# Patient Record
Sex: FEMALE | Race: WHITE | NOT HISPANIC OR LATINO | Employment: OTHER | URBAN - METROPOLITAN AREA
[De-identification: names, ages, dates, MRNs, and addresses within clinical notes are randomized per-mention and may not be internally consistent; named-entity substitution may affect disease eponyms.]

---

## 2017-01-06 ENCOUNTER — OUTPATIENT CASE MANAGEMENT (OUTPATIENT)
Dept: ADMINISTRATIVE | Facility: OTHER | Age: 82
End: 2017-01-06

## 2017-01-06 NOTE — PROGRESS NOTES
Follow up with pt. Pt reports she is doing much better. She states she had a good Flavio with her family. She denies any further needs at this time. Pt disenrolled from [facility].

## 2017-01-18 ENCOUNTER — OFFICE VISIT (OUTPATIENT)
Dept: FAMILY MEDICINE | Facility: CLINIC | Age: 82
End: 2017-01-18
Payer: MEDICARE

## 2017-01-18 VITALS
OXYGEN SATURATION: 93 % | WEIGHT: 142.63 LBS | TEMPERATURE: 98 F | RESPIRATION RATE: 18 BRPM | HEIGHT: 63 IN | SYSTOLIC BLOOD PRESSURE: 118 MMHG | DIASTOLIC BLOOD PRESSURE: 66 MMHG | HEART RATE: 77 BPM | BODY MASS INDEX: 25.27 KG/M2

## 2017-01-18 DIAGNOSIS — J06.9 UPPER RESPIRATORY TRACT INFECTION, UNSPECIFIED TYPE: Primary | ICD-10-CM

## 2017-01-18 PROCEDURE — 1157F ADVNC CARE PLAN IN RCRD: CPT | Mod: S$GLB,,, | Performed by: FAMILY MEDICINE

## 2017-01-18 PROCEDURE — 1160F RVW MEDS BY RX/DR IN RCRD: CPT | Mod: S$GLB,,, | Performed by: FAMILY MEDICINE

## 2017-01-18 PROCEDURE — 1125F AMNT PAIN NOTED PAIN PRSNT: CPT | Mod: S$GLB,,, | Performed by: FAMILY MEDICINE

## 2017-01-18 PROCEDURE — 99213 OFFICE O/P EST LOW 20 MIN: CPT | Mod: S$GLB,,, | Performed by: FAMILY MEDICINE

## 2017-01-18 PROCEDURE — 1159F MED LIST DOCD IN RCRD: CPT | Mod: S$GLB,,, | Performed by: FAMILY MEDICINE

## 2017-01-18 PROCEDURE — 99999 PR PBB SHADOW E&M-EST. PATIENT-LVL III: CPT | Mod: PBBFAC,,, | Performed by: FAMILY MEDICINE

## 2017-01-18 RX ORDER — BENZONATATE 100 MG/1
100 CAPSULE ORAL 3 TIMES DAILY PRN
Qty: 30 CAPSULE | Refills: 0 | Status: SHIPPED | OUTPATIENT
Start: 2017-01-18 | End: 2017-01-28

## 2017-01-18 RX ORDER — PROMETHAZINE HYDROCHLORIDE AND DEXTROMETHORPHAN HYDROBROMIDE 6.25; 15 MG/5ML; MG/5ML
5 SYRUP ORAL EVERY 8 HOURS PRN
Qty: 118 ML | Refills: 0 | Status: SHIPPED | OUTPATIENT
Start: 2017-01-18 | End: 2017-01-26 | Stop reason: ALTCHOICE

## 2017-01-18 NOTE — PROGRESS NOTES
"Subjective:       Patient ID: Sandra Moody is a 88 y.o. female.    Chief Complaint: URI (all since Sat) and Sore Throat    URI    This is a new problem. The current episode started in the past 7 days. There has been no fever. Associated symptoms include coughing, diarrhea, rhinorrhea and sneezing. Pertinent negatives include no ear pain, headaches, nausea or vomiting. She has tried nothing for the symptoms. The treatment provided no relief.     Review of Systems   HENT: Positive for rhinorrhea and sneezing. Negative for ear pain.    Respiratory: Positive for cough.    Gastrointestinal: Positive for diarrhea. Negative for nausea and vomiting.   Neurological: Negative for headaches.       Objective:       Vitals:    01/18/17 1446   BP: 118/66   Pulse: 77   Resp: 18   Temp: 98.3 °F (36.8 °C)   TempSrc: Oral   SpO2: (!) 93%   Weight: 64.7 kg (142 lb 10.2 oz)   Height: 5' 3" (1.6 m)     .ASS  Physical Exam   Constitutional: She is oriented to person, place, and time. She appears well-developed and well-nourished. No distress.   HENT:   Head: Normocephalic and atraumatic.   Right Ear: External ear normal.   Left Ear: External ear normal.   Mouth/Throat: Oropharynx is clear and moist. No oropharyngeal exudate.   Neck: Normal range of motion. Neck supple.   Cardiovascular: Normal rate, regular rhythm and normal heart sounds.  Exam reveals no gallop and no friction rub.    No murmur heard.  Pulmonary/Chest: Effort normal and breath sounds normal. No respiratory distress. She has no wheezes. She has no rales. She exhibits no tenderness.   Lymphadenopathy:     She has cervical adenopathy.   Neurological: She is alert and oriented to person, place, and time.   Skin: She is not diaphoretic.       Assessment:       1. Upper respiratory tract infection, unspecified type        Plan:       Sandra was seen today for uri and sore throat.    Diagnoses and all orders for this visit:    Upper respiratory tract infection, " unspecified type  -     benzonatate (TESSALON) 100 MG capsule; Take 1 capsule (100 mg total) by mouth 3 (three) times daily as needed for Cough.  -     promethazine-dextromethorphan (PROMETHAZINE-DM) 6.25-15 mg/5 mL Syrp; Take 5 mLs by mouth every 8 (eight) hours as needed.

## 2017-01-18 NOTE — MR AVS SNAPSHOT
[facility]  [address]  [address]  [place] [address]  Phone: [phone]  Fax: [phone]                  Sandra Moody   2017 2:45 PM   Office Visit    Description:  Female : 10/27/1928   Provider:  Chhaya Christianson MD   Department:  [facility]           Reason for Visit     URI     Sore Throat           Diagnoses this Visit        Comments    Upper respiratory tract infection, unspecified type    -  Primary            To Do List           Goals (5 Years of Data)              12/7/16    11/22/16    11/3/16    COMPLETED: Patient/caregiver will complete application process to obtain services prior to discharge from [facility].           Notes - Note edited  2016 12:54 PM by Alyce Varma LCSW    Overall Time to Completion  2 months from 2016    Short Term Goals  Patient/caregiver will contact Agency: [facility] and Insurance Customer Service for guidance within 1 month.  Interventions   · Assist with completing insurance claim form.  · Refer to Customer Service Department.   Status  · Deferred      Patient/caregiver will review and complete Advance Directive Form within 1 month.  Interventions   · Empower patient/caregiver to discuss End of life issues with family, Physician and/or Power of .  · Provide education on advance care planning.   Status  · Deferred            COMPLETED: Patient/caregiver will have adequate mental health support/resources prior to discharge from [facility].           Notes - Note edited  2016 11:47 AM by Alyce Varma LCSW    Overall Time to Completion  2 weeks from 2016    Short Term Goals  Patient/caregiver will visit Mental Health  or support group within 2 weeks.  Interventions   · Encourage compliance with medical/mental health appointments.  · Encourage family/social  and involvement in care.  · Encourage medication compliance.  · Facilitate family  meeting.  · Provide crisis intervention hotline.  · Provide information on safety devices.  · Provide support group information.  · Provide supportive counseling.  · Recognize and provide educational material (KRAMES).   Status  · Met            COMPLETED: Patient/caregiver will have an action plan in place to manage and prevent complications of COPD prior to discharge from [facility].   On track  On track  On track    Notes - Note created  10/24/2016  2:00 PM by Shawna Lawrence RN    Overall Time to Completion  4 weeks from 10/24/2016    Short Term Goals  Patient/caregiver will identify 3 deficits resulting from COPD and list 3 ways to overcome those deficits within 2 weeks.  Interventions   · Arrange home visit to evaluate home environment.  · Recognize and provide educational material (KRAMES).   Status  · Partially met    Patient/caregiver will verbalize 4 signs and symptoms of COPD within 2 weeks.   Interventions   · Recognize and provide educational material (KRAMES).  · Complete medication reconciliation.  · Encourage Medication Compliance.   Status  · Partially met    Patient/caregiver will verbalize 3 ways of preventing complications due to disease process within 1 month.  Interventions   · Recognize and provide educational material (KRAMES).  · Encourage Medication Compliance.   Status  · Partially met    Patient/caregiver will verbalize importance of early intervention for symptoms of Hypotension COPD within 1 month.  Interventions   · Recognize and provide educational material (KRAMES).  · Encourage Medication Compliance.   Status  · Partially met          COMPLETED: Patient/caregiver will have knowledge of resources available in order to obtain the services that are needed prior to discharge from [facility].           Notes - Note created  10/24/2016  2:01 PM by Shawna Lawrence RN    Overall Time to Completion  4 weeks from 10/24/2016    Short Term Goals  Patient/caregiver will notify RN CCM if patient does not hear  from [facility]  within 1 week.  Interventions   · Refer to Outpatient Case Management Social Worker.   Status  · Met             These Medications        Disp Refills Start End    benzonatate (TESSALON) 100 MG capsule 30 capsule 0 1/18/2017 1/28/2017    Take 1 capsule (100 mg total) by mouth 3 (three) times daily as needed for Cough. - Oral    Pharmacy: [facility] [address] [place] [address] [facility] #: [phone]       promethazine-dextromethorphan (PROMETHAZINE-DM) 6.25-15 mg/5 mL Syrp 118 mL 0 1/18/2017 1/28/2017    Take 5 mLs by mouth every 8 (eight) hours as needed. - Oral    Pharmacy: [facility] [address] [place] [address] [facility] #: [phone]         [facility]     [facility] On [facility] Nurse Care Line - 24/7 Assistance  Registered nurses in the [facility] provide clinical advisement, health education, appointment booking, and other advisory services.  Call for this free service at [phone].             Medications           Message regarding Medications     Verify the changes and/or additions to your medication regime listed below are the same as discussed with your clinician today.  If any of these changes or additions are incorrect, please notify your healthcare provider.        START taking these NEW medications        Refills    benzonatate (TESSALON) 100 MG capsule 0    Sig: Take 1 capsule (100 mg total) by mouth 3 (three) times daily as needed for Cough.    Class: Normal    Route: Oral    promethazine-dextromethorphan (PROMETHAZINE-DM) 6.25-15 mg/5 mL Syrp 0    Sig: Take 5 mLs by mouth every 8 (eight) hours as needed.    Class: Normal    Route: Oral           Verify that the below list of medications is an accurate representation of the medications you are currently taking.  If none reported, the list may be blank. If incorrect, please contact your healthcare provider. Carry this list  with you in case of emergency.           Current Medications     albuterol (PROVENTIL HFA) 90 mcg/actuation inhaler Inhale 2 puffs into the lungs every 6 (six) hours as needed (ANY equivalent covered is OK).    alendronate (FOSAMAX) 70 MG tablet TAKE 1 TABLET EVERY 7 DAYS    amlodipine (NORVASC) 5 MG tablet TAKE 2 TABLETS EVERY DAY    aspirin 81 mg Tab Take 1 tablet by mouth Daily.     ciclopirox (LOPROX) 0.77 % Crea     hydrocortisone 2.5 % cream Apply topically 2 (two) times daily.    levothyroxine (SYNTHROID) 25 MCG tablet TAKE 1 TABLET EVERY DAY    lisinopril (PRINIVIL,ZESTRIL) 40 MG tablet TAKE 1 TABLET EVERY DAY    metoprolol succinate (TOPROL-XL) 50 MG 24 hr tablet TAKE 1 TABLET ONE TIME DAILY    sertraline (ZOLOFT) 25 MG tablet Take 1 tablet (25 mg total) by mouth once daily.    temazepam (RESTORIL) 30 mg capsule TAKE ONE CAPSULE BY MOUTH AT NIGHT AS NEEDED    triamcinolone acetonide 0.1% (KENALOG) 0.1 % cream     benzonatate (TESSALON) 100 MG capsule Take 1 capsule (100 mg total) by mouth 3 (three) times daily as needed for Cough.    ergocalciferol (VITAMIN D2) 50,000 unit Cap Take 5,000 Units by mouth every 7 days.    estradiol (ESTRACE) 0.01 % (0.1 mg/g) vaginal cream Pea-size dab of cream to urethra/introitus at bed time.    fexofenadine (ALLEGRA) 180 MG tablet Take 1 tablet (180 mg total) by mouth once daily. 1 Tablet Oral Every day.  Take as directed for allergy or thin secretions.    omeprazole (PRILOSEC) 20 MG capsule TAKE 1 OR 2 CAPSULES DAILY AS DIRECTED    oxybutynin (DITROPAN-XL) 10 MG 24 hr tablet TAKE 1 TABLET ONE TIME DAILY    promethazine-dextromethorphan (PROMETHAZINE-DM) 6.25-15 mg/5 mL Syrp Take 5 mLs by mouth every 8 (eight) hours as needed.    tramadol (ULTRAM) 50 mg tablet Take 1 tablet (50 mg total) by mouth every 6 (six) hours as needed for Pain. 1-2 q 6hr prn           Clinical Reference Information           Vital Signs - Last Recorded  Most recent update: 1/18/2017  2:47 PM by  "Jessica Geiger MA    BP Pulse Temp Resp Ht Wt    118/66 (BP Location: Left arm, Patient Position: Sitting, BP Method: Manual) 77 98.3 °F (36.8 °C) (Oral) 18 5' 3" (1.6 m) 64.7 kg (142 lb 10.2 oz)    SpO2 BMI             (!) 93% 25.27 kg/m2         Blood Pressure          Most Recent Value    BP  118/66      Allergies as of 1/18/2017     No Known Allergies      Immunizations Administered on Date of Encounter - 1/18/2017     None      [facility] Sign-Up     Activating your [facility] account is as easy as 1-2-3!     1) Visit [email or web address], select Sign Up Now, enter this activation code and your date of birth, then select Next.  [email or web address]  Expires: 1/19/2017  9:49 AM      2) Create a username and password to use when you visit [facility] in the future and select a security question in case you lose your password and select Next.    3) Enter your e-mail address and click Sign Up!    Additional Information  If you have questions, please e-mail [email or web address] or call [phone] to talk to our [facility] staff. Remember, [facility] is NOT to be used for urgent needs. For medical emergencies, dial 911.         "

## 2017-01-26 ENCOUNTER — LAB VISIT (OUTPATIENT)
Dept: LAB | Facility: HOSPITAL | Age: 82
End: 2017-01-26
Attending: INTERNAL MEDICINE
Payer: MEDICARE

## 2017-01-26 ENCOUNTER — OFFICE VISIT (OUTPATIENT)
Dept: FAMILY MEDICINE | Facility: CLINIC | Age: 82
End: 2017-01-26
Payer: MEDICARE

## 2017-01-26 VITALS
SYSTOLIC BLOOD PRESSURE: 116 MMHG | RESPIRATION RATE: 18 BRPM | DIASTOLIC BLOOD PRESSURE: 64 MMHG | OXYGEN SATURATION: 93 % | HEART RATE: 88 BPM | HEIGHT: 63 IN | WEIGHT: 144.63 LBS | BODY MASS INDEX: 25.62 KG/M2 | TEMPERATURE: 99 F

## 2017-01-26 DIAGNOSIS — R35.0 INCREASED URINARY FREQUENCY: ICD-10-CM

## 2017-01-26 DIAGNOSIS — I10 ESSENTIAL HYPERTENSION: ICD-10-CM

## 2017-01-26 DIAGNOSIS — J01.00 ACUTE MAXILLARY SINUSITIS, RECURRENCE NOT SPECIFIED: Primary | ICD-10-CM

## 2017-01-26 DIAGNOSIS — R10.9 ACUTE LEFT FLANK PAIN: ICD-10-CM

## 2017-01-26 LAB
BACTERIA #/AREA URNS HPF: ABNORMAL /HPF
BILIRUB UR QL STRIP: NEGATIVE
CLARITY UR: CLEAR
COLOR UR: YELLOW
GLUCOSE UR QL STRIP: NEGATIVE
HGB UR QL STRIP: NEGATIVE
KETONES UR QL STRIP: NEGATIVE
LEUKOCYTE ESTERASE UR QL STRIP: ABNORMAL
MICROSCOPIC COMMENT: ABNORMAL
NITRITE UR QL STRIP: NEGATIVE
NON-SQ EPI CELLS #/AREA URNS HPF: <1 /HPF
PH UR STRIP: 5 [PH] (ref 5–8)
PROT UR QL STRIP: NEGATIVE
RBC #/AREA URNS HPF: 0 /HPF (ref 0–4)
SP GR UR STRIP: 1.01 (ref 1–1.03)
SQUAMOUS #/AREA URNS HPF: 10 /HPF
URN SPEC COLLECT METH UR: ABNORMAL
UROBILINOGEN UR STRIP-ACNC: NEGATIVE EU/DL
WBC #/AREA URNS HPF: 3 /HPF (ref 0–5)

## 2017-01-26 PROCEDURE — 87086 URINE CULTURE/COLONY COUNT: CPT

## 2017-01-26 PROCEDURE — 99499 UNLISTED E&M SERVICE: CPT | Mod: S$GLB,,, | Performed by: INTERNAL MEDICINE

## 2017-01-26 PROCEDURE — 1159F MED LIST DOCD IN RCRD: CPT | Mod: S$GLB,,, | Performed by: INTERNAL MEDICINE

## 2017-01-26 PROCEDURE — 99999 PR PBB SHADOW E&M-EST. PATIENT-LVL III: CPT | Mod: PBBFAC,,, | Performed by: INTERNAL MEDICINE

## 2017-01-26 PROCEDURE — 1125F AMNT PAIN NOTED PAIN PRSNT: CPT | Mod: S$GLB,,, | Performed by: INTERNAL MEDICINE

## 2017-01-26 PROCEDURE — 99214 OFFICE O/P EST MOD 30 MIN: CPT | Mod: S$GLB,,, | Performed by: INTERNAL MEDICINE

## 2017-01-26 PROCEDURE — 81000 URINALYSIS NONAUTO W/SCOPE: CPT

## 2017-01-26 PROCEDURE — 1160F RVW MEDS BY RX/DR IN RCRD: CPT | Mod: S$GLB,,, | Performed by: INTERNAL MEDICINE

## 2017-01-26 PROCEDURE — 1157F ADVNC CARE PLAN IN RCRD: CPT | Mod: S$GLB,,, | Performed by: INTERNAL MEDICINE

## 2017-01-26 RX ORDER — AMOXICILLIN AND CLAVULANATE POTASSIUM 875; 125 MG/1; MG/1
1 TABLET, FILM COATED ORAL 2 TIMES DAILY
Qty: 20 TABLET | Refills: 0 | Status: SHIPPED | OUTPATIENT
Start: 2017-01-26 | End: 2017-02-05

## 2017-01-26 NOTE — PROGRESS NOTES
SUBJECTIVE     Chief Complaint   Patient presents with    Flank Pain     x 2 days. no injury not pulled muscle. pain just started all of a sudden    Cough     Still having cough and coughing up phlem since last visit       HPI  Sandra Moody is a 88 y.o. female with multiple medical diagnoses as listed in the medical history and problem list that presents for evaluation of L flank pain x 1 day and cough since 1/18/17.     L Flank Pain- Pt says her L flank pain started yesterday, but denies any heavy lifting or falls/trauma. Pain is only when she touches it. She can not describe the pain, but is at a 5-6/10 without any radiation. She has not taken any meds for the pain. Denies any dysuria. +increased urinary frequency and urgency. Pt denies any fever, chills, or night sweats. +hx of kidney stones x 2 that she was not able to blast. Pt is not having any hematuria.     URI- Pt reports she is still coughing. She finds herself coughing more with talking and neither of the cough meds are supressing the cough. Cough is productive of yellow sputum. She has been using a peppermint with some improvement of cough. +sick contacts(2 great grandsons). Pt reports her cough started while on a cruise ship in [facility].      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 lj- fosamax    PSVT (paroxysmal supraventricular tachycardia)      dr. 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    Thyroid disease      hypothyroidism    Trochanteric bursitis 4/18/2013    Urine incontinence 2/24/2015    Vitamin D deficiency disease 9/4/2014       PAST SURGICAL HISTORY:  Past Surgical History   Procedure Laterality Date    Adenoidectomy      Tonsillectomy      Hysterectomy      Cholecystectomy      Appendectomy      Cystoscopy      Oophorectomy      Colonoscopy      Upper gastrointestinal endoscopy      Lung lobectomy Right        SOCIAL HISTORY:  Social History     Social History    Marital status:      Spouse name: N/A    Number of children: N/A    Years of education: N/A     Occupational History    Not on file.     Social History Main Topics    Smoking status: Never Smoker    Smokeless tobacco: Never Used    Alcohol use No    Drug use: No    Sexual activity: No     Other Topics Concern    Not on file     Social History Narrative       FAMILY HISTORY:  Family History   Problem Relation Age of Onset    Cancer Mother      COLON    Cancer Brother     Asthma Neg Hx     Emphysema Neg Hx     Amblyopia Neg Hx     Blindness Neg Hx     Cataracts Neg Hx     Diabetes Neg Hx     Glaucoma Neg Hx     Hypertension Neg Hx     Macular degeneration Neg Hx     Retinal detachment Neg Hx     Strabismus Neg Hx     Stroke Neg Hx     Thyroid disease Neg Hx     Cirrhosis Neg Hx        ALLERGIES AND MEDICATIONS: updated and reviewed.  Review of patient's allergies indicates:  No Known Allergies  Current Outpatient Prescriptions   Medication Sig Dispense Refill    alendronate (FOSAMAX) 70 MG tablet TAKE 1 TABLET EVERY 7 DAYS 12 tablet 11    amlodipine (NORVASC) 5 MG tablet TAKE 2 TABLETS EVERY  tablet 3    aspirin 81 mg Tab Take 1 tablet by mouth Daily.       benzonatate (TESSALON) 100 MG capsule Take 1 capsule (100 mg total) by mouth 3 (three) times daily as needed for Cough. 30 capsule 0    ciclopirox (LOPROX) 0.77  % Crea       ergocalciferol (VITAMIN D2) 50,000 unit Cap Take 5,000 Units by mouth every 7 days.      estradiol (ESTRACE) 0.01 % (0.1 mg/g) vaginal cream Pea-size dab of cream to urethra/introitus at bed time. 42.5 g 1    fexofenadine (ALLEGRA) 180 MG tablet Take 1 tablet (180 mg total) by mouth once daily. 1 Tablet Oral Every day.  Take as directed for allergy or thin secretions. 90 tablet 12    levothyroxine (SYNTHROID) 25 MCG tablet TAKE 1 TABLET EVERY DAY 90 tablet 3    lisinopril (PRINIVIL,ZESTRIL) 40 MG tablet TAKE 1 TABLET EVERY DAY 90 tablet 3    metoprolol succinate (TOPROL-XL) 50 MG 24 hr tablet TAKE 1 TABLET ONE TIME DAILY 90 tablet 3    omeprazole (PRILOSEC) 20 MG capsule TAKE 1 OR 2 CAPSULES DAILY AS DIRECTED 180 capsule PRN    oxybutynin (DITROPAN-XL) 10 MG 24 hr tablet TAKE 1 TABLET ONE TIME DAILY 90 tablet 11    sertraline (ZOLOFT) 25 MG tablet Take 1 tablet (25 mg total) by mouth once daily. 30 tablet 11    temazepam (RESTORIL) 30 mg capsule TAKE ONE CAPSULE BY MOUTH AT NIGHT AS NEEDED 30 capsule 5    tramadol (ULTRAM) 50 mg tablet Take 1 tablet (50 mg total) by mouth every 6 (six) hours as needed for Pain. 1-2 q 6hr prn 60 tablet 0    triamcinolone acetonide 0.1% (KENALOG) 0.1 % cream       albuterol (PROVENTIL HFA) 90 mcg/actuation inhaler Inhale 2 puffs into the lungs every 6 (six) hours as needed (ANY equivalent covered is OK). 1 Inhaler 2    amoxicillin-clavulanate 875-125mg (AUGMENTIN) 875-125 mg per tablet Take 1 tablet by mouth 2 (two) times daily. 20 tablet 0    hydrocortisone 2.5 % cream Apply topically 2 (two) times daily. 20 g 0     No current facility-administered medications for this visit.        ROS  Review of Systems   Constitutional: Negative for chills and fever.   HENT: Negative for hearing loss and sore throat.    Eyes: Negative for visual disturbance.   Respiratory: Positive for cough. Negative for shortness of breath.    Cardiovascular: Negative for chest pain,  "palpitations and leg swelling.   Gastrointestinal: Negative for abdominal pain, constipation, diarrhea, nausea and vomiting.   Genitourinary: Positive for frequency and urgency. Negative for dysuria.   Musculoskeletal: Negative for arthralgias, joint swelling and myalgias.        L flank pain   Skin: Negative for rash and wound.   Neurological: Negative for headaches.   Psychiatric/Behavioral: Negative for agitation and confusion. The patient is not nervous/anxious.          OBJECTIVE     Physical Exam  Vitals:    01/26/17 1002   BP: 116/64   Pulse: 88   Resp: 18   Temp: 98.8 °F (37.1 °C)    Body mass index is 25.62 kg/(m^2).  Weight: 65.6 kg (144 lb 10 oz)   Height: 5' 3" (160 cm)     Physical Exam   Constitutional: She is oriented to person, place, and time. She appears well-developed and well-nourished. No distress.   HENT:   Head: Normocephalic and atraumatic.   Right Ear: External ear normal.   Left Ear: External ear normal.   Nose: Right sinus exhibits maxillary sinus tenderness. Right sinus exhibits no frontal sinus tenderness. Left sinus exhibits maxillary sinus tenderness. Left sinus exhibits no frontal sinus tenderness.   Mouth/Throat: No uvula swelling. Posterior oropharyngeal erythema present. No posterior oropharyngeal edema. No tonsillar exudate.   Eyes: Conjunctivae and EOM are normal. Right eye exhibits no discharge. Left eye exhibits no discharge. No scleral icterus.   Neck: Normal range of motion. Neck supple. No JVD present. No tracheal deviation present.   Cardiovascular: Normal rate, regular rhythm, normal heart sounds and intact distal pulses.  Exam reveals no gallop and no friction rub.    No murmur heard.  Pulmonary/Chest: Effort normal and breath sounds normal. No respiratory distress. She has no wheezes.   Abdominal: Soft. Bowel sounds are normal. She exhibits no distension and no mass. There is tenderness in the right upper quadrant, epigastric area, left upper quadrant and left lower " quadrant. There is no rebound, no guarding and no CVA tenderness.   Musculoskeletal: Normal range of motion. She exhibits no edema, tenderness or deformity.   Neurological: She is alert and oriented to person, place, and time. She exhibits normal muscle tone. Coordination normal.   Skin: Skin is warm and dry. No rash noted. No erythema.   Psychiatric: She has a normal mood and affect. Her behavior is normal. Judgment and thought content normal.         Health Maintenance       Date Due Completion Date    TETANUS VACCINE 3/1/2017 (Originally 10/27/1946) ---    DEXA SCAN 5/5/2017 5/5/2014    Override on 4/10/2014: (N/S)    Lipid Panel 4/10/2019 4/10/2014            ASSESSMENT     88 y.o. female with     1. Acute maxillary sinusitis, recurrence not specified    2. Essential hypertension    3. Increased urinary frequency    4. Acute left flank pain        PLAN:     1. Acute maxillary sinusitis, recurrence not specified  - Pt instructed to rest and remain well hydrated   - Take Abx to completion  - Continue Tessalon perles for cough; okay to use cough drops as needed or OTC Robitussin prn cough  - amoxicillin-clavulanate 875-125mg (AUGMENTIN) 875-125 mg per tablet; Take 1 tablet by mouth 2 (two) times daily.  Dispense: 20 tablet; Refill: 0    2. Essential hypertension  - BP well controlled; at goal of <140/90  - The current medical regimen is effective;  continue present plan and medications.    3. Increased urinary frequency  - Urine culture; Future  - Urinalysis; Future    4. Acute left flank pain  - Suspect pain is musculoskeletal from excessive coughing for several weeks, but can not r/o a possible UTI vs Nephrolithiasis  - Pt advised to take NSAIDs(Aleve, Ibuprofen, Advil, or Motrin) or Tylenol prn pain  - Apply heating pad to affected area with care not to burn skin  - Will f/u on urine studies above        RTC in 2 weeks for any acute worsening of current condition or failure to improve     Rox Montes,  MD  01/26/2017 10:40 AM        No Follow-up on file.

## 2017-01-27 ENCOUNTER — TELEPHONE (OUTPATIENT)
Dept: FAMILY MEDICINE | Facility: CLINIC | Age: 82
End: 2017-01-27

## 2017-01-28 LAB — BACTERIA UR CULT: NORMAL

## 2017-02-06 ENCOUNTER — TELEPHONE (OUTPATIENT)
Dept: FAMILY MEDICINE | Facility: CLINIC | Age: 82
End: 2017-02-06

## 2017-02-06 RX ORDER — FLUCONAZOLE 150 MG/1
150 TABLET ORAL DAILY
Qty: 1 TABLET | Refills: 0 | Status: SHIPPED | OUTPATIENT
Start: 2017-02-06 | End: 2017-02-07

## 2017-02-06 NOTE — TELEPHONE ENCOUNTER
----- Message from Nicole Galarza sent at 2/4/2017  8:20 AM CST -----  Contact: self  Pt calling to state that she has been trying to get in to see Dr MAGANA and can never see him. Please call [phone]

## 2017-02-06 NOTE — TELEPHONE ENCOUNTER
Call and get details of what is going on AND research if there are other messages/calls pending, thanks

## 2017-02-06 NOTE — TELEPHONE ENCOUNTER
Thanks, please call back and advised we will send it and Diflucan pill and also please make sure patient knows that this is the first message the doctor has received.    If she needs us in the future, please encourage her to call and leave a message with details and we will definitely get back with her and get her started on treatment or get her an appointment    Again, we have heard nothing about her problems until now

## 2017-02-06 NOTE — TELEPHONE ENCOUNTER
Stated, she can never see her PCP. He is always booked. She is getting to old to be bounced around like this. She was started on antibiotic for an URI. If PCP wants to help, he can send her in something to help her with this yeast infection she has now from the antibiotic. She can never see her PCP anymore. But thank you for calling. Please advise.

## 2017-02-08 RX ORDER — TEMAZEPAM 30 MG/1
CAPSULE ORAL
Qty: 30 CAPSULE | Refills: 5 | Status: SHIPPED | OUTPATIENT
Start: 2017-02-08 | End: 2017-07-30 | Stop reason: SDUPTHER

## 2017-02-10 ENCOUNTER — TELEPHONE (OUTPATIENT)
Dept: FAMILY MEDICINE | Facility: CLINIC | Age: 82
End: 2017-02-10

## 2017-02-10 DIAGNOSIS — K74.60 CIRRHOSIS OF LIVER WITHOUT ASCITES, UNSPECIFIED HEPATIC CIRRHOSIS TYPE: Primary | ICD-10-CM

## 2017-02-10 RX ORDER — TEMAZEPAM 30 MG/1
CAPSULE ORAL
Qty: 30 CAPSULE | Refills: 0 | OUTPATIENT
Start: 2017-02-10

## 2017-02-10 NOTE — TELEPHONE ENCOUNTER
----- Message from Nicole Galarza sent at 2/10/2017 10:19 AM CST -----  Contact: self  Pt calling to request a refill of temazepam (RESTORIL) 30 mg capsule. Please call [phone].

## 2017-02-15 ENCOUNTER — TELEPHONE (OUTPATIENT)
Dept: ENDOSCOPY | Facility: HOSPITAL | Age: 82
End: 2017-02-15

## 2017-03-02 ENCOUNTER — HOSPITAL ENCOUNTER (OUTPATIENT)
Dept: RADIOLOGY | Facility: HOSPITAL | Age: 82
Discharge: HOME | End: 2017-03-02
Attending: NURSE PRACTITIONER
Payer: MEDICARE

## 2017-03-02 DIAGNOSIS — K74.60 CIRRHOSIS OF LIVER WITHOUT ASCITES, UNSPECIFIED HEPATIC CIRRHOSIS TYPE: ICD-10-CM

## 2017-03-02 PROCEDURE — 76700 US EXAM ABDOM COMPLETE: CPT | Mod: 26,,, | Performed by: RADIOLOGY

## 2017-03-02 PROCEDURE — 76700 US EXAM ABDOM COMPLETE: CPT | Mod: TC

## 2017-03-06 ENCOUNTER — TELEPHONE (OUTPATIENT)
Dept: FAMILY MEDICINE | Facility: CLINIC | Age: 82
End: 2017-03-06

## 2017-03-06 NOTE — TELEPHONE ENCOUNTER
----- Message from Francesca Ervin sent at 3/3/2017 12:17 PM CST -----  Contact: self  Pt needs a sooner appt with Dr Harry. Please call [phone]. Thanks

## 2017-03-07 ENCOUNTER — OFFICE VISIT (OUTPATIENT)
Dept: FAMILY MEDICINE | Facility: CLINIC | Age: 82
End: 2017-03-07
Payer: MEDICARE

## 2017-03-07 ENCOUNTER — TELEPHONE (OUTPATIENT)
Dept: FAMILY MEDICINE | Facility: CLINIC | Age: 82
End: 2017-03-07

## 2017-03-07 VITALS
TEMPERATURE: 99 F | WEIGHT: 143.5 LBS | DIASTOLIC BLOOD PRESSURE: 78 MMHG | SYSTOLIC BLOOD PRESSURE: 152 MMHG | RESPIRATION RATE: 18 BRPM | OXYGEN SATURATION: 93 % | BODY MASS INDEX: 25.43 KG/M2 | HEART RATE: 65 BPM | HEIGHT: 63 IN

## 2017-03-07 DIAGNOSIS — J44.9 CHRONIC OBSTRUCTIVE PULMONARY DISEASE, UNSPECIFIED COPD TYPE: ICD-10-CM

## 2017-03-07 DIAGNOSIS — I49.8 FLUTTERING HEART: ICD-10-CM

## 2017-03-07 DIAGNOSIS — D69.6 THROMBOCYTOPENIA: ICD-10-CM

## 2017-03-07 DIAGNOSIS — I70.0 ATHEROSCLEROSIS OF AORTA: ICD-10-CM

## 2017-03-07 DIAGNOSIS — K76.6 PORTAL HYPERTENSION: ICD-10-CM

## 2017-03-07 DIAGNOSIS — M19.90 OSTEOARTHRITIS, UNSPECIFIED OSTEOARTHRITIS TYPE, UNSPECIFIED SITE: Primary | ICD-10-CM

## 2017-03-07 DIAGNOSIS — I10 HYPERTENSION, ESSENTIAL, BENIGN: ICD-10-CM

## 2017-03-07 DIAGNOSIS — R94.31 ABNORMAL EKG: ICD-10-CM

## 2017-03-07 PROCEDURE — 99999 PR PBB SHADOW E&M-EST. PATIENT-LVL III: CPT | Mod: PBBFAC,,, | Performed by: INTERNAL MEDICINE

## 2017-03-07 PROCEDURE — 1160F RVW MEDS BY RX/DR IN RCRD: CPT | Mod: S$GLB,,, | Performed by: INTERNAL MEDICINE

## 2017-03-07 PROCEDURE — 99214 OFFICE O/P EST MOD 30 MIN: CPT | Mod: S$GLB,,, | Performed by: INTERNAL MEDICINE

## 2017-03-07 PROCEDURE — 1157F ADVNC CARE PLAN IN RCRD: CPT | Mod: S$GLB,,, | Performed by: INTERNAL MEDICINE

## 2017-03-07 PROCEDURE — 93005 ELECTROCARDIOGRAM TRACING: CPT | Mod: S$GLB,,, | Performed by: INTERNAL MEDICINE

## 2017-03-07 PROCEDURE — 1159F MED LIST DOCD IN RCRD: CPT | Mod: S$GLB,,, | Performed by: INTERNAL MEDICINE

## 2017-03-07 PROCEDURE — 1125F AMNT PAIN NOTED PAIN PRSNT: CPT | Mod: S$GLB,,, | Performed by: INTERNAL MEDICINE

## 2017-03-07 PROCEDURE — 93010 ELECTROCARDIOGRAM REPORT: CPT | Mod: S$GLB,,, | Performed by: INTERNAL MEDICINE

## 2017-03-07 PROCEDURE — 99499 UNLISTED E&M SERVICE: CPT | Mod: S$GLB,,, | Performed by: INTERNAL MEDICINE

## 2017-03-07 NOTE — PROGRESS NOTES
SUBJECTIVE     Chief Complaint   Patient presents with    Tachycardia     over past week noticed rapid HR them Slows down. concerned    Arthritis     fingers, hip, and shoulder       HPI  Sandra Moody is a 88 y.o. female with multiple medical diagnoses as listed in the medical history and problem list that presents for evaluation of Arthritis in B/L 1st MCP joint, L hip and L shoulder since late 2016 . Pt has not been taking any meds, because she did not want to hurt her liver. Pain is burning and swelling at an 8/10 and intermittent in nature. Pt reports her fingers get really red and inflammed.     Tachycardia- Pt has experienced tachycardia/palpitations x 1 week. She feels a fluttering in her chest almost daily and then the HR slows down. Occurs with rest or activity. She also becomes lightheaded. The episodes last only a few minutes and resolve without any intervention. Pt says this happened to her several years ago, but had a negative work up. Pt denies any associated chest pain, SOB, HA, N/V, or diaphoresis.     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 lj- fosamax    PSVT (paroxysmal supraventricular tachycardia)     dr. marx    PVC (premature ventricular contraction) 11/10/2012    Rectocele     Status post partial lobectomy of lung 2/2010    Thyroid disease     hypothyroidism    Trochanteric bursitis 4/18/2013    Urine incontinence 2/24/2015    Vitamin D deficiency disease 9/4/2014        PAST SURGICAL HISTORY:  Past Surgical History:   Procedure Laterality Date    ADENOIDECTOMY      APPENDECTOMY      CHOLECYSTECTOMY      COLONOSCOPY      CYSTOSCOPY      HYSTERECTOMY      LUNG LOBECTOMY Right     OOPHORECTOMY      TONSILLECTOMY      UPPER GASTROINTESTINAL ENDOSCOPY         SOCIAL HISTORY:  Social History     Social History    Marital status:      Spouse name: N/A    Number of children: N/A    Years of education: N/A     Occupational History    Not on file.     Social History Main Topics    Smoking status: Never Smoker    Smokeless tobacco: Never Used    Alcohol use No    Drug use: No    Sexual activity: No     Other Topics Concern    Not on file     Social History Narrative       FAMILY HISTORY:  Family History   Problem Relation Age of Onset    Cancer Mother      COLON    Cancer Brother     Asthma Neg Hx     Emphysema Neg Hx     Amblyopia Neg Hx     Blindness Neg Hx     Cataracts Neg Hx     Diabetes Neg Hx     Glaucoma Neg Hx     Hypertension Neg Hx     Macular degeneration Neg Hx     Retinal detachment Neg Hx     Strabismus Neg Hx     Stroke Neg Hx     Thyroid disease Neg Hx     Cirrhosis Neg Hx        ALLERGIES AND MEDICATIONS: updated and reviewed.  Review of patient's allergies indicates:  No Known Allergies  Current Outpatient Prescriptions   Medication Sig Dispense Refill    alendronate (FOSAMAX) 70 MG tablet TAKE 1 TABLET EVERY 7 DAYS 12 tablet 11    amlodipine (NORVASC) 5 MG tablet TAKE 2 TABLETS EVERY  tablet 3    aspirin 81 mg Tab Take 1 tablet by mouth Daily.       ciclopirox (LOPROX) 0.77 % Crea       ergocalciferol (VITAMIN D2) 50,000 unit Cap Take 5,000 Units by mouth every 7 days.      estradiol (ESTRACE) 0.01 % (0.1 mg/g) vaginal cream Pea-size dab of cream to urethra/introitus at bed time. 42.5 g 1    fexofenadine (ALLEGRA) 180 MG tablet Take 1 tablet (180 mg total) by mouth once daily. 1 Tablet Oral Every day.  Take as  directed for allergy or thin secretions. 90 tablet 12    levothyroxine (SYNTHROID) 25 MCG tablet TAKE 1 TABLET EVERY DAY 90 tablet 3    lisinopril (PRINIVIL,ZESTRIL) 40 MG tablet TAKE 1 TABLET EVERY DAY 90 tablet 3    metoprolol succinate (TOPROL-XL) 50 MG 24 hr tablet TAKE 1 TABLET ONE TIME DAILY 90 tablet 3    omeprazole (PRILOSEC) 20 MG capsule TAKE 1 OR 2 CAPSULES DAILY AS DIRECTED 180 capsule PRN    oxybutynin (DITROPAN-XL) 10 MG 24 hr tablet TAKE 1 TABLET ONE TIME DAILY 90 tablet 11    sertraline (ZOLOFT) 25 MG tablet Take 1 tablet (25 mg total) by mouth once daily. 30 tablet 11    temazepam (RESTORIL) 30 mg capsule TAKE ONE CAPSULE BY MOUTH AT NIGHT AS NEEDED 30 capsule 5    tramadol (ULTRAM) 50 mg tablet Take 1 tablet (50 mg total) by mouth every 6 (six) hours as needed for Pain. 1-2 q 6hr prn 60 tablet 0    triamcinolone acetonide 0.1% (KENALOG) 0.1 % cream       albuterol (PROVENTIL HFA) 90 mcg/actuation inhaler Inhale 2 puffs into the lungs every 6 (six) hours as needed (ANY equivalent covered is OK). 1 Inhaler 2    hydrocortisone 2.5 % cream Apply topically 2 (two) times daily. 20 g 0     No current facility-administered medications for this visit.        ROS  Review of Systems   Constitutional: Negative for chills and fever.   HENT: Negative for hearing loss and sore throat.    Eyes: Negative for visual disturbance.   Respiratory: Negative for cough and shortness of breath.    Cardiovascular: Positive for palpitations. Negative for chest pain and leg swelling.   Gastrointestinal: Negative for abdominal pain, constipation, diarrhea, nausea and vomiting.   Genitourinary: Negative for dysuria, frequency and urgency.   Musculoskeletal: Positive for arthralgias (B/L 1st MCP joint, L shoulder, and L hip pain). Negative for joint swelling and myalgias.   Skin: Negative for rash and wound.   Neurological: Positive for light-headedness. Negative for headaches.   Psychiatric/Behavioral: Negative for  "agitation and confusion. The patient is not nervous/anxious.          OBJECTIVE     Physical Exam  Vitals:    03/07/17 0956   BP: (!) 152/78   Pulse: 65   Resp: 18   Temp: 98.9 °F (37.2 °C)    Body mass index is 25.42 kg/(m^2).  Weight: 65.1 kg (143 lb 8.3 oz)   Height: 5' 3" (160 cm)     Physical Exam   Constitutional: She is oriented to person, place, and time. She appears well-developed and well-nourished. No distress.   HENT:   Head: Normocephalic and atraumatic.   Right Ear: External ear normal.   Left Ear: External ear normal.   Nose: Nose normal.   Mouth/Throat: Oropharynx is clear and moist.   Eyes: Conjunctivae and EOM are normal. Right eye exhibits no discharge. Left eye exhibits no discharge. No scleral icterus.   Neck: Normal range of motion. Neck supple. No JVD present. No tracheal deviation present.   Cardiovascular: Normal rate, regular rhythm and intact distal pulses.  Exam reveals no gallop and no friction rub.    No murmur heard.  Pulmonary/Chest: Effort normal and breath sounds normal. No respiratory distress. She has no wheezes.   Abdominal: Soft. Bowel sounds are normal. She exhibits no distension and no mass. There is no tenderness. There is no rebound and no guarding.   Musculoskeletal: Normal range of motion. She exhibits no edema, tenderness or deformity.   Neurological: She is alert and oriented to person, place, and time. She exhibits normal muscle tone. Coordination normal.   Skin: Skin is warm and dry. No rash noted. No erythema.   Psychiatric: She has a normal mood and affect. Her behavior is normal. Judgment and thought content normal.         Health Maintenance       Date Due Completion Date    TETANUS VACCINE 10/27/1946 ---    DEXA SCAN 5/5/2017 5/5/2014    Override on 4/10/2014: (N/S)    Lipid Panel 4/10/2019 4/10/2014            ASSESSMENT     88 y.o. female with     1. Osteoarthritis, unspecified osteoarthritis type, unspecified site    2. Abnormal EKG    3. Fluttering heart    4. " Atherosclerosis of aorta    5. Portal hypertension    6. Chronic obstructive pulmonary disease, unspecified COPD type    7. Hypertension, essential, benign    8. Thrombocytopenia        PLAN:     1. Osteoarthritis, unspecified osteoarthritis type, unspecified site  - Pt with OA of B/L 1st MCP joints, L shoulder, and L hip  - Pt advised to continue Tylenol prn pain, but advised not exceed 2g of Tylenol per day  - Pt also advised to use a rolling walker when necessary to avoid falls    2. Abnormal EKG  - 2D echo with color flow doppler; Future  - Ambulatory referral to Cardiology    3. Fluttering heart  - EKG personally reviewed by myself today in clinic and awaiting review by Cardiology  - EKG 12-lead; NSR, rate ~60s, axis ~-10, normal intervals, no LVH or atrial enlargement, q waves in III and aVF and poor R wave progression anteriorly; no ST elevation/depression  - Holter monitor - 48 hour; Future    4. Atherosclerosis of aorta  - Stable; no acute issues  - Continue ASA and pt needs better HTN control    5. Portal hypertension  - Pt with recent Abdominal U/S with mild hepatomegaly and spleenomegaly    6. Chronic obstructive pulmonary disease, unspecified COPD type  - Stable; no acute issues  - The current medical regimen is effective;  continue present plan and medications.    7. Hypertension, essential, benign  - BP above goal of <140/90; likely multifactorial as pt has not taken meds today and has arthritic pain  - Continue current meds and monitor with f/u appt in 4 weeks    8. Thrombocytopenia  - Stable; no signs of bleed/bruising  - Likely 2/2 hx of liver disease  - Monitor        RTC in 4 weeks     Rox Montes MD  03/07/2017 10:26 AM        No Follow-up on file.

## 2017-03-07 NOTE — TELEPHONE ENCOUNTER
----- Message from Jeanette Young sent at 3/6/2017  1:34 PM CST -----  Contact: Self  Pt called to f/u on message that was put in on Friday. Pt is very upset and would like a call back today. Pt can be reached @ [phone].

## 2017-03-08 NOTE — TELEPHONE ENCOUNTER
Spoke with pt she states that she was seen in [place] by Dr. Montes, states that Dr. Montes informed her that she had a slight heart attack, states that Dr Montes has nirav her to see heart dr and have other testing done. Pt want to inform you of the situation.

## 2017-03-10 ENCOUNTER — TELEPHONE (OUTPATIENT)
Dept: FAMILY MEDICINE | Facility: CLINIC | Age: 82
End: 2017-03-10

## 2017-03-16 ENCOUNTER — HOSPITAL ENCOUNTER (OUTPATIENT)
Dept: CARDIOLOGY | Facility: HOSPITAL | Age: 82
Discharge: HOME | End: 2017-03-16
Attending: INTERNAL MEDICINE
Payer: MEDICARE

## 2017-03-16 ENCOUNTER — HOSPITAL ENCOUNTER (OUTPATIENT)
Dept: RADIOLOGY | Facility: HOSPITAL | Age: 82
Discharge: HOME | End: 2017-03-16
Attending: INTERNAL MEDICINE
Payer: MEDICARE

## 2017-03-16 ENCOUNTER — OFFICE VISIT (OUTPATIENT)
Dept: CARDIOLOGY | Facility: CLINIC | Age: 82
End: 2017-03-16
Payer: MEDICARE

## 2017-03-16 VITALS
BODY MASS INDEX: 25.39 KG/M2 | WEIGHT: 143.31 LBS | OXYGEN SATURATION: 94 % | DIASTOLIC BLOOD PRESSURE: 67 MMHG | SYSTOLIC BLOOD PRESSURE: 140 MMHG | HEART RATE: 88 BPM | HEIGHT: 63 IN

## 2017-03-16 DIAGNOSIS — R94.31 ABNORMAL EKG: ICD-10-CM

## 2017-03-16 DIAGNOSIS — R00.0 TACHYCARDIA: ICD-10-CM

## 2017-03-16 DIAGNOSIS — I10 ESSENTIAL HYPERTENSION: ICD-10-CM

## 2017-03-16 DIAGNOSIS — J43.8 OTHER EMPHYSEMA: ICD-10-CM

## 2017-03-16 DIAGNOSIS — R00.2 PALPITATIONS: Primary | ICD-10-CM

## 2017-03-16 DIAGNOSIS — E03.9 ACQUIRED HYPOTHYROIDISM: ICD-10-CM

## 2017-03-16 DIAGNOSIS — Z85.118 HISTORY OF LUNG CANCER: ICD-10-CM

## 2017-03-16 DIAGNOSIS — R00.2 PALPITATIONS: ICD-10-CM

## 2017-03-16 DIAGNOSIS — K74.69 CRYPTOGENIC CIRRHOSIS: ICD-10-CM

## 2017-03-16 DIAGNOSIS — I49.8 FLUTTERING HEART: ICD-10-CM

## 2017-03-16 DIAGNOSIS — N18.2 CHRONIC KIDNEY DISEASE, STAGE II (MILD): ICD-10-CM

## 2017-03-16 DIAGNOSIS — E78.5 DYSLIPIDEMIA: ICD-10-CM

## 2017-03-16 LAB
DIASTOLIC DYSFUNCTION: NO
DIASTOLIC DYSFUNCTION: NO
ESTIMATED PA SYSTOLIC PRESSURE: 35.26
MITRAL VALVE REGURGITATION: NORMAL
RETIRED EF AND QEF - SEE NOTES: 60 (ref 55–65)
TRICUSPID VALVE REGURGITATION: NORMAL

## 2017-03-16 PROCEDURE — 93306 TTE W/DOPPLER COMPLETE: CPT | Mod: 26,,, | Performed by: INTERNAL MEDICINE

## 2017-03-16 PROCEDURE — 99204 OFFICE O/P NEW MOD 45 MIN: CPT | Mod: S$GLB,,, | Performed by: INTERNAL MEDICINE

## 2017-03-16 PROCEDURE — 63600175 PHARM REV CODE 636 W HCPCS

## 2017-03-16 PROCEDURE — 93018 CV STRESS TEST I&R ONLY: CPT | Mod: ,,, | Performed by: INTERNAL MEDICINE

## 2017-03-16 PROCEDURE — 99999 PR PBB SHADOW E&M-EST. PATIENT-LVL III: CPT | Mod: PBBFAC,,, | Performed by: INTERNAL MEDICINE

## 2017-03-16 PROCEDURE — 1157F ADVNC CARE PLAN IN RCRD: CPT | Mod: S$GLB,,, | Performed by: INTERNAL MEDICINE

## 2017-03-16 PROCEDURE — 93016 CV STRESS TEST SUPVJ ONLY: CPT | Mod: ,,, | Performed by: INTERNAL MEDICINE

## 2017-03-16 PROCEDURE — 1160F RVW MEDS BY RX/DR IN RCRD: CPT | Mod: S$GLB,,, | Performed by: INTERNAL MEDICINE

## 2017-03-16 PROCEDURE — 1126F AMNT PAIN NOTED NONE PRSNT: CPT | Mod: S$GLB,,, | Performed by: INTERNAL MEDICINE

## 2017-03-16 PROCEDURE — 93227 XTRNL ECG REC<48 HR R&I: CPT | Mod: ,,, | Performed by: INTERNAL MEDICINE

## 2017-03-16 PROCEDURE — 93225 XTRNL ECG REC<48 HRS REC: CPT

## 2017-03-16 PROCEDURE — 93017 CV STRESS TEST TRACING ONLY: CPT

## 2017-03-16 PROCEDURE — 78452 HT MUSCLE IMAGE SPECT MULT: CPT | Mod: TC

## 2017-03-16 PROCEDURE — 99499 UNLISTED E&M SERVICE: CPT | Mod: S$GLB,,, | Performed by: INTERNAL MEDICINE

## 2017-03-16 PROCEDURE — 78452 HT MUSCLE IMAGE SPECT MULT: CPT | Mod: 26,,, | Performed by: INTERNAL MEDICINE

## 2017-03-16 PROCEDURE — 93306 TTE W/DOPPLER COMPLETE: CPT

## 2017-03-16 PROCEDURE — 1159F MED LIST DOCD IN RCRD: CPT | Mod: S$GLB,,, | Performed by: INTERNAL MEDICINE

## 2017-03-16 RX ORDER — REGADENOSON 0.08 MG/ML
INJECTION, SOLUTION INTRAVENOUS
Status: DISPENSED
Start: 2017-03-16 | End: 2017-03-17

## 2017-03-16 NOTE — LETTER
March 16, 2017      Rox Montes MD  [address] [address]  [address]  [place] [address]           [facility]  [address] [facility] [address] [place] [address]  Phone: [phone]          Patient: Sandra Moody   MR Number: [id number]   YOB: 1928   Date of Visit: 3/16/2017       Dear Dr. Rox Montes:    Thank you for referring Sandra Moody to me for evaluation. Attached you will find relevant portions of my assessment and plan of care.    If you have questions, please do not hesitate to call me. I look forward to following Sandra Moody along with you.    Sincerely,    Aime Esteban MD    Enclosure  CC:  No Recipients    If you would like to receive this communication electronically, please contact [email or web address] or [phone] to request more information on [employer] access.    For providers and/or their staff who would like to refer a patient to [facility], please contact us through our one-stop-shop provider referral line, [facility], at [phone].    If you feel you have received this communication in error or would no longer like to receive these types of communications, please e-mail [email or web address]

## 2017-03-16 NOTE — PROGRESS NOTES
Subjective:    Patient ID:  Sandra Moody is a 88 y.o. female who presents for evaluation of [facility]      HPI  Patient was referred for evaluation of abnormal EKG and palpitations.  She says she has a history of irregular heartbeats dating back many years.  She says flutters that sustain with tachycardia upwards of 10 minutes.  She's had them more frequently in the past couple weeks.  EKG done at the care physician's office was abnormal showing baseline ST-T changes in possible inferior infarct.  She says she remotely had a cardiac workup possibly near 10 years ago that was within normal limits at that time.  She did go on a treadmill at that time as well.  Despite her stated age she looks great.  She says she denies any other cardiopulmonary complaints.  She's not expressing chest pain or shortness of breath.  She's not very active at baseline however.  She is able to do all her daily activities without any issues.  She says she also has all of her wits about her.  She denies any PND, orthopnea but does have some occasional lower extremity edema relieved with elevation.  She denies any dizziness, presyncope or syncope.    Review of Systems   Constitution: Negative.   HENT: Negative.    Eyes: Negative.    Cardiovascular: Positive for irregular heartbeat and palpitations. Negative for chest pain, dyspnea on exertion, leg swelling, near-syncope, orthopnea, paroxysmal nocturnal dyspnea and syncope.   Respiratory: Negative for shortness of breath.    Skin: Negative.    Musculoskeletal: Negative.    Gastrointestinal: Negative for abdominal pain, constipation and diarrhea.   Genitourinary: Negative for dysuria.   Neurological: Negative.    Psychiatric/Behavioral: Negative.      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 lj- fosamax    PSVT (paroxysmal supraventricular tachycardia)     dr. marx    PVC (premature ventricular contraction) 11/10/2012    Rectocele     Status post partial lobectomy of lung 2/2010    Thyroid disease     hypothyroidism    Trochanteric bursitis 4/18/2013    Urine incontinence 2/24/2015    Vitamin D deficiency disease 9/4/2014     Past Surgical History:   Procedure Laterality Date    ADENOIDECTOMY      APPENDECTOMY      CHOLECYSTECTOMY      COLONOSCOPY      CYSTOSCOPY      HYSTERECTOMY      LUNG LOBECTOMY Right     OOPHORECTOMY      TONSILLECTOMY      UPPER GASTROINTESTINAL ENDOSCOPY       Social History   Substance Use Topics    Smoking status: Never Smoker    Smokeless tobacco: Never Used    Alcohol use No     Family History   Problem Relation Age of Onset    Cancer Mother      COLON    Cancer Brother     Asthma Neg Hx     Emphysema Neg Hx     Amblyopia Neg Hx     Blindness Neg Hx     Cataracts Neg Hx     Diabetes Neg Hx     Glaucoma Neg Hx     Hypertension Neg Hx     Macular degeneration Neg Hx     Retinal detachment Neg Hx     Strabismus Neg Hx     Stroke Neg Hx     Thyroid disease Neg Hx     Cirrhosis Neg Hx         Objective:    Physical Exam   Constitutional: She is oriented to person, place, and time. She appears well-developed and well-nourished.   HENT:   Head: Normocephalic and atraumatic.   Eyes: Conjunctivae and EOM are normal. Pupils are equal, round, and reactive to light.   Neck: Normal range of motion. Neck supple. No thyromegaly present.   Cardiovascular: Normal rate and regular rhythm.    No murmur heard.  Pulmonary/Chest: Effort normal and breath  sounds normal. No respiratory distress.   Abdominal: Soft. Bowel sounds are normal.   Musculoskeletal: She exhibits no edema.   Neurological: She is alert and oriented to person, place, and time.   Skin: Skin is warm and dry.   Psychiatric: She has a normal mood and affect. Her behavior is normal.       ekg EKG shows normal sinus rhythm with nonspecific ST-T changes, inferior infarct    Assessment:       1. Palpitations    2. Tachycardia    3. Abnormal EKG    4. Essential hypertension    5. Dyslipidemia    6. Other emphysema    7. Chronic kidney disease, stage II (mild)    8. Acquired hypothyroidism    9. Cryptogenic cirrhosis    10. History of lung cancer         Plan:       -Multiple risk factors with abnormal EKG and current symptoms  -Plan for baseline testing including nuclear stress, echo and Holter as ordered  -Currently stable on beta blocker therapy    Return to clinic in one month with testing ASAP

## 2017-03-16 NOTE — MR AVS SNAPSHOT
[facility] - Cardiology  [address] [place] [address]  Phone: [phone]                  Sandra Moody   3/16/2017 8:40 AM   Office Visit    Description:  Female : 10/27/1928   Provider:  Aime Esteban MD   Department:  [facility] - Cardiology           Reason for Visit     Establish Care                To Do List           Future Appointments        Provider Department Dept Phone    3/16/2017 10:00 AM [facility] [phone]    3/16/2017 10:30 AM [facility]OLTER, [facility] [phone]    3/23/2017 1:00 PM Lisbeth Lund NP [facility] - Hepatology [phone]    2017 10:00 AM MD Bella [facility] - [facility] [phone]      Goals (5 Years of Data)              12/7/16    11/22/16    11/3/16    COMPLETED: Patient/caregiver will complete application process to obtain services prior to discharge from [facility].           Notes - Note edited  2016 12:54 PM by Alyce Varma LCSW    Overall Time to Completion  2 months from 2016    Short Term Goals  Patient/caregiver will contact Agency: [facility] and Insurance Customer Service for guidance within 1 month.  Interventions   · Assist with completing insurance claim form.  · Refer to Customer Service Department.   Status  · Deferred      Patient/caregiver will review and complete Advance Directive Form within 1 month.  Interventions   · Empower patient/caregiver to discuss End of life issues with family, Physician and/or Power of .  · Provide education on advance care planning.   Status  · Deferred            COMPLETED: Patient/caregiver will have adequate mental health support/resources prior to discharge from [facility].           Notes - Note edited  2016 11:47 AM by Alyce Varma LCSW    Overall Time to Completion  2 weeks from 2016    Short Term Goals  Patient/caregiver will visit Mental Health  or support  group within 2 weeks.  Interventions   · Encourage compliance with medical/mental health appointments.  · Encourage family/social  and involvement in care.  · Encourage medication compliance.  · Facilitate family meeting.  · Provide crisis intervention hotline.  · Provide information on safety devices.  · Provide support group information.  · Provide supportive counseling.  · Recognize and provide educational material (SUZANNEMES).   Status  · Met            COMPLETED: Patient/caregiver will have an action plan in place to manage and prevent complications of COPD prior to discharge from [facility].   On track  On track  On track    Notes - Note created  10/24/2016  2:00 PM by Shawna Lawrence RN    Overall Time to Completion  4 weeks from 10/24/2016    Short Term Goals  Patient/caregiver will identify 3 deficits resulting from COPD and list 3 ways to overcome those deficits within 2 weeks.  Interventions   · Arrange home visit to evaluate home environment.  · Recognize and provide educational material (KRAMES).   Status  · Partially met    Patient/caregiver will verbalize 4 signs and symptoms of COPD within 2 weeks.   Interventions   · Recognize and provide educational material (KRAMES).  · Complete medication reconciliation.  · Encourage Medication Compliance.   Status  · Partially met    Patient/caregiver will verbalize 3 ways of preventing complications due to disease process within 1 month.  Interventions   · Recognize and provide educational material (KRAMES).  · Encourage Medication Compliance.   Status  · Partially met    Patient/caregiver will verbalize importance of early intervention for symptoms of Hypotension COPD within 1 month.  Interventions   · Recognize and provide educational material (KRAMES).  · Encourage Medication Compliance.   Status  · Partially met          COMPLETED: Patient/caregiver will have knowledge of resources available in order to obtain the services that are needed prior to  discharge from [facility].           Notes - Note created  10/24/2016  2:01 PM by Shawna Lawrence RN    Overall Time to Completion  4 weeks from 10/24/2016    Short Term Goals  Patient/caregiver will notify RN [facility] if patient does not hear from [facility]  within 1 week.  Interventions   · Refer to Outpatient Case Management Social Worker.   Status  · Met            [facility] On Call     [facility] On Call Nurse Care Line - 24/7 Assistance  Registered nurses in the [facility] On Call Center provide clinical advisement, health education, appointment booking, and other advisory services.  Call for this free service at [phone].             Medications           Message regarding Medications     Verify the changes and/or additions to your medication regime listed below are the same as discussed with your clinician today.  If any of these changes or additions are incorrect, please notify your healthcare provider.             Verify that the below list of medications is an accurate representation of the medications you are currently taking.  If none reported, the list may be blank. If incorrect, please contact your healthcare provider. Carry this list with you in case of emergency.           Current Medications     albuterol (PROVENTIL HFA) 90 mcg/actuation inhaler Inhale 2 puffs into the lungs every 6 (six) hours as needed (ANY equivalent covered is OK).    alendronate (FOSAMAX) 70 MG tablet TAKE 1 TABLET EVERY 7 DAYS    amlodipine (NORVASC) 5 MG tablet TAKE 2 TABLETS EVERY DAY    aspirin 81 mg Tab Take 1 tablet by mouth Daily.     ciclopirox (LOPROX) 0.77 % Crea     ergocalciferol (VITAMIN D2) 50,000 unit Cap Take 5,000 Units by mouth every 7 days.    estradiol (ESTRACE) 0.01 % (0.1 mg/g) vaginal cream Pea-size dab of cream to urethra/introitus at bed time.    fexofenadine (ALLEGRA) 180 MG tablet Take 1 tablet (180 mg total) by mouth once daily. 1 Tablet Oral Every day.  Take as directed for allergy or thin secretions.     "hydrocortisone 2.5 % cream Apply topically 2 (two) times daily.    levothyroxine (SYNTHROID) 25 MCG tablet TAKE 1 TABLET EVERY DAY    lisinopril (PRINIVIL,ZESTRIL) 40 MG tablet TAKE 1 TABLET EVERY DAY    metoprolol succinate (TOPROL-XL) 50 MG 24 hr tablet TAKE 1 TABLET ONE TIME DAILY    omeprazole (PRILOSEC) 20 MG capsule TAKE 1 OR 2 CAPSULES DAILY AS DIRECTED    oxybutynin (DITROPAN-XL) 10 MG 24 hr tablet TAKE 1 TABLET ONE TIME DAILY    sertraline (ZOLOFT) 25 MG tablet Take 1 tablet (25 mg total) by mouth once daily.    temazepam (RESTORIL) 30 mg capsule TAKE ONE CAPSULE BY MOUTH AT NIGHT AS NEEDED    tramadol (ULTRAM) 50 mg tablet Take 1 tablet (50 mg total) by mouth every 6 (six) hours as needed for Pain. 1-2 q 6hr prn    triamcinolone acetonide 0.1% (KENALOG) 0.1 % cream            Clinical Reference Information           Your Vitals Were     BP Pulse Height Weight SpO2 BMI    140/67 (BP Location: Right arm, Patient Position: Sitting, BP Method: Automatic) 88 5' 3" (1.6 m) 65 kg (143 lb 4.8 oz) 94% 25.38 kg/m2      Blood Pressure          Most Recent Value    BP  (!)  140/67      Allergies as of 3/16/2017     No Known Allergies      Immunizations Administered on Date of Encounter - 3/16/2017     None      [facility] Sign-Up     Activating your [facility] account is as easy as 1-2-3!     1) Visit [email or web address], select Sign Up Now, enter this activation code and your date of birth, then select Next.  [email or web address]  Expires: 4/30/2017  8:43 AM      2) Create a username and password to use when you visit [facility] in the future and select a security question in case you lose your password and select Next.    3) Enter your e-mail address and click Sign Up!    Additional Information  If you have questions, please e-mail [email or web address] or call [phone] to talk to our [facility] staff. Remember, [facility] is NOT to be used for urgent needs. For medical emergencies, dial 911.         Language Assistance " Services     ATTENTION: Language assistance services are available, free of charge. Please call [phone].      ATENCIÓN: Si habla hugoañol, tiene a garcia disposición servicios gratuitos de asistencia lingüística. Llame al [phone].     CHÚ Ý: N?u b?n nói Ti?ng Vi?t, có các d?ch v? h? tr? ngôn ng? mi?n phí dành cho b?n. G?i s? [phone].         [facility] complies with applicable Federal civil rights laws and does not discriminate on the basis of race, color, national origin, age, disability, or sex.

## 2017-03-23 ENCOUNTER — TELEPHONE (OUTPATIENT)
Dept: HEPATOLOGY | Facility: CLINIC | Age: 82
End: 2017-03-23

## 2017-03-23 ENCOUNTER — OFFICE VISIT (OUTPATIENT)
Dept: HEPATOLOGY | Facility: CLINIC | Age: 82
End: 2017-03-23
Payer: MEDICARE

## 2017-03-23 VITALS
SYSTOLIC BLOOD PRESSURE: 135 MMHG | HEIGHT: 63 IN | DIASTOLIC BLOOD PRESSURE: 74 MMHG | WEIGHT: 144.81 LBS | OXYGEN SATURATION: 93 % | BODY MASS INDEX: 25.66 KG/M2 | TEMPERATURE: 98 F | HEART RATE: 80 BPM | RESPIRATION RATE: 20 BRPM

## 2017-03-23 DIAGNOSIS — K74.69 CRYPTOGENIC CIRRHOSIS: Primary | ICD-10-CM

## 2017-03-23 DIAGNOSIS — K76.6 PORTAL HYPERTENSION: ICD-10-CM

## 2017-03-23 DIAGNOSIS — Z12.11 COLON CANCER SCREENING: ICD-10-CM

## 2017-03-23 DIAGNOSIS — K74.60 CIRRHOSIS OF LIVER WITHOUT ASCITES, UNSPECIFIED HEPATIC CIRRHOSIS TYPE: Primary | ICD-10-CM

## 2017-03-23 PROCEDURE — 1157F ADVNC CARE PLAN IN RCRD: CPT | Mod: S$GLB,,, | Performed by: NURSE PRACTITIONER

## 2017-03-23 PROCEDURE — 1160F RVW MEDS BY RX/DR IN RCRD: CPT | Mod: S$GLB,,, | Performed by: NURSE PRACTITIONER

## 2017-03-23 PROCEDURE — 99999 PR PBB SHADOW E&M-EST. PATIENT-LVL IV: CPT | Mod: PBBFAC,,, | Performed by: NURSE PRACTITIONER

## 2017-03-23 PROCEDURE — 99213 OFFICE O/P EST LOW 20 MIN: CPT | Mod: S$GLB,,, | Performed by: NURSE PRACTITIONER

## 2017-03-23 PROCEDURE — 1159F MED LIST DOCD IN RCRD: CPT | Mod: S$GLB,,, | Performed by: NURSE PRACTITIONER

## 2017-03-23 PROCEDURE — 99499 UNLISTED E&M SERVICE: CPT | Mod: S$GLB,,, | Performed by: NURSE PRACTITIONER

## 2017-03-23 PROCEDURE — 1126F AMNT PAIN NOTED NONE PRSNT: CPT | Mod: S$GLB,,, | Performed by: NURSE PRACTITIONER

## 2017-03-23 NOTE — PROGRESS NOTES
[facility] Established Patient Visit    Reason for Visit:  F/u cirrhosis    PCP: Akhil Harry    HPI:  This is a 88 y.o. female here for f/u of well-compensated cryptogenic cirrhosis. Her cirrhosis evaluation was prompted after a CT chest in 8/2014 showed a nodular contour of the liver. Her serological workup was negative for Anurag's, alpha-1 antitrypsin deficiency, hemochromatosis, autoimmune etiology, and viral hepatitis. 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 She has thrombocytopenia and splenomegaly on imaging. She had repeat labs and u/s recently. She has normal synthetic functioning and normal liver enzymes. She had no masses seen on u/s, AFP nl. She reports she feels well and denies any complaints today. Denies jaundice, dark urine, hematemesis, melena, slowed mentation, abdominal distention.  EGD 10/2014 showed no varices. She is a very high functioning 88 yo. She will have an EGD for varices surveillance. She is due for colonoscopy also but does not want to do colonoscopy because the prep makes her sick every time. Her mother had colon cancer. She reports she has only had one polyp in the past. She does not have anemia or any changes in bowel habits. She stated that with her age, she would be reluctant to do anything if they find something anyway. She is willing to do stool for occult blood for screening so she will discuss this with her PCP.       ROS:   GENERAL: Denies fever, chills, weight loss/gain, fatigue  HEENT: Denies headaches, dizziness, vision/hearing changes  CARDIOVASCULAR: Denies chest pain, palpitations, or edema  RESPIRATORY: Denies dyspnea, cough  GI: Denies abdominal pain, rectal bleeding, nausea, vomiting. No change in bowel pattern or color  : Denies dysuria, hematuria   SKIN: Denies rash, itching   NEURO: Denies confusion, memory loss, or mood changes  PSYCH: Denies depression or anxiety  HEME/LYMPH: (+) easy bruising and bleeding       PMHX:  has  a past medical history of Allergy; Anxiety; Cirrhosis; COPD (chronic obstructive pulmonary disease) (10/16/2012); Cryptogenic cirrhosis (11/6/2014); Cystocele; Diverticulitis; Dyspnea (11/8/2012); GERD (gastroesophageal reflux disease); Hypertension; Hypothyroidism (4/10/2014); Insomnia (4/18/2013); Kidney stones; Lung cancer; Lung cancer, upper lobe (3/4/2013); Menopause; Nuclear sclerosis (7/17/2014); Osteopenia; PSVT (paroxysmal supraventricular tachycardia); PVC (premature ventricular contraction) (11/10/2012); Rectocele; 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    PSHX:  has a past surgical history that includes Adenoidectomy; Tonsillectomy; Hysterectomy; Cholecystectomy; Appendectomy; Cystoscopy; Oophorectomy; Colonoscopy; Upper gastrointestinal endoscopy; and Lung lobectomy (Right).    The patient's social and family histories were reviewed by me and updated in the appropriate section of the electronic medical record.    Review of patient's allergies indicates:  No Known Allergies    Current Outpatient Rx   Name  Route  Sig  Dispense  Refill    alendronate (FOSAMAX) 70 MG tablet    Oral    Take 1 tablet (70 mg total) by mouth every 7 days.    12 tablet    11      amlodipine (NORVASC) 5 MG tablet        TAKE 2 TABLETS EVERY DAY     180 tablet    3      aspirin 81 mg Tab    Oral    Take 1 tablet by mouth Daily.               ciclopirox (LOPROX) 0.77 % Crea                      ergocalciferol (VITAMIN D2) 50,000 unit Cap    Oral    Take 5,000 Units by mouth every 7 days.              estradiol (ESTRACE) 0.01 % (0.1 mg/g) vaginal cream        Pea-size dab of cream to urethra/introitus at bed time.    42.5 g    1      fexofenadine (ALLEGRA) 180 MG tablet    Oral    Take 1 tablet (180 mg total) by mouth once daily. 1 Tablet Oral Every day.  Take as directed for allergy or  "thin secretions.    90 tablet    12      levothyroxine (SYNTHROID) 25 MCG tablet        TAKE 1 TABLET EVERY DAY    90 tablet    3      lisinopril (PRINIVIL,ZESTRIL) 40 MG tablet        TAKE 1 TABLET EVERY DAY    90 tablet    3      metoprolol succinate (TOPROL-XL) 50 MG 24 hr tablet    Oral    Take 1 tablet (50 mg total) by mouth once daily.    30 tablet    3      omeprazole (PRILOSEC) 20 MG capsule        TAKE 1 OR 2 CAPSULES DAILY AS DIRECTED    180 capsule    PRN      ondansetron (ZOFRAN) 4 MG tablet    Oral    Take 1 tablet (4 mg total) by mouth every 8 (eight) hours as needed for Nausea.    12 tablet    0      oxybutynin (DITROPAN-XL) 10 MG 24 hr tablet    Oral    Take 1 tablet (10 mg total) by mouth once daily.    30 tablet    11      temazepam (RESTORIL) 30 mg capsule    Oral    Take 1 capsule (30 mg total) by mouth nightly as needed.    30 capsule    5      tramadol (ULTRAM) 50 mg tablet        1-2 q 6hr prn    60 tablet    5      triamcinolone acetonide 0.1% (KENALOG) 0.1 % cream                           Objective Findings:    PHYSICAL EXAM:   Friendly White female, in no acute distress; alert and oriented to person, place and time  VITALS:   /74 (BP Location: Left arm, Patient Position: Sitting, BP Method: Automatic)  Pulse 80  Temp 97.6 °F (36.4 °C) (Oral)   Resp 20  Ht 5' 3" (1.6 m)  Wt 65.7 kg (144 lb 13.5 oz)  SpO2 (!) 93%  BMI 25.66 kg/m2    HENT: Normocephalic, without obvious abnormality. Oral mucosa pink and moist. Dentition good.  EYES: Sclerae anicteric. No conjunctival pallor.   NECK: Supple. No masses or cervical adenopathy.  CARDIOVASCULAR: Regular rate and rhythm. No murmurs.  RESPIRATORY: Normal respiratory effort. BBS CTA. No wheezes or crackles.  GI: Soft, non-tender, non-distended. No hepatosplenomegaly. No masses palpable. No ascites.  EXTREMITIES:  No clubbing, cyanosis or edema.  SKIN: Warm and dry. No jaundice. No rashes noted to exposed skin. Few telangectasias " noted to chest. No palmar erythema.  NEURO:  Normal gate. No asterixis.  PSYCH:  Memory intact. Thought and speech pattern appropriate. Behavior normal. No depression or anxiety noted.      Labs:  Lab Results   Component Value Date    WBC 5.62 03/02/2017    HGB 14.6 03/02/2017    HCT 44.1 03/02/2017     (L) 03/02/2017    CHOL 188 04/10/2014    TRIG 172 (H) 04/10/2014    HDL 44 04/10/2014     03/02/2017    K 3.8 03/02/2017    CREATININE 0.6 03/02/2017    ALT 16 03/02/2017    AST 16 03/02/2017    ALKPHOS 46 (L) 03/02/2017    BILITOT 0.8 03/02/2017    ALBUMIN 4.0 03/02/2017    INR 1.0 03/02/2017    AFP 2.4 03/02/2017       ASSESSMENT:  88 y.o. White female with:  1.  Cryptogenic cirrhosis, well-compensated  -- MELD= MELD-Na score: 6 at 3/2/2017  9:35 AM  MELD score: 6 at 3/2/2017  9:35 AM  Calculated from:  Serum Creatinine: 0.6 mg/dL (Rounded to 1) at 3/2/2017  9:35 AM  Serum Sodium: 142 mmol/L (Rounded to 137) at 3/2/2017  9:35 AM  Total Bilirubin: 0.8 mg/dL (Rounded to 1) at 3/2/2017  9:35 AM  INR(ratio): 1.0 at 3/2/2017  9:35 AM  Age: 88 years  -- HCC screening- AFP and abd. U/S- up to date, next due 9/2017  -- Immunity to Hep A and B- finished hep B vaccines 4/2015, already immune to hep A  -- EGD- 10/13/14- no varices  2. Portal hypertension  -- EGD- 10/2014 - no varices  -- Ascites- none  -- Splenomegaly and thrombocytopenia  3. Colon cancer screening, h/o colon polyp, family h/o colon cancer  -- last colon 2011 and repeat in 5 yrs advised. Pt declines repeat colonoscopy due to her age and intolerance of colon prep. She will discuss alternatives for screening with her PCP      EDUCATION:   Signs and symptoms of hepatic decompensation were reviewed, including jaundice, ascites, and slowed mentation due to hepatic encephalopathy. The patient should seek medical attention if any of these things occur. We discussed the potential for bleeding from esophageal varices with symptoms of hematemesis and  melena. The patient should report to the Emergency Department for these symptoms.    We discussed the increased risk of hepatocellular carcinoma due to cirrhosis. Continued screening every six months with ultrasound and AFP is recommended.     Reassured her that her liver functioning is normal and liver disease appears to be very stable.      PLAN:  1. HCC screening Q 6 months with AFP and abd. U/S, next due 9/2017  2. EGD ordered to be done on [facility]  3. F/u in 9/2017 with u/s and labs before appt on [facility]    Thank you for allowing me to participate in the care of Sandra Lund NP-ERIKA

## 2017-03-23 NOTE — TELEPHONE ENCOUNTER
----- Message from Tami Dubois sent at 3/23/2017  3:38 PM CDT -----  Lisbeth, will Mrs. Moody need an AFP with September's labs?

## 2017-03-23 NOTE — Clinical Note
Please enter recall for F/u in 9/2017 with u/s and labs before appt on [facility]

## 2017-04-04 ENCOUNTER — OFFICE VISIT (OUTPATIENT)
Dept: FAMILY MEDICINE | Facility: CLINIC | Age: 82
End: 2017-04-04
Payer: MEDICARE

## 2017-04-04 VITALS
DIASTOLIC BLOOD PRESSURE: 70 MMHG | HEIGHT: 63 IN | BODY MASS INDEX: 25.47 KG/M2 | RESPIRATION RATE: 18 BRPM | SYSTOLIC BLOOD PRESSURE: 110 MMHG | OXYGEN SATURATION: 96 % | TEMPERATURE: 99 F | HEART RATE: 78 BPM | WEIGHT: 143.75 LBS

## 2017-04-04 DIAGNOSIS — Z71.2 ENCOUNTER TO DISCUSS TEST RESULTS: ICD-10-CM

## 2017-04-04 DIAGNOSIS — M19.90 ARTHRITIS: ICD-10-CM

## 2017-04-04 DIAGNOSIS — I10 ESSENTIAL HYPERTENSION: Primary | ICD-10-CM

## 2017-04-04 DIAGNOSIS — K74.69 CRYPTOGENIC CIRRHOSIS: ICD-10-CM

## 2017-04-04 PROCEDURE — 99999 PR PBB SHADOW E&M-EST. PATIENT-LVL III: CPT | Mod: PBBFAC,,, | Performed by: INTERNAL MEDICINE

## 2017-04-04 PROCEDURE — 1159F MED LIST DOCD IN RCRD: CPT | Mod: S$GLB,,, | Performed by: INTERNAL MEDICINE

## 2017-04-04 PROCEDURE — 99499 UNLISTED E&M SERVICE: CPT | Mod: S$GLB,,, | Performed by: INTERNAL MEDICINE

## 2017-04-04 PROCEDURE — 99214 OFFICE O/P EST MOD 30 MIN: CPT | Mod: S$GLB,,, | Performed by: INTERNAL MEDICINE

## 2017-04-04 PROCEDURE — 1157F ADVNC CARE PLAN IN RCRD: CPT | Mod: S$GLB,,, | Performed by: INTERNAL MEDICINE

## 2017-04-04 PROCEDURE — 1126F AMNT PAIN NOTED NONE PRSNT: CPT | Mod: S$GLB,,, | Performed by: INTERNAL MEDICINE

## 2017-04-04 PROCEDURE — 1160F RVW MEDS BY RX/DR IN RCRD: CPT | Mod: S$GLB,,, | Performed by: INTERNAL MEDICINE

## 2017-04-04 NOTE — PROGRESS NOTES
SUBJECTIVE     Chief Complaint   Patient presents with    Results    Follow-up     no other complaints/concerns       HPI  Sandra Moody is a 88 y.o. female with multiple medical diagnoses as listed in the medical history and problem list that presents for follow-up of HTN and to discuss results of recent tests. Pt has been doing well since last visit and reports full compliance with meds without any adverse side effects. Pt is without any specific complaints today.    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 lj- fosamax    PSVT (paroxysmal supraventricular tachycardia)     dr. marx    PVC (premature ventricular contraction) 11/10/2012    Rectocele     Status post partial lobectomy of lung 2/2010    Thyroid disease     hypothyroidism    Trochanteric bursitis 4/18/2013    Urine incontinence 2/24/2015    Vitamin D deficiency disease 9/4/2014       PAST SURGICAL HISTORY:  Past Surgical History:   Procedure Laterality Date    ADENOIDECTOMY      APPENDECTOMY      CHOLECYSTECTOMY      COLONOSCOPY      CYSTOSCOPY      HYSTERECTOMY      LUNG LOBECTOMY Right     OOPHORECTOMY      TONSILLECTOMY      UPPER GASTROINTESTINAL ENDOSCOPY         SOCIAL HISTORY:  Social History     Social History    Marital status:      Spouse name: N/A    Number of children: N/A    Years of education: N/A     Occupational History    Not on file.     Social History Main Topics    Smoking status: Never  Smoker    Smokeless tobacco: Never Used    Alcohol use No    Drug use: No    Sexual activity: No     Other Topics Concern    Not on file     Social History Narrative       FAMILY HISTORY:  Family History   Problem Relation Age of Onset    Cancer Mother      COLON    Cancer Brother     Asthma Neg Hx     Emphysema Neg Hx     Amblyopia Neg Hx     Blindness Neg Hx     Cataracts Neg Hx     Diabetes Neg Hx     Glaucoma Neg Hx     Hypertension Neg Hx     Macular degeneration Neg Hx     Retinal detachment Neg Hx     Strabismus Neg Hx     Stroke Neg Hx     Thyroid disease Neg Hx     Cirrhosis Neg Hx        ALLERGIES AND MEDICATIONS: updated and reviewed.  Review of patient's allergies indicates:  No Known Allergies  Current Outpatient Prescriptions   Medication Sig Dispense Refill    albuterol (PROVENTIL HFA) 90 mcg/actuation inhaler Inhale 2 puffs into the lungs every 6 (six) hours as needed (ANY equivalent covered is OK). 1 Inhaler 2    alendronate (FOSAMAX) 70 MG tablet TAKE 1 TABLET EVERY 7 DAYS 12 tablet 11    amlodipine (NORVASC) 5 MG tablet TAKE 2 TABLETS EVERY  tablet 3    aspirin 81 mg Tab Take 1 tablet by mouth Daily.       ciclopirox (LOPROX) 0.77 % Crea       ergocalciferol (VITAMIN D2) 50,000 unit Cap Take 5,000 Units by mouth every 7 days.      estradiol (ESTRACE) 0.01 % (0.1 mg/g) vaginal cream Pea-size dab of cream to urethra/introitus at bed time. 42.5 g 1    fexofenadine (ALLEGRA) 180 MG tablet Take 1 tablet (180 mg total) by mouth once daily. 1 Tablet Oral Every day.  Take as directed for allergy or thin secretions. 90 tablet 12    hydrocortisone 2.5 % cream Apply topically 2 (two) times daily. 20 g 0    levothyroxine (SYNTHROID) 25 MCG tablet TAKE 1 TABLET EVERY DAY 90 tablet 3    lisinopril (PRINIVIL,ZESTRIL) 40 MG tablet TAKE 1 TABLET EVERY DAY 90 tablet 3    metoprolol succinate (TOPROL-XL) 50 MG 24 hr tablet TAKE 1 TABLET ONE TIME DAILY 90 tablet 3     "omeprazole (PRILOSEC) 20 MG capsule TAKE 1 OR 2 CAPSULES DAILY AS DIRECTED 180 capsule PRN    oxybutynin (DITROPAN-XL) 10 MG 24 hr tablet TAKE 1 TABLET ONE TIME DAILY 90 tablet 11    sertraline (ZOLOFT) 25 MG tablet Take 1 tablet (25 mg total) by mouth once daily. 30 tablet 11    temazepam (RESTORIL) 30 mg capsule TAKE ONE CAPSULE BY MOUTH AT NIGHT AS NEEDED 30 capsule 5    tramadol (ULTRAM) 50 mg tablet Take 1 tablet (50 mg total) by mouth every 6 (six) hours as needed for Pain. 1-2 q 6hr prn 60 tablet 0    triamcinolone acetonide 0.1% (KENALOG) 0.1 % cream        No current facility-administered medications for this visit.        ROS  Review of Systems   Constitutional: Negative for chills and fever.   HENT: Negative for hearing loss and sore throat.    Eyes: Negative for visual disturbance.   Respiratory: Negative for cough and shortness of breath.    Cardiovascular: Negative for chest pain, palpitations and leg swelling.   Gastrointestinal: Negative for abdominal pain, constipation, diarrhea, nausea and vomiting.   Genitourinary: Negative for dysuria, frequency and urgency.   Musculoskeletal: Positive for arthralgias. Negative for joint swelling and myalgias.   Skin: Negative for rash and wound.   Neurological: Negative for headaches.   Psychiatric/Behavioral: Negative for agitation and confusion. The patient is not nervous/anxious.          OBJECTIVE     Physical Exam  Vitals:    04/04/17 0959   BP: 110/70   Pulse: 78   Resp: 18   Temp: 98.7 °F (37.1 °C)    Body mass index is 25.46 kg/(m^2).  Weight: 65.2 kg (143 lb 11.8 oz)   Height: 5' 3" (160 cm)     Physical Exam   Constitutional: She is oriented to person, place, and time. She appears well-developed and well-nourished. No distress.   HENT:   Head: Normocephalic and atraumatic.   Right Ear: External ear normal.   Left Ear: External ear normal.   Nose: Nose normal.   Mouth/Throat: Oropharynx is clear and moist.   Eyes: Conjunctivae and EOM are normal. " Right eye exhibits no discharge. Left eye exhibits no discharge. No scleral icterus.   Neck: Normal range of motion. Neck supple. No JVD present. No tracheal deviation present.   Cardiovascular: Normal rate, regular rhythm, normal heart sounds and intact distal pulses.  Exam reveals no gallop and no friction rub.    No murmur heard.  Pulmonary/Chest: Effort normal and breath sounds normal. No respiratory distress. She has no wheezes.   Abdominal: Soft. Bowel sounds are normal. She exhibits no distension and no mass. There is no tenderness. There is no rebound and no guarding.   Musculoskeletal: Normal range of motion. She exhibits no edema, tenderness or deformity.   Neurological: She is alert and oriented to person, place, and time. She exhibits normal muscle tone. Coordination normal.   Skin: Skin is warm and dry. No rash noted. No erythema.   Psychiatric: She has a normal mood and affect. Her behavior is normal. Judgment and thought content normal.         Health Maintenance       Date Due Completion Date    TETANUS VACCINE 10/27/1946 ---    DEXA SCAN 5/5/2017 5/5/2014    Override on 4/10/2014: (N/S)    Lipid Panel 4/10/2019 4/10/2014            ASSESSMENT     88 y.o. female with     1. Essential hypertension    2. Encounter to discuss test results    3. Cryptogenic cirrhosis    4. Arthritis        PLAN:     1. Essential hypertension  - BP at goal of <140/90; well controlled  - The current medical regimen is effective;  continue present plan and medications.    2. Encounter to discuss test results  - Pt given results of recent Holter monitor, 2D Echo, and stress test  - All questions/concerns addressed  - Pt voiced understanding  - Pt advised to keep f/u appt with Cardiology as previously scheduled    3. Cryptogenic cirrhosis  - Stable; no acute issues  - Continue management per Hepatology    4. Arthritis  - Stable; no acute issues  -The current medical regimen is effective;  continue present plan and  medications.  - Discussed pt taking Tylenol or NSAIDs(Aleve, Ibuprofen, Advil, or Motrin) prn pain, but pt to take meds safely  - Pt instructed not to exceed >2g of Tylenol per day given liver disease  - Pt also instructed to take NSAIDs on a limited basis to avoid kidney disease and/or gastritis        RTC in 3 months for f/u with PCP-Dr. Harry; otherwise f/u here prn     Rox Montes MD  04/04/2017 10:14 AM        No Follow-up on file.

## 2017-04-05 ENCOUNTER — ANESTHESIA EVENT (OUTPATIENT)
Dept: ENDOSCOPY | Facility: HOSPITAL | Age: 82
End: 2017-04-05
Payer: MEDICARE

## 2017-04-06 ENCOUNTER — ANESTHESIA (OUTPATIENT)
Dept: ENDOSCOPY | Facility: HOSPITAL | Age: 82
End: 2017-04-06
Payer: MEDICARE

## 2017-04-06 ENCOUNTER — HOSPITAL ENCOUNTER (OUTPATIENT)
Facility: HOSPITAL | Age: 82
Discharge: HOME | End: 2017-04-06
Attending: INTERNAL MEDICINE | Admitting: INTERNAL MEDICINE
Payer: MEDICARE

## 2017-04-06 ENCOUNTER — SURGERY (OUTPATIENT)
Age: 82
End: 2017-04-06

## 2017-04-06 VITALS
SYSTOLIC BLOOD PRESSURE: 132 MMHG | TEMPERATURE: 98 F | HEIGHT: 63 IN | BODY MASS INDEX: 25.16 KG/M2 | RESPIRATION RATE: 18 BRPM | OXYGEN SATURATION: 98 % | DIASTOLIC BLOOD PRESSURE: 74 MMHG | HEART RATE: 60 BPM | WEIGHT: 142 LBS

## 2017-04-06 DIAGNOSIS — K21.9 GERD (GASTROESOPHAGEAL REFLUX DISEASE): ICD-10-CM

## 2017-04-06 PROCEDURE — 25000003 PHARM REV CODE 250: Performed by: NURSE ANESTHETIST, CERTIFIED REGISTERED

## 2017-04-06 PROCEDURE — 88305 TISSUE EXAM BY PATHOLOGIST: CPT | Performed by: PATHOLOGY

## 2017-04-06 PROCEDURE — 37000009 HC ANESTHESIA EA ADD 15 MINS: Performed by: INTERNAL MEDICINE

## 2017-04-06 PROCEDURE — D9220A PRA ANESTHESIA: Mod: ANES,,, | Performed by: ANESTHESIOLOGY

## 2017-04-06 PROCEDURE — 45380 COLONOSCOPY AND BIOPSY: CPT | Performed by: INTERNAL MEDICINE

## 2017-04-06 PROCEDURE — 37000008 HC ANESTHESIA 1ST 15 MINUTES: Performed by: INTERNAL MEDICINE

## 2017-04-06 PROCEDURE — 88342 IMHCHEM/IMCYTCHM 1ST ANTB: CPT | Mod: 26,,, | Performed by: PATHOLOGY

## 2017-04-06 PROCEDURE — 25000003 PHARM REV CODE 250: Performed by: ANESTHESIOLOGY

## 2017-04-06 PROCEDURE — 88305 TISSUE EXAM BY PATHOLOGIST: CPT | Mod: 26,,, | Performed by: PATHOLOGY

## 2017-04-06 PROCEDURE — D9220A PRA ANESTHESIA: Mod: CRNA,,, | Performed by: NURSE ANESTHETIST, CERTIFIED REGISTERED

## 2017-04-06 PROCEDURE — 63600175 PHARM REV CODE 636 W HCPCS: Performed by: NURSE ANESTHETIST, CERTIFIED REGISTERED

## 2017-04-06 PROCEDURE — 43239 EGD BIOPSY SINGLE/MULTIPLE: CPT | Performed by: INTERNAL MEDICINE

## 2017-04-06 PROCEDURE — 27201012 HC FORCEPS, HOT/COLD, DISP: Performed by: INTERNAL MEDICINE

## 2017-04-06 RX ORDER — LIDOCAINE HCL/PF 100 MG/5ML
SYRINGE (ML) INTRAVENOUS
Status: DISCONTINUED | OUTPATIENT
Start: 2017-04-06 | End: 2017-04-06

## 2017-04-06 RX ORDER — PROPOFOL 10 MG/ML
VIAL (ML) INTRAVENOUS
Status: DISCONTINUED
Start: 2017-04-06 | End: 2017-04-06 | Stop reason: HOSPADM

## 2017-04-06 RX ORDER — SODIUM CHLORIDE 9 MG/ML
INJECTION, SOLUTION INTRAVENOUS CONTINUOUS
Status: DISCONTINUED | OUTPATIENT
Start: 2017-04-06 | End: 2017-04-06 | Stop reason: HOSPADM

## 2017-04-06 RX ORDER — LIDOCAINE HYDROCHLORIDE 20 MG/ML
INJECTION, SOLUTION INFILTRATION; PERINEURAL
Status: DISCONTINUED
Start: 2017-04-06 | End: 2017-04-06 | Stop reason: HOSPADM

## 2017-04-06 RX ORDER — PROPOFOL 10 MG/ML
VIAL (ML) INTRAVENOUS
Status: DISCONTINUED | OUTPATIENT
Start: 2017-04-06 | End: 2017-04-06

## 2017-04-06 RX ORDER — LIDOCAINE HYDROCHLORIDE 10 MG/ML
1 INJECTION, SOLUTION EPIDURAL; INFILTRATION; INTRACAUDAL; PERINEURAL ONCE
Status: DISCONTINUED | OUTPATIENT
Start: 2017-04-06 | End: 2017-04-06 | Stop reason: HOSPADM

## 2017-04-06 RX ADMIN — LIDOCAINE HYDROCHLORIDE 100 MG: 20 INJECTION, SOLUTION INTRAVENOUS at 10:04

## 2017-04-06 RX ADMIN — SODIUM CHLORIDE: 0.9 INJECTION, SOLUTION INTRAVENOUS at 09:04

## 2017-04-06 RX ADMIN — PROPOFOL 80 MG: 10 INJECTION, EMULSION INTRAVENOUS at 10:04

## 2017-04-06 NOTE — TRANSFER OF CARE
"Anesthesia Transfer of Care Note    Patient: Sandra Moody    Procedure(s) Performed: Procedure(s) (LRB):  ESOPHAGOGASTRODUODENOSCOPY (EGD) (N/A)    Patient location: PACU    Anesthesia Type: general    Transport from OR: Transported from OR on room air with adequate spontaneous ventilation    Post pain: adequate analgesia    Post assessment: no apparent anesthetic complications    Post vital signs: stable    Level of consciousness: awake, alert and oriented    Nausea/Vomiting: no nausea/vomiting    Complications: none          Last vitals:   Visit Vitals    BP (!) 119/57 (BP Location: Left arm, Patient Position: Lying, BP Method: Automatic)    Pulse 65    Temp 36.9 °C (98.4 °F) (Oral)    Resp 16    Ht 5' 3" (1.6 m)    Wt 64.4 kg (142 lb)    SpO2 95%    Breastfeeding No    BMI 25.15 kg/m2     "

## 2017-04-06 NOTE — ANESTHESIA PREPROCEDURE EVALUATION
04/06/2017  Sandra Moody is a 88 y.o., female.    [facility] Anesthesia Evaluation    I have reviewed the Patient Summary Reports.     I have reviewed the Medications.     Review of Systems  Anesthesia Hx:  No problems with previous Anesthesia Denies Hx of Anesthetic complications  History of prior surgery of interest to airway management or planning: Denies Family Hx of Anesthesia complications.   Denies Personal Hx of Anesthesia complications.   Social:  Non-Smoker, No Alcohol Use    Hematology/Oncology:  Hematology Normal   Oncology Normal     EENT/Dental:EENT/Dental Normal   Cardiovascular:   Exercise tolerance: good Hypertension, well controlled    Pulmonary:   COPD Shortness of breath    Renal/:  Renal/ Normal     Hepatic/GI:   GERD, well controlled Liver Disease, Portal HTN and stable varices   Musculoskeletal:  Musculoskeletal Normal    Neurological:  Neurology Normal    Endocrine:   Hypothyroidism    Dermatological:  Skin Normal    Psych:  Psychiatric Normal           Physical Exam  General:  Well nourished    Airway/Jaw/Neck:  Airway Findings: Mouth Opening: Small, but > 3cm Tongue: Normal  General Airway Assessment: Adult  Mallampati: II  Improves to II with phonation.  TM Distance: 4 - 6 cm      Dental:  Dental Findings: In tact   Chest/Lungs:  Chest/Lungs Findings: Normal Respiratory Rate     Heart/Vascular:  Heart Findings: Rate: Normal        Mental Status:  Mental Status Findings:  Cooperative, Alert and Oriented         Anesthesia Plan  Type of Anesthesia, risks & benefits discussed:  Anesthesia Type:  general  Patient's Preference:   Intra-op Monitoring Plan:   Intra-op Monitoring Plan Comments:   Post Op Pain Control Plan:   Post Op Pain Control Plan Comments:   Induction:   IV  Beta Blocker:  Patient is on a Beta-Blocker and has received one dose within the past 24 hours (No  "further documentation required).       Informed Consent: Patient understands risks and agrees with Anesthesia plan.  Questions answered. Anesthesia consent signed with patient.  ASA Score: 3     Day of Surgery Review of History & Physical:    H&P update referred to the surgeon.         Ready For Surgery From Anesthesia Perspective.     /74 (BP Location: Left arm, Patient Position: Lying, BP Method: Automatic)  Pulse 60  Temp 36.9 °C (98.4 °F) (Oral)   Resp 18  Ht 5' 3" (1.6 m)  Wt 64.4 kg (142 lb)  SpO2 98%  Breastfeeding? No  BMI 25.15 kg/m2  Wt Readings from Last 3 Encounters:   04/06/17 64.4 kg (142 lb)   04/04/17 65.2 kg (143 lb 11.8 oz)   03/23/17 65.7 kg (144 lb 13.5 oz)     BP Readings from Last 3 Encounters:   04/06/17 132/74   04/04/17 110/70   03/23/17 135/74     Review of patient's allergies indicates:  No Known Allergies  Active Ambulatory Problems     Diagnosis Date Noted    Essential hypertension     Dyspnea 11/08/2012    PVC (premature ventricular contraction) 11/10/2012    Chronic kidney disease, stage II (mild) 01/09/2013    GERD (gastroesophageal reflux disease) 04/18/2013    Insomnia 04/18/2013    Trochanteric bursitis 04/18/2013    Hypothyroidism 04/10/2014    Osteopenia 04/10/2014    COPD (chronic obstructive pulmonary disease) 04/10/2014    Nuclear sclerosis 07/17/2014    Chorioretinal scar 07/17/2014    Idiopathic hypertension 07/17/2014    Refractive error 07/17/2014    Senile nuclear sclerosis 08/28/2014    Vitamin D deficiency disease 09/04/2014    Cryptogenic cirrhosis 11/06/2014    Urine incontinence 02/24/2015    Portal hypertension 04/06/2016    Thrombocytopenia 04/06/2016    History of lung cancer 12/05/2016    Atherosclerosis of aorta 12/05/2016     Resolved Ambulatory Problems     Diagnosis Date Noted    Hypoxemia 11/29/2012    Non-small cell lung cancer 11/29/2012    History of frequent urinary tract infections 01/09/2013    Kidney stones " 01/09/2013    UTI (urinary tract infection) 01/09/2013    Lung cancer, upper lobe 03/04/2013    Vaginitis 04/23/2013    Post-operative state 08/29/2014    Post-operative state 10/10/2014    Esophageal varices 10/13/2014     Past Medical History:   Diagnosis Date    Allergy     Anxiety     Cirrhosis     COPD (chronic obstructive pulmonary disease) 10/16/2012    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    Cystocele     Diverticulitis     Dyspnea 11/8/2012    GERD (gastroesophageal reflux disease)     Hypertension     Hypothyroidism 4/10/2014    Insomnia 4/18/2013    Kidney stones     Lung cancer     Lung cancer, upper lobe 3/4/2013    Menopause     Nuclear sclerosis 7/17/2014    Osteopenia     PSVT (paroxysmal supraventricular tachycardia)     PVC (premature ventricular contraction) 11/10/2012    Rectocele     Status post partial lobectomy of lung 2/2010    Thyroid disease     Trochanteric bursitis 4/18/2013    Urine incontinence 2/24/2015    Vitamin D deficiency disease 9/4/2014     Past Surgical History:   Procedure Laterality Date    ADENOIDECTOMY      APPENDECTOMY      CHOLECYSTECTOMY      COLONOSCOPY      CYSTOSCOPY      HYSTERECTOMY      LUNG LOBECTOMY Right     OOPHORECTOMY      TONSILLECTOMY      UPPER GASTROINTESTINAL ENDOSCOPY         Current Facility-Administered Medications:     0.9%  NaCl infusion, , Intravenous, Continuous, Pillo Newman MD, Last Rate: 10 mL/hr at 04/06/17 0949    lidocaine (PF) 10 mg/ml (1%) injection 10 mg, 1 mL, Intradermal, Once, Pillo Newman MD    lidocaine HCL 20 mg/ml (2%) 20 mg/mL (2 %) injection, , , ,     propofol (DIPRIVAN) 10 mg/mL IVP injection, , , ,    Lab Results   Component Value Date    WBC 5.62 03/02/2017    HGB 14.6 03/02/2017    HCT 44.1 03/02/2017    MCV 93 03/02/2017     (L) 03/02/2017     Lab Results   Component Value Date    INR 1.0 03/02/2017    INR 1.0 09/30/2016    INR 1.0 04/05/2016

## 2017-04-06 NOTE — IP AVS SNAPSHOT
[place] [address] [place] [address]  Phone: [phone]           Patient Discharge Instructions   Our goal is to set you up for success. This packet includes information on your condition, medications, and your home care.  It will help you care for yourself to prevent having to return to the hospital.     Please ask your nurse if you have any questions.      There are many details to remember when preparing to leave the hospital. Here is what you will need to do:    1. Take your medicine. If you are prescribed medications, review your Medication List on the following pages. You may have new medications to  at the pharmacy and others that you'll need to stop taking. Review the instructions for how and when to take your medications. Talk with your doctor or nurses if you are unsure of what to do.     2. Go to your follow-up appointments. Specific follow-up information is listed in the following pages. Your may be contacted by a nurse or clinical provider about future appointments. Be sure we have all of the phone numbers to reach you. Please contact your provider's office if you are unable to make an appointment.     3. Watch for warning signs. Your doctor or nurse will give you detailed warning signs to watch for and when to call for assistance. These instructions may also include educational information about your condition. If you experience any of warning signs to your health, call your doctor.               ** Verify the list of medication(s) below is accurate and up to date. Carry this with you in case of emergency. If your medications have changed, please notify your healthcare provider.             Medication List      CONTINUE taking these medications        Additional Info                      albuterol 90 mcg/actuation inhaler   Commonly known as:  PROVENTIL HFA   Quantity:  1 Inhaler   Refills:  2   Dose:  2 puff    Instructions:  Inhale 2 puffs into the lungs  every 6 (six) hours as needed (ANY equivalent covered is OK).     Begin Date    AM    Noon    PM    Bedtime       alendronate 70 MG tablet   Commonly known as:  FOSAMAX   Quantity:  12 tablet   Refills:  11    Instructions:  TAKE 1 TABLET EVERY 7 DAYS     Begin Date    AM    Noon    PM    Bedtime       amlodipine 5 MG tablet   Commonly known as:  NORVASC   Quantity:  180 tablet   Refills:  3    Instructions:  TAKE 2 TABLETS EVERY DAY     Begin Date    AM    Noon    PM    Bedtime       aspirin 81 mg Tab   Refills:  0   Dose:  1 tablet    Instructions:  Take 1 tablet by mouth Daily.     Begin Date    AM    Noon    PM    Bedtime       ciclopirox 0.77 % Crea   Commonly known as:  LOPROX   Refills:  0      Begin Date    AM    Noon    PM    Bedtime       estradiol 0.01 % (0.1 mg/gram) vaginal cream   Commonly known as:  ESTRACE   Quantity:  42.5 g   Refills:  1    Instructions:  Pea-size dab of cream to urethra/introitus at bed time.     Begin Date    AM    Noon    PM    Bedtime       fexofenadine 180 MG tablet   Commonly known as:  ALLEGRA   Quantity:  90 tablet   Refills:  12   Dose:  180 mg    Instructions:  Take 1 tablet (180 mg total) by mouth once daily. 1 Tablet Oral Every day.  Take as directed for allergy or thin secretions.     Begin Date    AM    Noon    PM    Bedtime       hydrocortisone 2.5 % cream   Quantity:  20 g   Refills:  0    Instructions:  Apply topically 2 (two) times daily.     Begin Date    AM    Noon    PM    Bedtime       levothyroxine 25 MCG tablet   Commonly known as:  SYNTHROID   Quantity:  90 tablet   Refills:  3    Instructions:  TAKE 1 TABLET EVERY DAY     Begin Date    AM    Noon    PM    Bedtime       lisinopril 40 MG tablet   Commonly known as:  PRINIVIL,ZESTRIL   Quantity:  90 tablet   Refills:  3    Instructions:  TAKE 1 TABLET EVERY DAY     Begin Date    AM    Noon    PM    Bedtime       metoprolol succinate 50 MG 24 hr tablet   Commonly known as:  TOPROL-XL   Quantity:  90 tablet    Refills:  3    Instructions:  TAKE 1 TABLET ONE TIME DAILY     Begin Date    AM    Noon    PM    Bedtime       omeprazole 20 MG capsule   Commonly known as:  PRILOSEC   Quantity:  180 capsule   Refills:  PRN    Instructions:  TAKE 1 OR 2 CAPSULES DAILY AS DIRECTED     Begin Date    AM    Noon    PM    Bedtime       oxybutynin 10 MG 24 hr tablet   Commonly known as:  DITROPAN-XL   Quantity:  90 tablet   Refills:  11    Instructions:  TAKE 1 TABLET ONE TIME DAILY     Begin Date    AM    Noon    PM    Bedtime       sertraline 25 MG tablet   Commonly known as:  ZOLOFT   Quantity:  30 tablet   Refills:  11   Dose:  25 mg    Instructions:  Take 1 tablet (25 mg total) by mouth once daily.     Begin Date    AM    Noon    PM    Bedtime       temazepam 30 mg capsule   Commonly known as:  RESTORIL   Quantity:  30 capsule   Refills:  5    Instructions:  TAKE ONE CAPSULE BY MOUTH AT NIGHT AS NEEDED     Begin Date    AM    Noon    PM    Bedtime       tramadol 50 mg tablet   Commonly known as:  ULTRAM   Quantity:  60 tablet   Refills:  0   Dose:  50 mg    Instructions:  Take 1 tablet (50 mg total) by mouth every 6 (six) hours as needed for Pain. 1-2 q 6hr prn     Begin Date    AM    Noon    PM    Bedtime       triamcinolone acetonide 0.1% 0.1 % cream   Commonly known as:  KENALOG   Refills:  0      Begin Date    AM    Noon    PM    Bedtime       VITAMIN D2 50,000 unit Cap   Refills:  0   Dose:  5000 Units   Generic drug:  ergocalciferol    Instructions:  Take 5,000 Units by mouth every 7 days.     Begin Date    AM    Noon    PM    Bedtime                  Please bring to all follow up appointments:    1. A copy of your discharge instructions.  2. All medicines you are currently taking in their original bottles.  3. Identification and insurance card.    Please arrive 15 minutes ahead of scheduled appointment time.    Please call 24 hours in advance if you must reschedule your appointment and/or time.        Your Scheduled  "Appointments     Apr 17, 2017  1:20 PM CDT   Established Patient Visit with Aime Esteban MD   [facility]    [address] [facility] [address] [place] [id number] [phone]            Sep 07, 2017  7:30 AM CDT   Non-Fasting Lab [facility] ([facility])    [address] [place] [address] [phone]            Sep 07, 2017  8:30 AM CDT   Us Abdomen with [facility] US1 - ROOM 3   [facility] ([facility])    [address] [place] [address] [phone]              Follow-up Information     Follow up with Akhil Harry MD.    Specialty:  Internal Medicine    Why:  As previously scheduled    Contact information:    [address] [facility]  [place] [address] [phone]          Follow up with Martin Chauhan MD.    Specialty:  Gastroenterology    Contact information:    [address] [place] [address]  [address]  [facility]  [place] [address] [phone]            Admission Information     Date & Time Provider Department CSN    4/6/2017  9:21 AM Martin Chauhan MD [facility] [id number]      Care Providers     Provider Role Specialty Primary office phone    Martin Chauhan MD Attending Provider Gastroenterology [phone]    Martin Chauhan MD Surgeon  Gastroenterology [phone]      Your Vitals Were     BP Pulse Temp Resp Height Weight    132/74 (BP Location: Left arm, Patient Position: Lying, BP Method: Automatic) 60 98.4 °F (36.9 °C) (Oral) 18 5' 3" (1.6 m) 64.4 kg (142 lb)    SpO2 BMI             98% 25.15 kg/m2         Recent Lab Values     No lab values to display.      Pending Labs     Order Current Status    Specimen to Pathology - Surgery Collected (04/06/17 1016)      Allergies as of 4/6/2017     No Known Allergies      [facility]     [facility] On Call Nurse Care Line - 24/7 Assistance  Unless otherwise " directed by your provider, please contact [facility], our nurse care line that is available for 24/7 assistance.     Registered nurses in the [facility] provide clinical advisement, health education, appointment booking, and other advisory services.  Call for this free service at [phone].        Advance Directives     An advance directive is a document which, in the event you are no longer able to make decisions for yourself, tells your healthcare team what kind of treatment you do or do not want to receive, or who you would like to make those decisions for you.  If you do not currently have an advance directive, [facility] encourages you to create one.  For more information call:  [phone]), [phone] ([phone]),  or log on to [email or web address].        Language Assistance Services     ATTENTION: Language assistance services are available, free of charge. Please call [phone].      ATENCIÓN: Si habla español, tiene a garcia disposición servicios gratuitos de asistencia lingüística. Llame al [phone].     CHÚ Ý: N?u b?n nói Ti?ng Vi?t, có các d?ch v? h? tr? ngôn ng? mi?n phí dành cho b?n. G?i s? [phone].        Chronic Kindey Disease Education             [facility] Sign-Up     Activating your [facility] account is as easy as 1-2-3!     1) Visit [email or web address], select Sign Up Now, enter this activation code and your date of birth, then select Next.  [email or web address]  Expires: 4/30/2017  8:43 AM      2) Create a username and password to use when you visit [facility] in the future and select a security question in case you lose your password and select Next.    3) Enter your e-mail address and click Sign Up!    Additional Information  If you have questions, please e-mail [email or web address] or call [phone] to talk to our [facility] staff. Remember, [facility] is NOT to be used for urgent needs. For medical emergencies, dial 911.          [facility] complies with applicable Federal civil rights laws and does not discriminate on the basis of race, color, national origin, age, disability, or sex.

## 2017-04-06 NOTE — ANESTHESIA POSTPROCEDURE EVALUATION
"Anesthesia Post Evaluation    Patient: Sandra Moody    Procedure(s) Performed: Procedure(s) (LRB):  ESOPHAGOGASTRODUODENOSCOPY (EGD) (N/A)    Final Anesthesia Type: general  Patient location during evaluation: GI PACU  Patient participation: Yes- Able to Participate  Level of consciousness: awake and alert  Post-procedure vital signs: reviewed and stable  Pain management: adequate  Airway patency: patent  PONV status at discharge: No PONV  Anesthetic complications: no      Cardiovascular status: blood pressure returned to baseline  Respiratory status: unassisted  Hydration status: euvolemic  Follow-up not needed.        Visit Vitals    /74 (BP Location: Left arm, Patient Position: Lying, BP Method: Automatic)    Pulse 60    Temp 36.9 °C (98.4 °F) (Oral)    Resp 18    Ht 5' 3" (1.6 m)    Wt 64.4 kg (142 lb)    SpO2 98%    Breastfeeding No    BMI 25.15 kg/m2       Pain/Melania Score: Pain Assessment Performed: Yes (4/6/2017 10:36 AM)  Presence of Pain: denies (4/6/2017 10:36 AM)  Melania Score: 10 (4/6/2017 10:36 AM)      "

## 2017-04-17 ENCOUNTER — OFFICE VISIT (OUTPATIENT)
Dept: CARDIOLOGY | Facility: CLINIC | Age: 82
End: 2017-04-17
Payer: MEDICARE

## 2017-04-17 VITALS
WEIGHT: 141 LBS | SYSTOLIC BLOOD PRESSURE: 151 MMHG | BODY MASS INDEX: 24.98 KG/M2 | HEIGHT: 63 IN | HEART RATE: 67 BPM | OXYGEN SATURATION: 92 % | DIASTOLIC BLOOD PRESSURE: 69 MMHG

## 2017-04-17 DIAGNOSIS — E03.9 ACQUIRED HYPOTHYROIDISM: ICD-10-CM

## 2017-04-17 DIAGNOSIS — R00.2 PALPITATIONS: Primary | ICD-10-CM

## 2017-04-17 DIAGNOSIS — I49.3 PVC (PREMATURE VENTRICULAR CONTRACTION): ICD-10-CM

## 2017-04-17 DIAGNOSIS — N18.2 CHRONIC KIDNEY DISEASE, STAGE II (MILD): ICD-10-CM

## 2017-04-17 DIAGNOSIS — E78.5 DYSLIPIDEMIA: ICD-10-CM

## 2017-04-17 DIAGNOSIS — I10 ESSENTIAL HYPERTENSION: ICD-10-CM

## 2017-04-17 DIAGNOSIS — Z85.118 HISTORY OF LUNG CANCER: ICD-10-CM

## 2017-04-17 DIAGNOSIS — R00.0 TACHYCARDIA: ICD-10-CM

## 2017-04-17 DIAGNOSIS — R94.31 ABNORMAL EKG: ICD-10-CM

## 2017-04-17 DIAGNOSIS — J43.8 OTHER EMPHYSEMA: ICD-10-CM

## 2017-04-17 PROCEDURE — 1159F MED LIST DOCD IN RCRD: CPT | Mod: S$GLB,,, | Performed by: INTERNAL MEDICINE

## 2017-04-17 PROCEDURE — 1160F RVW MEDS BY RX/DR IN RCRD: CPT | Mod: S$GLB,,, | Performed by: INTERNAL MEDICINE

## 2017-04-17 PROCEDURE — 99214 OFFICE O/P EST MOD 30 MIN: CPT | Mod: S$GLB,,, | Performed by: INTERNAL MEDICINE

## 2017-04-17 PROCEDURE — 99499 UNLISTED E&M SERVICE: CPT | Mod: S$GLB,,, | Performed by: INTERNAL MEDICINE

## 2017-04-17 PROCEDURE — 1126F AMNT PAIN NOTED NONE PRSNT: CPT | Mod: S$GLB,,, | Performed by: INTERNAL MEDICINE

## 2017-04-17 PROCEDURE — 99999 PR PBB SHADOW E&M-EST. PATIENT-LVL III: CPT | Mod: PBBFAC,,, | Performed by: INTERNAL MEDICINE

## 2017-04-17 NOTE — MR AVS SNAPSHOT
[facility] - Cardiology  [address] [place] [address]  Phone: [phone]                  Sandra Moody   2017 1:20 PM   Office Visit    Description:  Female : 10/27/1928   Provider:  Aime Esteban MD   Department:  [facility] - Cardiology           Reason for Visit     Follow-up           Diagnoses this Visit        Comments    Palpitations    -  Primary     Tachycardia         Abnormal EKG         Essential hypertension         Dyslipidemia         Other emphysema         Acquired hypothyroidism         Chronic kidney disease, stage II (mild)         History of lung cancer         PVC (premature ventricular contraction)                To Do List           Future Appointments        Provider Department Dept Phone    2017 7:30 AM [facility] [phone]    2017 8:30 AM [facility] - ROOM 3 [facility] [phone]      Goals (5 Years of Data)              12/7/16    11/22/16    11/3/16    COMPLETED: Patient/caregiver will complete application process to obtain services prior to discharge from [facility].           Notes - Note edited  2016 12:54 PM by Alyce Varma LCSW    Overall Time to Completion  2 months from 2016    Short Term Goals  Patient/caregiver will contact Agency: [facility] and Insurance Customer Service for guidance within 1 month.  Interventions   · Assist with completing insurance claim form.  · Refer to Customer Service Department.   Status  · Deferred      Patient/caregiver will review and complete Advance Directive Form within 1 month.  Interventions   · Empower patient/caregiver to discuss End of life issues with family, Physician and/or Power of .  · Provide education on advance care planning.   Status  · Deferred            COMPLETED: Patient/caregiver will have adequate mental health support/resources prior to discharge from [facility].           Notes - Note edited   11/23/2016 11:47 AM by Alyce Varma LCSW    Overall Time to Completion  2 weeks from 11/08/2016    Short Term Goals  Patient/caregiver will visit Mental Health  or support group within 2 weeks.  Interventions   · Encourage compliance with medical/mental health appointments.  · Encourage family/social  and involvement in care.  · Encourage medication compliance.  · Facilitate family meeting.  · Provide crisis intervention hotline.  · Provide information on safety devices.  · Provide support group information.  · Provide supportive counseling.  · Recognize and provide educational material (CARLOS ALBERTO).   Status  · Met            COMPLETED: Patient/caregiver will have an action plan in place to manage and prevent complications of COPD prior to discharge from [facility].   On track  On track  On track    Notes - Note created  10/24/2016  2:00 PM by Shawna Lawrence RN    Overall Time to Completion  4 weeks from 10/24/2016    Short Term Goals  Patient/caregiver will identify 3 deficits resulting from COPD and list 3 ways to overcome those deficits within 2 weeks.  Interventions   · Arrange home visit to evaluate home environment.  · Recognize and provide educational material (CARLOS ALBERTO).   Status  · Partially met    Patient/caregiver will verbalize 4 signs and symptoms of COPD within 2 weeks.   Interventions   · Recognize and provide educational material (SUZANNEMES).  · Complete medication reconciliation.  · Encourage Medication Compliance.   Status  · Partially met    Patient/caregiver will verbalize 3 ways of preventing complications due to disease process within 1 month.  Interventions   · Recognize and provide educational material (KRAMES).  · Encourage Medication Compliance.   Status  · Partially met    Patient/caregiver will verbalize importance of early intervention for symptoms of Hypotension COPD within 1 month.  Interventions   · Recognize and provide educational material (CARLOS ALBERTO).  · Encourage  Medication Compliance.   Status  · Partially met          COMPLETED: Patient/caregiver will have knowledge of resources available in order to obtain the services that are needed prior to discharge from [facility].           Notes - Note created  10/24/2016  2:01 PM by Shawna Lawrence RN    Overall Time to Completion  4 weeks from 10/24/2016    Short Term Goals  Patient/caregiver will notify RN [facility] if patient does not hear from [facility]  within 1 week.  Interventions   · Refer to Outpatient Case Management Social Worker.   Status  · Met            [facility] On Call     [facility] On Call Nurse Care Line - 24/7 Assistance  Unless otherwise directed by your provider, please contact [facility], our nurse care line that is available for 24/7 assistance.     Registered nurses in the [facility] On Call Center provide: appointment scheduling, clinical advisement, health education, and other advisory services.  Call: [phone] (toll free)               Medications           Message regarding Medications     Verify the changes and/or additions to your medication regime listed below are the same as discussed with your clinician today.  If any of these changes or additions are incorrect, please notify your healthcare provider.        STOP taking these medications     ciclopirox (LOPROX) 0.77 % Crea     ergocalciferol (VITAMIN D2) 50,000 unit Cap Take 5,000 Units by mouth every 7 days.    omeprazole (PRILOSEC) 20 MG capsule TAKE 1 OR 2 CAPSULES DAILY AS DIRECTED    tramadol (ULTRAM) 50 mg tablet Take 1 tablet (50 mg total) by mouth every 6 (six) hours as needed for Pain. 1-2 q 6hr prn    triamcinolone acetonide 0.1% (KENALOG) 0.1 % cream            Verify that the below list of medications is an accurate representation of the medications you are currently taking.  If none reported, the list may be blank. If incorrect, please contact your healthcare provider. Carry this list with you in case of emergency.           Current  "Medications     albuterol (PROVENTIL HFA) 90 mcg/actuation inhaler Inhale 2 puffs into the lungs every 6 (six) hours as needed (ANY equivalent covered is OK).    alendronate (FOSAMAX) 70 MG tablet TAKE 1 TABLET EVERY 7 DAYS    amlodipine (NORVASC) 5 MG tablet TAKE 2 TABLETS EVERY DAY    aspirin 81 mg Tab Take 1 tablet by mouth Daily.     estradiol (ESTRACE) 0.01 % (0.1 mg/g) vaginal cream Pea-size dab of cream to urethra/introitus at bed time.    fexofenadine (ALLEGRA) 180 MG tablet Take 1 tablet (180 mg total) by mouth once daily. 1 Tablet Oral Every day.  Take as directed for allergy or thin secretions.    levothyroxine (SYNTHROID) 25 MCG tablet TAKE 1 TABLET EVERY DAY    lisinopril (PRINIVIL,ZESTRIL) 40 MG tablet TAKE 1 TABLET EVERY DAY    metoprolol succinate (TOPROL-XL) 50 MG 24 hr tablet TAKE 1 TABLET ONE TIME DAILY    oxybutynin (DITROPAN-XL) 10 MG 24 hr tablet TAKE 1 TABLET ONE TIME DAILY    sertraline (ZOLOFT) 25 MG tablet Take 1 tablet (25 mg total) by mouth once daily.    temazepam (RESTORIL) 30 mg capsule TAKE ONE CAPSULE BY MOUTH AT NIGHT AS NEEDED    hydrocortisone 2.5 % cream Apply topically 2 (two) times daily.           Clinical Reference Information           Your Vitals Were     BP Pulse Height Weight SpO2 BMI    151/69 (BP Location: Left arm, Patient Position: Sitting, BP Method: Automatic) 67 5' 3" (1.6 m) 64 kg (141 lb) 92% 24.98 kg/m2      Blood Pressure          Most Recent Value    BP  (!)  151/69      Allergies as of 4/17/2017     No Known Allergies      Immunizations Administered on Date of Encounter - 4/17/2017     None      [facility] Sign-Up     Activating your [facility] account is as easy as 1-2-3!     1) Visit [email or web address], select Sign Up Now, enter this activation code and your date of birth, then select Next.  [email or web address]  Expires: 4/30/2017  8:43 AM      2) Create a username and password to use when you visit [facility] in the future and select a security question in case you " lose your password and select Next.    3) Enter your e-mail address and click Sign Up!    Additional Information  If you have questions, please e-mail [email or web address] or call [phone] to talk to our [facility] staff. Remember, [facility] is NOT to be used for urgent needs. For medical emergencies, dial 911.         Language Assistance Services     ATTENTION: Language assistance services are available, free of charge. Please call [phone].      ATENCIÓN: Si habla español, tiene a garcia disposición servicios gratuitos de asistencia lingüística. Llame al [phone].     CHÚ Ý: N?u b?n nói Ti?ng Vi?t, có các d?ch v? h? tr? ngôn ng? mi?n phí dành cho b?n. G?i s? [phone].         [facility] complies with applicable Federal civil rights laws and does not discriminate on the basis of race, color, national origin, age, disability, or sex.

## 2017-04-17 NOTE — PROGRESS NOTES
Subjective:    Patient ID:  Sandra Moody is a 88 y.o. female who presents for follow-up of No chief complaint on file.      HPI  Patient is here for follow-up of palpitations.  She has frequent PVCs which were symptomatic.  She says that she's been better on beta blocker but needs to confirm that she's still taking this.  She denies any current cardiopulmonary complaints.  She's expands no chest pain or shortness of breath.  She feels like she can do all her daily activities without any issues.  She's not expressing any PND, orthopnea or lower edema.  She denies any dizziness, presyncope or syncope.  She underwent diagnostic testing as below which was fairly unremarkable apart from the frequent PVCs.    Review of Systems   Constitution: Negative.   HENT: Negative.    Eyes: Negative.    Cardiovascular: Negative for chest pain, dyspnea on exertion, irregular heartbeat, leg swelling, near-syncope, orthopnea, palpitations, paroxysmal nocturnal dyspnea and syncope.   Respiratory: Negative for shortness of breath.    Skin: Negative.    Musculoskeletal: Negative.    Gastrointestinal: Negative for abdominal pain, constipation and diarrhea.   Genitourinary: Negative for dysuria.   Neurological: Negative.    Psychiatric/Behavioral: Negative.         Objective:    Physical Exam   Constitutional: She is oriented to person, place, and time. She appears well-developed and well-nourished.   HENT:   Head: Normocephalic and atraumatic.   Eyes: Conjunctivae and EOM are normal. Pupils are equal, round, and reactive to light.   Neck: Normal range of motion. Neck supple. No thyromegaly present.   Cardiovascular: Normal rate and regular rhythm.    No murmur heard.  Pulmonary/Chest: Effort normal and breath sounds normal. No respiratory distress.   Abdominal: Soft. Bowel sounds are normal.   Musculoskeletal: She exhibits no edema.   Neurological: She is alert and oriented to person, place, and time.   Skin: Skin is warm and  dry.   Psychiatric: She has a normal mood and affect. Her behavior is normal.         NST:  Impression: NORMAL MYOCARDIAL PERFUSION  1. The perfusion scan is free of evidence for myocardial ischemia or injury.   2. There is mild apical thinning which is a normal variant.   3. Resting wall motion is physiologic.   4. Resting LV function is normal.   5. The ventricular volumes are normal at rest and stress.   6. The extracardiac distribution of radioactivity is normal.     Echo:  CONCLUSIONS     1 - Normal left ventricular systolic function (EF 60-65%).     2 - Concentric hypertrophy.     3 - Mildly enlarged ascending aorta.     4 - The estimated PA systolic pressure is 35 mmHg.     5 - Trivial mitral regurgitation.     6 - Mild tricuspid regurgitation.     [place]   TEST DESCRIPTION   PREDOMINANT RHYTHM  1. Sinus rhythm with heart rates varying between 50 and 117 bpm with an average of 72 bpm.     VENTRICULAR ARRHYTHMIAS  1. There were very frequent PVCs totalling 01358 and averaging 261 per hour.     2. There were rare non-sustained runs of monomorphic ventricular tachycardia lasting from 2 to 4 beats.     SUPRA VENTRICULAR ARRHYTHMIAS  1. There were occasional PACs totalling 755 and averaging 15 per hour.     2. There were no episodes of sustained supraventricular tachycardia.    SINUS NODE FUNCTION  1. There was no evidence of high grade SA sintia block.     AV CONDUCTION  1. There was no evidence of high grade AV block.     DIARY  1. The diary was not returned    MISCELLANEOUS  1. There were frequent hookup related artifacts. Overall, the study was of adequate quality.     2. This was a tape of adequate length (48 hrs).       Assessment:       1. Palpitations    2. Tachycardia    3. Abnormal EKG    4. Essential hypertension    5. Dyslipidemia    6. Other emphysema    7. Acquired hypothyroidism    8. Chronic kidney disease, stage II (mild)    9. History of lung cancer    10. PVC (premature ventricular contraction)          Plan:       -frequent sx pvc on bb    RTC 6 mos

## 2017-05-17 RX ORDER — AMLODIPINE BESYLATE 5 MG/1
TABLET ORAL
Qty: 180 TABLET | Refills: 3 | Status: SHIPPED | OUTPATIENT
Start: 2017-05-17 | End: 2018-03-02 | Stop reason: SDUPTHER

## 2017-06-05 ENCOUNTER — OFFICE VISIT (OUTPATIENT)
Dept: FAMILY MEDICINE | Facility: CLINIC | Age: 82
End: 2017-06-05
Payer: MEDICARE

## 2017-06-05 VITALS
WEIGHT: 144.38 LBS | BODY MASS INDEX: 25.58 KG/M2 | DIASTOLIC BLOOD PRESSURE: 70 MMHG | HEART RATE: 65 BPM | TEMPERATURE: 98 F | OXYGEN SATURATION: 95 % | SYSTOLIC BLOOD PRESSURE: 120 MMHG

## 2017-06-05 DIAGNOSIS — R31.9 URINARY TRACT INFECTION WITH HEMATURIA, SITE UNSPECIFIED: Primary | ICD-10-CM

## 2017-06-05 DIAGNOSIS — I10 ESSENTIAL HYPERTENSION: ICD-10-CM

## 2017-06-05 DIAGNOSIS — N39.0 URINARY TRACT INFECTION WITH HEMATURIA, SITE UNSPECIFIED: Primary | ICD-10-CM

## 2017-06-05 DIAGNOSIS — G47.00 INSOMNIA, UNSPECIFIED TYPE: ICD-10-CM

## 2017-06-05 LAB
BILIRUB SERPL-MCNC: NORMAL MG/DL
BLOOD URINE, POC: 250
COLOR, POC UA: NORMAL
GLUCOSE UR QL STRIP: NORMAL
KETONES UR QL STRIP: NORMAL
LEUKOCYTE ESTERASE URINE, POC: NORMAL
NITRITE, POC UA: NORMAL
PH, POC UA: 5
PROTEIN, POC: NORMAL
SPECIFIC GRAVITY, POC UA: 1.01
UROBILINOGEN, POC UA: NORMAL

## 2017-06-05 PROCEDURE — 99499 UNLISTED E&M SERVICE: CPT | Mod: S$GLB,,, | Performed by: INTERNAL MEDICINE

## 2017-06-05 PROCEDURE — 99214 OFFICE O/P EST MOD 30 MIN: CPT | Mod: 25,S$GLB,, | Performed by: INTERNAL MEDICINE

## 2017-06-05 PROCEDURE — 1126F AMNT PAIN NOTED NONE PRSNT: CPT | Mod: S$GLB,,, | Performed by: INTERNAL MEDICINE

## 2017-06-05 PROCEDURE — 99999 PR PBB SHADOW E&M-EST. PATIENT-LVL III: CPT | Mod: PBBFAC,,, | Performed by: INTERNAL MEDICINE

## 2017-06-05 PROCEDURE — 81002 URINALYSIS NONAUTO W/O SCOPE: CPT | Mod: S$GLB,,, | Performed by: INTERNAL MEDICINE

## 2017-06-05 PROCEDURE — 1159F MED LIST DOCD IN RCRD: CPT | Mod: S$GLB,,, | Performed by: INTERNAL MEDICINE

## 2017-06-05 RX ORDER — CEPHALEXIN 500 MG/1
500 CAPSULE ORAL EVERY 12 HOURS
Qty: 14 CAPSULE | Refills: 0 | Status: SHIPPED | OUTPATIENT
Start: 2017-06-05 | End: 2017-06-12

## 2017-06-05 NOTE — PROGRESS NOTES
"SUBJECTIVE     Chief Complaint   Patient presents with    Urinary Tract Infection     sx since 6/2/17       HPI  Sandra Moody is a 88 y.o. female with multiple medical diagnoses as listed in the medical history and problem list that presents for evaluation of UTI symptoms since Friday. Pt says she has "spasms" when she micturates. Pt denies any dysuria. She reports increased urinary frequency with cloudy urine and some urgency. Denies any fever or chills, but has some night sweats. She has not been taking any meds for her symptoms.     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 lj- fosamax    PSVT (paroxysmal supraventricular tachycardia)     dr. marx    PVC (premature ventricular contraction) 11/10/2012    Rectocele     Status post partial lobectomy of lung 2/2010    Thyroid disease     hypothyroidism    Trochanteric bursitis 4/18/2013    Urine incontinence 2/24/2015    Vitamin D deficiency disease 9/4/2014       PAST SURGICAL HISTORY:  Past Surgical History:   Procedure Laterality Date    ADENOIDECTOMY      APPENDECTOMY      CHOLECYSTECTOMY      COLONOSCOPY      CYSTOSCOPY      HYSTERECTOMY      LUNG LOBECTOMY Right     OOPHORECTOMY      TONSILLECTOMY      UPPER GASTROINTESTINAL ENDOSCOPY         SOCIAL HISTORY:  Social History     Social History    Marital status:      Spouse name: N/A    Number of children: N/A    Years of education: N/A     Occupational History    " Not on file.     Social History Main Topics    Smoking status: Never Smoker    Smokeless tobacco: Never Used    Alcohol use No    Drug use: No    Sexual activity: No     Other Topics Concern    Not on file     Social History Narrative    No narrative on file       FAMILY HISTORY:  Family History   Problem Relation Age of Onset    Cancer Mother      COLON    Cancer Brother     Asthma Neg Hx     Emphysema Neg Hx     Amblyopia Neg Hx     Blindness Neg Hx     Cataracts Neg Hx     Diabetes Neg Hx     Glaucoma Neg Hx     Hypertension Neg Hx     Macular degeneration Neg Hx     Retinal detachment Neg Hx     Strabismus Neg Hx     Stroke Neg Hx     Thyroid disease Neg Hx     Cirrhosis Neg Hx        ALLERGIES AND MEDICATIONS: updated and reviewed.  Review of patient's allergies indicates:  No Known Allergies  Current Outpatient Prescriptions   Medication Sig Dispense Refill    alendronate (FOSAMAX) 70 MG tablet TAKE 1 TABLET EVERY 7 DAYS 12 tablet 11    amlodipine (NORVASC) 5 MG tablet TAKE 2 TABLETS EVERY  tablet 3    aspirin 81 mg Tab Take 1 tablet by mouth Daily.       estradiol (ESTRACE) 0.01 % (0.1 mg/g) vaginal cream Pea-size dab of cream to urethra/introitus at bed time. 42.5 g 1    fexofenadine (ALLEGRA) 180 MG tablet Take 1 tablet (180 mg total) by mouth once daily. 1 Tablet Oral Every day.  Take as directed for allergy or thin secretions. 90 tablet 12    levothyroxine (SYNTHROID) 25 MCG tablet TAKE 1 TABLET EVERY DAY 90 tablet 3    lisinopril (PRINIVIL,ZESTRIL) 40 MG tablet TAKE 1 TABLET EVERY DAY 90 tablet 3    metoprolol succinate (TOPROL-XL) 50 MG 24 hr tablet TAKE 1 TABLET ONE TIME DAILY 90 tablet 3    oxybutynin (DITROPAN-XL) 10 MG 24 hr tablet TAKE 1 TABLET ONE TIME DAILY 90 tablet 11    sertraline (ZOLOFT) 25 MG tablet Take 1 tablet (25 mg total) by mouth once daily. 30 tablet 11    temazepam (RESTORIL) 30 mg capsule TAKE ONE CAPSULE BY MOUTH AT NIGHT AS NEEDED 30  capsule 5    albuterol (PROVENTIL HFA) 90 mcg/actuation inhaler Inhale 2 puffs into the lungs every 6 (six) hours as needed (ANY equivalent covered is OK). 1 Inhaler 2    cephALEXin (KEFLEX) 500 MG capsule Take 1 capsule (500 mg total) by mouth every 12 (twelve) hours. 14 capsule 0    hydrocortisone 2.5 % cream Apply topically 2 (two) times daily. 20 g 0     No current facility-administered medications for this visit.        ROS  Review of Systems   Constitutional: Negative for chills and fever.   HENT: Negative for hearing loss and sore throat.    Eyes: Negative for visual disturbance.   Respiratory: Negative for cough and shortness of breath.    Cardiovascular: Negative for chest pain, palpitations and leg swelling.   Gastrointestinal: Negative for abdominal pain, constipation, diarrhea, nausea and vomiting.   Genitourinary: Positive for frequency and urgency. Negative for dysuria.   Musculoskeletal: Negative for arthralgias, joint swelling and myalgias.   Skin: Negative for rash and wound.   Neurological: Negative for headaches.   Psychiatric/Behavioral: Negative for agitation and confusion. The patient is not nervous/anxious.          OBJECTIVE     Physical Exam  Vitals:    06/05/17 1106   BP: 120/70   Pulse: 65   Temp: 98.4 °F (36.9 °C)    Body mass index is 25.58 kg/m².  Weight: 65.5 kg (144 lb 6.4 oz)         Physical Exam   Constitutional: She is oriented to person, place, and time. She appears well-developed and well-nourished. No distress.   HENT:   Head: Normocephalic and atraumatic.   Right Ear: External ear normal.   Left Ear: External ear normal.   Nose: Nose normal.   Mouth/Throat: Oropharynx is clear and moist.   Eyes: Conjunctivae and EOM are normal. Right eye exhibits no discharge. Left eye exhibits no discharge. No scleral icterus.   Neck: Normal range of motion. Neck supple. No JVD present. No tracheal deviation present.   Cardiovascular: Normal rate, regular rhythm and intact distal pulses.   Exam reveals no gallop and no friction rub.    No murmur heard.  Pulmonary/Chest: Effort normal and breath sounds normal. No respiratory distress. She has no wheezes.   Abdominal: Soft. Bowel sounds are normal. She exhibits no distension and no mass. There is no tenderness. There is CVA tenderness. There is no rebound and no guarding.   Musculoskeletal: Normal range of motion. She exhibits no edema, tenderness or deformity.   Neurological: She is alert and oriented to person, place, and time. She exhibits normal muscle tone. Coordination normal.   Skin: Skin is warm and dry. No rash noted. No erythema.   Psychiatric: She has a normal mood and affect. Her behavior is normal. Judgment and thought content normal.         Health Maintenance       Date Due Completion Date    TETANUS VACCINE 10/27/1946 ---    DEXA SCAN 05/05/2017 5/5/2014    Override on 4/10/2014: (N/S)    Influenza Vaccine 08/01/2017 10/20/2016    Lipid Panel 04/10/2019 4/10/2014            ASSESSMENT     88 y.o. female with     1. Urinary tract infection with hematuria, site unspecified    2. Insomnia, unspecified type    3. Essential hypertension        PLAN:     1. Urinary tract infection with hematuria, site unspecified  - Pt was advised to take Abx to completion  - POCT URINE DIPSTICK WITHOUT MICROSCOPE; ++ leuk, + nit, and 250 blood(not enough urine to send for culture)  - cephALEXin (KEFLEX) 500 MG capsule; Take 1 capsule (500 mg total) by mouth every 12 (twelve) hours.  Dispense: 14 capsule; Refill: 0  - Pt advised to call back if no improvement of symptoms in the next 3 days    2. Insomnia, unspecified type  - Stable; no acute issues  - The current medical regimen is effective;  continue present plan and medications.    3. Essential hypertension  - BP well controlled; at goal of <140/90  - The current medical regimen is effective;  continue present plan and medications.      RTC in 1 week for any acute worsening of current condition or failure  to improve     Rox Montes MD  06/05/2017 11:34 AM        No Follow-up on file.

## 2017-06-20 ENCOUNTER — TELEPHONE (OUTPATIENT)
Dept: FAMILY MEDICINE | Facility: CLINIC | Age: 82
End: 2017-06-20

## 2017-06-20 ENCOUNTER — HOSPITAL ENCOUNTER (OUTPATIENT)
Dept: RADIOLOGY | Facility: HOSPITAL | Age: 82
Discharge: HOME | End: 2017-06-20
Attending: NURSE PRACTITIONER
Payer: MEDICARE

## 2017-06-20 ENCOUNTER — OFFICE VISIT (OUTPATIENT)
Dept: FAMILY MEDICINE | Facility: CLINIC | Age: 82
End: 2017-06-20
Payer: MEDICARE

## 2017-06-20 VITALS
BODY MASS INDEX: 26.75 KG/M2 | TEMPERATURE: 98 F | HEART RATE: 70 BPM | OXYGEN SATURATION: 94 % | DIASTOLIC BLOOD PRESSURE: 70 MMHG | SYSTOLIC BLOOD PRESSURE: 100 MMHG | WEIGHT: 151 LBS | HEIGHT: 63 IN

## 2017-06-20 DIAGNOSIS — M79.672 FOOT PAIN, LEFT: Primary | ICD-10-CM

## 2017-06-20 DIAGNOSIS — T14.90XA TRAUMA: ICD-10-CM

## 2017-06-20 DIAGNOSIS — M79.672 FOOT PAIN, LEFT: ICD-10-CM

## 2017-06-20 PROCEDURE — 99999 PR PBB SHADOW E&M-EST. PATIENT-LVL IV: CPT | Mod: PBBFAC,,, | Performed by: NURSE PRACTITIONER

## 2017-06-20 PROCEDURE — 1125F AMNT PAIN NOTED PAIN PRSNT: CPT | Mod: S$GLB,,, | Performed by: NURSE PRACTITIONER

## 2017-06-20 PROCEDURE — 99214 OFFICE O/P EST MOD 30 MIN: CPT | Mod: S$GLB,,, | Performed by: NURSE PRACTITIONER

## 2017-06-20 PROCEDURE — 73630 X-RAY EXAM OF FOOT: CPT | Mod: TC,PO,LT

## 2017-06-20 PROCEDURE — 1159F MED LIST DOCD IN RCRD: CPT | Mod: S$GLB,,, | Performed by: NURSE PRACTITIONER

## 2017-06-20 PROCEDURE — 73630 X-RAY EXAM OF FOOT: CPT | Mod: 26,LT,, | Performed by: RADIOLOGY

## 2017-06-20 NOTE — TELEPHONE ENCOUNTER
Call patient.  X-ray of foot look good.  No fracture.  Wear a loose fitting shoe something like a sneaker would be best.

## 2017-06-20 NOTE — PROGRESS NOTES
This dictation has been generated using Dragon Dictation some phonetic errors may occur.     Sandra was seen today for foot pain.    Diagnoses and all orders for this visit:    Foot pain, left  -     X-Ray Foot Complete Left; Future    Trauma  -     X-Ray Foot Complete Left; Future      Foot pain and trauma.  I reviewed x-ray images no evidence of fracture.  Recommended comfortable footwear.  Something loose fitting like a sneaker would be best.    Return if symptoms worsen or fail to improve.      ________________________________________________________________  ________________________________________________________________        Chief Complaint   Patient presents with    Foot Pain     left     History of present illness  This 88 y.o. presents today for complaint of foot pain.  Patient indicates that she struck her foot on a door 1 and half weeks ago.  She was walking at night to go to the bathroom.  She was able to bear weight at the time of the accident but notes limping.  She complains of left foot pain.  Pain is improved somewhat since incident.  The bruising has improved in the last week.  She denies any numbness in the toes.  Review of systems  A she denies any redness or swelling.  No break in the skin.  Past medical and social history reviewed.  Patient due to me.  Follows one of my partners.    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 lj- fosamax    PSVT (paroxysmal supraventricular tachycardia)     dr. francisco j ASCENCIO  (premature ventricular contraction) 11/10/2012    Rectocele     Status post partial lobectomy of lung 2/2010    Thyroid disease     hypothyroidism    Trochanteric bursitis 4/18/2013    Urine incontinence 2/24/2015    Vitamin D deficiency disease 9/4/2014       Past Surgical History:   Procedure Laterality Date    ADENOIDECTOMY      APPENDECTOMY      CHOLECYSTECTOMY      COLONOSCOPY      CYSTOSCOPY      HYSTERECTOMY      LUNG LOBECTOMY Right     OOPHORECTOMY      TONSILLECTOMY      UPPER GASTROINTESTINAL ENDOSCOPY         Family History   Problem Relation Age of Onset    Cancer Mother      COLON    Cancer Brother     Asthma Neg Hx     Emphysema Neg Hx     Amblyopia Neg Hx     Blindness Neg Hx     Cataracts Neg Hx     Diabetes Neg Hx     Glaucoma Neg Hx     Hypertension Neg Hx     Macular degeneration Neg Hx     Retinal detachment Neg Hx     Strabismus Neg Hx     Stroke Neg Hx     Thyroid disease Neg Hx     Cirrhosis Neg Hx        Social History     Social History    Marital status:      Spouse name: N/A    Number of children: N/A    Years of education: N/A     Social History Main Topics    Smoking status: Never Smoker    Smokeless tobacco: Never Used    Alcohol use No    Drug use: No    Sexual activity: No     Other Topics Concern    None     Social History Narrative    None       Current Outpatient Prescriptions   Medication Sig Dispense Refill    alendronate (FOSAMAX) 70 MG tablet TAKE 1 TABLET EVERY 7 DAYS 12 tablet 11    amlodipine (NORVASC) 5 MG tablet TAKE 2 TABLETS EVERY  tablet 3    aspirin 81 mg Tab Take 1 tablet by mouth Daily.       estradiol (ESTRACE) 0.01 % (0.1 mg/g) vaginal cream Pea-size dab of cream to urethra/introitus at bed time. 42.5 g 1    fexofenadine (ALLEGRA) 180 MG tablet Take 1 tablet (180 mg total) by mouth once daily. 1 Tablet Oral Every day.  Take as directed for allergy or thin secretions. 90 tablet 12    levothyroxine  (SYNTHROID) 25 MCG tablet TAKE 1 TABLET EVERY DAY 90 tablet 3    lisinopril (PRINIVIL,ZESTRIL) 40 MG tablet TAKE 1 TABLET EVERY DAY 90 tablet 3    metoprolol succinate (TOPROL-XL) 50 MG 24 hr tablet TAKE 1 TABLET ONE TIME DAILY 90 tablet 3    oxybutynin (DITROPAN-XL) 10 MG 24 hr tablet TAKE 1 TABLET ONE TIME DAILY 90 tablet 11    sertraline (ZOLOFT) 25 MG tablet Take 1 tablet (25 mg total) by mouth once daily. 30 tablet 11    temazepam (RESTORIL) 30 mg capsule TAKE ONE CAPSULE BY MOUTH AT NIGHT AS NEEDED 30 capsule 5    albuterol (PROVENTIL HFA) 90 mcg/actuation inhaler Inhale 2 puffs into the lungs every 6 (six) hours as needed (ANY equivalent covered is OK). 1 Inhaler 2    hydrocortisone 2.5 % cream Apply topically 2 (two) times daily. 20 g 0     No current facility-administered medications for this visit.        Review of patient's allergies indicates:  No Known Allergies    Physical examination  Vitals Reviewed  Gen. Well-dressed well-nourished no apparent distress  Skin warm dry and intact.  No rashes noted.  Neck is supple without adenopathy  Chest.  Respirations are even unlabored.  Lungs are clear to auscultation.  Cardiac regular rate and rhythm.  No chest wall adenopathy noted.  Neuro. Awake alert oriented x4.  Normal judgment and cognition noted.  Extremities no clubbing cyanosis or edema noted.  Bruising noted to the dorsal aspect of the foot and the second toe.  Neurovascularly intact.  Localized warmth palpable.  I don't appreciate any cellulitis or abscess collection.    Call or return to clinic prn if these symptoms worsen or fail to improve as anticipated.

## 2017-07-31 RX ORDER — TEMAZEPAM 30 MG/1
CAPSULE ORAL
Qty: 30 CAPSULE | Refills: 0 | Status: SHIPPED | OUTPATIENT
Start: 2017-07-31 | End: 2017-08-31 | Stop reason: SDUPTHER

## 2017-08-01 RX ORDER — METOPROLOL SUCCINATE 50 MG/1
TABLET, EXTENDED RELEASE ORAL
Qty: 90 TABLET | Refills: 3 | Status: SHIPPED | OUTPATIENT
Start: 2017-08-01 | End: 2018-07-07 | Stop reason: SDUPTHER

## 2017-08-01 RX ORDER — LISINOPRIL 40 MG/1
TABLET ORAL
Qty: 90 TABLET | Refills: 3 | Status: SHIPPED | OUTPATIENT
Start: 2017-08-01 | End: 2018-07-07 | Stop reason: SDUPTHER

## 2017-08-01 RX ORDER — LEVOTHYROXINE SODIUM 25 UG/1
TABLET ORAL
Qty: 90 TABLET | Refills: 3 | Status: SHIPPED | OUTPATIENT
Start: 2017-08-01 | End: 2018-07-07 | Stop reason: SDUPTHER

## 2017-08-09 ENCOUNTER — TELEPHONE (OUTPATIENT)
Dept: HEPATOLOGY | Facility: CLINIC | Age: 82
End: 2017-08-09

## 2017-08-09 NOTE — TELEPHONE ENCOUNTER
----- Message from Janna Jansen sent at 8/9/2017  2:49 PM CDT -----  Contact: patient   Requesting return call at  regarding labs

## 2017-08-09 NOTE — TELEPHONE ENCOUNTER
MA called patient back, schedule her follow up visit to see NP. Mailed appt reminder to patient. tino

## 2017-09-01 RX ORDER — TEMAZEPAM 30 MG/1
CAPSULE ORAL
Qty: 30 CAPSULE | Refills: 0 | Status: SHIPPED | OUTPATIENT
Start: 2017-09-01 | End: 2017-10-02 | Stop reason: SDUPTHER

## 2017-09-05 ENCOUNTER — TELEPHONE (OUTPATIENT)
Dept: FAMILY MEDICINE | Facility: CLINIC | Age: 82
End: 2017-09-05

## 2017-09-05 ENCOUNTER — OFFICE VISIT (OUTPATIENT)
Dept: FAMILY MEDICINE | Facility: CLINIC | Age: 82
End: 2017-09-05
Payer: MEDICARE

## 2017-09-05 VITALS
HEART RATE: 61 BPM | TEMPERATURE: 98 F | DIASTOLIC BLOOD PRESSURE: 80 MMHG | BODY MASS INDEX: 25.55 KG/M2 | RESPIRATION RATE: 16 BRPM | OXYGEN SATURATION: 98 % | SYSTOLIC BLOOD PRESSURE: 120 MMHG | HEIGHT: 63 IN | WEIGHT: 144.19 LBS

## 2017-09-05 DIAGNOSIS — E03.9 ACQUIRED HYPOTHYROIDISM: ICD-10-CM

## 2017-09-05 DIAGNOSIS — G47.00 INSOMNIA, UNSPECIFIED TYPE: ICD-10-CM

## 2017-09-05 DIAGNOSIS — H31.003 CHORIORETINAL SCAR, BILATERAL: ICD-10-CM

## 2017-09-05 DIAGNOSIS — J43.8 OTHER EMPHYSEMA: ICD-10-CM

## 2017-09-05 DIAGNOSIS — Z00.00 ENCOUNTER FOR PREVENTIVE HEALTH EXAMINATION: ICD-10-CM

## 2017-09-05 DIAGNOSIS — M85.80 OSTEOPENIA, UNSPECIFIED LOCATION: ICD-10-CM

## 2017-09-05 DIAGNOSIS — I10 ESSENTIAL HYPERTENSION: ICD-10-CM

## 2017-09-05 DIAGNOSIS — H25.12 NUCLEAR SCLEROSIS, LEFT: ICD-10-CM

## 2017-09-05 DIAGNOSIS — K76.6 PORTAL HYPERTENSION: ICD-10-CM

## 2017-09-05 DIAGNOSIS — H52.7 REFRACTIVE ERROR: ICD-10-CM

## 2017-09-05 DIAGNOSIS — M89.9 DISORDER OF BONE: ICD-10-CM

## 2017-09-05 DIAGNOSIS — N18.2 CHRONIC KIDNEY DISEASE, STAGE II (MILD): ICD-10-CM

## 2017-09-05 DIAGNOSIS — Z12.39 ENCOUNTER FOR SCREENING FOR MALIGNANT NEOPLASM OF BREAST: ICD-10-CM

## 2017-09-05 DIAGNOSIS — D69.6 THROMBOCYTOPENIA: ICD-10-CM

## 2017-09-05 DIAGNOSIS — K74.69 CRYPTOGENIC CIRRHOSIS: Primary | ICD-10-CM

## 2017-09-05 DIAGNOSIS — Z85.118 HISTORY OF LUNG CANCER: ICD-10-CM

## 2017-09-05 DIAGNOSIS — F41.9 ANXIETY AND DEPRESSION: ICD-10-CM

## 2017-09-05 DIAGNOSIS — Z12.31 ENCOUNTER FOR SCREENING MAMMOGRAM FOR MALIGNANT NEOPLASM OF BREAST: ICD-10-CM

## 2017-09-05 DIAGNOSIS — R73.01 IMPAIRED FASTING GLUCOSE: ICD-10-CM

## 2017-09-05 DIAGNOSIS — F32.A ANXIETY AND DEPRESSION: ICD-10-CM

## 2017-09-05 DIAGNOSIS — Z12.11 SCREENING FOR MALIGNANT NEOPLASM OF COLON: ICD-10-CM

## 2017-09-05 DIAGNOSIS — I70.0 ATHEROSCLEROSIS OF AORTA: ICD-10-CM

## 2017-09-05 DIAGNOSIS — K21.9 GASTROESOPHAGEAL REFLUX DISEASE, ESOPHAGITIS PRESENCE NOT SPECIFIED: ICD-10-CM

## 2017-09-05 DIAGNOSIS — Z86.39 HISTORY OF VITAMIN D DEFICIENCY: ICD-10-CM

## 2017-09-05 PROCEDURE — 99999 PR PBB SHADOW E&M-EST. PATIENT-LVL V: CPT | Mod: PBBFAC,,, | Performed by: NURSE PRACTITIONER

## 2017-09-05 PROCEDURE — G0439 PPPS, SUBSEQ VISIT: HCPCS | Mod: S$GLB,,, | Performed by: NURSE PRACTITIONER

## 2017-09-05 PROCEDURE — 99499 UNLISTED E&M SERVICE: CPT | Mod: S$GLB,,, | Performed by: NURSE PRACTITIONER

## 2017-09-05 NOTE — PATIENT INSTRUCTIONS
Counseling and Referral of Other Preventative  (Italic type indicates deductible and co-insurance are waived)    Patient Name: Sandra Moody  Today's Date: 9/5/2017      SERVICE LIMITATIONS RECOMMENDATION    Vaccines    · Pneumococcal (once after 65)    · Influenza (annually)    · Hepatitis B (if medium/high risk)    · Prevnar 13      Hepatitis B medium/high risk factors:       - End-stage renal disease       - Hemophiliacs who received Factor VII or         IX concentrates       - Clients of institutions for the mentally             retarded       - Persons who live in the same house as          a HepB carrier       - Homosexual men       - Illicit injectable drug abusers     Pneumococcal: Done, no repeat necessary     Influenza: Recommended to patient for fall     Hepatitis B: N/A     Prevnar 13: Done, no repeat necessary    Mammogram (biennial age 50-74)  Annually (age 40 or over)  Last done 10/2016, recommend to repeat every 1  years    Pap (up to age 70 and after 70 if unknown history or abnormal study last 10 years)    N/A     The USPSTF recommends against screening for cervical cancer in women who have had a hysterectomy with removal of the cervix and who do not have a history of a high-grade precancerous lesion (cervical intraepithelial neoplasia [MARLON] grade 2 or 3) or cervical cancer.     Colorectal cancer screening (to age 75)    · Fecal occult blood test (annual)  · Flexible sigmoidoscopy (5y)  · Screening colonoscopy (10y)  · Barium enema   Recommended to patient, will do occult blood    Diabetes self-management training (no USPSTF recommendations)  Requires referral by treating physician for patient with diabetes or renal disease. 10 hours of initial DSMT sessions of no less than 30 minutes each in a continuous 12-month period. 2 hours of follow-up DSMT in subsequent years.  Done this year, repeat every year    Bone mass measurements (age 65 & older, biennial)  Requires diagnosis related to  osteoporosis or estrogen deficiency. Biennial benefit unless patient has history of long-term glucocorticoid  Last done 2014, recommend to repeat every 2  years    Glaucoma screening (no USPSTF recommendation)  Diabetes mellitus, family history   , age 50 or over    American, age 65 or over  Recommend follow up with eye care professional regularly    Medical nutrition therapy for diabetes or renal disease (no recommended schedule)  Requires referral by treating physician for patient with diabetes or renal disease or kidney transplant within the past 3 years.  Can be provided in same year as diabetes self-management training (DSMT), and CMS recommends medical nutrition therapy take place after DSMT. Up to 3 hours for initial year and 2 hours in subsequent years.  N/A    Cardiovascular screening blood tests (every 5 years)  · Fasting lipid panel  Order as a panel if possible  Done this year, repeat every year    Diabetes screening tests (at least every 3 years, Medicare covers annually or at 6-month intervals for prediabetic patients)  · Fasting blood sugar (FBS) or glucose tolerance test (GTT)  Patient must be diagnosed with one of the following:       - Hypertension       - Dyslipidemia       - Obesity (BMI 30kg/m2)       - Previous elevated impaired FBS or GTT       ... or any two of the following:       - Overweight (BMI 25 but <30)       - Family history of diabetes       - Age 65 or older       - History of gestational diabetes or birth of baby weighing more than 9 pounds  Done this year, repeat every year    HIV screening (annually for increased risk patients)  · HIV-1 and HIV-2 by EIA, or ROXANE, rapid antibody test or oral mucosa transudate  Patients must be at increased risk for HIV infection per USPSTF guidelines or pregnant. Tests covered annually for patient at increased risk or as requested by the patient. Pregnant patients may receive up to 3 tests during pregnancy.  Risks  discussed, screening is not recommended    Smoking cessation counseling (up to 8 sessions per year)  Patients must be asymptomatic of tobacco-related conditions to receive as a preventative service.  Non-smoker    Subsequent annual wellness visit  At least 12 months since last AWV  Return in one year     The following information is provided to all patients.  This information is to help you find resources for any of the problems found today that may be affecting your health:                Living healthy guide: www.[facility].[place].[facility]      Understanding Diabetes: www.diabetes.org      Eating healthy: www.cdc.gov/healthyweight      CDC home safety checklist: www.cdc.gov/steadi/patient.html      Agency on Aging: www.goea.[place].[facility]      Alcoholics anonymous (AA): www.aa.org      Physical Activity: www.natalie.nih.gov/vt6xhnd      Tobacco use: www.quitwithusla.org

## 2017-09-05 NOTE — TELEPHONE ENCOUNTER
----- Message from Bonilla Saucedo sent at 9/1/2017 10:40 AM CDT -----  Contact: Self/[phone]  Patient would like to speak to the staff regarding her prescription:  temazepam (RESTORIL) 30 mg capsule. She states that she does not have transportation to  the prescription.   Thank you.

## 2017-09-06 RX ORDER — TEMAZEPAM 30 MG/1
CAPSULE ORAL
Qty: 30 CAPSULE | Refills: 0 | OUTPATIENT
Start: 2017-09-06

## 2017-09-07 ENCOUNTER — HOSPITAL ENCOUNTER (OUTPATIENT)
Dept: RADIOLOGY | Facility: HOSPITAL | Age: 82
Discharge: HOME | End: 2017-09-07
Attending: NURSE PRACTITIONER
Payer: MEDICARE

## 2017-09-07 DIAGNOSIS — K74.60 CIRRHOSIS OF LIVER WITHOUT ASCITES, UNSPECIFIED HEPATIC CIRRHOSIS TYPE: ICD-10-CM

## 2017-09-07 PROCEDURE — 76700 US EXAM ABDOM COMPLETE: CPT | Mod: TC

## 2017-09-07 PROCEDURE — 76700 US EXAM ABDOM COMPLETE: CPT | Mod: 26,,, | Performed by: RADIOLOGY

## 2017-09-08 ENCOUNTER — TELEPHONE (OUTPATIENT)
Dept: FAMILY MEDICINE | Facility: CLINIC | Age: 82
End: 2017-09-08

## 2017-09-08 PROBLEM — F41.9 ANXIETY AND DEPRESSION: Status: ACTIVE | Noted: 2017-09-08

## 2017-09-08 PROBLEM — F32.A ANXIETY AND DEPRESSION: Status: ACTIVE | Noted: 2017-09-08

## 2017-09-08 NOTE — PROGRESS NOTES
"Sandra Moody presented for a  Medicare AWV and comprehensive Health Risk Assessment today. The following components were reviewed and updated:    · Medical history  · Family History  · Social history  · Allergies and Current Medications  · Health Risk Assessment  · Health Maintenance  · Care Team     ** See Completed Assessments for Annual Wellness Visit within the encounter summary.**       The following assessments were completed:  · Living Situation  · CAGE  · Depression Screening  · Timed Get Up and Go  · Whisper Test  · Cognitive Function Screening  · Nutrition Screening  · ADL Screening  · PAQ Screening        Vitals:    09/05/17 1408   BP: 120/80   Pulse: 61   Resp: 16   Temp: 98.1 °F (36.7 °C)   TempSrc: Oral   SpO2: 98%   Weight: 65.4 kg (144 lb 2.9 oz)   Height: 5' 3" (1.6 m)     Body mass index is 25.54 kg/m².  Physical Exam   Constitutional: She is oriented to person, place, and time. She appears well-developed. No distress.   Cardiovascular: Normal rate and regular rhythm.    Murmur heard.  Pulmonary/Chest: Effort normal and breath sounds normal.   Musculoskeletal: She exhibits no edema.   Neurological: She is alert and oriented to person, place, and time.   Skin: She is not diaphoretic.   Psychiatric: She has a normal mood and affect. Her behavior is normal. Judgment and thought content normal.         Diagnoses and health risks identified today and associated recommendations/orders:    1. Cryptogenic cirrhosis  Stable, Follow by hepatology    2. Portal hypertension  Stable, follow by cardiology    3. Atherosclerosis of aorta  She will follow up with PCP to consider statin therapy    4. Other emphysema  Albuterol prn    5. Thrombocytopenia  Secondary liver disease, follow by hepatology    6. Essential hypertension  The current regimen is effective, continue treatment, follow by pcp  - Lipid panel; Future    7. Chronic kidney disease, stage II (mild)  Stable, The current regimen is effective, " continue treatment, follow by pcp    8. Gastroesophageal reflux disease, esophagitis presence not specified  Diet controlled    9. Insomnia, unspecified type  The current regimen, restoril,  is effective, continue treatment, follow by pcp    10. Acquired hypothyroidism  The current regimen is effective, continue treatment, follow by pcp  - TSH; Future  - T4, free; Future    11. 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 Refractive error  Follow by opthalmology, due for annual    14. History of lung cancer  Greater 5 years remission    15. Screening for malignant neoplasm of colon  Recommend fit kit  - Fecal Immunochemical Test (iFOBT); Future    16. History of vitamin D deficiency  Repeat vitamin D    17. Impaired fasting glucose  Check hemoglobin a1c to assess blood sugar control  - Hemoglobin A1c; Future    18. Osteopenia, unspecified location/Disorder of bone   High frax score, on fosamax, due for bone scan  - DXA Bone Density Spine And Hip; Future  - Vitamin D; Future    19. Encounter for screening mammogram for malignant neoplasm of breast /Encounter for screening for malignant neoplasm of breast  Due for mammogram, ordered  - Mammo Digital Screening Bilateral With CAD; Future    20. Anxiety and depression  The current regimen,zoloft,  is effective, continue treatment, follow by pcp    21. Encounter for preventive health examination  Provided Sandra with a 5-10 year written screening schedule and personal prevention plan. Recommendations were developed using the USPSTF age appropriate recommendations. Education, counseling, and referrals were provided as needed. After Visit Summary printed and given to patient which includes a list of additional screenings\tests needed.    Return if symptoms worsen or fail to improve. scheduled for follow up with pcp on 10/02/2017    Miguelina He NP

## 2017-09-22 ENCOUNTER — LAB VISIT (OUTPATIENT)
Dept: LAB | Facility: HOSPITAL | Age: 82
End: 2017-09-22
Attending: NURSE PRACTITIONER
Payer: MEDICARE

## 2017-09-22 ENCOUNTER — TELEPHONE (OUTPATIENT)
Dept: FAMILY MEDICINE | Facility: CLINIC | Age: 82
End: 2017-09-22

## 2017-09-22 DIAGNOSIS — Z12.11 SCREENING FOR MALIGNANT NEOPLASM OF COLON: ICD-10-CM

## 2017-09-22 LAB — HEMOCCULT STL QL IA: NEGATIVE

## 2017-09-22 PROCEDURE — 82274 ASSAY TEST FOR BLOOD FECAL: CPT

## 2017-09-22 NOTE — TELEPHONE ENCOUNTER
Call and advise that stool for blood, fit Kit was all negative    Please do whatever necessary to document this

## 2017-10-02 ENCOUNTER — HOSPITAL ENCOUNTER (OUTPATIENT)
Dept: RADIOLOGY | Facility: CLINIC | Age: 82
Discharge: HOME | End: 2017-10-02
Attending: NURSE PRACTITIONER
Payer: MEDICARE

## 2017-10-02 ENCOUNTER — OFFICE VISIT (OUTPATIENT)
Dept: FAMILY MEDICINE | Facility: CLINIC | Age: 82
End: 2017-10-02
Payer: MEDICARE

## 2017-10-02 VITALS
DIASTOLIC BLOOD PRESSURE: 78 MMHG | HEIGHT: 63 IN | OXYGEN SATURATION: 92 % | HEART RATE: 76 BPM | TEMPERATURE: 98 F | WEIGHT: 142.63 LBS | BODY MASS INDEX: 25.27 KG/M2 | SYSTOLIC BLOOD PRESSURE: 137 MMHG

## 2017-10-02 DIAGNOSIS — E03.9 ACQUIRED HYPOTHYROIDISM: ICD-10-CM

## 2017-10-02 DIAGNOSIS — E55.9 VITAMIN D DEFICIENCY DISEASE: ICD-10-CM

## 2017-10-02 DIAGNOSIS — M89.9 DISORDER OF BONE AND CARTILAGE: ICD-10-CM

## 2017-10-02 DIAGNOSIS — Z86.010 HISTORY OF COLONIC POLYPS: ICD-10-CM

## 2017-10-02 DIAGNOSIS — K74.69 CRYPTOGENIC CIRRHOSIS: ICD-10-CM

## 2017-10-02 DIAGNOSIS — G47.00 INSOMNIA, UNSPECIFIED TYPE: ICD-10-CM

## 2017-10-02 DIAGNOSIS — M85.80 OSTEOPENIA, UNSPECIFIED LOCATION: ICD-10-CM

## 2017-10-02 DIAGNOSIS — Z80.0 FAMILY HISTORY OF COLON CANCER: ICD-10-CM

## 2017-10-02 DIAGNOSIS — Z00.00 ROUTINE MEDICAL EXAM: Primary | ICD-10-CM

## 2017-10-02 DIAGNOSIS — M89.9 DISORDER OF BONE: ICD-10-CM

## 2017-10-02 DIAGNOSIS — F39 MOOD DISORDER: ICD-10-CM

## 2017-10-02 DIAGNOSIS — M94.9 DISORDER OF BONE AND CARTILAGE: ICD-10-CM

## 2017-10-02 DIAGNOSIS — I10 ESSENTIAL HYPERTENSION: ICD-10-CM

## 2017-10-02 PROCEDURE — G0008 ADMIN INFLUENZA VIRUS VAC: HCPCS | Mod: 59,S$GLB,, | Performed by: INTERNAL MEDICINE

## 2017-10-02 PROCEDURE — 90662 IIV NO PRSV INCREASED AG IM: CPT | Mod: S$GLB,,, | Performed by: INTERNAL MEDICINE

## 2017-10-02 PROCEDURE — 99214 OFFICE O/P EST MOD 30 MIN: CPT | Mod: 25,S$GLB,, | Performed by: INTERNAL MEDICINE

## 2017-10-02 PROCEDURE — 77080 DXA BONE DENSITY AXIAL: CPT | Mod: TC,PO

## 2017-10-02 PROCEDURE — 77080 DXA BONE DENSITY AXIAL: CPT | Mod: 26,,, | Performed by: INTERNAL MEDICINE

## 2017-10-02 PROCEDURE — 99999 PR PBB SHADOW E&M-EST. PATIENT-LVL III: CPT | Mod: PBBFAC,,, | Performed by: INTERNAL MEDICINE

## 2017-10-02 PROCEDURE — 99499 UNLISTED E&M SERVICE: CPT | Mod: S$GLB,,, | Performed by: INTERNAL MEDICINE

## 2017-10-02 PROCEDURE — 99397 PER PM REEVAL EST PAT 65+ YR: CPT | Mod: 25,S$GLB,, | Performed by: INTERNAL MEDICINE

## 2017-10-02 RX ORDER — TEMAZEPAM 30 MG/1
CAPSULE ORAL
Qty: 30 CAPSULE | Refills: 5 | Status: SHIPPED | OUTPATIENT
Start: 2017-10-02 | End: 2018-03-29 | Stop reason: SDUPTHER

## 2017-10-02 NOTE — PROGRESS NOTES
Chief complaint --physical exam    87-year-old white female patient here for her physical.  The discoloration needs any cancer screening although we did note her family history of colon cancer in her mom.  She also has a personal history of colon polyps back in 2011 with a 5 year follow-up but she is not interested in pursuing another colonoscopy.  She apparently did some stool testing that was sent to her.    Recent labs are reviewed        ROS:   CONST: weight stable. EYES: no vision change. ENT: no sore throat. CV: no chest pain w/ exertion. RESP: no shortness of breath. GI: no nausea, vomiting, diarrhea. No dysphagia. : no urinary issues. MUSCULOSKELETAL: no new myalgias or arthralgias. SKIN: no new changes. NEURO: no focal deficits. PSYCH: no new issues. ENDOCRINE: no polyuria. HEME: no lymph nodes. ALLERGY: no general pruritis.      PMH:   1. HTN, chronically uncontrolled. Renal ultrasound w/o ANAI in 2001.   2. Hypothyroidism.   3. Osteopenia, appears Fosamax was started 10/04 by Dr. Haynes. Restarted 2014.  4. H/o kidney stones.   5. Menopause at 47.   6. H/o paroxysmal supraventricular tachycardia.   7. FMH of colon cancer, mother.   8. Allergic rhinitis.   9. Last colonoscopy with diverticulosis 2001.   10. H/o diverticulitis.   11. S/p hysterectomy, tonsillectomy, appendectomy, cholecystectomy.   12. H/o cystocele and rectocele.   13. Adenocarcinoma of the lung, right upper lobe resection, 2010.   14. COLON POLYPS and many tics 7/11 -5 yrs   15. Mammo 10/16   16.  Trochanteric bursitis  17.  Insomnia  18.  PVC's, neg stress 12/12 -[facility]  19.  Urinary incontinence, bladder lift ×3  20.  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    Diagnoses and all orders for this visit:    Routine medical exam, patient wishes to defer her breast cancer screening perhaps every other year.  She also does not wish to pursue any further colon screening although we did discuss that she is at more risk than others with her family history and personal history of polyps which she states her polyps are only 1 time.                                                      Additional evaluation and management issues: X    Additionally, patient has numerous other medical issues to address today.  She does have osteopenia continues on weekly Fosamax and she had a bone density today and we will await the report.  She has significant vitamin D deficiency at 17 and we discussed taking 5000 units of vitamin D.  Her hypertension appears to be indicated control.  She continues with insomnia was somewhat anxious that we were giving her no refills and I explained her she was due for an appointment.  I'll be happy to give her 6 months of refills and even give her 6 month  As she comes in for her yearly physical.  Her anxiety and mood disorder.  Endocrine control.  We have previously discussed her bereavement and she appears to be doing at her.  She has hypothyroidism and thyroid function normal.  She has cirrhosis which likely explains her thrombocytopenia which is actually getting a little bit better.  She has had no abdominal distention or pain and she followed regular by the liver clinic.  She will get her flu shot today.  All these various issues discussed and patient counseled and her evaluation and management of the separate issues we based upon time counseling.  Total time over 25 minutes with over 50%  counseling.              Assessment and plan:            Osteopenia, unspecified location, treat vitamin D and await bone density result, continue Fosamax possibly    Vitamin D deficiency disease, start 5000 units    Essential hypertension, chronic and stable    Insomnia, unspecified type, incomplete control but discussed she has had side effects of Ambien and that likely will not be covered and I would not recommend it.  She can add some melatonin to the temazepam if the temazepam is working intermittently    Mood disorder, fair control    Acquired hypothyroidism, euthyroid    Cryptogenic cirrhosis, followed by liver clinic    History of colonic polyps, although stool cards and not indicated for people with history of polyps, it is some form of colon cancer screening that she wishes to continue with    Family history of colon cancer    Disorder of bone and cartilage    Other orders  -     Cancel: DXA Bone Density Spine And Hip; Future  -     Influenza - High Dose (65+) (PF) (IM)  -     temazepam (RESTORIL) 30 mg capsule; TAKE 1 CAPSULE BY MOUTH AT BEDTIME AS NEEDED

## 2017-10-04 ENCOUNTER — OFFICE VISIT (OUTPATIENT)
Dept: HEPATOLOGY | Facility: CLINIC | Age: 82
End: 2017-10-04
Payer: MEDICARE

## 2017-10-04 VITALS
HEART RATE: 70 BPM | OXYGEN SATURATION: 94 % | WEIGHT: 141.56 LBS | BODY MASS INDEX: 25.08 KG/M2 | DIASTOLIC BLOOD PRESSURE: 70 MMHG | SYSTOLIC BLOOD PRESSURE: 160 MMHG | HEIGHT: 63 IN | TEMPERATURE: 97 F | RESPIRATION RATE: 18 BRPM

## 2017-10-04 DIAGNOSIS — K76.6 PORTAL HYPERTENSION: ICD-10-CM

## 2017-10-04 DIAGNOSIS — K74.69 CRYPTOGENIC CIRRHOSIS: Primary | ICD-10-CM

## 2017-10-04 PROCEDURE — 99999 PR PBB SHADOW E&M-EST. PATIENT-LVL IV: CPT | Mod: PBBFAC,,, | Performed by: NURSE PRACTITIONER

## 2017-10-04 PROCEDURE — 99213 OFFICE O/P EST LOW 20 MIN: CPT | Mod: S$GLB,,, | Performed by: NURSE PRACTITIONER

## 2017-10-04 PROCEDURE — 99499 UNLISTED E&M SERVICE: CPT | Mod: S$GLB,,, | Performed by: NURSE PRACTITIONER

## 2017-10-04 NOTE — PROGRESS NOTES
[facility] Established Patient Visit    Reason for Visit:  F/u cirrhosis    PCP: Akhil Harry    HPI:  This is a 88 y.o. female here for f/u of well-compensated cryptogenic cirrhosis. Her cirrhosis evaluation was prompted after a CT chest in 8/2014 showed a nodular contour of the liver. Her serological workup was negative for Anurag's, alpha-1 antitrypsin deficiency, hemochromatosis, autoimmune etiology, and viral hepatitis. 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 She has thrombocytopenia and splenomegaly on imaging. She had repeat labs and u/s recently. She has normal synthetic functioning and normal liver enzymes. She had no masses seen on u/s, AFP nl. She reports she feels well and denies any complaints today. Denies jaundice, dark urine, hematemesis, melena, slowed mentation, abdominal distention.  EGD 4/2017 showed no varices again. She is a very high functioning 88 yo. She has declined repeat colonoscopy.    ROS:   GENERAL: Denies fever, chills, weight loss/gain, fatigue  HEENT: Denies headaches, dizziness, vision/hearing changes  CARDIOVASCULAR: Denies chest pain, palpitations, or edema  RESPIRATORY: Denies dyspnea, cough  GI: Denies abdominal pain, rectal bleeding, nausea, vomiting. No change in bowel pattern or color  : Denies dysuria, hematuria   SKIN: Denies rash, itching   NEURO: Denies confusion, memory loss, or mood changes  PSYCH: Denies depression or anxiety  HEME/LYMPH: (+) easy bruising and bleeding       PMHX:  has a past medical history of Allergy; Anxiety; Cirrhosis; COPD (chronic obstructive pulmonary disease) (10/16/2012); Cryptogenic cirrhosis (11/6/2014); Cystocele; Diverticulitis; Dyspnea (11/8/2012); Family history of colon cancer (10/2/2017); GERD (gastroesophageal reflux disease); History of colonic polyps (10/2/2017); Hypertension; Hypothyroidism (4/10/2014); Insomnia (4/18/2013); Kidney stones; Lung cancer; Lung cancer, upper lobe (3/4/2013);  Menopause; Nuclear sclerosis (7/17/2014); Osteopenia; PSVT (paroxysmal supraventricular tachycardia); PVC (premature ventricular contraction) (11/10/2012); Rectocele; 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    PSHX:  has a past surgical history that includes Adenoidectomy; Tonsillectomy; Hysterectomy; Cholecystectomy; Appendectomy; Cystoscopy; Oophorectomy; Colonoscopy; Upper gastrointestinal endoscopy; and Lung lobectomy (Right).    The patient's social and family histories were reviewed by me and updated in the appropriate section of the electronic medical record.    Review of patient's allergies indicates:  No Known Allergies    Current Outpatient Rx   Name  Route  Sig  Dispense  Refill    alendronate (FOSAMAX) 70 MG tablet    Oral    Take 1 tablet (70 mg total) by mouth every 7 days.    12 tablet    11      amlodipine (NORVASC) 5 MG tablet        TAKE 2 TABLETS EVERY DAY     180 tablet    3      aspirin 81 mg Tab    Oral    Take 1 tablet by mouth Daily.               ciclopirox (LOPROX) 0.77 % Crea                      ergocalciferol (VITAMIN D2) 50,000 unit Cap    Oral    Take 5,000 Units by mouth every 7 days.              estradiol (ESTRACE) 0.01 % (0.1 mg/g) vaginal cream        Pea-size dab of cream to urethra/introitus at bed time.    42.5 g    1      fexofenadine (ALLEGRA) 180 MG tablet    Oral    Take 1 tablet (180 mg total) by mouth once daily. 1 Tablet Oral Every day.  Take as directed for allergy or thin secretions.    90 tablet    12      levothyroxine (SYNTHROID) 25 MCG tablet        TAKE 1 TABLET EVERY DAY    90 tablet    3      lisinopril (PRINIVIL,ZESTRIL) 40 MG tablet        TAKE 1 TABLET EVERY DAY    90 tablet    3      metoprolol succinate (TOPROL-XL) 50 MG 24 hr tablet    Oral    Take 1 tablet (50 mg total) by mouth once daily.    30 tablet    3    "   omeprazole (PRILOSEC) 20 MG capsule        TAKE 1 OR 2 CAPSULES DAILY AS DIRECTED    180 capsule    PRN      ondansetron (ZOFRAN) 4 MG tablet    Oral    Take 1 tablet (4 mg total) by mouth every 8 (eight) hours as needed for Nausea.    12 tablet    0      oxybutynin (DITROPAN-XL) 10 MG 24 hr tablet    Oral    Take 1 tablet (10 mg total) by mouth once daily.    30 tablet    11      temazepam (RESTORIL) 30 mg capsule    Oral    Take 1 capsule (30 mg total) by mouth nightly as needed.    30 capsule    5      tramadol (ULTRAM) 50 mg tablet        1-2 q 6hr prn    60 tablet    5      triamcinolone acetonide 0.1% (KENALOG) 0.1 % cream                           Objective Findings:    PHYSICAL EXAM:   Friendly White female, in no acute distress; alert and oriented to person, place and time  VITALS:   BP (!) 160/70 (BP Location: Left arm, Patient Position: Sitting, BP Method: Small (Automatic))   Pulse 70   Temp 96.6 °F (35.9 °C) (Oral)   Resp 18   Ht 5' 3" (1.6 m)   Wt 64.2 kg (141 lb 8.6 oz)   SpO2 (!) 94%   BMI 25.07 kg/m²     HENT: Normocephalic, without obvious abnormality. Oral mucosa pink and moist. Dentition good.  EYES: Sclerae anicteric.   NECK: Supple.   CARDIOVASCULAR: Regular rate and rhythm. No murmurs.  RESPIRATORY: Normal respiratory effort. BBS CTA. No wheezes or crackles.  GI: Soft, non-tender, non-distended. No hepatosplenomegaly. No masses palpable. No ascites.  EXTREMITIES:  No clubbing, cyanosis or edema.  SKIN: Warm and dry. No jaundice. No rashes noted to exposed skin. Few telangectasias noted to chest. No palmar erythema.  NEURO:  Normal gate. No asterixis.  PSYCH:  Memory intact. Thought and speech pattern appropriate. Behavior normal. No depression or anxiety noted.      Labs:  Lab Results   Component Value Date    WBC 5.10 09/07/2017    HGB 14.9 09/07/2017    HCT 44.2 09/07/2017     (L) 09/07/2017    CHOL 201 (H) 09/07/2017    TRIG 198 (H) 09/07/2017    HDL 48 09/07/2017    "  09/07/2017    K 4.1 09/07/2017    CREATININE 0.8 09/07/2017    ALT 13 09/07/2017    AST 13 09/07/2017    ALKPHOS 39 (L) 09/07/2017    BILITOT 0.6 09/07/2017    ALBUMIN 3.7 09/07/2017    INR 1.0 09/07/2017    AFP 2.1 09/07/2017       ASSESSMENT:  88 y.o. White female with:  1.  Cryptogenic cirrhosis, well-compensated  -- MELD= MELD-Na score: 6 at 9/7/2017  7:31 AM  MELD score: 6 at 9/7/2017  7:31 AM  Calculated from:  Serum Creatinine: 0.8 mg/dL (Rounded to 1) at 9/7/2017  7:31 AM  Serum Sodium: 140 mmol/L (Rounded to 137) at 9/7/2017  7:31 AM  Total Bilirubin: 0.6 mg/dL (Rounded to 1) at 9/7/2017  7:31 AM  INR(ratio): 1.0 at 9/7/2017  7:31 AM  Age: 88 years  -- HCC screening- AFP and abd. U/S- up to date, next due 3/2018  -- Immunity to Hep A and B- finished hep B vaccines 4/2015, already immune to hep A  -- EGD- 4/2017- no varices  2. Portal hypertension  -- EGD- 4/2017 - no varices  -- Ascites- none  -- Splenomegaly and thrombocytopenia  3. Colon cancer screening, h/o colon polyp, family h/o colon cancer  --  Pt declines repeat colonoscopy due to her age and intolerance of colon prep.       EDUCATION:   Signs and symptoms of hepatic decompensation were reviewed, including jaundice, ascites, and slowed mentation due to hepatic encephalopathy. The patient should seek medical attention if any of these things occur. We discussed the potential for bleeding from esophageal varices with symptoms of hematemesis and melena. The patient should report to the Emergency Department for these symptoms.    We discussed the increased risk of hepatocellular carcinoma due to cirrhosis. Continued screening every six months with ultrasound and AFP is recommended.     Reassured her that her liver functioning is normal and liver disease appears to be very stable.      PLAN:  1. HCC screening Q 6 months with AFP and abd. U/S, next due 3/2018  2. No indication for repeat EGD unless she has findings worrisome for bleeding  3. F/u in  3/2018 with u/s and labs before appt on [facility]    Thank you for allowing me to participate in the care of Sandra Lund, CARLOSC

## 2017-10-04 NOTE — Clinical Note
Please enter recall for F/u in 3/2018 with u/s and labs before appt on [facility]

## 2017-10-13 ENCOUNTER — OFFICE VISIT (OUTPATIENT)
Dept: FAMILY MEDICINE | Facility: CLINIC | Age: 82
End: 2017-10-13
Payer: MEDICARE

## 2017-10-13 VITALS
TEMPERATURE: 99 F | HEART RATE: 68 BPM | SYSTOLIC BLOOD PRESSURE: 110 MMHG | WEIGHT: 141.31 LBS | OXYGEN SATURATION: 95 % | DIASTOLIC BLOOD PRESSURE: 70 MMHG | BODY MASS INDEX: 25.03 KG/M2

## 2017-10-13 DIAGNOSIS — G89.29 CHRONIC RIGHT SHOULDER PAIN: ICD-10-CM

## 2017-10-13 DIAGNOSIS — N39.0 URINARY TRACT INFECTION WITHOUT HEMATURIA, SITE UNSPECIFIED: Primary | ICD-10-CM

## 2017-10-13 DIAGNOSIS — M25.511 CHRONIC RIGHT SHOULDER PAIN: ICD-10-CM

## 2017-10-13 LAB
BILIRUB SERPL-MCNC: NORMAL MG/DL
BLOOD URINE, POC: 50
COLOR, POC UA: YELLOW
GLUCOSE UR QL STRIP: NORMAL
KETONES UR QL STRIP: NORMAL
LEUKOCYTE ESTERASE URINE, POC: NORMAL
NITRITE, POC UA: NORMAL
PH, POC UA: 5
PROTEIN, POC: NORMAL
SPECIFIC GRAVITY, POC UA: 1.01
UROBILINOGEN, POC UA: NORMAL

## 2017-10-13 PROCEDURE — 87088 URINE BACTERIA CULTURE: CPT

## 2017-10-13 PROCEDURE — 99214 OFFICE O/P EST MOD 30 MIN: CPT | Mod: 25,S$GLB,, | Performed by: INTERNAL MEDICINE

## 2017-10-13 PROCEDURE — 99999 PR PBB SHADOW E&M-EST. PATIENT-LVL III: CPT | Mod: PBBFAC,,, | Performed by: INTERNAL MEDICINE

## 2017-10-13 PROCEDURE — 87077 CULTURE AEROBIC IDENTIFY: CPT

## 2017-10-13 PROCEDURE — 87186 SC STD MICRODIL/AGAR DIL: CPT

## 2017-10-13 PROCEDURE — 81002 URINALYSIS NONAUTO W/O SCOPE: CPT | Mod: S$GLB,,, | Performed by: INTERNAL MEDICINE

## 2017-10-13 PROCEDURE — 87086 URINE CULTURE/COLONY COUNT: CPT

## 2017-10-13 RX ORDER — DOXYCYCLINE 100 MG/1
100 CAPSULE ORAL 2 TIMES DAILY
Qty: 10 CAPSULE | Refills: 0 | Status: SHIPPED | OUTPATIENT
Start: 2017-10-13 | End: 2017-10-18

## 2017-10-13 NOTE — PROGRESS NOTES
"SUBJECTIVE     Chief Complaint   Patient presents with    Shoulder Pain    Arthritis    Urinary Tract Infection     over 1 week of sx       HPI  Sandra Moody is a 88 y.o. female with multiple medical diagnoses as listed in the medical history and problem list that presents for evaluation of UTI symptoms x >1 week. Pt reports a "vibrating" pain to the suprapubic area and cloudy, increased urinary frequency, and urgency. Denies any dysuria or hematuria. Denies any fever, chills, or night sweats.    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 lj- fosamax    PSVT (paroxysmal supraventricular tachycardia)     dr. marx    PVC (premature ventricular contraction) 11/10/2012    Rectocele     Status post partial lobectomy of lung 2/2010    Thyroid disease     hypothyroidism    Trochanteric bursitis 4/18/2013    Urine incontinence 2/24/2015    Vitamin D deficiency disease 9/4/2014       PAST SURGICAL HISTORY:  Past Surgical History:   Procedure Laterality Date    ADENOIDECTOMY      APPENDECTOMY      CHOLECYSTECTOMY      COLONOSCOPY      CYSTOSCOPY      HYSTERECTOMY      LUNG LOBECTOMY Right     OOPHORECTOMY      TONSILLECTOMY      UPPER GASTROINTESTINAL ENDOSCOPY         SOCIAL HISTORY:  Social History     Social History    Marital status:      Spouse name: N/A    Number of children: N/A    Years of " education: N/A     Occupational History    Not on file.     Social History Main Topics    Smoking status: Never Smoker    Smokeless tobacco: Never Used    Alcohol use No    Drug use: No    Sexual activity: No     Other Topics Concern    Not on file     Social History Narrative    No narrative on file       FAMILY HISTORY:  Family History   Problem Relation Age of Onset    Cancer Mother      COLON    Cancer Brother      throat    Asthma Neg Hx     Emphysema Neg Hx     Amblyopia Neg Hx     Blindness Neg Hx     Cataracts Neg Hx     Diabetes Neg Hx     Glaucoma Neg Hx     Hypertension Neg Hx     Macular degeneration Neg Hx     Retinal detachment Neg Hx     Strabismus Neg Hx     Stroke Neg Hx     Thyroid disease Neg Hx     Cirrhosis Neg Hx        ALLERGIES AND MEDICATIONS: updated and reviewed.  Review of patient's allergies indicates:  No Known Allergies  Current Outpatient Prescriptions   Medication Sig Dispense Refill    albuterol (PROVENTIL HFA) 90 mcg/actuation inhaler Inhale 2 puffs into the lungs every 6 (six) hours as needed (ANY equivalent covered is OK). 1 Inhaler 2    alendronate (FOSAMAX) 70 MG tablet TAKE 1 TABLET EVERY 7 DAYS 12 tablet 11    amlodipine (NORVASC) 5 MG tablet TAKE 2 TABLETS EVERY  tablet 3    aspirin 81 mg Tab Take 1 tablet by mouth Daily.       doxycycline (MONODOX) 100 MG capsule Take 1 capsule (100 mg total) by mouth 2 (two) times daily. 10 capsule 0    estradiol (ESTRACE) 0.01 % (0.1 mg/g) vaginal cream Pea-size dab of cream to urethra/introitus at bed time. 42.5 g 1    levothyroxine (SYNTHROID) 25 MCG tablet TAKE 1 TABLET EVERY DAY 90 tablet 3    lisinopril (PRINIVIL,ZESTRIL) 40 MG tablet TAKE 1 TABLET EVERY DAY 90 tablet 3    metoprolol succinate (TOPROL-XL) 50 MG 24 hr tablet TAKE 1 TABLET ONE TIME DAILY 90 tablet 3    oxybutynin (DITROPAN-XL) 10 MG 24 hr tablet TAKE 1 TABLET ONE TIME DAILY 90 tablet 11    sertraline (ZOLOFT) 25 MG tablet Take  1 tablet (25 mg total) by mouth once daily. 30 tablet 11    temazepam (RESTORIL) 30 mg capsule TAKE 1 CAPSULE BY MOUTH AT BEDTIME AS NEEDED 30 capsule 5     No current facility-administered medications for this visit.        ROS  Review of Systems   Constitutional: Negative for chills and fever.   HENT: Negative for hearing loss and sore throat.    Eyes: Negative for visual disturbance.   Respiratory: Negative for cough and shortness of breath.    Cardiovascular: Negative for chest pain, palpitations and leg swelling.   Gastrointestinal: Negative for abdominal pain, constipation, diarrhea, nausea and vomiting.   Genitourinary: Positive for frequency and urgency. Negative for dysuria.   Musculoskeletal: Positive for arthralgias (R shoulder, 1st MCP joint of B/L hands, rib pain). Negative for joint swelling and myalgias.   Skin: Negative for rash and wound.   Neurological: Negative for headaches.   Psychiatric/Behavioral: Negative for agitation and confusion. The patient is not nervous/anxious.          OBJECTIVE     Physical Exam  Vitals:    10/13/17 1357   BP: 110/70   Pulse: 68   Temp: 98.7 °F (37.1 °C)    Body mass index is 25.03 kg/m².  Weight: 64.1 kg (141 lb 5 oz)         Physical Exam   Constitutional: She is oriented to person, place, and time. She appears well-developed and well-nourished. No distress.   HENT:   Head: Normocephalic and atraumatic.   Right Ear: External ear normal.   Left Ear: External ear normal.   Nose: Nose normal.   Mouth/Throat: Oropharynx is clear and moist.   Eyes: Conjunctivae and EOM are normal. Right eye exhibits no discharge. Left eye exhibits no discharge. No scleral icterus.   Neck: Normal range of motion. Neck supple. No JVD present. No tracheal deviation present.   Cardiovascular: Normal rate, regular rhythm and intact distal pulses.  Exam reveals no gallop and no friction rub.    No murmur heard.  Pulmonary/Chest: Effort normal and breath sounds normal. No respiratory  distress. She has no wheezes.   Abdominal: Soft. Bowel sounds are normal. She exhibits no distension and no mass. There is no tenderness. There is no rebound, no guarding and no CVA tenderness.   Musculoskeletal: Normal range of motion. She exhibits no edema, tenderness or deformity.   Neurological: She is alert and oriented to person, place, and time. She exhibits normal muscle tone. Coordination normal.   Skin: Skin is warm and dry. No rash noted. No erythema.   Psychiatric: She has a normal mood and affect. Her behavior is normal. Judgment and thought content normal.         Health Maintenance       Date Due Completion Date    TETANUS VACCINE 09/13/2015 9/13/2005    Fecal Occult Blood Test (FOBT)/[employer] 09/22/2018 9/22/2017    Lipid Panel 09/07/2022 9/7/2017            ASSESSMENT     88 y.o. female with     1. Urinary tract infection without hematuria, site unspecified    2. Chronic right shoulder pain        PLAN:     1. Urinary tract infection without hematuria, site unspecified  - Pt advised to take Abx to completion  - POCT URINE DIPSTICK WITHOUT MICROSCOPE  - Urine culture  - doxycycline (MONODOX) 100 MG capsule; Take 1 capsule (100 mg total) by mouth 2 (two) times daily.  Dispense: 10 capsule; Refill: 0    2. Chronic right shoulder pain  - Likely 2/2 arthritis  - Continue Tylenol prn pain and pt okay to take NSAID for any unrelieved pain on occasion  - Pt advised to apply heating compress with care not to burn herself  - Continue management per Ortho        RTC in 3-5 days as needed for any acute worsening of current condition or failure to improve    Rox Montes MD  10/13/2017 2:24 PM        No Follow-up on file.

## 2017-10-15 LAB — BACTERIA UR CULT: NORMAL

## 2017-10-17 ENCOUNTER — TELEPHONE (OUTPATIENT)
Dept: FAMILY MEDICINE | Facility: CLINIC | Age: 82
End: 2017-10-17

## 2017-10-17 NOTE — TELEPHONE ENCOUNTER
Call with bone density    Dr. MAGANA says that the recent bone density still shows osteopenia or thinning of the bones but there was no change from the prior bone density so we will plan on repeating in 2 years

## 2017-11-15 RX ORDER — SERTRALINE HYDROCHLORIDE 25 MG/1
TABLET, FILM COATED ORAL
Qty: 90 TABLET | Refills: 90 | Status: SHIPPED | OUTPATIENT
Start: 2017-11-15 | End: 2019-03-13 | Stop reason: SDUPTHER

## 2017-11-15 RX ORDER — SERTRALINE HYDROCHLORIDE 25 MG/1
TABLET, FILM COATED ORAL
Qty: 30 TABLET | Refills: 0 | Status: SHIPPED | OUTPATIENT
Start: 2017-11-15 | End: 2017-11-15 | Stop reason: SDUPTHER

## 2018-01-24 RX ORDER — ALENDRONATE SODIUM 70 MG/1
TABLET ORAL
Qty: 12 TABLET | Refills: 11 | Status: SHIPPED | OUTPATIENT
Start: 2018-01-24 | End: 2019-03-13 | Stop reason: SDUPTHER

## 2018-03-02 RX ORDER — AMLODIPINE BESYLATE 5 MG/1
TABLET ORAL
Qty: 180 TABLET | Refills: 3 | Status: SHIPPED | OUTPATIENT
Start: 2018-03-02 | End: 2018-11-27 | Stop reason: SDUPTHER

## 2018-03-28 ENCOUNTER — TELEPHONE (OUTPATIENT)
Dept: HEPATOLOGY | Facility: CLINIC | Age: 83
End: 2018-03-28

## 2018-03-28 NOTE — TELEPHONE ENCOUNTER
----- Message from Paolo Montes sent at 3/27/2018  9:39 AM CDT -----  Contact: patient   Patient calling to reschedule her appointment that she miss on March 8, 2018.           Please call [phone]      Thanks

## 2018-03-28 NOTE — TELEPHONE ENCOUNTER
Ma CALLED PATIENT back schedule her labs, US and follow up visit.    mailed appt reminder to patient. CARLEE

## 2018-03-29 RX ORDER — TEMAZEPAM 30 MG/1
CAPSULE ORAL
Qty: 30 CAPSULE | Refills: 5 | Status: SHIPPED | OUTPATIENT
Start: 2018-03-29 | End: 2018-10-03 | Stop reason: SDUPTHER

## 2018-04-03 RX ORDER — TEMAZEPAM 30 MG/1
CAPSULE ORAL
Qty: 30 CAPSULE | Refills: 0 | OUTPATIENT
Start: 2018-04-03

## 2018-04-03 NOTE — TELEPHONE ENCOUNTER
----- Message from Charlene Haq sent at 4/3/2018 10:53 AM CDT -----  Contact: Camryn   Pharmacist calling to speak to a nurse regarding meds. [phone]

## 2018-04-16 ENCOUNTER — HOSPITAL ENCOUNTER (OUTPATIENT)
Dept: RADIOLOGY | Facility: HOSPITAL | Age: 83
Discharge: HOME | End: 2018-04-16
Attending: NURSE PRACTITIONER
Payer: MEDICARE

## 2018-04-16 DIAGNOSIS — K74.60 CIRRHOSIS OF LIVER WITHOUT ASCITES, UNSPECIFIED HEPATIC CIRRHOSIS TYPE: ICD-10-CM

## 2018-04-16 PROCEDURE — 76700 US EXAM ABDOM COMPLETE: CPT | Mod: TC

## 2018-04-16 PROCEDURE — 76700 US EXAM ABDOM COMPLETE: CPT | Mod: 26,,, | Performed by: RADIOLOGY

## 2018-04-26 ENCOUNTER — PES CALL (OUTPATIENT)
Dept: ADMINISTRATIVE | Facility: CLINIC | Age: 83
End: 2018-04-26

## 2018-05-07 ENCOUNTER — OFFICE VISIT (OUTPATIENT)
Dept: HEPATOLOGY | Facility: CLINIC | Age: 83
End: 2018-05-07
Payer: MEDICARE

## 2018-05-07 VITALS
BODY MASS INDEX: 25.7 KG/M2 | HEIGHT: 63 IN | OXYGEN SATURATION: 94 % | TEMPERATURE: 98 F | WEIGHT: 145.06 LBS | HEART RATE: 76 BPM | SYSTOLIC BLOOD PRESSURE: 147 MMHG | DIASTOLIC BLOOD PRESSURE: 66 MMHG | RESPIRATION RATE: 18 BRPM

## 2018-05-07 DIAGNOSIS — K76.6 PORTAL HYPERTENSION: ICD-10-CM

## 2018-05-07 DIAGNOSIS — K74.69 CRYPTOGENIC CIRRHOSIS: Primary | ICD-10-CM

## 2018-05-07 PROCEDURE — 99213 OFFICE O/P EST LOW 20 MIN: CPT | Mod: S$GLB,,, | Performed by: NURSE PRACTITIONER

## 2018-05-07 PROCEDURE — 99499 UNLISTED E&M SERVICE: CPT | Mod: S$GLB,,, | Performed by: NURSE PRACTITIONER

## 2018-05-07 PROCEDURE — 99999 PR PBB SHADOW E&M-EST. PATIENT-LVL IV: CPT | Mod: PBBFAC,,, | Performed by: NURSE PRACTITIONER

## 2018-05-07 NOTE — PROGRESS NOTES
[facility] Established Patient Visit    Reason for Visit:  F/u cirrhosis    PCP: Akhil Harry    HPI:  This is a 89 y.o. female here for f/u of well-compensated cryptogenic cirrhosis. Her cirrhosis evaluation was prompted after a CT chest in 8/2014 showed a nodular contour of the liver. Her serological workup was negative for Anurag's, alpha-1 antitrypsin deficiency, hemochromatosis, autoimmune etiology, and viral hepatitis. 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 She has thrombocytopenia and splenomegaly on imaging. She had repeat labs and u/s recently. She has normal synthetic functioning and normal liver enzymes. She had no masses seen on u/s, AFP nl. She reports she feels well and denies any complaints today. Denies jaundice, dark urine, hematemesis, melena, slowed mentation, abdominal distention.  EGD 4/2017 showed no varices again. She is a very high functioning 88 yo. She reports urinary urgency and occasional incontinence. Reports she used to take a medication for this but thinks she may be out.     ROS:   GENERAL: Denies fever, chills, weight loss/gain, fatigue  HEENT: Denies headaches, dizziness, vision/hearing changes  CARDIOVASCULAR: Denies chest pain, palpitations, or edema  RESPIRATORY: Denies dyspnea, cough  GI: Denies abdominal pain, rectal bleeding, nausea, vomiting. No change in bowel pattern or color  : (+) urinary urgency and occasional incontinence  SKIN: Denies rash, itching   NEURO: Denies confusion, memory loss, or mood changes  PSYCH: Denies depression or anxiety  HEME/LYMPH: (+) easy bruising and bleeding       PMHX:  has a past medical history of Allergy; Anxiety; Cirrhosis; COPD (chronic obstructive pulmonary disease) (10/16/2012); Cryptogenic cirrhosis (11/6/2014); Cystocele; Diverticulitis; Dyspnea (11/8/2012); Family history of colon cancer (10/2/2017); GERD (gastroesophageal reflux disease); History of colonic polyps (10/2/2017); Hypertension;  Hypothyroidism (4/10/2014); Insomnia (4/18/2013); Kidney stones; Lung cancer; Lung cancer, upper lobe (3/4/2013); Menopause; Nuclear sclerosis (7/17/2014); Osteopenia; PSVT (paroxysmal supraventricular tachycardia); PVC (premature ventricular contraction) (11/10/2012); Rectocele; 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    PSHX:  has a past surgical history that includes Adenoidectomy; Tonsillectomy; Hysterectomy; Cholecystectomy; Appendectomy; Cystoscopy; Oophorectomy; Colonoscopy; Upper gastrointestinal endoscopy; and Lung lobectomy (Right).    The patient's social and family histories were reviewed by me and updated in the appropriate section of the electronic medical record.    Review of patient's allergies indicates:  No Known Allergies    Current Outpatient Prescriptions on File Prior to Visit   Medication Sig Dispense Refill    albuterol (PROVENTIL HFA) 90 mcg/actuation inhaler Inhale 2 puffs into the lungs every 6 (six) hours as needed (ANY equivalent covered is OK). 1 Inhaler 2    alendronate (FOSAMAX) 70 MG tablet TAKE 1 TABLET EVERY 7 DAYS 12 tablet 11    amLODIPine (NORVASC) 5 MG tablet TAKE 2 TABLETS EVERY  tablet 3    aspirin 81 mg Tab Take 1 tablet by mouth Daily.       estradiol (ESTRACE) 0.01 % (0.1 mg/g) vaginal cream Pea-size dab of cream to urethra/introitus at bed time. 42.5 g 1    levothyroxine (SYNTHROID) 25 MCG tablet TAKE 1 TABLET EVERY DAY 90 tablet 3    lisinopril (PRINIVIL,ZESTRIL) 40 MG tablet TAKE 1 TABLET EVERY DAY 90 tablet 3    metoprolol succinate (TOPROL-XL) 50 MG 24 hr tablet TAKE 1 TABLET ONE TIME DAILY 90 tablet 3    oxybutynin (DITROPAN-XL) 10 MG 24 hr tablet TAKE 1 TABLET ONE TIME DAILY 90 tablet 11    sertraline (ZOLOFT) 25 MG tablet TAKE 1 TABLET(25 MG) BY MOUTH EVERY DAY 90 tablet 90    temazepam (RESTORIL) 30 mg capsule  "TAKE ONE CAPSULE BY MOUTH AT BEDTIME AS NEEDED 30 capsule 5     No current facility-administered medications on file prior to visit.         Objective Findings:    PHYSICAL EXAM:   Friendly White female, in no acute distress; alert and oriented to person, place and time  VITALS:   BP (!) 147/66 (BP Location: Left arm, Patient Position: Sitting)   Pulse 76   Temp 97.7 °F (36.5 °C) (Oral)   Resp 18   Ht 5' 3" (1.6 m)   Wt 65.8 kg (145 lb 1 oz)   SpO2 (!) 94%   BMI 25.70 kg/m²     HENT: Normocephalic, without obvious abnormality. Oral mucosa pink and moist. Dentition good.  EYES: Sclerae anicteric.   NECK: Supple.   CARDIOVASCULAR: Regular rate and rhythm. No murmurs.  RESPIRATORY: Normal respiratory effort. BBS CTA. No wheezes or crackles.  GI: Soft, non-tender, non-distended. No hepatosplenomegaly. No masses palpable. No ascites.  EXTREMITIES:  No clubbing, cyanosis or edema.  SKIN: Warm and dry. No jaundice. No rashes noted to exposed skin. Few telangectasias noted to chest. No palmar erythema.  NEURO:  Normal gate. No asterixis.  PSYCH:  Memory intact. Thought and speech pattern appropriate. Behavior normal. No depression or anxiety noted.      Labs:  Lab Results   Component Value Date    WBC 4.97 04/16/2018    HGB 15.9 04/16/2018    HCT 47.8 04/16/2018     (L) 04/16/2018    CHOL 201 (H) 09/07/2017    TRIG 198 (H) 09/07/2017    HDL 48 09/07/2017     04/16/2018    K 4.1 04/16/2018    CREATININE 0.8 04/16/2018    ALT 11 04/16/2018    AST 13 04/16/2018    ALKPHOS 50 (L) 04/16/2018    BILITOT 0.8 04/16/2018    ALBUMIN 4.4 04/16/2018    INR 1.0 04/16/2018    AFP 2.7 04/16/2018       ASSESSMENT:  89 y.o. White female with:  1.  Cryptogenic cirrhosis, well-compensated  -- MELD= MELD-Na score: 6 at 4/16/2018 10:00 AM  MELD score: 6 at 4/16/2018 10:00 AM  Calculated from:  Serum Creatinine: 0.8 mg/dL (Rounded to 1) at 4/16/2018 10:00 AM  Serum Sodium: 138 mmol/L (Rounded to 137) at 4/16/2018 10:00 " AM  Total Bilirubin: 0.8 mg/dL (Rounded to 1) at 4/16/2018 10:00 AM  INR(ratio): 1 at 4/16/2018 10:00 AM  Age: 89 years  -- HCC screening- AFP and abd. U/S- up to date, next due 10/2018  -- Immunity to Hep A and B- finished hep B vaccines 4/2015, already immune to hep A  -- EGD- 4/2017- no varices  2. Portal hypertension  -- EGD- 4/2017 - no varices  -- Ascites- none  -- Splenomegaly and thrombocytopenia        EDUCATION:   Signs and symptoms of hepatic decompensation were reviewed, including jaundice, ascites, and slowed mentation due to hepatic encephalopathy. The patient should seek medical attention if any of these things occur. We discussed the potential for bleeding from esophageal varices with symptoms of hematemesis and melena. The patient should report to the Emergency Department for these symptoms.    We discussed the increased risk of hepatocellular carcinoma due to cirrhosis. Continued screening every six months with ultrasound and AFP is recommended.     Reassured her that her liver functioning is normal and liver disease appears to be very stable.      PLAN:  1. HCC screening Q 6 months with AFP and abd. U/S, next due 10/2018  2. No indication for repeat EGD unless she has findings worrisome for bleeding  3. She will check her meds at home to see if she's out of Ditropan and contact PCP for refill if needed  4. F/u in 10/2018 with u/s and labs before appt [facility]    Thank you for allowing me to participate in the care of PHYLICIA Sam

## 2018-05-14 RX ORDER — OXYBUTYNIN CHLORIDE 10 MG/1
TABLET, EXTENDED RELEASE ORAL
Qty: 90 TABLET | Refills: 11 | Status: SHIPPED | OUTPATIENT
Start: 2018-05-14 | End: 2019-12-04 | Stop reason: SDUPTHER

## 2018-07-08 RX ORDER — LEVOTHYROXINE SODIUM 25 UG/1
TABLET ORAL
Qty: 90 TABLET | Refills: 3 | Status: SHIPPED | OUTPATIENT
Start: 2018-07-08 | End: 2020-11-15

## 2018-07-08 RX ORDER — METOPROLOL SUCCINATE 50 MG/1
TABLET, EXTENDED RELEASE ORAL
Qty: 90 TABLET | Refills: 3 | Status: ON HOLD | OUTPATIENT
Start: 2018-07-08 | End: 2019-10-24 | Stop reason: HOSPADM

## 2018-07-08 RX ORDER — LISINOPRIL 40 MG/1
TABLET ORAL
Qty: 90 TABLET | Refills: 3 | Status: ON HOLD | OUTPATIENT
Start: 2018-07-08 | End: 2019-10-24 | Stop reason: HOSPADM

## 2018-08-14 ENCOUNTER — TELEPHONE (OUTPATIENT)
Dept: FAMILY MEDICINE | Facility: CLINIC | Age: 83
End: 2018-08-14

## 2018-08-14 ENCOUNTER — OFFICE VISIT (OUTPATIENT)
Dept: FAMILY MEDICINE | Facility: CLINIC | Age: 83
End: 2018-08-14
Payer: MEDICARE

## 2018-08-14 VITALS
HEART RATE: 80 BPM | WEIGHT: 144.38 LBS | BODY MASS INDEX: 25.58 KG/M2 | TEMPERATURE: 99 F | HEIGHT: 63 IN | OXYGEN SATURATION: 94 % | SYSTOLIC BLOOD PRESSURE: 120 MMHG | DIASTOLIC BLOOD PRESSURE: 68 MMHG

## 2018-08-14 DIAGNOSIS — K74.69 CRYPTOGENIC CIRRHOSIS: ICD-10-CM

## 2018-08-14 DIAGNOSIS — R26.9 GAIT ABNORMALITY: ICD-10-CM

## 2018-08-14 DIAGNOSIS — K76.6 PORTAL HYPERTENSION: ICD-10-CM

## 2018-08-14 DIAGNOSIS — J43.8 OTHER EMPHYSEMA: ICD-10-CM

## 2018-08-14 DIAGNOSIS — D69.6 THROMBOCYTOPENIA: ICD-10-CM

## 2018-08-14 DIAGNOSIS — I70.0 ATHEROSCLEROSIS OF AORTA: Primary | ICD-10-CM

## 2018-08-14 DIAGNOSIS — Z00.00 ENCOUNTER FOR PREVENTIVE HEALTH EXAMINATION: ICD-10-CM

## 2018-08-14 DIAGNOSIS — M85.80 OSTEOPENIA, UNSPECIFIED LOCATION: Primary | ICD-10-CM

## 2018-08-14 PROCEDURE — 99499 UNLISTED E&M SERVICE: CPT | Mod: HCNC,S$GLB,, | Performed by: NURSE PRACTITIONER

## 2018-08-14 PROCEDURE — 99999 PR PBB SHADOW E&M-EST. PATIENT-LVL IV: CPT | Mod: PBBFAC,,, | Performed by: NURSE PRACTITIONER

## 2018-08-14 PROCEDURE — G0439 PPPS, SUBSEQ VISIT: HCPCS | Mod: S$GLB,,, | Performed by: NURSE PRACTITIONER

## 2018-08-14 NOTE — PATIENT INSTRUCTIONS
Counseling and Referral of Other Preventative  (Italic type indicates deductible and co-insurance are waived)    Patient Name: Sandra Moody  Today's Date: 8/14/2018    Health Maintenance       Date Due Completion Date    TETANUS VACCINE 09/13/2015 9/13/2005    Influenza Vaccine 08/01/2018 10/2/2017    Fecal Occult Blood Test (FOBT)/FitKit 09/22/2018 9/22/2017    Mammogram 10/17/2018 10/17/2016    DEXA SCAN 10/16/2019 10/16/2017 (Done)    Override on 10/16/2017: Done    Override on 4/10/2014: (N/S)    Lipid Panel 09/07/2022 9/7/2017        No orders of the defined types were placed in this encounter.    The following information is provided to all patients.  This information is to help you find resources for any of the problems found today that may be affecting your health:                Living healthy guide: www.[facility].[place]      Understanding Diabetes: www.diabetes.org      Eating healthy: www.cdc.gov/healthyweight      [facility] home safety checklist: www.cdc.gov/steadi/patient.html      Agency on Aging: www.goea.[place]      Alcoholics anonymous (AA): www.aa.org      Physical Activity: www.natalie.nih.gov/tj9yipb      Tobacco use: www.quitwithusla.org

## 2018-10-03 RX ORDER — TEMAZEPAM 30 MG/1
CAPSULE ORAL
Qty: 30 CAPSULE | Refills: 5 | Status: SHIPPED | OUTPATIENT
Start: 2018-10-03 | End: 2019-01-16 | Stop reason: SDUPTHER

## 2018-10-08 ENCOUNTER — TELEPHONE (OUTPATIENT)
Dept: HEPATOLOGY | Facility: CLINIC | Age: 83
End: 2018-10-08

## 2018-10-08 ENCOUNTER — HOSPITAL ENCOUNTER (OUTPATIENT)
Dept: RADIOLOGY | Facility: HOSPITAL | Age: 83
Discharge: HOME | End: 2018-10-08
Attending: NURSE PRACTITIONER
Payer: MEDICARE

## 2018-10-08 DIAGNOSIS — K74.60 CIRRHOSIS OF LIVER WITHOUT ASCITES, UNSPECIFIED HEPATIC CIRRHOSIS TYPE: ICD-10-CM

## 2018-10-08 PROCEDURE — 76700 US EXAM ABDOM COMPLETE: CPT | Mod: TC

## 2018-10-08 PROCEDURE — 76700 US EXAM ABDOM COMPLETE: CPT | Mod: 26,,, | Performed by: RADIOLOGY

## 2018-10-08 NOTE — TELEPHONE ENCOUNTER
Attempted to call pt to review labs and u/s since no f/u appt with me was made. Labs and u/s stable. No mass on u/s. If she's feeling well, she can f/u again in 4/2019 with ultrasound and labs again. Please relay msg to pt and enter recall if she is ok seeing me in 4/2019. Otherwise, if she prefers to come in, schedule her an appt.

## 2018-10-09 NOTE — TELEPHONE ENCOUNTER
MA called patient relay message below from NP, she understood and agreed to be seen next year April. Recall entered in the system.

## 2018-10-30 ENCOUNTER — OFFICE VISIT (OUTPATIENT)
Dept: FAMILY MEDICINE | Facility: CLINIC | Age: 83
End: 2018-10-30
Payer: MEDICARE

## 2018-10-30 VITALS
OXYGEN SATURATION: 94 % | BODY MASS INDEX: 25.54 KG/M2 | HEART RATE: 84 BPM | SYSTOLIC BLOOD PRESSURE: 150 MMHG | DIASTOLIC BLOOD PRESSURE: 86 MMHG | TEMPERATURE: 97 F | WEIGHT: 144.19 LBS

## 2018-10-30 DIAGNOSIS — Z12.11 ENCOUNTER FOR SCREENING COLONOSCOPY: ICD-10-CM

## 2018-10-30 DIAGNOSIS — M19.90 ARTHRITIS: ICD-10-CM

## 2018-10-30 DIAGNOSIS — I10 ESSENTIAL HYPERTENSION: ICD-10-CM

## 2018-10-30 DIAGNOSIS — Z00.00 ANNUAL PHYSICAL EXAM: Primary | ICD-10-CM

## 2018-10-30 PROCEDURE — 99999 PR PBB SHADOW E&M-EST. PATIENT-LVL III: CPT | Mod: PBBFAC,,, | Performed by: INTERNAL MEDICINE

## 2018-10-30 PROCEDURE — 99213 OFFICE O/P EST LOW 20 MIN: CPT | Mod: PBBFAC,PO | Performed by: INTERNAL MEDICINE

## 2018-10-30 PROCEDURE — 99397 PER PM REEVAL EST PAT 65+ YR: CPT | Mod: S$PBB,,, | Performed by: INTERNAL MEDICINE

## 2018-10-30 NOTE — PROGRESS NOTES
SUBJECTIVE     Chief Complaint   Patient presents with    Annual Exam       HPI  Sandra Moody is a 89 y.o. female with multiple medical diagnoses as listed in the medical history and problem list that presents for annual exam. Pt has been doing well since her last visit. She has a good appetite and eats well. She does not exercise. She sleeps for ~7-8 hours nightly. Pt does take OTC supplements this is Vit D 5000 units daily. She does have any current stressors only when her joints her hurting. Pt is UTD on age appropriate CA screening.    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 lj- fosamax    PSVT (paroxysmal supraventricular tachycardia)     dr. marx    PVC (premature ventricular contraction) 11/10/2012    Rectocele     Status post partial lobectomy of lung 2/2010    Thyroid disease     hypothyroidism    Trochanteric bursitis 4/18/2013    Urine incontinence 2/24/2015    Vitamin D deficiency disease 9/4/2014       PAST SURGICAL HISTORY:  Past Surgical History:   Procedure Laterality Date    ADENOIDECTOMY      APPENDECTOMY      CHOLECYSTECTOMY      COLONOSCOPY      CYSTOSCOPY      ESOPHAGOGASTRODUODENOSCOPY (EGD) N/A 4/6/2017    [facility]O    ESOPHAGOGASTRODUODENOSCOPY (EGD) N/A 10/13/2014    Performed by Usman Manuel MD at [facility] ENDO (4TH FLR)     HYSTERECTOMY      INSERTION-INTRAOCULAR LENS (IOL) Left 9/11/2014    [facility] at [facility] OR    LUNG LOBECTOMY Right     OOPHORECTOMY      PHACOEMULSIFICATION-ASPIRATION-CATARACT Left 9/11/2014    Per[facility] at [facility] OR    PHACOEMULSIFICATION-ASPIRATION-CATARACT Right 8/28/2014    Per[facility] by [facility] at [facility] OR    TONSILLECTOMY      UPPER GASTROINTESTINAL ENDOSCOPY         SOCIAL HISTORY:  Social History     Socioeconomic History    Marital status:      Spouse name: Not on file    Number of children: Not on file    Years of education: Not on file    Highest education level: Not on file   Social Needs    Financial resource strain: Not on file    Food insecurity - worry: Not on file    Food insecurity - inability: Not on file    Transportation needs - medical: Not on file    Transportation needs - non-medical: Not on file   Occupational History    Not on file   Tobacco Use    Smoking status: Never Smoker    Smokeless tobacco: Never Used   Substance and Sexual Activity    Alcohol use: No    Drug use: No    Sexual activity: No   Other Topics Concern    Not on file   Social History Narrative    Not on file       FAMILY HISTORY:  Family History   Problem Relation Age of Onset    Cancer Mother         COLON    Cancer Brother         throat    Asthma Neg Hx     Emphysema Neg Hx     Amblyopia Neg Hx     Blindness Neg Hx     Cataracts Neg Hx     Diabetes Neg Hx     Glaucoma Neg Hx     Hypertension Neg Hx     Macular degeneration Neg Hx     Retinal detachment Neg Hx     Strabismus Neg Hx     Stroke Neg Hx     Thyroid disease Neg Hx     Cirrhosis Neg Hx        ALLERGIES AND MEDICATIONS: updated and reviewed.  Review of patient's allergies indicates:  No Known Allergies  Current Outpatient Medications   Medication Sig Dispense Refill    albuterol (PROVENTIL HFA) 90 mcg/actuation inhaler Inhale 2 puffs into the  lungs every 6 (six) hours as needed (ANY equivalent covered is OK). 1 Inhaler 2    alendronate (FOSAMAX) 70 MG tablet TAKE 1 TABLET EVERY 7 DAYS 12 tablet 11    amLODIPine (NORVASC) 5 MG tablet TAKE 2 TABLETS EVERY  tablet 3    aspirin 81 mg Tab Take 1 tablet by mouth Daily.       estradiol (ESTRACE) 0.01 % (0.1 mg/g) vaginal cream Pea-size dab of cream to urethra/introitus at bed time. 42.5 g 1    levothyroxine (SYNTHROID) 25 MCG tablet TAKE 1 TABLET EVERY DAY 90 tablet 3    lisinopril (PRINIVIL,ZESTRIL) 40 MG tablet TAKE 1 TABLET EVERY DAY 90 tablet 3    metoprolol succinate (TOPROL-XL) 50 MG 24 hr tablet TAKE 1 TABLET EVERY DAY 90 tablet 3    oxybutynin (DITROPAN-XL) 10 MG 24 hr tablet TAKE 1 TABLET ONE TIME DAILY 90 tablet 11    sertraline (ZOLOFT) 25 MG tablet TAKE 1 TABLET(25 MG) BY MOUTH EVERY DAY 90 tablet 90    temazepam (RESTORIL) 30 mg capsule TAKE 1 CAPSULE BY MOUTH EVERY DAY AT BEDTIME 30 capsule 5     No current facility-administered medications for this visit.        ROS  Review of Systems   Constitutional: Negative for chills and fever.   HENT: Negative for hearing loss and sore throat.    Eyes: Negative for visual disturbance.   Respiratory: Negative for cough and shortness of breath.    Cardiovascular: Negative for chest pain, palpitations and leg swelling.   Gastrointestinal: Negative for abdominal pain, constipation, diarrhea, nausea and vomiting.   Genitourinary: Negative for dysuria, frequency and urgency.   Musculoskeletal: Positive for arthralgias (R shoulder and R hip). Negative for joint swelling and myalgias.   Skin: Negative for rash and wound.   Neurological: Negative for headaches.   Psychiatric/Behavioral: Negative for agitation and confusion. The patient is not nervous/anxious.          OBJECTIVE     Physical Exam  Vitals:    10/30/18 0855   BP: (!) 150/86   Pulse: 84   Temp: 97.3 °F (36.3 °C)    Body mass index is 25.54 kg/m².  Weight: 65.4 kg (144 lb 2.9 oz)          Physical Exam   Constitutional: She is oriented to person, place, and time. She appears well-developed and well-nourished. No distress.   HENT:   Head: Normocephalic and atraumatic.   Right Ear: External ear normal.   Left Ear: External ear normal.   Nose: Nose normal.   Mouth/Throat: Oropharynx is clear and moist.   Eyes: Conjunctivae and EOM are normal. Right eye exhibits no discharge. Left eye exhibits no discharge. No scleral icterus.   Neck: Normal range of motion. Neck supple. No JVD present. No tracheal deviation present.   Cardiovascular: Normal rate, regular rhythm and intact distal pulses. Exam reveals no gallop and no friction rub.   No murmur heard.  Pulmonary/Chest: Effort normal and breath sounds normal. No respiratory distress. She has no wheezes.   Abdominal: Soft. Bowel sounds are normal. She exhibits no distension and no mass. There is no tenderness. There is no rebound and no guarding.   Musculoskeletal: Normal range of motion. She exhibits no edema, tenderness or deformity.   Neurological: She is alert and oriented to person, place, and time. She exhibits normal muscle tone. Coordination normal.   Skin: Skin is warm and dry. No rash noted. No erythema.   Psychiatric: She has a normal mood and affect. Her behavior is normal. Judgment and thought content normal.         Health Maintenance       Date Due Completion Date    TETANUS VACCINE 09/13/2015 9/13/2005    Influenza Vaccine 08/01/2018 10/2/2017    Fecal Occult Blood Test (FOBT)/FitKit 09/22/2018 9/22/2017    Mammogram 10/17/2018 10/17/2016    DEXA SCAN 10/16/2019 10/16/2017 (Done)    Override on 10/16/2017: Done    Override on 4/10/2014: (N/S)    Lipid Panel 09/07/2022 9/7/2017            ASSESSMENT     89 y.o. female with     1. Annual physical exam    2. Essential hypertension    3. Encounter for screening colonoscopy    4. Arthritis        PLAN:     1. Annual physical exam  - Counseled on age appropriate medical preventative services,  including age appropriate cancer screenings, over all nutritional health, need for a consistent exercise regimen and an over all push towards maintaining a vigorous and active lifestyle.  Counseled on age appropriate vaccines and discussed upcoming health care needs based on age/gender.  Spent time with patient counseling on need for a good patient/doctor relationship moving forward.  Discussed use of common OTC medications and supplements.  Discussed common dietary aids and use of caffeine and the need for good sleep hygiene and stress management.  - Reviewed recent labs    2. Essential hypertension  - BP elevated above goal of <140/90; likely 2/2 medication non-compliance as pt has not taken meds in 2 days  - Pt advised to take meds as previously directed  - Previously controlled per chart check  - Continue current meds and monitor    3. Encounter for screening colonoscopy  - Fecal Immunochemical Test (iFOBT); Future    4. Arthritis  - CANE FOR HOME USE        RTC in 3 months     Rox Montes MD  10/30/2018 9:11 AM        No Follow-up on file.

## 2018-11-02 ENCOUNTER — CLINICAL SUPPORT (OUTPATIENT)
Dept: FAMILY MEDICINE | Facility: CLINIC | Age: 83
End: 2018-11-02
Payer: MEDICARE

## 2018-11-02 DIAGNOSIS — Z23 FLU VACCINE NEED: Primary | ICD-10-CM

## 2018-11-02 PROCEDURE — G0008 ADMIN INFLUENZA VIRUS VAC: HCPCS | Mod: S$GLB,,, | Performed by: INTERNAL MEDICINE

## 2018-11-02 PROCEDURE — 90662 IIV NO PRSV INCREASED AG IM: CPT | Mod: S$GLB,,, | Performed by: INTERNAL MEDICINE

## 2018-11-05 ENCOUNTER — OFFICE VISIT (OUTPATIENT)
Dept: FAMILY MEDICINE | Facility: CLINIC | Age: 83
End: 2018-11-05
Payer: MEDICARE

## 2018-11-05 ENCOUNTER — NURSE TRIAGE (OUTPATIENT)
Dept: ADMINISTRATIVE | Facility: CLINIC | Age: 83
End: 2018-11-05

## 2018-11-05 VITALS — DIASTOLIC BLOOD PRESSURE: 82 MMHG | OXYGEN SATURATION: 94 % | SYSTOLIC BLOOD PRESSURE: 124 MMHG | HEART RATE: 72 BPM

## 2018-11-05 DIAGNOSIS — M62.838 MUSCLE SPASM OF BOTH LOWER LEGS: ICD-10-CM

## 2018-11-05 DIAGNOSIS — T50.905A REACTION TO SHOT, INITIAL ENCOUNTER: Primary | ICD-10-CM

## 2018-11-05 PROCEDURE — 99213 OFFICE O/P EST LOW 20 MIN: CPT | Mod: HCNC,S$GLB,, | Performed by: INTERNAL MEDICINE

## 2018-11-05 PROCEDURE — 99999 PR PBB SHADOW E&M-EST. PATIENT-LVL II: CPT | Mod: PBBFAC,HCNC,, | Performed by: INTERNAL MEDICINE

## 2018-11-05 PROCEDURE — 1101F PT FALLS ASSESS-DOCD LE1/YR: CPT | Mod: CPTII,HCNC,S$GLB, | Performed by: INTERNAL MEDICINE

## 2018-11-05 NOTE — TELEPHONE ENCOUNTER
"  Reason for Disposition   Redness or red streak around the injection site begins > 48 hours after shot    Protocols used: ST IMMUNIZATION [id number]    Pt states she received the flu vaccine Friday to L deltoid. Pt c/o redness to injection site that has now "moved down the arm." Pt advised per protocol, pt verbalizes understanding, and appointment made.   "

## 2018-11-05 NOTE — PROGRESS NOTES
SUBJECTIVE     Chief Complaint   Patient presents with    Allergic Reaction     Possible reaction to flu vaccine    Swelling     L arm swelling at injection site. Reddness traveling down arm        HPI  Sandra Moody is a 89 y.o. female with multiple medical diagnoses as listed in the medical history and problem list that presents for evaluation of L arm erythema and swelling after flu shot on Friday. Pt walk up the following morning with some erythema and minimal pain. The redness started to move down the arm from the site of the injection, but denies any pain, fever, chills, or night sweats. Pt took 2 tabs of extra strength Tylenol, because she was advised by a friend.     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 lj- fosamax    PSVT (paroxysmal supraventricular tachycardia)     dr. marx    PVC (premature ventricular contraction) 11/10/2012    Rectocele     Status post partial lobectomy of lung 2/2010    Thyroid disease     hypothyroidism    Trochanteric bursitis 4/18/2013    Urine incontinence 2/24/2015    Vitamin D deficiency disease 9/4/2014       PAST SURGICAL HISTORY:  Past Surgical History:   Procedure Laterality Date    ADENOIDECTOMY      APPENDECTOMY      CHOLECYSTECTOMY      COLONOSCOPY      CYSTOSCOPY      ESOPHAGOGASTRODUODENOSCOPY (EGD) N/A 4/6/2017    Performed by [facility]han [facility] at  [facility] ENDO    ESOPHAGOGASTRODUODENOSCOPY (EGD) N/A 10/13/2014    [facility] ([facility] F[facility])    HYSTERECTOMY      INSERTION-INTRAOCULAR LENS (IOL) Left 9/11/2014    [facility] by Sebastián [facility]as [facility] at [facility] OR    LUNG LOBECTOMY Right     OOPHORECTOMY      PHACOEMULSIFICATION-ASPIRATION-CATARACT Left 9/11/2014    [facility] at [facility] OR    PHACOEMULSIFICATION-ASPIRATION-CATARACT Right 8/28/2014    [facility] by [facility] at [facility] OR    TONSILLECTOMY      UPPER GASTROINTESTINAL ENDOSCOPY         SOCIAL HISTORY:  Social History     Socioeconomic History    Marital status:      Spouse name: Not on file    Number of children: Not on file    Years of education: Not on file    Highest education level: Not on file   Social Needs    Financial resource strain: Not on file    Food insecurity - worry: Not on file    Food insecurity - inability: Not on file    Transportation needs - medical: Not on file    Transportation needs - non-medical: Not on file   Occupational History    Not on file   Tobacco Use    Smoking status: Never Smoker    Smokeless tobacco: Never Used   Substance and Sexual Activity    Alcohol use: No    Drug use: No    Sexual activity: No   Other Topics Concern    Not on file   Social History Narrative    Not on file       FAMILY HISTORY:  Family History   Problem Relation Age of Onset    Cancer Mother         COLON    Cancer Brother         throat    Asthma Neg Hx     Emphysema Neg Hx     Amblyopia Neg Hx     Blindness Neg Hx     Cataracts Neg Hx     Diabetes Neg Hx     Glaucoma Neg Hx     Hypertension Neg Hx     Macular degeneration Neg Hx     Retinal detachment Neg Hx     Strabismus Neg Hx     Stroke Neg Hx     Thyroid disease Neg Hx     Cirrhosis Neg Hx        ALLERGIES AND MEDICATIONS: updated and reviewed.  Review of patient's allergies indicates:  No Known Allergies  Current  Outpatient Medications   Medication Sig Dispense Refill    albuterol (PROVENTIL HFA) 90 mcg/actuation inhaler Inhale 2 puffs into the lungs every 6 (six) hours as needed (ANY equivalent covered is OK). 1 Inhaler 2    alendronate (FOSAMAX) 70 MG tablet TAKE 1 TABLET EVERY 7 DAYS 12 tablet 11    amLODIPine (NORVASC) 5 MG tablet TAKE 2 TABLETS EVERY  tablet 3    aspirin 81 mg Tab Take 1 tablet by mouth Daily.       estradiol (ESTRACE) 0.01 % (0.1 mg/g) vaginal cream Pea-size dab of cream to urethra/introitus at bed time. 42.5 g 1    levothyroxine (SYNTHROID) 25 MCG tablet TAKE 1 TABLET EVERY DAY 90 tablet 3    lisinopril (PRINIVIL,ZESTRIL) 40 MG tablet TAKE 1 TABLET EVERY DAY 90 tablet 3    metoprolol succinate (TOPROL-XL) 50 MG 24 hr tablet TAKE 1 TABLET EVERY DAY 90 tablet 3    oxybutynin (DITROPAN-XL) 10 MG 24 hr tablet TAKE 1 TABLET ONE TIME DAILY 90 tablet 11    sertraline (ZOLOFT) 25 MG tablet TAKE 1 TABLET(25 MG) BY MOUTH EVERY DAY 90 tablet 90    temazepam (RESTORIL) 30 mg capsule TAKE 1 CAPSULE BY MOUTH EVERY DAY AT BEDTIME 30 capsule 5     No current facility-administered medications for this visit.        ROS  Review of Systems   Constitutional: Negative for chills and fever.   HENT: Negative for hearing loss and sore throat.    Eyes: Negative for visual disturbance.   Respiratory: Negative for cough and shortness of breath.    Cardiovascular: Negative for chest pain, palpitations and leg swelling.   Gastrointestinal: Negative for abdominal pain, constipation, diarrhea, nausea and vomiting.   Genitourinary: Negative for dysuria, frequency and urgency.   Musculoskeletal: Positive for myalgias. Negative for arthralgias and joint swelling.   Skin: Positive for color change (erythema to LUE). Negative for rash and wound.   Neurological: Negative for headaches.   Psychiatric/Behavioral: Negative for agitation and confusion. The patient is not nervous/anxious.          OBJECTIVE     Physical  Exam  Vitals:    11/05/18 1119   BP: 124/82   Pulse: 72    There is no height or weight on file to calculate BMI.            Physical Exam   Constitutional: She is oriented to person, place, and time. She appears well-developed and well-nourished. No distress.   HENT:   Head: Normocephalic and atraumatic.   Right Ear: External ear normal.   Left Ear: External ear normal.   Nose: Nose normal.   Mouth/Throat: Oropharynx is clear and moist.   Eyes: Conjunctivae and EOM are normal. Right eye exhibits no discharge. Left eye exhibits no discharge. No scleral icterus.   Neck: Normal range of motion. Neck supple. No JVD present. No tracheal deviation present.   Pulmonary/Chest: Effort normal. No respiratory distress.   Musculoskeletal: Normal range of motion. She exhibits no edema, tenderness or deformity.   Neurological: She is alert and oriented to person, place, and time. No sensory deficit. She exhibits normal muscle tone. Coordination normal.   Skin: Skin is warm and dry. No rash noted. There is erythema (mild erythema to L upper arm; no tenderness, increased warmth to touch, induration, or fluctuance appreciated).   Psychiatric: She has a normal mood and affect. Her behavior is normal. Judgment and thought content normal.         Health Maintenance       Date Due Completion Date    TETANUS VACCINE 09/13/2015 9/13/2005    Fecal Occult Blood Test (FOBT)/FitKit 09/22/2018 9/22/2017    Mammogram 10/17/2018 10/17/2016    DEXA SCAN 10/16/2019 10/16/2017 (Done)    Override on 10/16/2017: Done    Override on 4/10/2014: (N/S)    Lipid Panel 09/07/2022 9/7/2017            ASSESSMENT     89 y.o. female with     1. Reaction to shot, initial encounter    2. Muscle spasm of both lower legs        PLAN:     1. Reaction to shot, initial encounter  - Mild erythema to LUE; likely local reaction after flu shot  - Pt encouraged to apply ice packs 2-3 times daily at 10 minute intervals x 72 hours, then okay to change to heating compress  with care not to burn her self; she  voiced understanding   - Okay to continue Tylenol prn pain  - Seek medical attention for any worsening erythema with red streaking, edema, drainage, pain/tenderness, fever, chills, or night sweats    2. Muscle spasm of both lower legs  - Pt to apply warm compresses and do leg stretches  - Reviewed recent labs and electrolytes all wnl        RTC in 3-5 days as needed for any acute worsening of current condition or failure to improve       Rox Montes MD  11/05/2018 11:39 AM        No Follow-up on file.

## 2018-11-29 RX ORDER — AMLODIPINE BESYLATE 5 MG/1
TABLET ORAL
Qty: 180 TABLET | Refills: 3 | Status: SHIPPED | OUTPATIENT
Start: 2018-11-29 | End: 2019-09-24 | Stop reason: SDUPTHER

## 2019-01-15 ENCOUNTER — TELEPHONE (OUTPATIENT)
Dept: FAMILY MEDICINE | Facility: CLINIC | Age: 84
End: 2019-01-15

## 2019-01-15 NOTE — TELEPHONE ENCOUNTER
Left a detailed message for patient to call clinic back/ there are sooner appointments available to see Dr. Montes (not sure which provider patient means)

## 2019-01-15 NOTE — TELEPHONE ENCOUNTER
----- Message from Anya Dunbar sent at 1/15/2019  8:34 AM CST -----  Contact: self - [phone]  Pt asking for a prescription for a yeast infection to be called in. She is upset that everytime she calls for an appt she has to see another doctor. First available appt is 01/29. Please contact back to discuss poss sooner appt.        ...  [facility] [address] [place] [address] MURPHY GARCIA [facility] AT [facility] [address] [place] [address] [place] [address] [employer]  [place] [address]  Phone: [phone] Fax: [phone]

## 2019-01-16 ENCOUNTER — OFFICE VISIT (OUTPATIENT)
Dept: FAMILY MEDICINE | Facility: CLINIC | Age: 84
End: 2019-01-16
Payer: MEDICARE

## 2019-01-16 VITALS
TEMPERATURE: 99 F | HEART RATE: 73 BPM | HEIGHT: 63 IN | OXYGEN SATURATION: 97 % | BODY MASS INDEX: 25.7 KG/M2 | DIASTOLIC BLOOD PRESSURE: 72 MMHG | SYSTOLIC BLOOD PRESSURE: 120 MMHG | WEIGHT: 145.06 LBS

## 2019-01-16 DIAGNOSIS — J43.8 OTHER EMPHYSEMA: ICD-10-CM

## 2019-01-16 DIAGNOSIS — B37.2 CANDIDAL INTERTRIGO: ICD-10-CM

## 2019-01-16 DIAGNOSIS — N76.0 VULVOVAGINITIS: Primary | ICD-10-CM

## 2019-01-16 DIAGNOSIS — K76.6 PORTAL HYPERTENSION: ICD-10-CM

## 2019-01-16 DIAGNOSIS — G47.00 INSOMNIA, UNSPECIFIED TYPE: ICD-10-CM

## 2019-01-16 DIAGNOSIS — D69.6 THROMBOCYTOPENIA: ICD-10-CM

## 2019-01-16 DIAGNOSIS — I70.0 ATHEROSCLEROSIS OF AORTA: ICD-10-CM

## 2019-01-16 DIAGNOSIS — K74.69 CRYPTOGENIC CIRRHOSIS: ICD-10-CM

## 2019-01-16 LAB
BILIRUB SERPL-MCNC: NEGATIVE MG/DL
BLOOD URINE, POC: NEGATIVE
COLOR, POC UA: YELLOW
GLUCOSE UR QL STRIP: NORMAL
KETONES UR QL STRIP: NEGATIVE
LEUKOCYTE ESTERASE URINE, POC: NORMAL
NITRITE, POC UA: NEGATIVE
PH, POC UA: 6
PROTEIN, POC: NEGATIVE
SPECIFIC GRAVITY, POC UA: 1.01
UROBILINOGEN, POC UA: NORMAL

## 2019-01-16 PROCEDURE — 81000 URINALYSIS NONAUTO W/SCOPE: CPT | Mod: HCNC

## 2019-01-16 PROCEDURE — 1101F PT FALLS ASSESS-DOCD LE1/YR: CPT | Mod: CPTII,HCNC,S$GLB, | Performed by: INTERNAL MEDICINE

## 2019-01-16 PROCEDURE — 87088 URINE BACTERIA CULTURE: CPT | Mod: HCNC

## 2019-01-16 PROCEDURE — 99214 PR OFFICE/OUTPT VISIT, EST, LEVL IV, 30-39 MIN: ICD-10-PCS | Mod: 25,HCNC,S$GLB, | Performed by: INTERNAL MEDICINE

## 2019-01-16 PROCEDURE — 87086 URINE CULTURE/COLONY COUNT: CPT | Mod: HCNC

## 2019-01-16 PROCEDURE — 99499 UNLISTED E&M SERVICE: CPT | Mod: S$GLB,,, | Performed by: INTERNAL MEDICINE

## 2019-01-16 PROCEDURE — 87077 CULTURE AEROBIC IDENTIFY: CPT | Mod: 59,HCNC

## 2019-01-16 PROCEDURE — 87186 SC STD MICRODIL/AGAR DIL: CPT | Mod: 59,HCNC

## 2019-01-16 PROCEDURE — 81002 URINALYSIS NONAUTO W/O SCOPE: CPT | Mod: HCNC,S$GLB,, | Performed by: INTERNAL MEDICINE

## 2019-01-16 PROCEDURE — 99999 PR PBB SHADOW E&M-EST. PATIENT-LVL III: ICD-10-PCS | Mod: PBBFAC,HCNC,, | Performed by: INTERNAL MEDICINE

## 2019-01-16 PROCEDURE — 99999 PR PBB SHADOW E&M-EST. PATIENT-LVL III: CPT | Mod: PBBFAC,HCNC,, | Performed by: INTERNAL MEDICINE

## 2019-01-16 PROCEDURE — 81002 POCT URINE DIPSTICK WITHOUT MICROSCOPE: ICD-10-PCS | Mod: HCNC,S$GLB,, | Performed by: INTERNAL MEDICINE

## 2019-01-16 PROCEDURE — 1101F PR PT FALLS ASSESS DOC 0-1 FALLS W/OUT INJ PAST YR: ICD-10-PCS | Mod: CPTII,HCNC,S$GLB, | Performed by: INTERNAL MEDICINE

## 2019-01-16 PROCEDURE — 99499 RISK ADDL DX/OHS AUDIT: ICD-10-PCS | Mod: S$GLB,,, | Performed by: INTERNAL MEDICINE

## 2019-01-16 PROCEDURE — 99214 OFFICE O/P EST MOD 30 MIN: CPT | Mod: 25,HCNC,S$GLB, | Performed by: INTERNAL MEDICINE

## 2019-01-16 RX ORDER — ESTRADIOL 0.1 MG/G
CREAM VAGINAL
Qty: 42.5 G | Refills: 1 | Status: CANCELLED | OUTPATIENT
Start: 2019-01-16

## 2019-01-16 RX ORDER — HYDROCODONE BITARTRATE AND ACETAMINOPHEN 5; 325 MG/1; MG/1
TABLET ORAL
Refills: 0 | COMMUNITY
Start: 2018-12-21 | End: 2019-10-14

## 2019-01-16 RX ORDER — TRIAMCINOLONE ACETONIDE 5 MG/G
CREAM TOPICAL 2 TIMES DAILY
Qty: 30 G | Refills: 3 | Status: SHIPPED | OUTPATIENT
Start: 2019-01-16 | End: 2020-01-13

## 2019-01-16 RX ORDER — NYSTATIN 100000 U/G
CREAM TOPICAL 2 TIMES DAILY
Qty: 30 G | Refills: 3 | Status: SHIPPED | OUTPATIENT
Start: 2019-01-16 | End: 2020-01-13

## 2019-01-16 RX ORDER — TEMAZEPAM 30 MG/1
CAPSULE ORAL
Qty: 30 CAPSULE | Refills: 5 | Status: SHIPPED | OUTPATIENT
Start: 2019-01-16 | End: 2019-07-23 | Stop reason: SDUPTHER

## 2019-01-16 NOTE — PROGRESS NOTES
SUBJECTIVE     Chief Complaint   Patient presents with    Vaginal Itching     a couple of days    burning sensation       HPI  Sandra Moody is a 89 y.o. female with multiple medical diagnoses as listed in the medical history and problem list that presents for evaluation of vaginal itching x 2 days. Pt reports a burning sensation that is intermittent in nature. Denies any vaginal discharge, erythema, and edema. Pt has not taken or applied any meds. Denies any urinary complaints.    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 lj- fosamax    PSVT (paroxysmal supraventricular tachycardia)     dr. marx    PVC (premature ventricular contraction) 11/10/2012    Rectocele     Status post partial lobectomy of lung 2/2010    Thyroid disease     hypothyroidism    Trochanteric bursitis 4/18/2013    Urine incontinence 2/24/2015    Vitamin D deficiency disease 9/4/2014       PAST SURGICAL HISTORY:  Past Surgical History:   Procedure Laterality Date    ADENOIDECTOMY      APPENDECTOMY      CHOLECYSTECTOMY      COLONOSCOPY      CYSTOSCOPY      ESOPHAGOGASTRODUODENOSCOPY (EGD) N/A 4/6/2017    [facility]    ESOPHAGOGASTRODUODENOSCOPY (EGD) N/A 10/13/2014    [facility] by [facility] ENDO (4TH FLR)    HYSTERECTOMY      INSERTION-INTRAOCULAR LENS (IOL) Left 9/11/2014     [facility] by [facility] at [facility] OR    LUNG LOBECTOMY Right     OOPHORECTOMY      PHACOEMULSIFICATION-ASPIRATION-CATARACT Left 9/11/2014    [facility] by [facility] at [facility] OR    PHACOEMULSIFICATION-ASPIRATION-CATARACT Right 8/28/2014    [facility] at [facility] OR    TONSILLECTOMY      UPPER GASTROINTESTINAL ENDOSCOPY         SOCIAL HISTORY:  Social History     Socioeconomic History    Marital status:      Spouse name: Not on file    Number of children: Not on file    Years of education: Not on file    Highest education level: Not on file   Social Needs    Financial resource strain: Not on file    Food insecurity - worry: Not on file    Food insecurity - inability: Not on file    Transportation needs - medical: Not on file    Transportation needs - non-medical: Not on file   Occupational History    Not on file   Tobacco Use    Smoking status: Never Smoker    Smokeless tobacco: Never Used   Substance and Sexual Activity    Alcohol use: No    Drug use: No    Sexual activity: No   Other Topics Concern    Not on file   Social History Narrative    Not on file       FAMILY HISTORY:  Family History   Problem Relation Age of Onset    Cancer Mother         COLON    Cancer Brother         throat    Asthma Neg Hx     Emphysema Neg Hx     Amblyopia Neg Hx     Blindness Neg Hx     Cataracts Neg Hx     Diabetes Neg Hx     Glaucoma Neg Hx     Hypertension Neg Hx     Macular degeneration Neg Hx     Retinal detachment Neg Hx     Strabismus Neg Hx     Stroke Neg Hx     Thyroid disease Neg Hx     Cirrhosis Neg Hx        ALLERGIES AND MEDICATIONS: updated and reviewed.  Review of patient's allergies indicates:  No Known Allergies  Current Outpatient Medications   Medication Sig Dispense Refill    alendronate (FOSAMAX) 70 MG tablet TAKE 1 TABLET EVERY 7 DAYS 12 tablet 11    amLODIPine (NORVASC) 5 MG tablet TAKE 2 TABLETS EVERY   tablet 3    aspirin 81 mg Tab Take 1 tablet by mouth Daily.       HYDROcodone-acetaminophen (NORCO) 5-325 mg per tablet TK 1 T PO Q 4 TO 6 H PRN P  0    levothyroxine (SYNTHROID) 25 MCG tablet TAKE 1 TABLET EVERY DAY 90 tablet 3    lisinopril (PRINIVIL,ZESTRIL) 40 MG tablet TAKE 1 TABLET EVERY DAY 90 tablet 3    metoprolol succinate (TOPROL-XL) 50 MG 24 hr tablet TAKE 1 TABLET EVERY DAY 90 tablet 3    oxybutynin (DITROPAN-XL) 10 MG 24 hr tablet TAKE 1 TABLET ONE TIME DAILY 90 tablet 11    sertraline (ZOLOFT) 25 MG tablet TAKE 1 TABLET(25 MG) BY MOUTH EVERY DAY 90 tablet 90    temazepam (RESTORIL) 30 mg capsule TAKE 1 CAPSULE BY MOUTH EVERY DAY AT BEDTIME 30 capsule 5    albuterol (PROVENTIL HFA) 90 mcg/actuation inhaler Inhale 2 puffs into the lungs every 6 (six) hours as needed (ANY equivalent covered is OK). 1 Inhaler 2    estradiol (ESTRACE) 0.01 % (0.1 mg/g) vaginal cream Pea-size dab of cream to urethra/introitus at bed time. 42.5 g 1    nystatin (MYCOSTATIN) cream Apply topically 2 (two) times daily. 30 g 3    triamcinolone acetonide 0.5% (KENALOG) 0.5 % Crea Apply topically 2 (two) times daily. 30 g 3     No current facility-administered medications for this visit.        ROS  Review of Systems   Constitutional: Negative for chills and fever.   HENT: Negative for hearing loss and sore throat.    Eyes: Negative for visual disturbance.   Respiratory: Negative for cough and shortness of breath.    Cardiovascular: Negative for chest pain, palpitations and leg swelling.   Gastrointestinal: Negative for abdominal pain, constipation, diarrhea, nausea and vomiting.   Genitourinary: Negative for dysuria, frequency and urgency.        Vaginal itch   Musculoskeletal: Negative for arthralgias, joint swelling and myalgias.   Skin: Negative for rash and wound.   Neurological: Negative for headaches.   Psychiatric/Behavioral: Negative for agitation and confusion. The patient is not nervous/anxious.   "        OBJECTIVE     Physical Exam  Vitals:    01/16/19 1540   BP: 120/72   Pulse: 73   Temp: 99 °F (37.2 °C)    Body mass index is 25.7 kg/m².  Weight: 65.8 kg (145 lb 1 oz)   Height: 5' 3" (160 cm)     Physical Exam   Constitutional: She is oriented to person, place, and time. She appears well-developed and well-nourished. No distress.   HENT:   Head: Normocephalic and atraumatic.   Right Ear: External ear normal.   Left Ear: External ear normal.   Nose: Nose normal.   Mouth/Throat: Oropharynx is clear and moist.   Eyes: Conjunctivae and EOM are normal. Right eye exhibits no discharge. Left eye exhibits no discharge. No scleral icterus.   Neck: Normal range of motion. Neck supple. No JVD present. No tracheal deviation present.   Pulmonary/Chest: Effort normal. No respiratory distress.   Musculoskeletal: Normal range of motion. She exhibits no deformity.   R forearm cast in place   Neurological: She is alert and oriented to person, place, and time. She exhibits normal muscle tone. Coordination normal.   Skin: Skin is warm and dry. No rash noted. No erythema.   Psychiatric: She has a normal mood and affect. Her behavior is normal. Judgment and thought content normal.         Health Maintenance       Date Due Completion Date    TETANUS VACCINE 09/13/2015 9/13/2005    Fecal Occult Blood Test (FOBT)/FitKit 09/22/2018 9/22/2017    Mammogram 10/17/2018 10/17/2016    DEXA SCAN 10/16/2019 10/16/2017 (Done)    Override on 10/16/2017: Done    Override on 4/10/2014: (N/S)    Lipid Panel 09/07/2022 9/7/2017            ASSESSMENT     89 y.o. female with     1. Vulvovaginitis    2. Atherosclerosis of aorta    3. Portal hypertension    4. Other emphysema    5. Thrombocytopenia    6. Cryptogenic cirrhosis    7. Insomnia, unspecified type    8. Candidal intertrigo        PLAN:     1. Vulvovaginitis  - POCT URINE DIPSTICK WITHOUT MICROSCOPE  - Urine culture  - Urinalysis  - triamcinolone acetonide 0.5% (KENALOG) 0.5 % Crea; Apply " topically 2 (two) times daily.  Dispense: 30 g; Refill: 3    2. Atherosclerosis of aorta  - Stable; no acute issues  - Monitor    3. Portal hypertension  - Stable; no acute issues  - Continue management per Hepatology    4. Other emphysema  - Stable; no acute issues  - The current medical regimen is effective;  continue present plan and medications.    5. Thrombocytopenia  - Stable; no acute issues  - Monitor    6. Cryptogenic cirrhosis  - Stable; no acute issues  - Continue management per Hepatology    7. Insomnia, unspecified type  - Stable; no acute issues  - The current medical regimen is effective;  continue present plan and medications.  - temazepam (RESTORIL) 30 mg capsule; TAKE 1 CAPSULE BY MOUTH EVERY DAY AT BEDTIME  Dispense: 30 capsule; Refill: 5    8. Candidal intertrigo  - Stable; no acute issues  - The current medical regimen is effective;  continue present plan and medications.  - nystatin (MYCOSTATIN) cream; Apply topically 2 (two) times daily.  Dispense: 30 g; Refill: 3        RTC in 1-2 weeks as needed for any acute worsening of current condition or failure to improve       Rox Montes MD  01/16/2019 4:14 PM        No Follow-up on file.

## 2019-01-17 LAB
BACTERIA #/AREA URNS HPF: ABNORMAL /HPF
BILIRUB UR QL STRIP: NEGATIVE
CLARITY UR: ABNORMAL
COLOR UR: YELLOW
GLUCOSE UR QL STRIP: NEGATIVE
HGB UR QL STRIP: ABNORMAL
KETONES UR QL STRIP: NEGATIVE
LEUKOCYTE ESTERASE UR QL STRIP: ABNORMAL
MICROSCOPIC COMMENT: ABNORMAL
NITRITE UR QL STRIP: POSITIVE
NON-SQ EPI CELLS #/AREA URNS HPF: 1 /HPF
PH UR STRIP: 6 [PH] (ref 5–8)
PROT UR QL STRIP: NEGATIVE
RBC #/AREA URNS HPF: 2 /HPF (ref 0–4)
SP GR UR STRIP: 1.01 (ref 1–1.03)
SQUAMOUS #/AREA URNS HPF: 4 /HPF
URN SPEC COLLECT METH UR: ABNORMAL
UROBILINOGEN UR STRIP-ACNC: NEGATIVE EU/DL
WBC #/AREA URNS HPF: 5 /HPF (ref 0–5)

## 2019-01-19 LAB
BACTERIA UR CULT: NORMAL
BACTERIA UR CULT: NORMAL

## 2019-01-21 DIAGNOSIS — N30.00 ACUTE CYSTITIS WITHOUT HEMATURIA: Primary | ICD-10-CM

## 2019-01-21 RX ORDER — CIPROFLOXACIN 500 MG/1
500 TABLET ORAL 2 TIMES DAILY
Qty: 10 TABLET | Refills: 0 | Status: SHIPPED | OUTPATIENT
Start: 2019-01-21 | End: 2019-01-26

## 2019-01-25 ENCOUNTER — PES CALL (OUTPATIENT)
Dept: ADMINISTRATIVE | Facility: CLINIC | Age: 84
End: 2019-01-25

## 2019-03-13 ENCOUNTER — LAB VISIT (OUTPATIENT)
Dept: LAB | Facility: HOSPITAL | Age: 84
End: 2019-03-13
Attending: INTERNAL MEDICINE
Payer: MEDICARE

## 2019-03-13 ENCOUNTER — OFFICE VISIT (OUTPATIENT)
Dept: FAMILY MEDICINE | Facility: CLINIC | Age: 84
End: 2019-03-13
Payer: MEDICARE

## 2019-03-13 VITALS
BODY MASS INDEX: 25.55 KG/M2 | OXYGEN SATURATION: 97 % | HEART RATE: 81 BPM | TEMPERATURE: 98 F | HEIGHT: 63 IN | RESPIRATION RATE: 16 BRPM | WEIGHT: 144.19 LBS | DIASTOLIC BLOOD PRESSURE: 82 MMHG | SYSTOLIC BLOOD PRESSURE: 134 MMHG

## 2019-03-13 DIAGNOSIS — E55.9 VITAMIN D DEFICIENCY DISEASE: ICD-10-CM

## 2019-03-13 DIAGNOSIS — M85.80 OSTEOPENIA, UNSPECIFIED LOCATION: ICD-10-CM

## 2019-03-13 DIAGNOSIS — K74.69 CRYPTOGENIC CIRRHOSIS: ICD-10-CM

## 2019-03-13 DIAGNOSIS — I10 ESSENTIAL HYPERTENSION: ICD-10-CM

## 2019-03-13 DIAGNOSIS — K21.9 GASTROESOPHAGEAL REFLUX DISEASE, ESOPHAGITIS PRESENCE NOT SPECIFIED: ICD-10-CM

## 2019-03-13 DIAGNOSIS — G47.00 INSOMNIA, UNSPECIFIED TYPE: ICD-10-CM

## 2019-03-13 DIAGNOSIS — M70.61 TROCHANTERIC BURSITIS OF RIGHT HIP: ICD-10-CM

## 2019-03-13 DIAGNOSIS — M25.511 CHRONIC RIGHT SHOULDER PAIN: ICD-10-CM

## 2019-03-13 DIAGNOSIS — G89.29 CHRONIC RIGHT SHOULDER PAIN: ICD-10-CM

## 2019-03-13 DIAGNOSIS — F32.A ANXIETY AND DEPRESSION: ICD-10-CM

## 2019-03-13 DIAGNOSIS — Z00.00 ENCOUNTER FOR PREVENTIVE HEALTH EXAMINATION: Primary | ICD-10-CM

## 2019-03-13 DIAGNOSIS — E03.9 ACQUIRED HYPOTHYROIDISM: ICD-10-CM

## 2019-03-13 DIAGNOSIS — D69.6 THROMBOCYTOPENIA: ICD-10-CM

## 2019-03-13 DIAGNOSIS — Z85.118 HISTORY OF LUNG CANCER: ICD-10-CM

## 2019-03-13 DIAGNOSIS — F41.9 ANXIETY AND DEPRESSION: ICD-10-CM

## 2019-03-13 DIAGNOSIS — N18.2 CHRONIC KIDNEY DISEASE, STAGE II (MILD): ICD-10-CM

## 2019-03-13 DIAGNOSIS — I70.0 ATHEROSCLEROSIS OF AORTA: ICD-10-CM

## 2019-03-13 DIAGNOSIS — K76.6 PORTAL HYPERTENSION: ICD-10-CM

## 2019-03-13 DIAGNOSIS — B37.2 CANDIDAL INTERTRIGO: ICD-10-CM

## 2019-03-13 DIAGNOSIS — Z86.010 HISTORY OF COLONIC POLYPS: ICD-10-CM

## 2019-03-13 PROBLEM — Z80.0 FAMILY HISTORY OF COLON CANCER: Status: RESOLVED | Noted: 2017-10-02 | Resolved: 2019-03-13

## 2019-03-13 LAB
ALBUMIN SERPL BCP-MCNC: 4.2 G/DL
ALP SERPL-CCNC: 50 U/L
ALT SERPL W/O P-5'-P-CCNC: 14 U/L
ANION GAP SERPL CALC-SCNC: 9 MMOL/L
AST SERPL-CCNC: 15 U/L
BASOPHILS # BLD AUTO: 0.01 K/UL
BASOPHILS NFR BLD: 0.2 %
BILIRUB SERPL-MCNC: 0.6 MG/DL
BUN SERPL-MCNC: 17 MG/DL
CALCIUM SERPL-MCNC: 9.7 MG/DL
CHLORIDE SERPL-SCNC: 102 MMOL/L
CO2 SERPL-SCNC: 28 MMOL/L
CREAT SERPL-MCNC: 0.6 MG/DL
DIFFERENTIAL METHOD: ABNORMAL
EOSINOPHIL # BLD AUTO: 0 K/UL
EOSINOPHIL NFR BLD: 0.8 %
ERYTHROCYTE [DISTWIDTH] IN BLOOD BY AUTOMATED COUNT: 14 %
EST. GFR  (AFRICAN AMERICAN): >60 ML/MIN/1.73 M^2
EST. GFR  (NON AFRICAN AMERICAN): >60 ML/MIN/1.73 M^2
GLUCOSE SERPL-MCNC: 98 MG/DL
HCT VFR BLD AUTO: 47.2 %
HGB BLD-MCNC: 15.2 G/DL
LYMPHOCYTES # BLD AUTO: 1.7 K/UL
LYMPHOCYTES NFR BLD: 34.2 %
MCH RBC QN AUTO: 30.2 PG
MCHC RBC AUTO-ENTMCNC: 32.2 G/DL
MCV RBC AUTO: 94 FL
MONOCYTES # BLD AUTO: 0.3 K/UL
MONOCYTES NFR BLD: 5.4 %
NEUTROPHILS # BLD AUTO: 3 K/UL
NEUTROPHILS NFR BLD: 59.2 %
PLATELET # BLD AUTO: 135 K/UL
PMV BLD AUTO: 10 FL
POTASSIUM SERPL-SCNC: 4.1 MMOL/L
PROT SERPL-MCNC: 7.1 G/DL
RBC # BLD AUTO: 5.04 M/UL
SODIUM SERPL-SCNC: 139 MMOL/L
T4 FREE SERPL-MCNC: 0.94 NG/DL
TSH SERPL DL<=0.005 MIU/L-ACNC: 1.98 UIU/ML
WBC # BLD AUTO: 5.03 K/UL

## 2019-03-13 PROCEDURE — 84439 ASSAY OF FREE THYROXINE: CPT | Mod: HCNC

## 2019-03-13 PROCEDURE — 99999 PR PBB SHADOW E&M-EST. PATIENT-LVL V: ICD-10-PCS | Mod: PBBFAC,HCNC,, | Performed by: PHYSICIAN ASSISTANT

## 2019-03-13 PROCEDURE — 85025 COMPLETE CBC W/AUTO DIFF WBC: CPT | Mod: HCNC

## 2019-03-13 PROCEDURE — 82306 VITAMIN D 25 HYDROXY: CPT | Mod: HCNC

## 2019-03-13 PROCEDURE — G0439 PR MEDICARE ANNUAL WELLNESS SUBSEQUENT VISIT: ICD-10-PCS | Mod: HCNC,S$GLB,, | Performed by: PHYSICIAN ASSISTANT

## 2019-03-13 PROCEDURE — 80053 COMPREHEN METABOLIC PANEL: CPT | Mod: HCNC

## 2019-03-13 PROCEDURE — 84443 ASSAY THYROID STIM HORMONE: CPT | Mod: HCNC

## 2019-03-13 PROCEDURE — 36415 COLL VENOUS BLD VENIPUNCTURE: CPT | Mod: HCNC,PO

## 2019-03-13 PROCEDURE — 99999 PR PBB SHADOW E&M-EST. PATIENT-LVL V: CPT | Mod: PBBFAC,HCNC,, | Performed by: PHYSICIAN ASSISTANT

## 2019-03-13 PROCEDURE — G0439 PPPS, SUBSEQ VISIT: HCPCS | Mod: HCNC,S$GLB,, | Performed by: PHYSICIAN ASSISTANT

## 2019-03-13 RX ORDER — SERTRALINE HYDROCHLORIDE 25 MG/1
TABLET, FILM COATED ORAL
Qty: 90 TABLET | Refills: 3 | Status: SHIPPED | OUTPATIENT
Start: 2019-03-13 | End: 2019-08-09

## 2019-03-13 RX ORDER — ALENDRONATE SODIUM 70 MG/1
70 TABLET ORAL
Qty: 12 TABLET | Refills: 1 | Status: SHIPPED | OUTPATIENT
Start: 2019-03-13 | End: 2019-10-14 | Stop reason: SDUPTHER

## 2019-03-13 RX ORDER — ACETAMINOPHEN, DIPHENHYDRAMINE HCL, PHENYLEPHRINE HCL 325; 25; 5 MG/1; MG/1; MG/1
TABLET ORAL DAILY
Status: ON HOLD | COMMUNITY
End: 2023-02-20

## 2019-03-13 RX ORDER — SERTRALINE HYDROCHLORIDE 25 MG/1
25 TABLET, FILM COATED ORAL DAILY
Qty: 30 TABLET | Refills: 0 | Status: SHIPPED | OUTPATIENT
Start: 2019-03-13 | End: 2019-04-15 | Stop reason: SDUPTHER

## 2019-03-13 NOTE — PROGRESS NOTES
"Sandra Moody presented for a  Medicare AWV and comprehensive Health Risk Assessment today. The following components were reviewed and updated:    · Medical history  · Family History  · Social history  · Allergies and Current Medications  · Health Risk Assessment  · Health Maintenance  · Care Team     ** See Completed Assessments for Annual Wellness Visit within the encounter summary.**       The following assessments were completed:  · Living Situation  · CAGE  · Depression Screening  · Timed Get Up and Go  · Whisper Test  · Cognitive Function Screening  · Nutrition Screening  · ADL Screening  · PAQ Screening      Vitals:    03/13/19 0912   BP: 134/82   Pulse: 81   Resp: 16   Temp: 98.3 °F (36.8 °C)   TempSrc: Oral   SpO2: 97%   Weight: 65.4 kg (144 lb 2.9 oz)   Height: 5' 3" (1.6 m)     Body mass index is 25.54 kg/m².  Physical Exam   Constitutional: She is oriented to person, place, and time. No distress.   Neurological: She is alert and oriented to person, place, and time.   Skin: Skin is warm. No erythema.   Psychiatric: She has a normal mood and affect. Her behavior is normal.         Diagnoses and health risks identified today and associated recommendations/orders:    1. Encounter for preventive health examination  Patient overall doing well  Only concern is frequent loose stool. Has appt with PCP yuliet.     2. Atherosclerosis of aorta  Continue to control RF BP and lipids       3. Essential hypertension  The current medical regimen is effective;  continue present plan and medications.  - CBC auto differential; Future  - Comprehensive metabolic panel; Future    4. Vitamin D deficiency disease  - Vitamin D; Future    5. Thrombocytopenia  Continue to monitor    6. Cryptogenic cirrhosis  Continue to follow up with hep     7. Candidal intertrigo  The current medical regimen is effective;  continue present plan and medications.      8. Osteopenia, unspecified location  - alendronate (FOSAMAX) 70 MG tablet; Take 1 " tablet (70 mg total) by mouth every 7 days.  Dispense: 12 tablet; Refill: 1    9. Portal hypertension  Continue to follow up with hep     10. Acquired hypothyroidism  The current medical regimen is effective;  continue present plan and medications.  - TSH; Future  - T4, free; Future    11. Insomnia, unspecified type  The current medical regimen is effective;  continue present plan and medications.    12. Gastroesophageal reflux disease, esophagitis presence not specified  The current medical regimen is effective;  continue present plan and medications.    13. History of lung cancer  States she was cleared for 6 years so does not follow up anymore.     14. Anxiety and depression  - sertraline (ZOLOFT) 25 MG tablet; TAKE 1 TABLET(25 MG) BY MOUTH EVERY DAY  Dispense: 90 tablet; Refill: 3    15. Trochanteric bursitis of right hip  The current medical regimen is effective;  continue present plan and medications.    16. Chronic right shoulder pain  The current medical regimen is effective;  continue present plan and medications.    17. History of colonic polyps  Pt no longer wishes to do fit kit or colonoscopy    18. Chronic kidney disease, stage II (mild)  Rechecking labs today       Provided Sandra with a 5-10 year written screening schedule and personal prevention plan. Recommendations were developed using the USPSTF age appropriate recommendations. Education, counseling, and referrals were provided as needed. After Visit Summary printed and given to patient which includes a list of additional screenings\tests needed.    No Follow-up on file.    HOSEA Oliver

## 2019-03-13 NOTE — PATIENT INSTRUCTIONS
Counseling and Referral of Other Preventative  (Italic type indicates deductible and co-insurance are waived)    Patient Name: Sandra Moody  Today's Date: 3/13/2019    Health Maintenance       Date Due Completion Date    TETANUS VACCINE 09/13/2015 9/13/2005    DEXA SCAN 10/16/2019 10/16/2017 (Done)    Override on 10/16/2017: Done    Override on 4/10/2014: (N/S)    Lipid Panel 09/07/2022 9/7/2017        No orders of the defined types were placed in this encounter.    The following information is provided to all patients.  This information is to help you find resources for any of the problems found today that may be affecting your health:                Living healthy guide: www.[facility].[place].gov      Understanding Diabetes: www.diabetes.org      Eating healthy: www.cdc.gov/healthyweight      [facility] home safety checklist: www.cdc.gov/steadi/patient.html      Agency on Aging: www.goea.[place].[facility]      Alcoholics anonymous (AA): www.aa.org      Physical Activity: www.natalie.nih.gov/st1pkza      Tobacco use: www.quitwithusla.org

## 2019-03-13 NOTE — PROGRESS NOTES
I offered to discuss end of life issues, including information on how to make advance directives that the patient could use to name someone who would make medical decisions on their behalf if they became too ill to make themselves.    ___Patient declined  _X_Patient is interested, I provided paper work and offered to discuss.    Patient has her own at home at this time. Will bring In copy

## 2019-03-14 ENCOUNTER — OFFICE VISIT (OUTPATIENT)
Dept: FAMILY MEDICINE | Facility: CLINIC | Age: 84
End: 2019-03-14
Payer: MEDICARE

## 2019-03-14 VITALS
HEART RATE: 75 BPM | DIASTOLIC BLOOD PRESSURE: 74 MMHG | TEMPERATURE: 98 F | BODY MASS INDEX: 25.82 KG/M2 | RESPIRATION RATE: 18 BRPM | WEIGHT: 145.75 LBS | HEIGHT: 63 IN | SYSTOLIC BLOOD PRESSURE: 118 MMHG | OXYGEN SATURATION: 93 %

## 2019-03-14 DIAGNOSIS — M54.50 ACUTE LEFT-SIDED LOW BACK PAIN WITHOUT SCIATICA: Primary | ICD-10-CM

## 2019-03-14 DIAGNOSIS — R15.9 URINARY AND FECAL INCONTINENCE: ICD-10-CM

## 2019-03-14 DIAGNOSIS — R32 URINARY AND FECAL INCONTINENCE: ICD-10-CM

## 2019-03-14 LAB
25(OH)D3+25(OH)D2 SERPL-MCNC: 38 NG/ML
BILIRUB UR QL STRIP: NEGATIVE
CLARITY UR: CLEAR
COLOR UR: YELLOW
GLUCOSE UR QL STRIP: NEGATIVE
HGB UR QL STRIP: NEGATIVE
KETONES UR QL STRIP: NEGATIVE
LEUKOCYTE ESTERASE UR QL STRIP: NEGATIVE
NITRITE UR QL STRIP: NEGATIVE
PH UR STRIP: 5 [PH] (ref 5–8)
PROT UR QL STRIP: NEGATIVE
SP GR UR STRIP: 1.01 (ref 1–1.03)
URN SPEC COLLECT METH UR: NORMAL
UROBILINOGEN UR STRIP-ACNC: NEGATIVE EU/DL

## 2019-03-14 PROCEDURE — 99213 OFFICE O/P EST LOW 20 MIN: CPT | Mod: HCNC,S$GLB,, | Performed by: INTERNAL MEDICINE

## 2019-03-14 PROCEDURE — 99213 PR OFFICE/OUTPT VISIT, EST, LEVL III, 20-29 MIN: ICD-10-PCS | Mod: HCNC,S$GLB,, | Performed by: INTERNAL MEDICINE

## 2019-03-14 PROCEDURE — 87086 URINE CULTURE/COLONY COUNT: CPT | Mod: HCNC

## 2019-03-14 PROCEDURE — 99999 PR PBB SHADOW E&M-EST. PATIENT-LVL IV: ICD-10-PCS | Mod: PBBFAC,HCNC,, | Performed by: INTERNAL MEDICINE

## 2019-03-14 PROCEDURE — 81003 URINALYSIS AUTO W/O SCOPE: CPT | Mod: HCNC

## 2019-03-14 PROCEDURE — 99999 PR PBB SHADOW E&M-EST. PATIENT-LVL IV: CPT | Mod: PBBFAC,HCNC,, | Performed by: INTERNAL MEDICINE

## 2019-03-14 PROCEDURE — 1101F PR PT FALLS ASSESS DOC 0-1 FALLS W/OUT INJ PAST YR: ICD-10-PCS | Mod: HCNC,CPTII,S$GLB, | Performed by: INTERNAL MEDICINE

## 2019-03-14 PROCEDURE — 1101F PT FALLS ASSESS-DOCD LE1/YR: CPT | Mod: HCNC,CPTII,S$GLB, | Performed by: INTERNAL MEDICINE

## 2019-03-14 NOTE — PROGRESS NOTES
"SUBJECTIVE     Chief Complaint   Patient presents with    Fatigue    Back Pain       HPI  Sandra Moody is a 89 y.o. female with multiple medical diagnoses as listed in the medical history and problem list that presents for evaluation of fecal incontinence x 2-3 months. Pt has been taking Immodium because the "accidents are always loose stools." She also reports R lower back pain x 1 week that shoots across to the L side of the back at a 10/10. Pain occurs and then resolves. Denies any preceding trauma or heavy lifting. Pt did have a fall on 12/28/19 during which time she fractured her R wrist. She also reports urinary incontinence. Denies any weakness in her legs.      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 lj- fosamax    PSVT (paroxysmal supraventricular tachycardia)     dr. marx    PVC (premature ventricular contraction) 11/10/2012    Rectocele     Status post partial lobectomy of lung 2/2010    Thyroid disease     hypothyroidism    Trochanteric bursitis 4/18/2013    Urine incontinence 2/24/2015    Vitamin D deficiency disease 9/4/2014       PAST SURGICAL HISTORY:  Past Surgical History:   Procedure Laterality Date    ADENOIDECTOMY      APPENDECTOMY      CHOLECYSTECTOMY      COLONOSCOPY      CYSTOSCOPY      ESOPHAGOGASTRODUODENOSCOPY (EGD) N/A 4/6/2017    Performed " by Martin [facility]han MD at [facility] ENDO    ESOPHAGOGASTRODUODENOSCOPY (EGD) N/A 10/13/2014    [facility] by Usman Manuel [facility] at [facility] ENDO (4TH FLR)    EYE SURGERY Bilateral     HYSTERECTOMY      INSERTION-INTRAOCULAR LENS (IOL) Left 9/11/2014    Per[facility] by Sebastián Arias MD at [facility] OR    LUNG LOBECTOMY Right     OOPHORECTOMY      PHACOEMULSIFICATION-ASPIRATION-CATARACT Left 9/11/2014    [facility] at [facility] OR    PHACOEMULSIFICATION-ASPIRATION-CATARACT Right 8/28/2014    Per[facility] at [facility] OR    TONSILLECTOMY      UPPER GASTROINTESTINAL ENDOSCOPY         SOCIAL HISTORY:  Social History     Socioeconomic History    Marital status:      Spouse name: Not on file    Number of children: Not on file    Years of education: Not on file    Highest education level: Not on file   Social Needs    Financial resource strain: Not on file    Food insecurity - worry: Not on file    Food insecurity - inability: Not on file    Transportation needs - medical: Not on file    Transportation needs - non-medical: Not on file   Occupational History    Not on file   Tobacco Use    Smoking status: Never Smoker    Smokeless tobacco: Never Used   Substance and Sexual Activity    Alcohol use: No    Drug use: No    Sexual activity: No   Other Topics Concern    Not on file   Social History Narrative    Not on file       FAMILY HISTORY:  Family History   Problem Relation Age of Onset    Cancer Mother         COLON    Hypertension Mother     Aneurysm Father     Alcohol abuse Brother     Kidney failure Sister     Cancer Brother         throat-smoker     Alcohol abuse Brother     Pacemaker/defibrilator Brother     Birth defects Paternal Grandfather         prostate     Asthma Neg Hx     Emphysema Neg Hx     Amblyopia Neg Hx     Blindness Neg Hx     Cataracts Neg Hx     Diabetes Neg Hx     Glaucoma Neg Hx     Macular degeneration Neg Hx      Retinal detachment Neg Hx     Strabismus Neg Hx     Stroke Neg Hx     Thyroid disease Neg Hx     Cirrhosis Neg Hx        ALLERGIES AND MEDICATIONS: updated and reviewed.  Review of patient's allergies indicates:  No Known Allergies  Current Outpatient Medications   Medication Sig Dispense Refill    albuterol (PROVENTIL HFA) 90 mcg/actuation inhaler Inhale 2 puffs into the lungs every 6 (six) hours as needed (ANY equivalent covered is OK). 1 Inhaler 2    alendronate (FOSAMAX) 70 MG tablet Take 1 tablet (70 mg total) by mouth every 7 days. 12 tablet 1    amLODIPine (NORVASC) 5 MG tablet TAKE 2 TABLETS EVERY  tablet 3    aspirin 81 mg Tab Take 1 tablet by mouth Daily.       estradiol (ESTRACE) 0.01 % (0.1 mg/g) vaginal cream Pea-size dab of cream to urethra/introitus at bed time. 42.5 g 1    HYDROcodone-acetaminophen (NORCO) 5-325 mg per tablet TK 1 T PO Q 4 TO 6 H PRN P  0    levothyroxine (SYNTHROID) 25 MCG tablet TAKE 1 TABLET EVERY DAY 90 tablet 3    lisinopril (PRINIVIL,ZESTRIL) 40 MG tablet TAKE 1 TABLET EVERY DAY 90 tablet 3    melatonin 10 mg Tab Take by mouth.      metoprolol succinate (TOPROL-XL) 50 MG 24 hr tablet TAKE 1 TABLET EVERY DAY 90 tablet 3    nystatin (MYCOSTATIN) cream Apply topically 2 (two) times daily. 30 g 3    oxybutynin (DITROPAN-XL) 10 MG 24 hr tablet TAKE 1 TABLET ONE TIME DAILY 90 tablet 11    sertraline (ZOLOFT) 25 MG tablet TAKE 1 TABLET(25 MG) BY MOUTH EVERY DAY 90 tablet 3    sertraline (ZOLOFT) 25 MG tablet Take 1 tablet (25 mg total) by mouth once daily. 30 tablet 0    temazepam (RESTORIL) 30 mg capsule TAKE 1 CAPSULE BY MOUTH EVERY DAY AT BEDTIME 30 capsule 5    triamcinolone acetonide 0.5% (KENALOG) 0.5 % Crea Apply topically 2 (two) times daily. 30 g 3     No current facility-administered medications for this visit.        ROS  Review of Systems   Constitutional: Negative for chills and fever.   HENT: Negative for hearing loss and sore throat.   "  Eyes: Negative for visual disturbance.   Respiratory: Negative for cough and shortness of breath.    Cardiovascular: Negative for chest pain, palpitations and leg swelling.   Gastrointestinal: Negative for abdominal pain, constipation, diarrhea, nausea and vomiting.   Genitourinary: Negative for dysuria, frequency and urgency.   Musculoskeletal: Positive for back pain. Negative for arthralgias, joint swelling and myalgias.   Skin: Negative for rash and wound.   Neurological: Negative for headaches.   Psychiatric/Behavioral: Negative for agitation and confusion. The patient is not nervous/anxious.          OBJECTIVE     Physical Exam  Vitals:    03/14/19 0822   BP: 118/74   Pulse: 75   Resp: 18   Temp: 98.3 °F (36.8 °C)    Body mass index is 25.81 kg/m².  Weight: 66.1 kg (145 lb 11.6 oz)   Height: 5' 3" (160 cm)     Physical Exam   Constitutional: She is oriented to person, place, and time. She appears well-developed and well-nourished. No distress.   HENT:   Head: Normocephalic and atraumatic.   Right Ear: External ear normal.   Left Ear: External ear normal.   Nose: Nose normal.   Mouth/Throat: Oropharynx is clear and moist.   Eyes: Conjunctivae and EOM are normal. Right eye exhibits no discharge. Left eye exhibits no discharge. No scleral icterus.   Neck: Normal range of motion. Neck supple. No JVD present. No tracheal deviation present.   Cardiovascular: Normal rate, regular rhythm and intact distal pulses. Exam reveals no gallop and no friction rub.   No murmur heard.  Pulmonary/Chest: Effort normal and breath sounds normal. No respiratory distress. She has no wheezes.   Abdominal: Soft. Bowel sounds are normal. She exhibits no distension and no mass. There is no tenderness. There is no rebound and no guarding.   Musculoskeletal: Normal range of motion. She exhibits no edema, tenderness or deformity.   Negative BLE straight leg raise   Neurological: She is alert and oriented to person, place, and time. She " exhibits normal muscle tone. Coordination normal.   Skin: Skin is warm and dry. No rash noted. No erythema.   Psychiatric: She has a normal mood and affect. Her behavior is normal. Judgment and thought content normal.         Health Maintenance       Date Due Completion Date    TETANUS VACCINE 09/13/2015 9/13/2005    DEXA SCAN 10/16/2019 10/16/2017 (Done)    Override on 10/16/2017: Done    Override on 4/10/2014: (N/S)    Lipid Panel 09/07/2022 9/7/2017            ASSESSMENT     89 y.o. female with     1. Acute left-sided low back pain without sciatica    2. Urinary and fecal incontinence        PLAN:     1. Acute left-sided low back pain without sciatica  - Pt okay to apply ice packs 2-3 times daily at 10 minute intervals x 72 hours, then okay to change to heating compress with care not to burn her self  - Pt okay to take Tylenol as needed for pain  - X-Ray Thoracolumbar Spine AP Lateral; Future  - Urinalysis  - Urine culture    2. Urinary and fecal incontinence  - ?spinal cord stenosis given back pain also; will start with xray then move to MRI  - Also r/o UTI  - X-Ray Thoracolumbar Spine AP Lateral; Future  - Urinalysis  - Urine culture        RTC in 2 weeks for repeat assessment of current treatment plan       Rox Montes MD  03/14/2019 8:37 AM        No Follow-up on file.

## 2019-03-16 LAB — BACTERIA UR CULT: NORMAL

## 2019-04-02 ENCOUNTER — HOSPITAL ENCOUNTER (OUTPATIENT)
Dept: RADIOLOGY | Facility: HOSPITAL | Age: 84
Discharge: HOME | End: 2019-04-02
Attending: INTERNAL MEDICINE
Payer: MEDICARE

## 2019-04-02 ENCOUNTER — HOSPITAL ENCOUNTER (OUTPATIENT)
Dept: RADIOLOGY | Facility: HOSPITAL | Age: 84
Discharge: HOME | End: 2019-04-02
Attending: NURSE PRACTITIONER
Payer: MEDICARE

## 2019-04-02 DIAGNOSIS — M54.50 ACUTE LEFT-SIDED LOW BACK PAIN WITHOUT SCIATICA: ICD-10-CM

## 2019-04-02 DIAGNOSIS — R32 URINARY AND FECAL INCONTINENCE: ICD-10-CM

## 2019-04-02 DIAGNOSIS — R15.9 URINARY AND FECAL INCONTINENCE: ICD-10-CM

## 2019-04-02 DIAGNOSIS — K74.69 CRYPTOGENIC CIRRHOSIS: Primary | ICD-10-CM

## 2019-04-02 DIAGNOSIS — K74.60 CIRRHOSIS OF LIVER WITHOUT ASCITES, UNSPECIFIED HEPATIC CIRRHOSIS TYPE: ICD-10-CM

## 2019-04-02 PROCEDURE — 72080 X-RAY EXAM THORACOLMB 2/> VW: CPT | Mod: 26,HCNC,, | Performed by: RADIOLOGY

## 2019-04-02 PROCEDURE — 76700 US EXAM ABDOM COMPLETE: CPT | Mod: TC,HCNC

## 2019-04-02 PROCEDURE — 76700 US ABDOMEN COMPLETE: ICD-10-PCS | Mod: 26,HCNC,, | Performed by: RADIOLOGY

## 2019-04-02 PROCEDURE — 72080 XR THORACOLUMBAR SPINE AP LATERAL: ICD-10-PCS | Mod: 26,HCNC,, | Performed by: RADIOLOGY

## 2019-04-02 PROCEDURE — 72080 X-RAY EXAM THORACOLMB 2/> VW: CPT | Mod: TC,HCNC

## 2019-04-02 PROCEDURE — 76700 US EXAM ABDOM COMPLETE: CPT | Mod: 26,HCNC,, | Performed by: RADIOLOGY

## 2019-04-04 ENCOUNTER — OFFICE VISIT (OUTPATIENT)
Dept: HEPATOLOGY | Facility: CLINIC | Age: 84
End: 2019-04-04
Payer: MEDICARE

## 2019-04-04 VITALS
SYSTOLIC BLOOD PRESSURE: 150 MMHG | HEIGHT: 63 IN | OXYGEN SATURATION: 94 % | BODY MASS INDEX: 25.86 KG/M2 | WEIGHT: 145.94 LBS | HEART RATE: 74 BPM | TEMPERATURE: 97 F | DIASTOLIC BLOOD PRESSURE: 74 MMHG

## 2019-04-04 DIAGNOSIS — K74.69 CRYPTOGENIC CIRRHOSIS: Primary | ICD-10-CM

## 2019-04-04 DIAGNOSIS — K76.6 PORTAL HYPERTENSION: ICD-10-CM

## 2019-04-04 PROCEDURE — 1101F PT FALLS ASSESS-DOCD LE1/YR: CPT | Mod: HCNC,CPTII,S$GLB, | Performed by: NURSE PRACTITIONER

## 2019-04-04 PROCEDURE — 99999 PR PBB SHADOW E&M-EST. PATIENT-LVL V: CPT | Mod: PBBFAC,HCNC,, | Performed by: NURSE PRACTITIONER

## 2019-04-04 PROCEDURE — 99999 PR PBB SHADOW E&M-EST. PATIENT-LVL V: ICD-10-PCS | Mod: PBBFAC,HCNC,, | Performed by: NURSE PRACTITIONER

## 2019-04-04 PROCEDURE — 99214 OFFICE O/P EST MOD 30 MIN: CPT | Mod: HCNC,S$GLB,, | Performed by: NURSE PRACTITIONER

## 2019-04-04 PROCEDURE — 99499 RISK ADDL DX/OHS AUDIT: ICD-10-PCS | Mod: HCNC,S$GLB,, | Performed by: NURSE PRACTITIONER

## 2019-04-04 PROCEDURE — 1101F PR PT FALLS ASSESS DOC 0-1 FALLS W/OUT INJ PAST YR: ICD-10-PCS | Mod: HCNC,CPTII,S$GLB, | Performed by: NURSE PRACTITIONER

## 2019-04-04 PROCEDURE — 99214 PR OFFICE/OUTPT VISIT, EST, LEVL IV, 30-39 MIN: ICD-10-PCS | Mod: HCNC,S$GLB,, | Performed by: NURSE PRACTITIONER

## 2019-04-04 PROCEDURE — 99499 UNLISTED E&M SERVICE: CPT | Mod: HCNC,S$GLB,, | Performed by: NURSE PRACTITIONER

## 2019-04-04 NOTE — PROGRESS NOTES
[facility] Established Patient Visit    Reason for Visit:  F/u cirrhosis    PCP: Rox Montes    HPI:  This is a 89 y.o. female here for f/u of well-compensated cryptogenic cirrhosis. Her cirrhosis evaluation was prompted after a CT chest in 8/2014 showed a nodular contour of the liver. Her serological workup was negative for Anurag's, alpha-1 antitrypsin deficiency, hemochromatosis, autoimmune etiology, and viral hepatitis. 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 She has thrombocytopenia and splenomegaly on imaging. She had repeat labs and u/s recently. She has normal synthetic functioning and normal liver enzymes. She had no masses seen on u/s, AFP nl. EGD 4/2017 showed no varices again.     She reports she feels well. Denies jaundice, dark urine, hematemesis, melena, slowed mentation, abdominal distention. Reports some arthritis pains and insomnia. Takes Restoril but still wakes up at 3 am sometimes. Does take an afternoon nap for 1.5 hrs daily.    She is a very high functioning 89 y.o.. She fell in 12/2018 and broke her wrist. She occasionally uses a cane or walker now for balance but otherwise is still very independent in all ADL's. Still lives alone.        ROS:   GENERAL: Denies fever, chills, weight loss/gain, fatigue, (+) insomnia  HEENT: Denies headaches, dizziness, vision/hearing changes  CARDIOVASCULAR: Denies chest pain, palpitations, or edema  RESPIRATORY: Denies dyspnea, cough  GI: Denies abdominal pain, rectal bleeding, nausea, vomiting. No change in bowel pattern or color  : Denies hematuria, dysuria  SKIN: Denies rash, itching   NEURO: Denies confusion, memory loss, or mood changes  PSYCH: Denies depression or anxiety  HEME/LYMPH: (+) easy bruising and bleeding  MS: (+) arthralgias       PMHX:  has a past medical history of Allergy, Anxiety, Back pain, Cirrhosis, COPD (chronic obstructive pulmonary disease) (10/16/2012), Cryptogenic cirrhosis (11/6/2014), Cystocele,  Depression, Diverticulitis, Dyspnea (11/8/2012), Family history of colon cancer (10/2/2017), GERD (gastroesophageal reflux disease), History of colonic polyps (10/2/2017), Hypertension, Hypothyroidism (4/10/2014), Insomnia (4/18/2013), Kidney stones, Lung cancer, Lung cancer, upper lobe (3/4/2013), Menopause, Nuclear sclerosis (7/17/2014), Osteopenia, PSVT (paroxysmal supraventricular tachycardia), PVC (premature ventricular contraction) (11/10/2012), Rectocele, Status post partial lobectomy of lung (2/2010), Thyroid disease, Trochanteric bursitis (4/18/2013), Urine incontinence (2/24/2015), and Vitamin D deficiency disease (9/4/2014).    PSHX:  has a past surgical history that includes Adenoidectomy; Tonsillectomy; Hysterectomy; Cholecystectomy; Appendectomy; Cystoscopy; Oophorectomy; Colonoscopy; Upper gastrointestinal endoscopy; Lung lobectomy (Right); and Eye surgery (Bilateral).    The patient's social and family histories were reviewed by me and updated in the appropriate section of the electronic medical record.    Review of patient's allergies indicates:  No Known Allergies     Current Outpatient Medications on File Prior to Visit   Medication Sig Dispense Refill    albuterol (PROVENTIL HFA) 90 mcg/actuation inhaler Inhale 2 puffs into the lungs every 6 (six) hours as needed (ANY equivalent covered is OK). 1 Inhaler 2    alendronate (FOSAMAX) 70 MG tablet Take 1 tablet (70 mg total) by mouth every 7 days. 12 tablet 1    amLODIPine (NORVASC) 5 MG tablet TAKE 2 TABLETS EVERY  tablet 3    aspirin 81 mg Tab Take 1 tablet by mouth Daily.       estradiol (ESTRACE) 0.01 % (0.1 mg/g) vaginal cream Pea-size dab of cream to urethra/introitus at bed time. 42.5 g 1    HYDROcodone-acetaminophen (NORCO) 5-325 mg per tablet TK 1 T PO Q 4 TO 6 H PRN P  0    levothyroxine (SYNTHROID) 25 MCG tablet TAKE 1 TABLET EVERY DAY 90 tablet 3    lisinopril (PRINIVIL,ZESTRIL) 40 MG tablet TAKE 1 TABLET EVERY DAY 90  "tablet 3    melatonin 10 mg Tab Take by mouth.      metoprolol succinate (TOPROL-XL) 50 MG 24 hr tablet TAKE 1 TABLET EVERY DAY 90 tablet 3    nystatin (MYCOSTATIN) cream Apply topically 2 (two) times daily. 30 g 3    oxybutynin (DITROPAN-XL) 10 MG 24 hr tablet TAKE 1 TABLET ONE TIME DAILY 90 tablet 11    sertraline (ZOLOFT) 25 MG tablet TAKE 1 TABLET(25 MG) BY MOUTH EVERY DAY 90 tablet 3    sertraline (ZOLOFT) 25 MG tablet Take 1 tablet (25 mg total) by mouth once daily. 30 tablet 0    temazepam (RESTORIL) 30 mg capsule TAKE 1 CAPSULE BY MOUTH EVERY DAY AT BEDTIME 30 capsule 5    triamcinolone acetonide 0.5% (KENALOG) 0.5 % Crea Apply topically 2 (two) times daily. 30 g 3     No current facility-administered medications on file prior to visit.         Objective Findings:    PHYSICAL EXAM:   Friendly White female, in no acute distress; alert and oriented to person, place and time  VITALS:   BP (!) 150/74 (BP Location: Right arm, Patient Position: Sitting, BP Method: Medium (Automatic)) Comment: Never took medication this morning  Pulse 74   Temp 97.2 °F (36.2 °C) (Oral)   Ht 5' 3" (1.6 m)   Wt 66.2 kg (145 lb 15.1 oz)   SpO2 (!) 94%   BMI 25.85 kg/m²     HENT: Normocephalic, without obvious abnormality. Oral mucosa pink and moist. Dentition good.  EYES: Sclerae anicteric.   NECK: Supple.   CARDIOVASCULAR: Regular rate and rhythm. No murmurs.  RESPIRATORY: Normal respiratory effort. BBS CTA. No wheezes or crackles.  GI: Soft, non-tender, non-distended. No hepatosplenomegaly. No masses palpable. No ascites.  EXTREMITIES:  No clubbing, cyanosis or edema.  SKIN: Warm and dry. No jaundice. No rashes noted to exposed skin. Few telangectasias noted to chest. No palmar erythema.  NEURO:  Normal gate. No asterixis.  PSYCH:  Memory intact. Thought and speech pattern appropriate. Behavior normal. No depression or anxiety noted.      Labs:  Lab Results   Component Value Date    WBC 5.21 04/02/2019    HGB 14.9 " 04/02/2019    HCT 47.1 04/02/2019     (L) 04/02/2019    CHOL 201 (H) 09/07/2017    TRIG 198 (H) 09/07/2017    HDL 48 09/07/2017     04/02/2019    K 5.0 04/02/2019    CREATININE 0.8 04/02/2019    ALT 17 04/02/2019    AST 17 04/02/2019    ALKPHOS 51 (L) 04/02/2019    BILITOT 0.6 04/02/2019    ALBUMIN 4.2 04/02/2019    INR 1.0 04/02/2019    AFP 2.5 04/02/2019       ASSESSMENT:  89 y.o. White female with:  1.  Cryptogenic cirrhosis, well-compensated  -- MELD= MELD-Na score: 6 at 4/2/2019  7:38 AM  MELD score: 6 at 4/2/2019  7:38 AM  Calculated from:  Serum Creatinine: 0.8 mg/dL (Rounded to 1 mg/dL) at 4/2/2019  7:38 AM  Serum Sodium: 141 mmol/L (Rounded to 137 mmol/L) at 4/2/2019  7:38 AM  Total Bilirubin: 0.6 mg/dL (Rounded to 1 mg/dL) at 4/2/2019  7:38 AM  INR(ratio): 1.0 at 4/2/2019  7:38 AM  Age: 89 years  -- HCC screening- AFP and abd. U/S- up to date, next due 10/2019  -- Immunity to Hep A and B- finished hep B vaccines 4/2015, already immune to hep A  -- EGD- 4/2017- no varices  2. Portal hypertension  -- EGD- 4/2017 - no varices  -- Ascites- none  -- Splenomegaly and thrombocytopenia        EDUCATION:   Signs and symptoms of hepatic decompensation were reviewed, including jaundice, ascites, and slowed mentation due to hepatic encephalopathy. The patient should seek medical attention if any of these things occur. We discussed the potential for bleeding from esophageal varices with symptoms of hematemesis and melena. The patient should report to the Emergency Department for these symptoms.    We discussed the increased risk of hepatocellular carcinoma due to cirrhosis. Continued screening every six months with ultrasound and AFP is recommended.     Reassured her that her liver functioning is normal and liver disease appears to be very stable.      PLAN:  1. HCC screening Q 6 months with AFP and abd. U/S, next due 10/2019  2. No indication for repeat EGD unless she has findings worrisome for  bleeding  3. Tylenol/acetaminophen as needed for pain, up to 2000 mg daily. Can use this for arthritis pain daily  4. Can consider trazodone as alternative med for insomnia. Defer management to PCP though  5. F/u in 10/2019 with u/s and labs before appt on [facility]    Thank you for allowing me to participate in the care of Sandra Lund, RADHIKA-C

## 2019-04-15 RX ORDER — SERTRALINE HYDROCHLORIDE 25 MG/1
25 TABLET, FILM COATED ORAL DAILY
Qty: 90 TABLET | Refills: 0 | Status: SHIPPED | OUTPATIENT
Start: 2019-04-15 | End: 2019-08-09

## 2019-04-15 NOTE — TELEPHONE ENCOUNTER
----- Message from Marissa Aminata sent at 4/15/2019  8:12 AM CDT -----  Contact: self [phone]  .Type: RX Refill Request    Who Called: self     Refill or New Rx:refill    RX Name and Strength:sertraline (ZOLOFT) 25 MG tablet    How is the patient currently taking it? (ex. 1XDay):    Is this a 30 day or 90 day RX: 90 day    Preferred Pharmacy with phone number:  .  [facility]/pharmacy #[id number] - [place] - [address]  [address] [place] [address]  Phone: [phone] Fax: [phone]    Local or Mail Order: local    Ordering Provider:    Would the patient rather a call back or a response via My [facility]? Call back   Best Call Back Number: [phone]    Additional Information:     \

## 2019-04-15 NOTE — TELEPHONE ENCOUNTER
Patient requesting refill of medication -  Zoloft.  Advised patient of refill sent to [facility] on 3/13/2019 for 90 supply.  Patient stated she has not received anything from [facility], but will call to find out what is going on with medication.    Patient requesting refill to local pharmacy  - needs medication until issue with mail order is resolved.  Please advise.

## 2019-07-19 DIAGNOSIS — M85.80 OSTEOPENIA, UNSPECIFIED LOCATION: ICD-10-CM

## 2019-07-23 DIAGNOSIS — G47.00 INSOMNIA, UNSPECIFIED TYPE: ICD-10-CM

## 2019-07-23 RX ORDER — TEMAZEPAM 30 MG/1
CAPSULE ORAL
Qty: 30 CAPSULE | Refills: 2 | Status: ON HOLD | OUTPATIENT
Start: 2019-07-23 | End: 2019-10-24 | Stop reason: HOSPADM

## 2019-07-23 NOTE — TELEPHONE ENCOUNTER
----- Message from Pattie Kathleen sent at 7/23/2019 10:01 AM CDT -----  Contact: Self   Type: RX Refill Request    Who Called: self   Refill or New Rx: refill     RX Name and Strength     temazepam (RESTORIL) 30 mg capsule        How is the patient currently taking it? (ex. 1XDay):     Is this a 30 day or 90 day RX: 90    Preferred Pharmacy with phone number:[facility]/pharmacy #[id number] - [place] - [address] [facility] [phone] (Phone)  [phone] (Fax)        Local or Mail Order:local     Ordering Provider: Simon     Would the patient rather a call back or a response via My [facility]? Call     Best Call Back Number: [phone]    Additional Information: Patient is unable to drive right now

## 2019-08-09 RX ORDER — SERTRALINE HYDROCHLORIDE 25 MG/1
TABLET, FILM COATED ORAL
Qty: 30 TABLET | Refills: 0 | Status: SHIPPED | OUTPATIENT
Start: 2019-08-09 | End: 2020-01-09

## 2019-08-29 ENCOUNTER — HOSPITAL ENCOUNTER (OUTPATIENT)
Dept: RADIOLOGY | Facility: HOSPITAL | Age: 84
Discharge: HOME | End: 2019-08-29
Attending: NURSE PRACTITIONER
Payer: MEDICARE

## 2019-08-29 DIAGNOSIS — K74.69 CRYPTOGENIC CIRRHOSIS: ICD-10-CM

## 2019-08-29 PROCEDURE — 76700 US EXAM ABDOM COMPLETE: CPT | Mod: 26,HCNC,, | Performed by: RADIOLOGY

## 2019-08-29 PROCEDURE — 76700 US ABDOMEN COMPLETE: ICD-10-PCS | Mod: 26,HCNC,, | Performed by: RADIOLOGY

## 2019-08-29 PROCEDURE — 76700 US EXAM ABDOM COMPLETE: CPT | Mod: TC,HCNC

## 2019-09-03 ENCOUNTER — OFFICE VISIT (OUTPATIENT)
Dept: HEPATOLOGY | Facility: CLINIC | Age: 84
End: 2019-09-03
Payer: MEDICARE

## 2019-09-03 VITALS
SYSTOLIC BLOOD PRESSURE: 129 MMHG | OXYGEN SATURATION: 91 % | DIASTOLIC BLOOD PRESSURE: 59 MMHG | RESPIRATION RATE: 12 BRPM | WEIGHT: 145 LBS | BODY MASS INDEX: 25.69 KG/M2 | HEIGHT: 63 IN | HEART RATE: 69 BPM

## 2019-09-03 DIAGNOSIS — K76.6 PORTAL HYPERTENSION: ICD-10-CM

## 2019-09-03 DIAGNOSIS — K74.69 CRYPTOGENIC CIRRHOSIS: Primary | ICD-10-CM

## 2019-09-03 PROCEDURE — 99214 OFFICE O/P EST MOD 30 MIN: CPT | Mod: HCNC,S$GLB,, | Performed by: NURSE PRACTITIONER

## 2019-09-03 PROCEDURE — 99999 PR PBB SHADOW E&M-EST. PATIENT-LVL IV: ICD-10-PCS | Mod: PBBFAC,HCNC,, | Performed by: NURSE PRACTITIONER

## 2019-09-03 PROCEDURE — 99214 PR OFFICE/OUTPT VISIT, EST, LEVL IV, 30-39 MIN: ICD-10-PCS | Mod: HCNC,S$GLB,, | Performed by: NURSE PRACTITIONER

## 2019-09-03 PROCEDURE — 99999 PR PBB SHADOW E&M-EST. PATIENT-LVL IV: CPT | Mod: PBBFAC,HCNC,, | Performed by: NURSE PRACTITIONER

## 2019-09-03 PROCEDURE — 99499 RISK ADDL DX/OHS AUDIT: ICD-10-PCS | Mod: HCNC,S$GLB,, | Performed by: NURSE PRACTITIONER

## 2019-09-03 PROCEDURE — 1101F PT FALLS ASSESS-DOCD LE1/YR: CPT | Mod: HCNC,CPTII,S$GLB, | Performed by: NURSE PRACTITIONER

## 2019-09-03 PROCEDURE — 1101F PR PT FALLS ASSESS DOC 0-1 FALLS W/OUT INJ PAST YR: ICD-10-PCS | Mod: HCNC,CPTII,S$GLB, | Performed by: NURSE PRACTITIONER

## 2019-09-03 PROCEDURE — 99499 UNLISTED E&M SERVICE: CPT | Mod: HCNC,S$GLB,, | Performed by: NURSE PRACTITIONER

## 2019-09-03 NOTE — PROGRESS NOTES
[facility] Established Patient Visit    Reason for Visit:  F/u cirrhosis    PCP: Rox Montes    HPI:  This is a 89 y.o. female here for f/u of well-compensated cryptogenic cirrhosis. Her cirrhosis evaluation was prompted after a CT chest in 8/2014 showed a nodular contour of the liver. Her serological workup was negative for Anurag's, alpha-1 antitrypsin deficiency, hemochromatosis, autoimmune etiology, and viral hepatitis. 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 She has thrombocytopenia and splenomegaly on imaging. She had repeat labs and u/s recently. She has normal synthetic functioning and normal liver enzymes. She had no masses seen on u/s, AFP nl. EGD 4/2017 showed no varices again.     She reports she feels well. Denies jaundice, dark urine, hematemesis, melena, slowed mentation, abdominal distention.     She is a very high functioning 89 y.o. She fell in 12/2018 and broke her wrist. She occasionally uses a cane or walker now for balance but otherwise is still very independent in all ADL's. Still lives alone.        ROS:   GENERAL: Denies fever, chills, weight loss/gain, fatigue  HEENT: Denies headaches, dizziness, vision/hearing changes  CARDIOVASCULAR: Denies chest pain, palpitations, or edema  RESPIRATORY: Denies dyspnea, cough  GI: Denies abdominal pain, rectal bleeding, nausea, vomiting. No change in bowel pattern or color  : Denies hematuria, dysuria  SKIN: Denies rash, itching   NEURO: Denies confusion, memory loss, or mood changes  PSYCH: Denies depression or anxiety  HEME/LYMPH: (+) easy bruising and bleeding  MS: (+) arthralgias       PMHX:  has a past medical history of Allergy, Anxiety, Back pain, Cirrhosis, COPD (chronic obstructive pulmonary disease) (10/16/2012), Cryptogenic cirrhosis (11/6/2014), Cystocele, Depression, Diverticulitis, Dyspnea (11/8/2012), Family history of colon cancer (10/2/2017), GERD (gastroesophageal reflux disease), History of colonic  polyps (10/2/2017), Hypertension, Hypothyroidism (4/10/2014), Insomnia (4/18/2013), Kidney stones, Lung cancer, Lung cancer, upper lobe (3/4/2013), Menopause, Nuclear sclerosis (7/17/2014), Osteopenia, PSVT (paroxysmal supraventricular tachycardia), PVC (premature ventricular contraction) (11/10/2012), Rectocele, Status post partial lobectomy of lung (2/2010), Thyroid disease, Trochanteric bursitis (4/18/2013), Urine incontinence (2/24/2015), and Vitamin D deficiency disease (9/4/2014).    PSHX:  has a past surgical history that includes Adenoidectomy; Tonsillectomy; Hysterectomy; Cholecystectomy; Appendectomy; Cystoscopy; Oophorectomy; Colonoscopy; Upper gastrointestinal endoscopy; Lung lobectomy (Right); and Eye surgery (Bilateral).    The patient's social and family histories were reviewed by me and updated in the appropriate section of the electronic medical record.    Review of patient's allergies indicates:  No Known Allergies     Current Outpatient Medications on File Prior to Visit   Medication Sig Dispense Refill    albuterol (PROVENTIL HFA) 90 mcg/actuation inhaler Inhale 2 puffs into the lungs every 6 (six) hours as needed (ANY equivalent covered is OK). 1 Inhaler 2    alendronate (FOSAMAX) 70 MG tablet Take 1 tablet (70 mg total) by mouth every 7 days. 12 tablet 1    amLODIPine (NORVASC) 5 MG tablet TAKE 2 TABLETS EVERY  tablet 3    aspirin 81 mg Tab Take 1 tablet by mouth Daily.       estradiol (ESTRACE) 0.01 % (0.1 mg/g) vaginal cream Pea-size dab of cream to urethra/introitus at bed time. 42.5 g 1    HYDROcodone-acetaminophen (NORCO) 5-325 mg per tablet TK 1 T PO Q 4 TO 6 H PRN P  0    levothyroxine (SYNTHROID) 25 MCG tablet TAKE 1 TABLET EVERY DAY 90 tablet 3    lisinopril (PRINIVIL,ZESTRIL) 40 MG tablet TAKE 1 TABLET EVERY DAY 90 tablet 3    melatonin 10 mg Tab Take by mouth.      metoprolol succinate (TOPROL-XL) 50 MG 24 hr tablet TAKE 1 TABLET EVERY DAY 90 tablet 3    nystatin  "(MYCOSTATIN) cream Apply topically 2 (two) times daily. 30 g 3    oxybutynin (DITROPAN-XL) 10 MG 24 hr tablet TAKE 1 TABLET ONE TIME DAILY 90 tablet 11    sertraline (ZOLOFT) 25 MG tablet TAKE 1 TABLET BY MOUTH EVERY DAY 30 tablet 0    temazepam (RESTORIL) 30 mg capsule TAKE 1 CAPSULE BY MOUTH EVERY DAY AT BEDTIME 30 capsule 2    triamcinolone acetonide 0.5% (KENALOG) 0.5 % Crea Apply topically 2 (two) times daily. 30 g 3     No current facility-administered medications on file prior to visit.         Objective Findings:    PHYSICAL EXAM:   Friendly White female, in no acute distress; alert and oriented to person, place and time  VITALS:   BP (!) 129/59 (BP Location: Right arm, Patient Position: Sitting, BP Method: Medium (Automatic))   Pulse 69   Resp 12   Ht 5' 3" (1.6 m)   Wt 65.8 kg (145 lb)   SpO2 (!) 91%   BMI 25.69 kg/m²      HENT: Normocephalic, without obvious abnormality. Oral mucosa pink and moist. Dentition good.  EYES: Sclerae anicteric.   NECK: Supple.   CARDIOVASCULAR: Regular rate and rhythm. No murmurs.  RESPIRATORY: Normal respiratory effort. BBS CTA. No wheezes or crackles.  GI: Soft, non-tender, non-distended. No hepatosplenomegaly. No masses palpable. No ascites.  EXTREMITIES:  No clubbing, cyanosis or edema.  SKIN: Warm and dry. No jaundice. No rashes noted to exposed skin. Few telangectasias noted to chest. No palmar erythema.  NEURO:  Normal gate. No asterixis.  PSYCH:  Memory intact. Thought and speech pattern appropriate. Behavior normal. No depression or anxiety noted.      Labs:  Lab Results   Component Value Date    WBC 5.60 08/29/2019    HGB 15.2 08/29/2019    HCT 47.7 08/29/2019     (L) 08/29/2019    CHOL 201 (H) 09/07/2017    TRIG 198 (H) 09/07/2017    HDL 48 09/07/2017     08/29/2019    K 3.6 08/29/2019    CREATININE 0.7 08/29/2019    ALT 13 08/29/2019    AST 15 08/29/2019    ALKPHOS 53 (L) 08/29/2019    BILITOT 0.7 08/29/2019    ALBUMIN 4.3 08/29/2019    INR " 1.0 08/29/2019    AFP 3.0 08/29/2019       ASSESSMENT:  89 y.o. White female with:  1.  Cryptogenic cirrhosis, well-compensated  -- MELD= MELD-Na score: 6 at 8/29/2019  7:56 AM  MELD score: 6 at 8/29/2019  7:56 AM  Calculated from:  Serum Creatinine: 0.7 mg/dL (Rounded to 1 mg/dL) at 8/29/2019  7:56 AM  Serum Sodium: 142 mmol/L (Rounded to 137 mmol/L) at 8/29/2019  7:56 AM  Total Bilirubin: 0.7 mg/dL (Rounded to 1 mg/dL) at 8/29/2019  7:56 AM  INR(ratio): 1.0 at 8/29/2019  7:56 AM  Age: 89 years  -- HCC screening- AFP and abd. U/S- up to date, next due 2/2020  -- Immunity to Hep A and B- finished hep B vaccines 4/2015, already immune to hep A  -- EGD- 4/2017- no varices  2. Portal hypertension  -- EGD- 4/2017 - no varices  -- Ascites- none  -- Splenomegaly and thrombocytopenia        EDUCATION:   Signs and symptoms of hepatic decompensation were reviewed, including jaundice, ascites, and slowed mentation due to hepatic encephalopathy. The patient should seek medical attention if any of these things occur. We discussed the potential for bleeding from esophageal varices with symptoms of hematemesis and melena. The patient should report to the Emergency Department for these symptoms.    We discussed the increased risk of hepatocellular carcinoma due to cirrhosis. Continued screening every six months with ultrasound and AFP is recommended.     Reassured her that her liver functioning is normal and liver disease appears to be very stable.      PLAN:  1. HCC screening Q 6 months with AFP and abd. U/S, next due 2/2020  2. No indication for repeat EGD unless she has findings worrisome for bleeding  3. Tylenol/acetaminophen as needed for pain, up to 2000 mg daily  4. F/u in 2/2020 with u/s and labs before appt on [facility]. Pt would like to decrease her visits in the future [place]. Can likely see her yearly after her next f/u but continue  HCC screenings Q 6 months with phone review in between her yearly visits    Thank you for allowing me to participate in the care of PHYLICIA Sam

## 2019-09-24 RX ORDER — AMLODIPINE BESYLATE 5 MG/1
TABLET ORAL
Qty: 180 TABLET | Refills: 12 | Status: ON HOLD | OUTPATIENT
Start: 2019-09-24 | End: 2019-10-24 | Stop reason: HOSPADM

## 2019-10-14 ENCOUNTER — TELEPHONE (OUTPATIENT)
Dept: FAMILY MEDICINE | Facility: CLINIC | Age: 84
End: 2019-10-14

## 2019-10-14 ENCOUNTER — HOSPITAL ENCOUNTER (INPATIENT)
Facility: HOSPITAL | Age: 84
LOS: 10 days | Discharge: HOME HEALTH | DRG: 872 | End: 2019-10-24
Attending: EMERGENCY MEDICINE | Admitting: HOSPITALIST
Payer: MEDICARE

## 2019-10-14 ENCOUNTER — OFFICE VISIT (OUTPATIENT)
Dept: FAMILY MEDICINE | Facility: CLINIC | Age: 84
End: 2019-10-14
Payer: MEDICARE

## 2019-10-14 VITALS
DIASTOLIC BLOOD PRESSURE: 88 MMHG | TEMPERATURE: 98 F | BODY MASS INDEX: 25.58 KG/M2 | RESPIRATION RATE: 16 BRPM | OXYGEN SATURATION: 95 % | WEIGHT: 144.38 LBS | SYSTOLIC BLOOD PRESSURE: 138 MMHG | HEART RATE: 73 BPM | HEIGHT: 63 IN

## 2019-10-14 DIAGNOSIS — R50.9 FEVER, UNSPECIFIED FEVER CAUSE: ICD-10-CM

## 2019-10-14 DIAGNOSIS — E87.20 LACTIC ACIDOSIS: Primary | ICD-10-CM

## 2019-10-14 DIAGNOSIS — A41.9 SEPSIS, DUE TO UNSPECIFIED ORGANISM, UNSPECIFIED WHETHER ACUTE ORGAN DYSFUNCTION PRESENT: ICD-10-CM

## 2019-10-14 DIAGNOSIS — M85.80 OSTEOPENIA, UNSPECIFIED LOCATION: ICD-10-CM

## 2019-10-14 DIAGNOSIS — A41.3: ICD-10-CM

## 2019-10-14 DIAGNOSIS — Z23 NEEDS FLU SHOT: ICD-10-CM

## 2019-10-14 DIAGNOSIS — Z01.818 PREOPERATIVE CLEARANCE: ICD-10-CM

## 2019-10-14 DIAGNOSIS — R00.0 TACHYCARDIA: ICD-10-CM

## 2019-10-14 DIAGNOSIS — R11.2 NAUSEA AND VOMITING, INTRACTABILITY OF VOMITING NOT SPECIFIED, UNSPECIFIED VOMITING TYPE: Primary | ICD-10-CM

## 2019-10-14 DIAGNOSIS — I48.91 ATRIAL FIBRILLATION: ICD-10-CM

## 2019-10-14 DIAGNOSIS — I48.91 A-FIB: ICD-10-CM

## 2019-10-14 DIAGNOSIS — K74.69 CRYPTOGENIC CIRRHOSIS: ICD-10-CM

## 2019-10-14 DIAGNOSIS — M25.551 RIGHT HIP PAIN: Primary | ICD-10-CM

## 2019-10-14 DIAGNOSIS — R25.2 MUSCLE CRAMPS: ICD-10-CM

## 2019-10-14 DIAGNOSIS — R07.9 CHEST PAIN, UNSPECIFIED TYPE: ICD-10-CM

## 2019-10-14 DIAGNOSIS — K74.60 CIRRHOSIS: ICD-10-CM

## 2019-10-14 LAB
ALBUMIN SERPL BCP-MCNC: 4.6 G/DL (ref 3.5–5.2)
ALP SERPL-CCNC: 63 U/L (ref 55–135)
ALT SERPL W/O P-5'-P-CCNC: 16 U/L (ref 10–44)
ANION GAP SERPL CALC-SCNC: 14 MMOL/L (ref 8–16)
AST SERPL-CCNC: 16 U/L (ref 10–40)
BACTERIA #/AREA URNS HPF: NORMAL /HPF
BASOPHILS # BLD AUTO: 0.01 K/UL (ref 0–0.2)
BASOPHILS NFR BLD: 0.2 % (ref 0–1.9)
BILIRUB SERPL-MCNC: 0.8 MG/DL (ref 0.1–1)
BILIRUB UR QL STRIP: NEGATIVE
BNP SERPL-MCNC: 90 PG/ML (ref 0–99)
BUN SERPL-MCNC: 18 MG/DL (ref 8–23)
CALCIUM SERPL-MCNC: 10.1 MG/DL (ref 8.7–10.5)
CHLORIDE SERPL-SCNC: 104 MMOL/L (ref 95–110)
CLARITY UR: CLEAR
CO2 SERPL-SCNC: 24 MMOL/L (ref 23–29)
COLOR UR: YELLOW
CREAT SERPL-MCNC: 0.8 MG/DL (ref 0.5–1.4)
CTP QC/QA: YES
DIFFERENTIAL METHOD: ABNORMAL
EOSINOPHIL # BLD AUTO: 0 K/UL (ref 0–0.5)
EOSINOPHIL NFR BLD: 0 % (ref 0–8)
ERYTHROCYTE [DISTWIDTH] IN BLOOD BY AUTOMATED COUNT: 13.2 % (ref 11.5–14.5)
EST. GFR  (AFRICAN AMERICAN): >60 ML/MIN/1.73 M^2
EST. GFR  (NON AFRICAN AMERICAN): >60 ML/MIN/1.73 M^2
GLUCOSE SERPL-MCNC: 146 MG/DL (ref 70–110)
GLUCOSE UR QL STRIP: ABNORMAL
HCT VFR BLD AUTO: 49.6 % (ref 37–48.5)
HGB BLD-MCNC: 16.3 G/DL (ref 12–16)
HGB UR QL STRIP: ABNORMAL
IMM GRANULOCYTES # BLD AUTO: 0.02 K/UL (ref 0–0.04)
IMM GRANULOCYTES NFR BLD AUTO: 0.5 % (ref 0–0.5)
KETONES UR QL STRIP: NEGATIVE
LACTATE SERPL-SCNC: 5 MMOL/L (ref 0.5–2.2)
LACTATE SERPL-SCNC: 5.3 MMOL/L (ref 0.5–2.2)
LEUKOCYTE ESTERASE UR QL STRIP: NEGATIVE
LYMPHOCYTES # BLD AUTO: 0.1 K/UL (ref 1–4.8)
LYMPHOCYTES NFR BLD: 2.8 % (ref 18–48)
MCH RBC QN AUTO: 30.7 PG (ref 27–31)
MCHC RBC AUTO-ENTMCNC: 32.9 G/DL (ref 32–36)
MCV RBC AUTO: 93 FL (ref 82–98)
MICROSCOPIC COMMENT: NORMAL
MONOCYTES # BLD AUTO: 0 K/UL (ref 0.3–1)
MONOCYTES NFR BLD: 0.5 % (ref 4–15)
NEUTROPHILS # BLD AUTO: 4.2 K/UL (ref 1.8–7.7)
NEUTROPHILS NFR BLD: 96 % (ref 38–73)
NITRITE UR QL STRIP: NEGATIVE
NRBC BLD-RTO: 0 /100 WBC
PH UR STRIP: 5 [PH] (ref 5–8)
PLATELET # BLD AUTO: 116 K/UL (ref 150–350)
PMV BLD AUTO: 9.2 FL (ref 9.2–12.9)
POC MOLECULAR INFLUENZA A AGN: NEGATIVE
POC MOLECULAR INFLUENZA B AGN: POSITIVE
POTASSIUM SERPL-SCNC: 3.6 MMOL/L (ref 3.5–5.1)
PROT SERPL-MCNC: 7.6 G/DL (ref 6–8.4)
PROT UR QL STRIP: NEGATIVE
RBC # BLD AUTO: 5.31 M/UL (ref 4–5.4)
RBC #/AREA URNS HPF: 1 /HPF (ref 0–4)
SODIUM SERPL-SCNC: 142 MMOL/L (ref 136–145)
SP GR UR STRIP: 1.01 (ref 1–1.03)
SQUAMOUS #/AREA URNS HPF: 2 /HPF
TROPONIN I SERPL DL<=0.01 NG/ML-MCNC: <0.006 NG/ML (ref 0–0.03)
URN SPEC COLLECT METH UR: ABNORMAL
UROBILINOGEN UR STRIP-ACNC: NEGATIVE EU/DL
WBC # BLD AUTO: 4.35 K/UL (ref 3.9–12.7)
WBC #/AREA URNS HPF: 2 /HPF (ref 0–5)

## 2019-10-14 PROCEDURE — 93010 ELECTROCARDIOGRAM REPORT: CPT | Mod: HCNC,,, | Performed by: INTERNAL MEDICINE

## 2019-10-14 PROCEDURE — 84484 ASSAY OF TROPONIN QUANT: CPT | Mod: HCNC

## 2019-10-14 PROCEDURE — 99214 OFFICE O/P EST MOD 30 MIN: CPT | Mod: 25,HCNC,S$GLB, | Performed by: INTERNAL MEDICINE

## 2019-10-14 PROCEDURE — 96375 TX/PRO/DX INJ NEW DRUG ADDON: CPT | Mod: HCNC

## 2019-10-14 PROCEDURE — G0008 ADMIN INFLUENZA VIRUS VAC: HCPCS | Mod: HCNC,S$GLB,, | Performed by: INTERNAL MEDICINE

## 2019-10-14 PROCEDURE — 99999 PR PBB SHADOW E&M-EST. PATIENT-LVL III: ICD-10-PCS | Mod: PBBFAC,HCNC,, | Performed by: INTERNAL MEDICINE

## 2019-10-14 PROCEDURE — 63600175 PHARM REV CODE 636 W HCPCS: Mod: HCNC | Performed by: EMERGENCY MEDICINE

## 2019-10-14 PROCEDURE — 25000003 PHARM REV CODE 250: Mod: HCNC | Performed by: EMERGENCY MEDICINE

## 2019-10-14 PROCEDURE — 83605 ASSAY OF LACTIC ACID: CPT | Mod: 91,HCNC

## 2019-10-14 PROCEDURE — 84484 ASSAY OF TROPONIN QUANT: CPT | Mod: 91,HCNC

## 2019-10-14 PROCEDURE — 20000000 HC ICU ROOM: Mod: HCNC

## 2019-10-14 PROCEDURE — 85025 COMPLETE CBC W/AUTO DIFF WBC: CPT | Mod: HCNC

## 2019-10-14 PROCEDURE — 1101F PT FALLS ASSESS-DOCD LE1/YR: CPT | Mod: HCNC,CPTII,S$GLB, | Performed by: INTERNAL MEDICINE

## 2019-10-14 PROCEDURE — G0008 FLU VACCINE - HIGH DOSE (65+) PRESERVATIVE FREE IM: ICD-10-PCS | Mod: HCNC,S$GLB,, | Performed by: INTERNAL MEDICINE

## 2019-10-14 PROCEDURE — 80053 COMPREHEN METABOLIC PANEL: CPT | Mod: HCNC

## 2019-10-14 PROCEDURE — 1101F PR PT FALLS ASSESS DOC 0-1 FALLS W/OUT INJ PAST YR: ICD-10-PCS | Mod: HCNC,CPTII,S$GLB, | Performed by: INTERNAL MEDICINE

## 2019-10-14 PROCEDURE — 87502 INFLUENZA DNA AMP PROBE: CPT | Mod: HCNC

## 2019-10-14 PROCEDURE — 83605 ASSAY OF LACTIC ACID: CPT | Mod: HCNC

## 2019-10-14 PROCEDURE — 93005 ELECTROCARDIOGRAM TRACING: CPT | Mod: HCNC

## 2019-10-14 PROCEDURE — 36415 COLL VENOUS BLD VENIPUNCTURE: CPT | Mod: HCNC

## 2019-10-14 PROCEDURE — 90662 IIV NO PRSV INCREASED AG IM: CPT | Mod: HCNC,S$GLB,, | Performed by: INTERNAL MEDICINE

## 2019-10-14 PROCEDURE — 81000 URINALYSIS NONAUTO W/SCOPE: CPT | Mod: HCNC

## 2019-10-14 PROCEDURE — 87040 BLOOD CULTURE FOR BACTERIA: CPT | Mod: 59,HCNC

## 2019-10-14 PROCEDURE — 99214 PR OFFICE/OUTPT VISIT, EST, LEVL IV, 30-39 MIN: ICD-10-PCS | Mod: 25,HCNC,S$GLB, | Performed by: INTERNAL MEDICINE

## 2019-10-14 PROCEDURE — 96367 TX/PROPH/DG ADDL SEQ IV INF: CPT | Mod: HCNC

## 2019-10-14 PROCEDURE — 96365 THER/PROPH/DIAG IV INF INIT: CPT | Mod: HCNC

## 2019-10-14 PROCEDURE — 83880 ASSAY OF NATRIURETIC PEPTIDE: CPT | Mod: HCNC

## 2019-10-14 PROCEDURE — 99999 PR PBB SHADOW E&M-EST. PATIENT-LVL III: CPT | Mod: PBBFAC,HCNC,, | Performed by: INTERNAL MEDICINE

## 2019-10-14 PROCEDURE — 90662 FLU VACCINE - HIGH DOSE (65+) PRESERVATIVE FREE IM: ICD-10-PCS | Mod: HCNC,S$GLB,, | Performed by: INTERNAL MEDICINE

## 2019-10-14 PROCEDURE — 99285 EMERGENCY DEPT VISIT HI MDM: CPT | Mod: 25,HCNC

## 2019-10-14 PROCEDURE — 96366 THER/PROPH/DIAG IV INF ADDON: CPT | Mod: HCNC

## 2019-10-14 PROCEDURE — 93010 EKG 12-LEAD: ICD-10-PCS | Mod: HCNC,,, | Performed by: INTERNAL MEDICINE

## 2019-10-14 RX ORDER — LORAZEPAM 2 MG/ML
1 INJECTION INTRAMUSCULAR
Status: COMPLETED | OUTPATIENT
Start: 2019-10-14 | End: 2019-10-14

## 2019-10-14 RX ORDER — ENOXAPARIN SODIUM 100 MG/ML
40 INJECTION SUBCUTANEOUS EVERY 24 HOURS
Status: DISCONTINUED | OUTPATIENT
Start: 2019-10-14 | End: 2019-10-16

## 2019-10-14 RX ORDER — PANTOPRAZOLE SODIUM 40 MG/1
80 TABLET, DELAYED RELEASE ORAL
Status: COMPLETED | OUTPATIENT
Start: 2019-10-14 | End: 2019-10-14

## 2019-10-14 RX ORDER — SERTRALINE HYDROCHLORIDE 25 MG/1
25 TABLET, FILM COATED ORAL DAILY
Status: DISCONTINUED | OUTPATIENT
Start: 2019-10-15 | End: 2019-10-24 | Stop reason: HOSPADM

## 2019-10-14 RX ORDER — ONDANSETRON 4 MG/1
4 TABLET, ORALLY DISINTEGRATING ORAL EVERY 6 HOURS PRN
Status: DISCONTINUED | OUTPATIENT
Start: 2019-10-14 | End: 2019-10-15

## 2019-10-14 RX ORDER — LISINOPRIL 20 MG/1
40 TABLET ORAL DAILY
Status: DISCONTINUED | OUTPATIENT
Start: 2019-10-15 | End: 2019-10-14

## 2019-10-14 RX ORDER — LEVOTHYROXINE SODIUM 25 UG/1
25 TABLET ORAL
Status: DISCONTINUED | OUTPATIENT
Start: 2019-10-15 | End: 2019-10-24 | Stop reason: HOSPADM

## 2019-10-14 RX ORDER — ONDANSETRON 4 MG/1
4 TABLET, FILM COATED ORAL EVERY 6 HOURS PRN
Qty: 20 TABLET | Refills: 0 | Status: ON HOLD | OUTPATIENT
Start: 2019-10-14 | End: 2019-10-21 | Stop reason: SDUPTHER

## 2019-10-14 RX ORDER — VANCOMYCIN HCL IN 5 % DEXTROSE 1G/250ML
15 PLASTIC BAG, INJECTION (ML) INTRAVENOUS
Status: COMPLETED | OUTPATIENT
Start: 2019-10-14 | End: 2019-10-14

## 2019-10-14 RX ORDER — TRAMADOL HYDROCHLORIDE 50 MG/1
50 TABLET ORAL
Qty: 30 TABLET | Refills: 2 | Status: ON HOLD | OUTPATIENT
Start: 2019-10-14 | End: 2019-10-24 | Stop reason: HOSPADM

## 2019-10-14 RX ORDER — AMLODIPINE BESYLATE 5 MG/1
10 TABLET ORAL DAILY
Status: DISCONTINUED | OUTPATIENT
Start: 2019-10-15 | End: 2019-10-14

## 2019-10-14 RX ORDER — RAMELTEON 8 MG/1
8 TABLET ORAL NIGHTLY PRN
Status: DISCONTINUED | OUTPATIENT
Start: 2019-10-14 | End: 2019-10-24 | Stop reason: HOSPADM

## 2019-10-14 RX ORDER — SODIUM CHLORIDE 0.9 % (FLUSH) 0.9 %
10 SYRINGE (ML) INJECTION
Status: DISCONTINUED | OUTPATIENT
Start: 2019-10-14 | End: 2019-10-24 | Stop reason: HOSPADM

## 2019-10-14 RX ORDER — ALENDRONATE SODIUM 70 MG/1
70 TABLET ORAL
Qty: 12 TABLET | Refills: 1 | Status: SHIPPED | OUTPATIENT
Start: 2019-10-14 | End: 2020-02-21

## 2019-10-14 RX ORDER — PANTOPRAZOLE SODIUM 40 MG/1
40 TABLET, DELAYED RELEASE ORAL DAILY
Status: DISCONTINUED | OUTPATIENT
Start: 2019-10-15 | End: 2019-10-24 | Stop reason: HOSPADM

## 2019-10-14 RX ORDER — MAG HYDROX/ALUMINUM HYD/SIMETH 200-200-20
60 SUSPENSION, ORAL (FINAL DOSE FORM) ORAL
Status: COMPLETED | OUTPATIENT
Start: 2019-10-14 | End: 2019-10-14

## 2019-10-14 RX ORDER — SODIUM CHLORIDE 9 MG/ML
1000 INJECTION, SOLUTION INTRAVENOUS CONTINUOUS
Status: ACTIVE | OUTPATIENT
Start: 2019-10-14 | End: 2019-10-15

## 2019-10-14 RX ORDER — VANCOMYCIN HCL IN 5 % DEXTROSE 1G/250ML
15 PLASTIC BAG, INJECTION (ML) INTRAVENOUS
Status: DISCONTINUED | OUTPATIENT
Start: 2019-10-15 | End: 2019-10-17

## 2019-10-14 RX ORDER — OXYBUTYNIN CHLORIDE 5 MG/1
5 TABLET, EXTENDED RELEASE ORAL DAILY
Status: DISCONTINUED | OUTPATIENT
Start: 2019-10-15 | End: 2019-10-24 | Stop reason: HOSPADM

## 2019-10-14 RX ORDER — NAPROXEN SODIUM 220 MG/1
81 TABLET, FILM COATED ORAL DAILY
Status: DISCONTINUED | OUTPATIENT
Start: 2019-10-15 | End: 2019-10-21

## 2019-10-14 RX ORDER — CEFEPIME HYDROCHLORIDE 1 G/50ML
1 INJECTION, SOLUTION INTRAVENOUS
Status: COMPLETED | OUTPATIENT
Start: 2019-10-14 | End: 2019-10-14

## 2019-10-14 RX ORDER — ACETAMINOPHEN 500 MG
1000 TABLET ORAL
Status: COMPLETED | OUTPATIENT
Start: 2019-10-14 | End: 2019-10-14

## 2019-10-14 RX ORDER — ACETAMINOPHEN 325 MG/1
650 TABLET ORAL EVERY 6 HOURS PRN
Status: DISCONTINUED | OUTPATIENT
Start: 2019-10-14 | End: 2019-10-24 | Stop reason: HOSPADM

## 2019-10-14 RX ORDER — METOPROLOL SUCCINATE 50 MG/1
50 TABLET, EXTENDED RELEASE ORAL DAILY
Status: DISCONTINUED | OUTPATIENT
Start: 2019-10-15 | End: 2019-10-14

## 2019-10-14 RX ORDER — ONDANSETRON 8 MG/1
8 TABLET, ORALLY DISINTEGRATING ORAL
Status: COMPLETED | OUTPATIENT
Start: 2019-10-14 | End: 2019-10-14

## 2019-10-14 RX ORDER — HYDROCODONE BITARTRATE AND ACETAMINOPHEN 5; 325 MG/1; MG/1
1 TABLET ORAL EVERY 4 HOURS PRN
Status: DISCONTINUED | OUTPATIENT
Start: 2019-10-14 | End: 2019-10-21

## 2019-10-14 RX ORDER — LORAZEPAM 2 MG/ML
1 INJECTION INTRAMUSCULAR EVERY 6 HOURS PRN
Status: DISCONTINUED | OUTPATIENT
Start: 2019-10-14 | End: 2019-10-18

## 2019-10-14 RX ORDER — CEFEPIME HYDROCHLORIDE 1 G/50ML
1 INJECTION, SOLUTION INTRAVENOUS
Status: DISCONTINUED | OUTPATIENT
Start: 2019-10-14 | End: 2019-10-14

## 2019-10-14 RX ORDER — CEFEPIME HYDROCHLORIDE 1 G/50ML
1 INJECTION, SOLUTION INTRAVENOUS
Status: DISCONTINUED | OUTPATIENT
Start: 2019-10-15 | End: 2019-10-17

## 2019-10-14 RX ADMIN — CEFEPIME HYDROCHLORIDE 1 G: 1 INJECTION, SOLUTION INTRAVENOUS at 04:10

## 2019-10-14 RX ADMIN — ACETAMINOPHEN 1000 MG: 500 TABLET ORAL at 04:10

## 2019-10-14 RX ADMIN — PANTOPRAZOLE SODIUM 80 MG: 40 TABLET, DELAYED RELEASE ORAL at 04:10

## 2019-10-14 RX ADMIN — ONDANSETRON 8 MG: 8 TABLET, ORALLY DISINTEGRATING ORAL at 07:10

## 2019-10-14 RX ADMIN — SODIUM CHLORIDE 1000 ML: 0.9 INJECTION, SOLUTION INTRAVENOUS at 10:10

## 2019-10-14 RX ADMIN — LORAZEPAM 1 MG: 2 INJECTION INTRAMUSCULAR; INTRAVENOUS at 04:10

## 2019-10-14 RX ADMIN — ALUMINUM HYDROXIDE, MAGNESIUM HYDROXIDE, AND SIMETHICONE 60 ML: 200; 200; 20 SUSPENSION ORAL at 04:10

## 2019-10-14 RX ADMIN — SODIUM CHLORIDE 1000 ML: 0.9 INJECTION, SOLUTION INTRAVENOUS at 08:10

## 2019-10-14 RX ADMIN — SODIUM CHLORIDE 1000 ML: 0.9 INJECTION, SOLUTION INTRAVENOUS at 09:10

## 2019-10-14 RX ADMIN — VANCOMYCIN HYDROCHLORIDE 1000 MG: 1 INJECTION, POWDER, LYOPHILIZED, FOR SOLUTION INTRAVENOUS at 05:10

## 2019-10-14 RX ADMIN — SODIUM CHLORIDE 2000 ML: 0.9 INJECTION, SOLUTION INTRAVENOUS at 04:10

## 2019-10-14 NOTE — PROGRESS NOTES
Administered High Dose Influenza Vaccine 0.5mL IM to left deltoid. No s/s of any adverse reaction noted.

## 2019-10-14 NOTE — PROGRESS NOTES
SUBJECTIVE     Chief Complaint   Patient presents with    Hip Pain       HPI  Sandra Moody is a 89 y.o. female with multiple medical diagnoses as listed in the medical history and problem list that presents for evaluation of R hip pain x 2-3 months. Pt reports intermittent achy pain at a 10/10 to the R hip without radiation. Pt denies any preceding trauma, falls, or heavy lifting. Pain is improved with rest and worsens with activity. She has been taking Tylenol Extra Strength with minimal relief of symptoms.     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 lj- fosamax    PSVT (paroxysmal supraventricular tachycardia)     dr. marx    PVC (premature ventricular contraction) 11/10/2012    Rectocele     Status post partial lobectomy of lung 2/2010    Thyroid disease     hypothyroidism    Trochanteric bursitis 4/18/2013    Urine incontinence 2/24/2015    Vitamin D deficiency disease 9/4/2014       PAST SURGICAL HISTORY:  Past Surgical History:   Procedure Laterality Date    ADENOIDECTOMY      APPENDECTOMY      CHOLECYSTECTOMY      COLONOSCOPY      CYSTOSCOPY      EYE SURGERY Bilateral     HYSTERECTOMY      LUNG LOBECTOMY Right     OOPHORECTOMY      TONSILLECTOMY      UPPER GASTROINTESTINAL ENDOSCOPY         SOCIAL HISTORY:  Social History     Socioeconomic History     Marital status:      Spouse name: Not on file    Number of children: Not on file    Years of education: Not on file    Highest education level: Not on file   Occupational History    Not on file   Social Needs    Financial resource strain: Not on file    Food insecurity:     Worry: Not on file     Inability: Not on file    Transportation needs:     Medical: Not on file     Non-medical: Not on file   Tobacco Use    Smoking status: Never Smoker    Smokeless tobacco: Never Used   Substance and Sexual Activity    Alcohol use: No    Drug use: No    Sexual activity: Never   Lifestyle    Physical activity:     Days per week: Not on file     Minutes per session: Not on file    Stress: Not on file   Relationships    Social connections:     Talks on phone: Not on file     Gets together: Not on file     Attends Holiness service: Not on file     Active member of club or organization: Not on file     Attends meetings of clubs or organizations: Not on file     Relationship status: Not on file   Other Topics Concern    Not on file   Social History Narrative    Not on file       FAMILY HISTORY:  Family History   Problem Relation Age of Onset    Cancer Mother         COLON    Hypertension Mother     Aneurysm Father     Alcohol abuse Brother     Kidney failure Sister     Cancer Brother         throat-smoker     Alcohol abuse Brother     Pacemaker/defibrilator Brother     Birth defects Paternal Grandfather         prostate     Asthma Neg Hx     Emphysema Neg Hx     Amblyopia Neg Hx     Blindness Neg Hx     Cataracts Neg Hx     Diabetes Neg Hx     Glaucoma Neg Hx     Macular degeneration Neg Hx     Retinal detachment Neg Hx     Strabismus Neg Hx     Stroke Neg Hx     Thyroid disease Neg Hx     Cirrhosis Neg Hx        ALLERGIES AND MEDICATIONS: updated and reviewed.  Review of patient's allergies indicates:  No Known Allergies  Current Outpatient Medications   Medication Sig Dispense Refill     albuterol (PROVENTIL HFA) 90 mcg/actuation inhaler Inhale 2 puffs into the lungs every 6 (six) hours as needed (ANY equivalent covered is OK). 1 Inhaler 2    alendronate (FOSAMAX) 70 MG tablet Take 1 tablet (70 mg total) by mouth every 7 days. 12 tablet 1    amLODIPine (NORVASC) 5 MG tablet TAKE 2 TABLETS EVERY  tablet 12    aspirin 81 mg Tab Take 1 tablet by mouth Daily.       estradiol (ESTRACE) 0.01 % (0.1 mg/g) vaginal cream Pea-size dab of cream to urethra/introitus at bed time. 42.5 g 1    levothyroxine (SYNTHROID) 25 MCG tablet TAKE 1 TABLET EVERY DAY 90 tablet 3    lisinopril (PRINIVIL,ZESTRIL) 40 MG tablet TAKE 1 TABLET EVERY DAY 90 tablet 3    melatonin 10 mg Tab Take by mouth once daily.       metoprolol succinate (TOPROL-XL) 50 MG 24 hr tablet TAKE 1 TABLET EVERY DAY 90 tablet 3    nystatin (MYCOSTATIN) cream Apply topically 2 (two) times daily. 30 g 3    oxybutynin (DITROPAN-XL) 10 MG 24 hr tablet TAKE 1 TABLET ONE TIME DAILY 90 tablet 11    sertraline (ZOLOFT) 25 MG tablet TAKE 1 TABLET BY MOUTH EVERY DAY 30 tablet 0    temazepam (RESTORIL) 30 mg capsule TAKE 1 CAPSULE BY MOUTH EVERY DAY AT BEDTIME 30 capsule 2    triamcinolone acetonide 0.5% (KENALOG) 0.5 % Crea Apply topically 2 (two) times daily. 30 g 3    traMADol (ULTRAM) 50 mg tablet Take 1 tablet (50 mg total) by mouth every 24 hours as needed for Pain (MAY CAUSE DROWSINESS). 30 tablet 2     No current facility-administered medications for this visit.        ROS  Review of Systems   Constitutional: Negative for chills and fever.   HENT: Negative for hearing loss and sore throat.    Eyes: Negative for visual disturbance.   Respiratory: Negative for cough and shortness of breath.    Cardiovascular: Negative for chest pain, palpitations and leg swelling.   Gastrointestinal: Negative for abdominal pain, constipation, diarrhea, nausea and vomiting.   Genitourinary: Negative for dysuria, frequency and urgency.  "  Musculoskeletal: Positive for arthralgias (R hip pain). Negative for joint swelling and myalgias.   Skin: Negative for rash and wound.   Neurological: Negative for headaches.   Psychiatric/Behavioral: Negative for agitation and confusion. The patient is not nervous/anxious.          OBJECTIVE     Physical Exam  Vitals:    10/14/19 1107   BP: 138/88   Pulse: 73   Resp: 16   Temp: 98.4 °F (36.9 °C)    Body mass index is 25.58 kg/m².  Weight: 65.5 kg (144 lb 6.4 oz)   Height: 5' 3" (160 cm)     Physical Exam   Constitutional: She is oriented to person, place, and time. She appears well-developed and well-nourished. No distress.   HENT:   Head: Normocephalic and atraumatic.   Right Ear: External ear normal.   Left Ear: External ear normal.   Nose: Nose normal.   Mouth/Throat: Oropharynx is clear and moist.   Eyes: Conjunctivae and EOM are normal. Right eye exhibits no discharge. Left eye exhibits no discharge. No scleral icterus.   Neck: Normal range of motion. Neck supple. No JVD present. No tracheal deviation present.   Cardiovascular: Normal rate, regular rhythm and intact distal pulses. Exam reveals no gallop and no friction rub.   No murmur heard.  Pulmonary/Chest: Effort normal and breath sounds normal. No respiratory distress. She has no wheezes.   Abdominal: Soft. Bowel sounds are normal. She exhibits no distension and no mass. There is no tenderness. There is no rebound and no guarding.   Musculoskeletal: Normal range of motion. She exhibits no edema, tenderness or deformity.        Right hip: She exhibits normal range of motion, normal strength, no tenderness and no bony tenderness.   Neurological: She is alert and oriented to person, place, and time. She exhibits normal muscle tone. Coordination normal.   Skin: Skin is warm and dry. No rash noted. No erythema.   Psychiatric: She has a normal mood and affect. Her behavior is normal. Judgment and thought content normal.         Health Maintenance       Date " Due Completion Date    Shingles Vaccine (1 of 2) 10/27/1978 ---    TETANUS VACCINE 09/13/2015 9/13/2005    Influenza Vaccine (1) 09/01/2019 11/2/2018    DEXA SCAN 10/16/2019 10/16/2017 (Done)    Override on 10/16/2017: Done    Override on 4/10/2014: (N/S)    Lipid Panel 09/07/2022 9/7/2017            ASSESSMENT     89 y.o. female with     1. Right hip pain    2. Cryptogenic cirrhosis    3. Muscle cramps    4. Osteopenia, unspecified location    5. Needs flu shot        PLAN:     1. Right hip pain  - Likely 2/2 arthritis  - Pt okay to apply ice packs 2-3 times daily at 10 minute intervals x 72 hours, then okay to change to heating compress with care not to burn her self; she  voiced understanding   - traMADol (ULTRAM) 50 mg tablet; Take 1 tablet (50 mg total) by mouth every 24 hours as needed for Pain (MAY CAUSE DROWSINESS).  Dispense: 30 tablet; Refill: 2  - Advised to continue ambulation with walking cane to prevent falls; discussed potential drowsiness with above pain meds and she voiced understanding    2. Cryptogenic cirrhosis  - Will have pt take less Tylenol and use Tramadol prn pain given stable liver cirrhosis    3. Muscle cramps  - Reviewed recent labs and no signs of electrolyte abnormality; also reviewed meds to determine etiology  - Pt advised to do some stretching exercises    4. Osteopenia, unspecified location  - Stable; no acute issues  - The current medical regimen is effective;  continue present plan and medications.  - alendronate (FOSAMAX) 70 MG tablet; Take 1 tablet (70 mg total) by mouth every 7 days.  Dispense: 12 tablet; Refill: 1    5. Needs flu shot  - Influenza - High Dose (65+) (PF) (IM)        RTC in 6 months     Rox Montes MD  10/14/2019 11:23 AM        No follow-ups on file.

## 2019-10-14 NOTE — TELEPHONE ENCOUNTER
"LUCITA Kiran called to let us know that pt was transported to the ER and taken to [facility] for possible "reaction to flu shot".  She was not sure of any additional symptoms besides the Nausea, vomiting, diarrhea, and weakness.   "

## 2019-10-14 NOTE — TELEPHONE ENCOUNTER
----- Message from Rajiwillcarloz Rioszekesoni sent at 10/14/2019  3:40 PM CDT -----  Contact: Qiana/[phone]  Type: Patient Call Back    Who called: Qiana    What is the request in detail:  Qiana stated the patient is being transported (via ambulance) to the ER. Thank you.    Can the clinic reply by [facility]? No    Would the patient rather a call back or a response via [facility]? Call back    Best call back number. [phone]

## 2019-10-14 NOTE — TELEPHONE ENCOUNTER
Called pt son, he states he has a NIKIA going over to check on pt, they would like something for nausea sent in to [facility] on [address].

## 2019-10-14 NOTE — TELEPHONE ENCOUNTER
Spoke with pt LUCITA.  She states pt is weak, N&V. Let her know  says this is not a reason to go to ER, yet.  Pt needs to keep hydrated and she can send her in something for nausea.  Pt LUCITA states they have nobody to  rx right now.  Suggested I call pt.  Called pt on both home and mobile numbers, pt did not .  Lm for pt to call our office back.

## 2019-10-15 PROBLEM — I47.20 VENTRICULAR TACHYCARDIA: Status: ACTIVE | Noted: 2019-10-15

## 2019-10-15 PROBLEM — R50.9 FEVER: Status: ACTIVE | Noted: 2019-10-15

## 2019-10-15 PROBLEM — I95.9 ARTERIAL HYPOTENSION: Status: ACTIVE | Noted: 2019-10-15

## 2019-10-15 PROBLEM — A41.9 SEPSIS: Status: ACTIVE | Noted: 2019-10-15

## 2019-10-15 PROBLEM — R65.20 SEVERE SEPSIS: Status: ACTIVE | Noted: 2019-10-15

## 2019-10-15 PROBLEM — A41.9 SEVERE SEPSIS: Status: ACTIVE | Noted: 2019-10-15

## 2019-10-15 LAB
ANION GAP SERPL CALC-SCNC: 8 MMOL/L (ref 8–16)
BASOPHILS # BLD AUTO: 0.01 K/UL (ref 0–0.2)
BASOPHILS NFR BLD: 0.2 % (ref 0–1.9)
BUN SERPL-MCNC: 10 MG/DL (ref 8–23)
CALCIUM SERPL-MCNC: 7.4 MG/DL (ref 8.7–10.5)
CHLORIDE SERPL-SCNC: 109 MMOL/L (ref 95–110)
CO2 SERPL-SCNC: 23 MMOL/L (ref 23–29)
CREAT SERPL-MCNC: 0.6 MG/DL (ref 0.5–1.4)
DIFFERENTIAL METHOD: ABNORMAL
EOSINOPHIL # BLD AUTO: 0.2 K/UL (ref 0–0.5)
EOSINOPHIL NFR BLD: 3 % (ref 0–8)
ERYTHROCYTE [DISTWIDTH] IN BLOOD BY AUTOMATED COUNT: 13.7 % (ref 11.5–14.5)
EST. GFR  (AFRICAN AMERICAN): >60 ML/MIN/1.73 M^2
EST. GFR  (NON AFRICAN AMERICAN): >60 ML/MIN/1.73 M^2
GLUCOSE SERPL-MCNC: 140 MG/DL (ref 70–110)
HCT VFR BLD AUTO: 41.4 % (ref 37–48.5)
HGB BLD-MCNC: 13.3 G/DL (ref 12–16)
IMM GRANULOCYTES # BLD AUTO: 0.03 K/UL (ref 0–0.04)
IMM GRANULOCYTES NFR BLD AUTO: 0.5 % (ref 0–0.5)
LACTATE SERPL-SCNC: 2.9 MMOL/L (ref 0.5–2.2)
LYMPHOCYTES # BLD AUTO: 0.1 K/UL (ref 1–4.8)
LYMPHOCYTES NFR BLD: 1.6 % (ref 18–48)
MCH RBC QN AUTO: 30.8 PG (ref 27–31)
MCHC RBC AUTO-ENTMCNC: 32.1 G/DL (ref 32–36)
MCV RBC AUTO: 96 FL (ref 82–98)
MONOCYTES # BLD AUTO: 0.2 K/UL (ref 0.3–1)
MONOCYTES NFR BLD: 3.5 % (ref 4–15)
NEUTROPHILS # BLD AUTO: 5.7 K/UL (ref 1.8–7.7)
NEUTROPHILS NFR BLD: 91.2 % (ref 38–73)
NRBC BLD-RTO: 0 /100 WBC
PLATELET # BLD AUTO: 87 K/UL (ref 150–350)
PMV BLD AUTO: 8.9 FL (ref 9.2–12.9)
POTASSIUM SERPL-SCNC: 3.8 MMOL/L (ref 3.5–5.1)
RBC # BLD AUTO: 4.32 M/UL (ref 4–5.4)
SODIUM SERPL-SCNC: 140 MMOL/L (ref 136–145)
TROPONIN I SERPL DL<=0.01 NG/ML-MCNC: 0.01 NG/ML (ref 0–0.03)
TROPONIN I SERPL DL<=0.01 NG/ML-MCNC: 0.03 NG/ML (ref 0–0.03)
WBC # BLD AUTO: 6.29 K/UL (ref 3.9–12.7)

## 2019-10-15 PROCEDURE — 63600175 PHARM REV CODE 636 W HCPCS: Mod: HCNC | Performed by: HOSPITALIST

## 2019-10-15 PROCEDURE — 20000000 HC ICU ROOM: Mod: HCNC

## 2019-10-15 PROCEDURE — 83605 ASSAY OF LACTIC ACID: CPT | Mod: HCNC

## 2019-10-15 PROCEDURE — 84484 ASSAY OF TROPONIN QUANT: CPT | Mod: HCNC

## 2019-10-15 PROCEDURE — 80048 BASIC METABOLIC PNL TOTAL CA: CPT | Mod: HCNC

## 2019-10-15 PROCEDURE — 63600175 PHARM REV CODE 636 W HCPCS: Mod: HCNC | Performed by: EMERGENCY MEDICINE

## 2019-10-15 PROCEDURE — 25000003 PHARM REV CODE 250: Mod: HCNC | Performed by: HOSPITALIST

## 2019-10-15 PROCEDURE — 94761 N-INVAS EAR/PLS OXIMETRY MLT: CPT | Mod: HCNC

## 2019-10-15 PROCEDURE — 27000221 HC OXYGEN, UP TO 24 HOURS: Mod: HCNC

## 2019-10-15 PROCEDURE — 36415 COLL VENOUS BLD VENIPUNCTURE: CPT | Mod: HCNC

## 2019-10-15 PROCEDURE — 85025 COMPLETE CBC W/AUTO DIFF WBC: CPT | Mod: HCNC

## 2019-10-15 RX ORDER — SODIUM CHLORIDE 9 MG/ML
1000 INJECTION, SOLUTION INTRAVENOUS CONTINUOUS
Status: ACTIVE | OUTPATIENT
Start: 2019-10-15 | End: 2019-10-16

## 2019-10-15 RX ORDER — SODIUM CHLORIDE 9 MG/ML
1000 INJECTION, SOLUTION INTRAVENOUS CONTINUOUS
Status: DISCONTINUED | OUTPATIENT
Start: 2019-10-15 | End: 2019-10-15

## 2019-10-15 RX ORDER — ONDANSETRON 8 MG/1
8 TABLET, ORALLY DISINTEGRATING ORAL EVERY 8 HOURS PRN
Status: DISCONTINUED | OUTPATIENT
Start: 2019-10-15 | End: 2019-10-24 | Stop reason: HOSPADM

## 2019-10-15 RX ORDER — OSELTAMIVIR PHOSPHATE 75 MG/1
75 CAPSULE ORAL 2 TIMES DAILY
Status: DISCONTINUED | OUTPATIENT
Start: 2019-10-15 | End: 2019-10-21

## 2019-10-15 RX ADMIN — LEVOTHYROXINE SODIUM 25 MCG: 25 TABLET ORAL at 05:10

## 2019-10-15 RX ADMIN — OSELTAMIVIR PHOSPHATE 75 MG: 75 CAPSULE ORAL at 05:10

## 2019-10-15 RX ADMIN — CEFEPIME HYDROCHLORIDE 1 G: 1 INJECTION, SOLUTION INTRAVENOUS at 04:10

## 2019-10-15 RX ADMIN — ASPIRIN 81 MG 81 MG: 81 TABLET ORAL at 08:10

## 2019-10-15 RX ADMIN — OXYBUTYNIN CHLORIDE 5 MG: 5 TABLET, EXTENDED RELEASE ORAL at 08:10

## 2019-10-15 RX ADMIN — VANCOMYCIN HYDROCHLORIDE 1000 MG: 1 INJECTION, POWDER, LYOPHILIZED, FOR SOLUTION INTRAVENOUS at 05:10

## 2019-10-15 RX ADMIN — SODIUM CHLORIDE 1000 ML: 0.9 INJECTION, SOLUTION INTRAVENOUS at 02:10

## 2019-10-15 RX ADMIN — SERTRALINE HYDROCHLORIDE 25 MG: 25 TABLET ORAL at 08:10

## 2019-10-15 RX ADMIN — PANTOPRAZOLE SODIUM 40 MG: 40 TABLET, DELAYED RELEASE ORAL at 08:10

## 2019-10-15 RX ADMIN — SODIUM CHLORIDE 1000 ML: 0.9 INJECTION, SOLUTION INTRAVENOUS at 08:10

## 2019-10-15 RX ADMIN — ENOXAPARIN SODIUM 40 MG: 100 INJECTION SUBCUTANEOUS at 04:10

## 2019-10-15 RX ADMIN — OSELTAMIVIR PHOSPHATE 75 MG: 75 CAPSULE ORAL at 09:10

## 2019-10-15 RX ADMIN — VANCOMYCIN HYDROCHLORIDE 1000 MG: 1 INJECTION, POWDER, LYOPHILIZED, FOR SOLUTION INTRAVENOUS at 06:10

## 2019-10-15 RX ADMIN — ENOXAPARIN SODIUM 40 MG: 100 INJECTION SUBCUTANEOUS at 12:10

## 2019-10-15 NOTE — CONSULTS
TN spoke with patient's son Molly via phone to discuss patient's discharge plan. Molly stated patient is independent at home however he is unsure of the plan as of now. Patient can discharge to her son's home with home health if needed. TN will continue to follow.

## 2019-10-15 NOTE — PLAN OF CARE
"TN spoke with patient's son Molly via phone to complete discharge needs assessment. TN explained duties of case management to Molly. TN reviewed  "[facility]", "Discharge Planning Begins on Admission" and discussed "Help at Home". Patient lives at home alone and receives help from her son Molly  as needed. Patient however is independent at home.  Contact information added to white board.    Patient Preferred Pharmacy:       [facility] - [place], [place] - [address]  [address] [facility] [address]  Phone: [phone] Fax: [phone]    [id number] [place] [address]  [address] [place] [address]  Phone: [phone] Fax: [phone]         10/15/19 1351   Discharge Assessment   Assessment Type Discharge Planning Assessment   Assessment information obtained from? Other   Prior to hospitilization cognitive status: Unable to Assess   Prior to hospitalization functional status: Independent;Assistive Equipment   Current cognitive status: Unable to Assess   Current Functional Status: Independent;Assistive Equipment   Lives With alone   Able to Return to Prior Arrangements   (tbd)   Is patient able to care for self after discharge? Unable to determine at this time (comments)   Who are your caregiver(s) and their phone number(s)? Silke kaplan @ [phone]   Patient's perception of discharge disposition home or selfcare   Readmission Within the Last 30 Days no previous admission in last 30 days   Patient currently being followed by outpatient case management? No   Patient currently receives any other outside agency services? No   Equipment Currently Used at Home wheelchair;walker, rolling;cane, straight   Do you have any problems affording any of your prescribed medications? No   Is the patient taking medications as prescribed? yes   Does the patient have transportation home? Yes   Transportation Anticipated family or friend will provide "   Does the patient receive services at the [facility]? No   Discharge Plan A Home;Home with family;Home Health   Discharge Plan B   (TBD)   Patient/Family in Agreement with Plan yes   Does the patient have transportation to healthcare appointments? Yes

## 2019-10-15 NOTE — H&P
[facility] Hospital Medicine  History & Physical    Patient Name: Sandra Moody  MRN: [id number]  Admission Date: 10/15/2019  Attending Physician: Shayne Berrios MD, MPH      PCP:     Rox Montes MD    CC:     Chief Complaint   Patient presents with    Emesis     Pt reports nausea and vomiting that began after waking up from a nap today.        HISTORY OF PRESENT ILLNESS:     Sandra Moody is a 89 y.o. female that (in part)  has a past medical history of Allergy, Anxiety, Back pain, Cirrhosis, COPD (chronic obstructive pulmonary disease), Cryptogenic cirrhosis, Cystocele, Depression, Diverticulitis, Dyspnea, Family history of colon cancer, GERD (gastroesophageal reflux disease), History of colonic polyps, Hypertension, Hypothyroidism, Insomnia, Kidney stones, Lung cancer, Lung cancer, upper lobe, Menopause, Nuclear sclerosis, Osteopenia, PSVT (paroxysmal supraventricular tachycardia), PVC (premature ventricular contraction), Rectocele, Status post partial lobectomy of lung, Thyroid disease, Trochanteric bursitis, Urine incontinence, and Vitamin D deficiency disease.  Presents to [facility] Emergency Department initially complaining of nausea with vomiting.  She was taking a nap and she woke up today feeling ill.  Possible sick contacts.  Generalized fatigue, malaise, chills, and nonfocal subacute muscle weakness.  Says arrest Ali worsening.  No known exacerbating factors.  No relieving factors.  Constant duration.      She saw her PCP earlier today for right hip pain and follow-up for chronic medical conditions including cryptogenic cirrhosis.  She also complained of muscle cramps.  She received her influenza vaccine today.    In the emergency department she was noted to be tachycardic, febrile, and hypotensive.  She was given IV fluids.  Routine laboratory studies were obtained.  She had elevated lactic acid levels.  She was given IV fluids and  continue to have arterial hypotension.  Her lactic acid remained elevated at 5.3.  Sepsis protocol was initiated.  Influenza type B serology came back positive.    Hospital medicine has been asked to admit for further evaluation and treatment.       REVIEW OF SYSTEMS:     -- Constitutional:  Positive for fever and chills  -- Eyes: No visual changes, diplopia, pain, tearing, blind spots, or discharge.   -- Ears, nose, mouth, throat, and face: No congestion, sore throat, epistaxis, d/c, bleeding gums, neck stiffness masses, or dental issues.  -- Respiratory: No cough, shortness of breath, hemoptysis, stridor, wheezing, or night sweats.  -- Cardiovascular:  +Tachycardia.  No chest pain, DORSEY, syncope, PND, edema, cyanosis, or palpitations.   -- Gastrointestinal:  Nausea vomiting.  abdominal pain, hematemesis, melena, dyspepsia, or change in bowel habits.  -- Genitourinary: No hematuria, dysuria, frequency, urgency, nocturia, polyuria, stones, or incontinence.  -- Integument/breast: No rash, pruritis, pigmentation changes, dryness, or changes in hair  -- Hematologic/lymphatic: No easy bruising or lymphadenopathy.   -- Musculoskeletal:  Myalgias and muscle cramps.  Subacute diffuse muscle weakness without focal deficits .  Chronic right hip pain.  -- Neurological: No seizures, headaches, incoordination, paraesthesias, ataxia, vertigo, or tremors.  -- Behavioral/Psych: No auditory or visual hallucinations, depression, or suicidal/homicidal ideations.  -- Endocrine: No heat or cold intolerance, polydipsia, or unintentional weight gain / loss.  -- Allergy/Immunologic: No recurrent infections or adverse reaction to food, insects, or difficulty breathing.        PAST MEDICAL / SURGICAL HISTORY:     Past Medical History:   Diagnosis Date    Allergy     Anxiety     Back pain     Cirrhosis     COPD (chronic obstructive pulmonary disease) 10/16/2012    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    Cystocele     Depression      Diverticulitis     Dyspnea 11/8/2012    Family history of colon cancer 10/2/2017    GERD (gastroesophageal reflux disease)     History of colonic polyps 10/2/2017    Hypertension     Hypothyroidism 4/10/2014    Insomnia 4/18/2013    Kidney stones     Lung cancer     adenocarcinoma, RUL    Lung cancer, upper lobe 3/4/2013    Menopause     AGE 47    Nuclear sclerosis 7/17/2014    Osteopenia     dr. lj- fosamax    PSVT (paroxysmal supraventricular tachycardia)     dr. marx    PVC (premature ventricular contraction) 11/10/2012    Rectocele     Status post partial lobectomy of lung 2/2010    Thyroid disease     hypothyroidism    Trochanteric bursitis 4/18/2013    Urine incontinence 2/24/2015    Vitamin D deficiency disease 9/4/2014     Past Surgical History:   Procedure Laterality Date    ADENOIDECTOMY      APPENDECTOMY      CHOLECYSTECTOMY      COLONOSCOPY      CYSTOSCOPY      EYE SURGERY Bilateral     HYSTERECTOMY      LUNG LOBECTOMY Right     OOPHORECTOMY      TONSILLECTOMY      UPPER GASTROINTESTINAL ENDOSCOPY           FAMILY HISTORY:     Family History   Problem Relation Age of Onset    Cancer Mother         COLON    Hypertension Mother     Aneurysm Father     Alcohol abuse Brother     Kidney failure Sister     Cancer Brother         throat-smoker     Alcohol abuse Brother     Pacemaker/defibrilator Brother     Birth defects Paternal Grandfather         prostate     Asthma Neg Hx     Emphysema Neg Hx     Amblyopia Neg Hx     Blindness Neg Hx     Cataracts Neg Hx     Diabetes Neg Hx     Glaucoma Neg Hx     Macular degeneration Neg Hx     Retinal detachment Neg Hx     Strabismus Neg Hx     Stroke Neg Hx     Thyroid disease Neg Hx     Cirrhosis Neg Hx          SOCIAL HISTORY:     Social History     Socioeconomic History    Marital status:      Spouse name: Not on file    Number of children: Not on file    Years of education: Not on file     Highest education level: Not on file   Occupational History    Not on file   Social Needs    Financial resource strain: Not on file    Food insecurity:     Worry: Not on file     Inability: Not on file    Transportation needs:     Medical: Not on file     Non-medical: Not on file   Tobacco Use    Smoking status: Never Smoker    Smokeless tobacco: Never Used   Substance and Sexual Activity    Alcohol use: No    Drug use: No    Sexual activity: Never   Lifestyle    Physical activity:     Days per week: Not on file     Minutes per session: Not on file    Stress: Not on file   Relationships    Social connections:     Talks on phone: Not on file     Gets together: Not on file     Attends Anabaptist service: Not on file     Active member of club or organization: Not on file     Attends meetings of clubs or organizations: Not on file     Relationship status: Not on file   Other Topics Concern    Not on file   Social History Narrative    Not on file         ALLERGIES:       Review of patient's allergies indicates:  No Known Allergies      HEALTH SCREENING:     Influenza vaccine t up-to-date for this season.  Prevnar 13 pneumonia vaccine =  evidence of previous vaccination found in the medical record      HOME MEDICATIONS:     Prior to Admission medications    Medication Sig Start Date End Date Taking? Authorizing Provider   albuterol (PROVENTIL HFA) 90 mcg/actuation inhaler Inhale 2 puffs into the lungs every 6 (six) hours as needed (ANY equivalent covered is OK). 6/17/16 10/14/21 Yes NELIA Covarrubias-ERIKA   alendronate (FOSAMAX) 70 MG tablet Take 1 tablet (70 mg total) by mouth every 7 days. 10/14/19  Yes Rox Montes MD   amLODIPine (NORVASC) 5 MG tablet TAKE 2 TABLETS EVERY DAY 9/24/19  Yes Akhil Harry MD   aspirin 81 mg Tab Take 1 tablet by mouth Daily.    Yes Historical Provider, MD   estradiol (ESTRACE) 0.01 % (0.1 mg/g) vaginal cream Pea-size dab of cream to urethra/introitus at bed time.  4/23/13  Yes Blaire Macario MD   levothyroxine (SYNTHROID) 25 MCG tablet TAKE 1 TABLET EVERY DAY 7/8/18  Yes Sebastián Crum NP   lisinopril (PRINIVIL,ZESTRIL) 40 MG tablet TAKE 1 TABLET EVERY DAY 7/8/18  Yes Akhil Harry MD   melatonin 10 mg Tab Take by mouth once daily.    Yes Historical Provider, MD   metoprolol succinate (TOPROL-XL) 50 MG 24 hr tablet TAKE 1 TABLET EVERY DAY 7/8/18  Yes Akhil Harry MD   nystatin (MYCOSTATIN) cream Apply topically 2 (two) times daily. 1/16/19  Yes Rox Montes MD   ondansetron (ZOFRAN) 4 MG tablet Take 1 tablet (4 mg total) by mouth every 6 (six) hours as needed. 10/14/19  Yes Rox Montes MD   oxybutynin (DITROPAN-XL) 10 MG 24 hr tablet TAKE 1 TABLET ONE TIME DAILY 5/14/18  Yes Akhil Harry MD   sertraline (ZOLOFT) 25 MG tablet TAKE 1 TABLET BY MOUTH EVERY DAY 8/9/19  Yes Venkata Boyce, NELIA-C   temazepam (RESTORIL) 30 mg capsule TAKE 1 CAPSULE BY MOUTH EVERY DAY AT BEDTIME 7/23/19  Yes Rox Montes MD   traMADol (ULTRAM) 50 mg tablet Take 1 tablet (50 mg total) by mouth every 24 hours as needed for Pain (MAY CAUSE DROWSINESS). 10/14/19  Yes Rox Montes MD   triamcinolone acetonide 0.5% (KENALOG) 0.5 % Crea Apply topically 2 (two) times daily. 1/16/19  Yes Rox Montes MD          HOSPITAL MEDICATIONS:     Scheduled Meds:    aspirin  81 mg Oral Daily    ceFEPime (MAXIPIME) IVPB  1 g Intravenous Q12H    enoxaparin  40 mg Subcutaneous Daily    levothyroxine  25 mcg Oral Before breakfast    oseltamivir  75 mg Oral BID    oxybutynin  5 mg Oral Daily    pantoprazole  40 mg Oral Daily    sertraline  25 mg Oral Daily    vancomycin (VANCOCIN) IVPB  15 mg/kg Intravenous Q12H     Continuous Infusions:    sodium chloride 0.9% 1,000 mL (10/14/19 3133)     PRN Meds: acetaminophen, HYDROcodone-acetaminophen, LORazepam, ondansetron, promethazine (PHENERGAN) IVPB, ramelteon, sodium chloride 0.9%      PHYSICAL EXAM:     Wt Readings from  Last 1 Encounters:   10/14/19 2255 69 kg (152 lb 1.9 oz)   10/14/19 1600 64.4 kg (142 lb)     Body mass index is 26.95 kg/m².  Vitals:    10/15/19 0115 10/15/19 0130 10/15/19 0145 10/15/19 0200   BP: (!) 91/50 (!) 95/47 (!) 93/47 (!) 95/48   BP Location:       Patient Position:       Pulse: 104 92 90 94   Resp: (!) 25 (!) 32 (!) 31 (!) 30   Temp:       TempSrc:       SpO2: (!) 90% (!) 94% (!) 94% (!) 93%   Weight:       Height:              -- General appearance:  Chronically ill-appearing elderly female who is lying in bed.  well developed. appears stated age   -- Head: normocephalic, atraumatic   -- Eyes: conjunctivae clear. Extraocular muscles intact  -- Nose: Nares normal. Septum midline.   -- Mouth/Throat: lips, mucosa, and tongue normal. no throat erythema.   -- Neck: supple, symmetrical, trachea midline, no JVD and thyroid not grossly enlarged, appears symmetric  -- Lungs: + tachypnea.  clear to auscultation bilaterally. normal respiratory effort. No use of accessory muscles.   -- Chest wall: no tenderness. equal bilateral chest rise   -- Heart:  Rapid rate and regular rhythm. S1, S2 normal.  no click, rub or gallop   -- Abdomen: soft, non-tender, non-distended, non-tympanic; bowel sounds normal; no masses  -- Extremities: no cyanosis, clubbing or edema.   -- Pulses: 2+ and symmetric   -- Skin:  Turgor normal. Color normal. Texture normal. No rashes or lesions.   -- Neurologic:  Globally decreased muscle strength and tone. No focal numbness or weakness. CNII-XII intact. [place] coma scale: eyes open spontaneously-4, oriented & converses-5, obeys commands-6.      LABORATORY STUDIES:     Recent Results (from the past 36 hour(s))   Blood culture x two cultures. Draw prior to antibiotics.    Collection Time: 10/14/19  4:40 PM   Result Value Ref Range    Blood Culture, Routine No Growth to date    CBC auto differential    Collection Time: 10/14/19  4:40 PM   Result Value Ref Range    WBC 4.35 3.90 - 12.70 K/uL     RBC 5.31 4.00 - 5.40 M/uL    Hemoglobin 16.3 (H) 12.0 - 16.0 g/dL    Hematocrit 49.6 (H) 37.0 - 48.5 %    Mean Corpuscular Volume 93 82 - 98 fL    Mean Corpuscular Hemoglobin 30.7 27.0 - 31.0 pg    Mean Corpuscular Hemoglobin Conc 32.9 32.0 - 36.0 g/dL    RDW 13.2 11.5 - 14.5 %    Platelets 116 (L) 150 - 350 K/uL    MPV 9.2 9.2 - 12.9 fL    Immature Granulocytes 0.5 0.0 - 0.5 %    Gran # (ANC) 4.2 1.8 - 7.7 K/uL    Immature Grans (Abs) 0.02 0.00 - 0.04 K/uL    Lymph # 0.1 (L) 1.0 - 4.8 K/uL    Mono # 0.0 (L) 0.3 - 1.0 K/uL    Eos # 0.0 0.0 - 0.5 K/uL    Baso # 0.01 0.00 - 0.20 K/uL    nRBC 0 0 /100 WBC    Gran% 96.0 (H) 38.0 - 73.0 %    Lymph% 2.8 (L) 18.0 - 48.0 %    Mono% 0.5 (L) 4.0 - 15.0 %    Eosinophil% 0.0 0.0 - 8.0 %    Basophil% 0.2 0.0 - 1.9 %    Differential Method Automated    Comprehensive metabolic panel    Collection Time: 10/14/19  4:40 PM   Result Value Ref Range    Sodium 142 136 - 145 mmol/L    Potassium 3.6 3.5 - 5.1 mmol/L    Chloride 104 95 - 110 mmol/L    CO2 24 23 - 29 mmol/L    Glucose 146 (H) 70 - 110 mg/dL    BUN, Bld 18 8 - 23 mg/dL    Creatinine 0.8 0.5 - 1.4 mg/dL    Calcium 10.1 8.7 - 10.5 mg/dL    Total Protein 7.6 6.0 - 8.4 g/dL    Albumin 4.6 3.5 - 5.2 g/dL    Total Bilirubin 0.8 0.1 - 1.0 mg/dL    Alkaline Phosphatase 63 55 - 135 U/L    AST 16 10 - 40 U/L    ALT 16 10 - 44 U/L    Anion Gap 14 8 - 16 mmol/L    eGFR if African American >60 >60 mL/min/1.73 m^2    eGFR if non African American >60 >60 mL/min/1.73 m^2   Lactic acid, plasma #1    Collection Time: 10/14/19  4:40 PM   Result Value Ref Range    Lactate (Lactic Acid) 5.3 (HH) 0.5 - 2.2 mmol/L   Troponin I    Collection Time: 10/14/19  4:40 PM   Result Value Ref Range    Troponin I <0.006 0.000 - 0.026 ng/mL   Brain natriuretic peptide    Collection Time: 10/14/19  4:40 PM   Result Value Ref Range    BNP 90 0 - 99 pg/mL   Blood culture x two cultures. Draw prior to antibiotics.    Collection Time: 10/14/19  4:55 PM   Result  Value Ref Range    Blood Culture, Routine No Growth to date    Urinalysis, Reflex to Urine Culture Urine, Clean Catch    Collection Time: 10/14/19  6:39 PM   Result Value Ref Range    Specimen UA Urine, Clean Catch     Color, UA Yellow Yellow, Straw, Samantha    Appearance, UA Clear Clear    pH, UA 5.0 5.0 - 8.0    Specific Gravity, UA 1.015 1.005 - 1.030    Protein, UA Negative Negative    Glucose, UA 1+ (A) Negative    Ketones, UA Negative Negative    Bilirubin (UA) Negative Negative    Occult Blood UA 1+ (A) Negative    Nitrite, UA Negative Negative    Urobilinogen, UA Negative <2.0 EU/dL    Leukocytes, UA Negative Negative   Urinalysis Microscopic    Collection Time: 10/14/19  6:39 PM   Result Value Ref Range    RBC, UA 1 0 - 4 /hpf    WBC, UA 2 0 - 5 /hpf    Bacteria Rare None-Occ /hpf    Squam Epithel, UA 2 /hpf    Microscopic Comment SEE COMMENT    Lactic acid, plasma #2    Collection Time: 10/14/19  8:35 PM   Result Value Ref Range    Lactate (Lactic Acid) 5.0 (HH) 0.5 - 2.2 mmol/L   POCT Influenza A/B Molecular    Collection Time: 10/14/19 11:36 PM   Result Value Ref Range    POC Molecular Influenza A Ag Negative Negative, Not Reported    POC Molecular Influenza B Ag Positive (A) Negative, Not Reported     Acceptable Yes    Troponin I    Collection Time: 10/14/19 11:48 PM   Result Value Ref Range    Troponin I 0.015 0.000 - 0.026 ng/mL       Lab Results   Component Value Date    INR 1.0 08/29/2019    INR 1.0 04/02/2019    INR 1.0 10/08/2018     Lab Results   Component Value Date    HGBA1C 5.4 09/07/2017     No results for input(s): POCTGLUCOSE in the last 72 hours.        MICROBIOLOGY DATA:     Urine Culture, Routine   Date Value Ref Range Status   03/14/2019 No significant growth  Final   01/16/2019 ESCHERICHIA COLI  > 100,000 cfu/ml    Final   01/16/2019 PSEUDOMONAS AERUGINOSA  > 100,000 cfu/ml    Final   10/13/2017 ESCHERICHIA COLI  > 100,000 cfu/ml    Final   01/26/2017 No significant growth   Final     AFB Culture & Smear   Date Value Ref Range Status   02/25/2010 NO GROWTH AFTER 8 WEEKS - FINAL REPORT.  Final       Microbiology x 7d:   Microbiology Results (last 7 days)     Procedure Component Value Units Date/Time    Blood culture x two cultures. Draw prior to antibiotics. [[id number]] Collected:  10/14/19 1640    Order Status:  Completed Specimen:  Blood from Peripheral, Antecubital, Right Updated:  10/15/19 0312     Blood Culture, Routine No Growth to date    Narrative:       Aerobic and anaerobic    Blood culture x two cultures. Draw prior to antibiotics. [[id number]] Collected:  10/14/19 1655    Order Status:  Completed Specimen:  Blood from Peripheral, Hand, Left Updated:  10/15/19 0312     Blood Culture, Routine No Growth to date    Narrative:       Aerobic and anaerobic            IMAGING:     Imaging Results          X-Ray Chest AP Portable (Final result)  Result time 10/14/19 17:26:24    Final result by Deepak Estrella MD (10/14/19 17:26:24)                 Impression:      No detrimental change or radiographic acute intrathoracic process seen on this single view.  Specifically, no focal consolidation.      Electronically signed by: Deepak Estrella MD  Date:    10/14/2019  Time:    17:26             Narrative:    EXAMINATION:  XR CHEST AP PORTABLE    CLINICAL HISTORY:  Sepsis;    TECHNIQUE:  Single frontal view of the chest was performed.    COMPARISON:  Chest radiograph 11/18/2015    FINDINGS:  Monitoring leads overlie the chest.  Chronic mild nonspecific elevation of the right hemidiaphragm.  Cardiomediastinal silhouette is midline and similar to prior without evidence of failure.  Hilar regions are unchanged noting surgical clips on the right.  Few scattered linear opacities consistent with subsegmental scarring versus atelectasis.  No large consolidation or new focal opacity.  No large pleural effusion or pneumothorax.  No acute osseous process seen.  PA and lateral views can be obtained.                                   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       Influenza B her with secondary Sepsis due to unknown etiology  · Currently patient has 3 of 4 SIRS criteria (WBC <4 or >12,  Temp >100.4 or < 96.8, HR >90, RR >20 or PaCO2 <32mmHg)  · Severe sepsis criteria (Organ dysfunction, hypotension, or hypoperfusion/metabolic acidosis, lactic acid greater than 2.0)? - yes  · Severe Sepsis with Hypotension, despite adequate fluid resuscitation? - yes  · Multiple Organ Dysfunction Syndrome Criteria (>1 organ failing)? - No    GOALS - Treat infection and optimize tissue perfusion by:  1. Antibiotics given in first 1 hour (+ source control)  2. CVP 8 to 12mmHg; early and aggressive resuscitation (colloids/blood PRN - 4 to 6 liters common in 1st 6 hrs)  3. MAP >65mmHg, Vasopressor PRN (norepinephrine & dopamine are 1st line)  4. SCVO2 > 70; otherwise, transfuse PRBC to Hct >30%  5. Glucose levels <180 mg/dL (SQ insulin sliding scale or IV drip)    Detailed Sepsis Evaluation & Treatment  · Blood cultures x 2 sets BEFORE antibiotics. (3rd set may decrease likelihood of false positive from contaminant)  · Urine culture & Gram stain  · Sputum culture & Gram stain  · Lactic acid level = 5  · CBC and CMP  · IV fluid bolus x 30 mL/kg   · CXR to evaluate for pulmonary edema, pnuemonia, or pneumothorax  · ABG  · Mechanical  ventilation PRN with lung protective strategy (low TV)  · Will give broad spectrum antibiotic coverage, including double coverage for ESBL organisms =  1. Vancomyin loading dose & subsequent trough levels before 3rd dose (goal 20-25 for lung penetration)  2. Cefepime    Miscellaneous issues  · Droplet isolation  · Consider hydrocortisone IV only if non-responsive to IVF & vasopressors  · Avoid malnutrition  · Use respiratory therapist-driven weaning protocol   · Use intermittent/bolus sedation (not continuous)  · DVT prophylaxis  · Ulcer prophylaxis  · Early PT/OT  · Discuss goals & plan of care w/ patient & family  · CRRT preferred over HD if on pressors  · No sodium bicarbonate use for pH >7.15    ===========================================================    Lactic acid acidosis , arterial hypotension, ventricular tachycardia, and fever  · All secondary to sepsis and influenza type B   · Management as noted above    Chronic kidney disease  · Renal dose medications  · Avoid nephrotoxic agents  · Maintain euvolemic state    Thyroid dysfunction   · Clinically, patient is euthyroid   · Chemically, undetermined  · Obtain TSH, free T3, and free T4  · Continue current regimen    VTE Risk Mitigation (From admission, onward)         Ordered     enoxaparin injection 40 mg  Daily      10/14/19 2333     IP VTE HIGH RISK PATIENT  Once      10/14/19 2333                  Adult PRN medications available   DVT prophylaxis given       DISPOSITION:     Will admit to the Hospital Medicine service for further evaluation and treatment.    Chart reviewed and updated where applicable.    High Risk Conditions:  Patient has a condition that poses threat to life and bodily function:  Severe sepsis      ===============================================================    Shayne Berrios MD, MPH  Department of Hospital Medicine   [facility]  [phone] pg  (7pm - 6am)          This note is dictated using MModal voice  recognition software.  There are word recognition mistakes that are occasionally missed on review.

## 2019-10-15 NOTE — ED NOTES
ED MD Notification: Requested order for Flu swab. Dr. Rashmi MD aware and stated he would enter order.

## 2019-10-15 NOTE — PROGRESS NOTES
Pharmacist Renal Dose Adjustment Note    Sandra Moody is a 89 y.o. female being treated with the medication cefepime    Patient Data:    Vital Signs (Most Recent):  Temp: 99.3 °F (37.4 °C) (10/14/19 2255)  Pulse: (!) 113 (10/14/19 2255)  Resp: (!) 35 (10/14/19 2255)  BP: (!) 128/56 (10/14/19 2255)  SpO2: (!) 91 % (10/14/19 2216)   Vital Signs (72h Range):  Temp:  [98.4 °F (36.9 °C)-100.5 °F (38.1 °C)]   Pulse:  []   Resp:  [16-67]   BP: ()/(48-88)   SpO2:  [89 %-95 %]      Recent Labs   Lab 10/14/19  1640   CREATININE 0.8     Serum creatinine: 0.8 mg/dL 10/14/19 1640  Estimated creatinine clearance: 43.5 mL/min    Medication:Cefepime 1g every 8 hours will be changed to 1g every 12 hours.    Pharmacist's Name: Tico Sim  Pharmacist's Extension: [phone]

## 2019-10-15 NOTE — PLAN OF CARE
Patient remains in isolation.  Short of breath with exertion, remains on nasal cannula oxygen.  Low grade temps.  VSS   Continues to get NS at 100ml/hr.  Poor appetite.   Family visits.

## 2019-10-15 NOTE — CARE UPDATE
[facility]  ICU Multidisciplinary Bedside Rounds     UPDATE     Date: 10/15/2019      Plan of care reviewed with the following, Nurse, Charge Nurse, Physician, Pulm CC, Resp. Therapist and Infection Prevention.       Needs/ Goals for the day: decrease IV fluids/monitor VS/continue antibiotics/update family      Level of Care: Critical Care

## 2019-10-15 NOTE — HPI
Sandra Moody is a 89 y.o. female that (in part)  has a past medical history of Allergy, Anxiety, Back pain, Cirrhosis, COPD (chronic obstructive pulmonary disease), Cryptogenic cirrhosis, Cystocele, Depression, Diverticulitis, Dyspnea, Family history of colon cancer, GERD (gastroesophageal reflux disease), History of colonic polyps, Hypertension, Hypothyroidism, Insomnia, Kidney stones, Lung cancer, Lung cancer, upper lobe, Menopause, Nuclear sclerosis, Osteopenia, PSVT (paroxysmal supraventricular tachycardia), PVC (premature ventricular contraction), Rectocele, Status post partial lobectomy of lung, Thyroid disease, Trochanteric bursitis, Urine incontinence, and Vitamin D deficiency disease.  Presents to [facility] Emergency Department initially complaining of nausea with vomiting.  She was taking a nap and she woke up today feeling ill.  Possible sick contacts.  Generalized fatigue, malaise, chills, and nonfocal subacute muscle weakness.  Says arrest Ali worsening.  No known exacerbating factors.  No relieving factors.  Constant duration.      She saw her PCP earlier today for right hip pain and follow-up for chronic medical conditions including cryptogenic cirrhosis.  She also complained of muscle cramps.  She received her influenza vaccine today.    In the emergency department she was noted to be tachycardic, febrile, and hypotensive.  She was given IV fluids.  Routine laboratory studies were obtained.  She had elevated lactic acid levels.  She was given IV fluids and continue to have arterial hypotension.  Her lactic acid remained elevated at 5.3.  Sepsis protocol was initiated.  Influenza type B serology came back positive.    Hospital medicine has been asked to admit for further evaluation and treatment.

## 2019-10-16 LAB
ANION GAP SERPL CALC-SCNC: 6 MMOL/L (ref 8–16)
BASOPHILS # BLD AUTO: 0.01 K/UL (ref 0–0.2)
BASOPHILS NFR BLD: 0.3 % (ref 0–1.9)
BUN SERPL-MCNC: 11 MG/DL (ref 8–23)
CALCIUM SERPL-MCNC: 7.6 MG/DL (ref 8.7–10.5)
CHLORIDE SERPL-SCNC: 109 MMOL/L (ref 95–110)
CO2 SERPL-SCNC: 25 MMOL/L (ref 23–29)
CREAT SERPL-MCNC: 0.7 MG/DL (ref 0.5–1.4)
DIFFERENTIAL METHOD: ABNORMAL
EOSINOPHIL # BLD AUTO: 0 K/UL (ref 0–0.5)
EOSINOPHIL NFR BLD: 0.3 % (ref 0–8)
ERYTHROCYTE [DISTWIDTH] IN BLOOD BY AUTOMATED COUNT: 13.7 % (ref 11.5–14.5)
EST. GFR  (AFRICAN AMERICAN): >60 ML/MIN/1.73 M^2
EST. GFR  (NON AFRICAN AMERICAN): >60 ML/MIN/1.73 M^2
GLUCOSE SERPL-MCNC: 97 MG/DL (ref 70–110)
HCT VFR BLD AUTO: 41.9 % (ref 37–48.5)
HGB BLD-MCNC: 13.2 G/DL (ref 12–16)
IMM GRANULOCYTES # BLD AUTO: 0.02 K/UL (ref 0–0.04)
IMM GRANULOCYTES NFR BLD AUTO: 0.6 % (ref 0–0.5)
LYMPHOCYTES # BLD AUTO: 0.7 K/UL (ref 1–4.8)
LYMPHOCYTES NFR BLD: 19.8 % (ref 18–48)
MAGNESIUM SERPL-MCNC: 2 MG/DL (ref 1.6–2.6)
MCH RBC QN AUTO: 30 PG (ref 27–31)
MCHC RBC AUTO-ENTMCNC: 31.5 G/DL (ref 32–36)
MCV RBC AUTO: 95 FL (ref 82–98)
MONOCYTES # BLD AUTO: 0.2 K/UL (ref 0.3–1)
MONOCYTES NFR BLD: 6.9 % (ref 4–15)
NEUTROPHILS # BLD AUTO: 2.4 K/UL (ref 1.8–7.7)
NEUTROPHILS NFR BLD: 72.1 % (ref 38–73)
NRBC BLD-RTO: 0 /100 WBC
PHOSPHATE SERPL-MCNC: 1.7 MG/DL (ref 2.7–4.5)
PLATELET # BLD AUTO: 43 K/UL (ref 150–350)
PLATELET BLD QL SMEAR: ABNORMAL
PMV BLD AUTO: 9.3 FL (ref 9.2–12.9)
POTASSIUM SERPL-SCNC: 4.2 MMOL/L (ref 3.5–5.1)
RBC # BLD AUTO: 4.4 M/UL (ref 4–5.4)
SODIUM SERPL-SCNC: 140 MMOL/L (ref 136–145)
VANCOMYCIN TROUGH SERPL-MCNC: 12.3 UG/ML (ref 10–22)
WBC # BLD AUTO: 3.33 K/UL (ref 3.9–12.7)

## 2019-10-16 PROCEDURE — 25000003 PHARM REV CODE 250: Mod: HCNC | Performed by: HOSPITALIST

## 2019-10-16 PROCEDURE — 93010 EKG 12-LEAD: ICD-10-PCS | Mod: HCNC,,, | Performed by: INTERNAL MEDICINE

## 2019-10-16 PROCEDURE — 63600175 PHARM REV CODE 636 W HCPCS: Mod: HCNC | Performed by: HOSPITALIST

## 2019-10-16 PROCEDURE — 36415 COLL VENOUS BLD VENIPUNCTURE: CPT | Mod: HCNC

## 2019-10-16 PROCEDURE — 94761 N-INVAS EAR/PLS OXIMETRY MLT: CPT | Mod: HCNC

## 2019-10-16 PROCEDURE — 80048 BASIC METABOLIC PNL TOTAL CA: CPT | Mod: HCNC

## 2019-10-16 PROCEDURE — 85025 COMPLETE CBC W/AUTO DIFF WBC: CPT | Mod: HCNC

## 2019-10-16 PROCEDURE — 20000000 HC ICU ROOM: Mod: HCNC

## 2019-10-16 PROCEDURE — 83735 ASSAY OF MAGNESIUM: CPT | Mod: HCNC

## 2019-10-16 PROCEDURE — 93005 ELECTROCARDIOGRAM TRACING: CPT | Mod: HCNC

## 2019-10-16 PROCEDURE — 84100 ASSAY OF PHOSPHORUS: CPT | Mod: HCNC

## 2019-10-16 PROCEDURE — 93010 ELECTROCARDIOGRAM REPORT: CPT | Mod: HCNC,76,, | Performed by: INTERNAL MEDICINE

## 2019-10-16 PROCEDURE — 80202 ASSAY OF VANCOMYCIN: CPT | Mod: HCNC

## 2019-10-16 PROCEDURE — 27000221 HC OXYGEN, UP TO 24 HOURS: Mod: HCNC

## 2019-10-16 PROCEDURE — 25000003 PHARM REV CODE 250: Mod: HCNC

## 2019-10-16 RX ORDER — METOPROLOL TARTRATE 1 MG/ML
INJECTION, SOLUTION INTRAVENOUS
Status: COMPLETED
Start: 2019-10-16 | End: 2019-10-16

## 2019-10-16 RX ORDER — METOPROLOL TARTRATE 1 MG/ML
5 INJECTION, SOLUTION INTRAVENOUS ONCE
Status: COMPLETED | OUTPATIENT
Start: 2019-10-16 | End: 2019-10-16

## 2019-10-16 RX ORDER — METOPROLOL TARTRATE 25 MG/1
12.5 TABLET ORAL 2 TIMES DAILY
Status: DISCONTINUED | OUTPATIENT
Start: 2019-10-16 | End: 2019-10-16

## 2019-10-16 RX ORDER — METOPROLOL TARTRATE 25 MG/1
25 TABLET, FILM COATED ORAL ONCE
Status: COMPLETED | OUTPATIENT
Start: 2019-10-16 | End: 2019-10-16

## 2019-10-16 RX ORDER — ENOXAPARIN SODIUM 100 MG/ML
1 INJECTION SUBCUTANEOUS
Status: DISCONTINUED | OUTPATIENT
Start: 2019-10-16 | End: 2019-10-16

## 2019-10-16 RX ORDER — METOPROLOL SUCCINATE 50 MG/1
50 TABLET, EXTENDED RELEASE ORAL DAILY
Status: DISCONTINUED | OUTPATIENT
Start: 2019-10-17 | End: 2019-10-22

## 2019-10-16 RX ADMIN — PANTOPRAZOLE SODIUM 40 MG: 40 TABLET, DELAYED RELEASE ORAL at 08:10

## 2019-10-16 RX ADMIN — SODIUM CHLORIDE 1000 ML: 0.9 INJECTION, SOLUTION INTRAVENOUS at 12:10

## 2019-10-16 RX ADMIN — OSELTAMIVIR PHOSPHATE 75 MG: 75 CAPSULE ORAL at 08:10

## 2019-10-16 RX ADMIN — VANCOMYCIN HYDROCHLORIDE 1000 MG: 1 INJECTION, POWDER, LYOPHILIZED, FOR SOLUTION INTRAVENOUS at 07:10

## 2019-10-16 RX ADMIN — VANCOMYCIN HYDROCHLORIDE 1000 MG: 1 INJECTION, POWDER, LYOPHILIZED, FOR SOLUTION INTRAVENOUS at 06:10

## 2019-10-16 RX ADMIN — ENOXAPARIN SODIUM 40 MG: 100 INJECTION SUBCUTANEOUS at 04:10

## 2019-10-16 RX ADMIN — CEFEPIME HYDROCHLORIDE 1 G: 1 INJECTION, SOLUTION INTRAVENOUS at 05:10

## 2019-10-16 RX ADMIN — METOPROLOL TARTRATE 5 MG: 1 INJECTION, SOLUTION INTRAVENOUS at 01:10

## 2019-10-16 RX ADMIN — ASPIRIN 81 MG 81 MG: 81 TABLET ORAL at 08:10

## 2019-10-16 RX ADMIN — METOPROLOL TARTRATE 5 MG: 1 INJECTION, SOLUTION INTRAVENOUS at 06:10

## 2019-10-16 RX ADMIN — LEVOTHYROXINE SODIUM 25 MCG: 25 TABLET ORAL at 06:10

## 2019-10-16 RX ADMIN — CEFEPIME HYDROCHLORIDE 1 G: 1 INJECTION, SOLUTION INTRAVENOUS at 04:10

## 2019-10-16 RX ADMIN — METOPROLOL TARTRATE 25 MG: 25 TABLET ORAL at 08:10

## 2019-10-16 RX ADMIN — SERTRALINE HYDROCHLORIDE 25 MG: 25 TABLET ORAL at 08:10

## 2019-10-16 RX ADMIN — OXYBUTYNIN CHLORIDE 5 MG: 5 TABLET, EXTENDED RELEASE ORAL at 08:10

## 2019-10-16 NOTE — NURSING
Patient up in chair--ready to transfer and then patient goes into SVT--rate 171/min---dr hernandez notified and Lopressor given.  Patient now in SR, back in the bed, remains on 3L/min Nasal cannula.  Transfer on hold.

## 2019-10-16 NOTE — CARE UPDATE
[facility]  ICU Multidisciplinary Bedside Rounds     UPDATE     Date: 10/16/2019      Plan of care reviewed with the following, Nurse, Charge Nurse, Physician, Pulm CC, Resp. Therapist, Infection Prevention and Dietician.       Needs/ Goals for the day: stop IV antibiotics/increase activity as tolerated      Level of Care: OK to Transfer

## 2019-10-16 NOTE — NURSING
PtAA&O. Sitting up in bed. Had lab drawn and given thyroid med. Noted HR 10's. Remains alert and talking. SVT. BP stable. EICU alerted. 12 lead done. Lopressor 5 mg IVP given. Converted back to 80's with frequent PVC's. Remained alert and oriented throughout.

## 2019-10-16 NOTE — EICU
eICU Note :    Called by the [facility]:    Problem: SVT rate 170/mt    Pertinent History and labs reviewed : 89 y/o female  Problem List:  2019-10: Severe sepsis  2019-10: Fever  2019-10: Ventricular tachycardia  2019-10: Arterial hypotension  2019-10: Sepsis  2019-10: Lactic acidosis            Treatment /Intervention given: Lopressor 5 mg IVP x1         Catalina Valadez M.D  eICU Physician

## 2019-10-16 NOTE — PLAN OF CARE
Patient remains in droplet isolation for flu--continues to get antibiotics.  Up to chair and commode, slightly short of breath with exertion.  Transfer orders given and await bed.   Family visits

## 2019-10-16 NOTE — CARE UPDATE
[facility]  ICU Multidisciplinary Bedside Rounds   SUMMARY     Date: 10/16/2019    Prehospitalization: Home  Admit Date / LOS : 10/14/2019/ 2 days    Diagnosis: Severe sepsis    Consults:        Active: N/A       Needed: N/A     Code Status: Full Code   Advanced Directive: <no information>    LDA:        Central Lines/Site/Justification:Patient Does Not Have Central Line       Urinary Cath/Order/Justification:Patient Does Not Have Urinary Catheter    Vasopressors/Infusions:    sodium chloride 0.9% 100 mL/hr at 10/16/19 0600          GOALS: Volume/ Hemodynamic: N/A                     RASS:     CAM ICU: N/A  Pain Management: none       Pain Controlled: not applicable     Rhythm: NSR    Respiratory Device: Nasal Cannula                  Most Recent SBT/ SAT: N/A       MOVE Screen: PASS    VTE Prophylaxis: Pharm  Mobility: Bedrest  Stress Ulcer Prophylaxis: Yes    Dietary: PO  Tolerance: yes  /  Advancement: no    Isolation: Contact and Airborne    Restraints: No    Significant Dates:  Post Op Date: N/A  Rescue Date: N/A  Imaging/ Diagnostics: N/A    Noteworthy Labs:  none    CBC/Anemia Labs: Coags:    Recent Labs   Lab 10/14/19  1640 10/15/19  0527 10/16/19  0216   WBC 4.35 6.29 3.33*   HGB 16.3* 13.3 13.2   HCT 49.6* 41.4 41.9   * 87* 43*   MCV 93 96 95   RDW 13.2 13.7 13.7    No results for input(s): PT, INR, APTT in the last 168 hours.     Chemistries:   Recent Labs   Lab 10/14/19  1640 10/15/19  0527 10/16/19  0216    140 140   K 3.6 3.8 4.2    109 109   CO2 24 23 25   BUN 18 10 11   CREATININE 0.8 0.6 0.7   CALCIUM 10.1 7.4* 7.6*   PROT 7.6  --   --    BILITOT 0.8  --   --    ALKPHOS 63  --   --    ALT 16  --   --    AST 16  --   --    MG  --   --  2.0   PHOS  --   --  1.7*        Cardiac Enzymes: Ejection Fractions:    Recent Labs     10/14/19  1640 10/14/19  2348 10/15/19  0527   TROPONINI <0.006 0.015 0.029*    No results found for: EF     POCT Glucose: HbA1c:    No  results for input(s): POCTGLUCOSE in the last 168 hours. Hemoglobin A1C   Date Value Ref Range Status   09/07/2017 5.4 4.0 - 5.6 % Final     Comment:     According to ADA guidelines, hemoglobin A1c <7.0% represents  optimal control in non-pregnant diabetic patients. Different  metrics may apply to specific patient populations.   Standards of Medical Care in Diabetes-2016.  For the purpose of screening for the presence of diabetes:  <5.7%     Consistent with the absence of diabetes  5.7-6.4%  Consistent with increasing risk for diabetes   (prediabetes)  >or=6.5%  Consistent with diabetes  Currently, no consensus exists for use of hemoglobin A1c  for diagnosis of diabetes for children.  This Hemoglobin A1c assay has significant interference with fetal   hemoglobin   (HbF). The results are invalid for patients with abnormal amounts of   HbF,   including those with known Hereditary Persistence   of Fetal Hemoglobin. Heterozygous hemoglobin variants (HbAS, HbAC,   HbAD, HbAE, HbA2) do not significantly interfere with this assay;   however, presence of multiple variants in a sample may impact the %   interference.          Needs from Care Team: none     ICU LOS 1d 7h  Level of Care: OK to Transfer

## 2019-10-16 NOTE — EICU
Called to the room for pt in SVT /min.  Rebecca blevins at the bedside.  Dr. Olivo videoed into the room.  Bedside encouraged her to cough for vagal stim without positive result.  Pt spontaneously converted to A-fib initially and then to SR.

## 2019-10-16 NOTE — CARE UPDATE
Pt seen and examined, and I agree with Dr. Berrios's assessment and plan. Will continue current care outlined in the H&P with the following additions:    Pt feeling better, no need for pressors. Will continue antibiotics until cultures are negative for 48 hours. Stable for transfer to the floor, continue Tamiflu.    LULU Park M.D.   Hospitalist   [facility]   Department of Hospital Medicine   Pager: [phone]

## 2019-10-17 PROBLEM — J10.1 INFLUENZA B: Status: ACTIVE | Noted: 2019-10-17

## 2019-10-17 LAB
ANION GAP SERPL CALC-SCNC: 6 MMOL/L (ref 8–16)
AORTIC ROOT ANNULUS: 3.12 CM
AORTIC VALVE CUSP SEPERATION: 1.91 CM
ASCENDING AORTA: 3.97 CM
AV INDEX (PROSTH): 0.51
AV MEAN GRADIENT: 9 MMHG
AV PEAK GRADIENT: 15 MMHG
AV VALVE AREA: 2.1 CM2
AV VELOCITY RATIO: 0.52
BASOPHILS # BLD AUTO: 0.01 K/UL (ref 0–0.2)
BASOPHILS NFR BLD: 0.3 % (ref 0–1.9)
BSA FOR ECHO PROCEDURE: 1.75 M2
BUN SERPL-MCNC: 8 MG/DL (ref 8–23)
CALCIUM SERPL-MCNC: 7.8 MG/DL (ref 8.7–10.5)
CHLORIDE SERPL-SCNC: 101 MMOL/L (ref 95–110)
CO2 SERPL-SCNC: 30 MMOL/L (ref 23–29)
CREAT SERPL-MCNC: 0.5 MG/DL (ref 0.5–1.4)
CV ECHO LV RWT: 0.42 CM
DIFFERENTIAL METHOD: ABNORMAL
DOP CALC AO PEAK VEL: 1.95 M/S
DOP CALC AO VTI: 41.95 CM
DOP CALC LVOT AREA: 4.1 CM2
DOP CALC LVOT DIAMETER: 2.29 CM
DOP CALC LVOT PEAK VEL: 1.01 M/S
DOP CALC LVOT STROKE VOLUME: 88.05 CM3
DOP CALCLVOT PEAK VEL VTI: 21.39 CM
E WAVE DECELERATION TIME: 211.76 MSEC
E/A RATIO: 1.02
ECHO LV POSTERIOR WALL: 0.98 CM (ref 0.6–1.1)
EOSINOPHIL # BLD AUTO: 0 K/UL (ref 0–0.5)
EOSINOPHIL NFR BLD: 0.3 % (ref 0–8)
ERYTHROCYTE [DISTWIDTH] IN BLOOD BY AUTOMATED COUNT: 13.3 % (ref 11.5–14.5)
EST. GFR  (AFRICAN AMERICAN): >60 ML/MIN/1.73 M^2
EST. GFR  (NON AFRICAN AMERICAN): >60 ML/MIN/1.73 M^2
FRACTIONAL SHORTENING: 31 % (ref 28–44)
GLUCOSE SERPL-MCNC: 128 MG/DL (ref 70–110)
HCT VFR BLD AUTO: 38.6 % (ref 37–48.5)
HGB BLD-MCNC: 12.7 G/DL (ref 12–16)
IMM GRANULOCYTES # BLD AUTO: 0.02 K/UL (ref 0–0.04)
IMM GRANULOCYTES NFR BLD AUTO: 0.5 % (ref 0–0.5)
INTERVENTRICULAR SEPTUM: 1.23 CM (ref 0.6–1.1)
IVRT: 0.08 MSEC
LA MAJOR: 5.15 CM
LA MINOR: 4.57 CM
LA WIDTH: 3.42 CM
LEFT ATRIUM SIZE: 3.9 CM
LEFT ATRIUM VOLUME INDEX: 31.9 ML/M2
LEFT ATRIUM VOLUME: 54.9 CM3
LEFT INTERNAL DIMENSION IN SYSTOLE: 3.22 CM (ref 2.1–4)
LEFT VENTRICLE DIASTOLIC VOLUME INDEX: 59.58 ML/M2
LEFT VENTRICLE DIASTOLIC VOLUME: 102.53 ML
LEFT VENTRICLE MASS INDEX: 110 G/M2
LEFT VENTRICLE SYSTOLIC VOLUME INDEX: 24.2 ML/M2
LEFT VENTRICLE SYSTOLIC VOLUME: 41.68 ML
LEFT VENTRICULAR INTERNAL DIMENSION IN DIASTOLE: 4.7 CM (ref 3.5–6)
LEFT VENTRICULAR MASS: 188.73 G
LYMPHOCYTES # BLD AUTO: 0.5 K/UL (ref 1–4.8)
LYMPHOCYTES NFR BLD: 13.1 % (ref 18–48)
MAGNESIUM SERPL-MCNC: 1.8 MG/DL (ref 1.6–2.6)
MCH RBC QN AUTO: 30.1 PG (ref 27–31)
MCHC RBC AUTO-ENTMCNC: 32.9 G/DL (ref 32–36)
MCV RBC AUTO: 92 FL (ref 82–98)
MONOCYTES # BLD AUTO: 0.2 K/UL (ref 0.3–1)
MONOCYTES NFR BLD: 6.3 % (ref 4–15)
MV PEAK A VEL: 1.24 M/S
MV PEAK E VEL: 1.27 M/S
NEUTROPHILS # BLD AUTO: 3 K/UL (ref 1.8–7.7)
NEUTROPHILS NFR BLD: 79.5 % (ref 38–73)
NRBC BLD-RTO: 0 /100 WBC
PISA TR MAX VEL: 3.08 M/S
PLATELET # BLD AUTO: 47 K/UL (ref 150–350)
PMV BLD AUTO: 10.4 FL (ref 9.2–12.9)
POTASSIUM SERPL-SCNC: 3.1 MMOL/L (ref 3.5–5.1)
PULM VEIN S/D RATIO: 1.21
PV PEAK D VEL: 0.43 M/S
PV PEAK S VEL: 0.52 M/S
PV PEAK VELOCITY: 0.8 CM/S
RA MAJOR: 4.63 CM
RA PRESSURE: 8 MMHG
RA WIDTH: 3.79 CM
RBC # BLD AUTO: 4.22 M/UL (ref 4–5.4)
RIGHT VENTRICULAR END-DIASTOLIC DIMENSION: 4.22 CM
SINUS: 3.21 CM
SODIUM SERPL-SCNC: 137 MMOL/L (ref 136–145)
STJ: 2.7 CM
TR MAX PG: 38 MMHG
TRICUSPID ANNULAR PLANE SYSTOLIC EXCURSION: 1.95 CM
TV REST PULMONARY ARTERY PRESSURE: 46 MMHG
WBC # BLD AUTO: 3.81 K/UL (ref 3.9–12.7)

## 2019-10-17 PROCEDURE — 21400001 HC TELEMETRY ROOM: Mod: HCNC

## 2019-10-17 PROCEDURE — 80048 BASIC METABOLIC PNL TOTAL CA: CPT | Mod: HCNC

## 2019-10-17 PROCEDURE — 36415 COLL VENOUS BLD VENIPUNCTURE: CPT | Mod: HCNC

## 2019-10-17 PROCEDURE — 25000003 PHARM REV CODE 250: Mod: HCNC | Performed by: HOSPITALIST

## 2019-10-17 PROCEDURE — 63600175 PHARM REV CODE 636 W HCPCS: Mod: HCNC | Performed by: HOSPITALIST

## 2019-10-17 PROCEDURE — 25000242 PHARM REV CODE 250 ALT 637 W/ HCPCS: Mod: HCNC | Performed by: HOSPITALIST

## 2019-10-17 PROCEDURE — 94761 N-INVAS EAR/PLS OXIMETRY MLT: CPT | Mod: HCNC

## 2019-10-17 PROCEDURE — 27000221 HC OXYGEN, UP TO 24 HOURS: Mod: HCNC

## 2019-10-17 PROCEDURE — 83735 ASSAY OF MAGNESIUM: CPT | Mod: HCNC

## 2019-10-17 PROCEDURE — 94640 AIRWAY INHALATION TREATMENT: CPT | Mod: HCNC

## 2019-10-17 PROCEDURE — 85025 COMPLETE CBC W/AUTO DIFF WBC: CPT | Mod: HCNC

## 2019-10-17 RX ORDER — METOPROLOL TARTRATE 1 MG/ML
5 INJECTION, SOLUTION INTRAVENOUS ONCE
Status: COMPLETED | OUTPATIENT
Start: 2019-10-17 | End: 2019-10-17

## 2019-10-17 RX ORDER — IPRATROPIUM BROMIDE AND ALBUTEROL SULFATE 2.5; .5 MG/3ML; MG/3ML
3 SOLUTION RESPIRATORY (INHALATION) EVERY 4 HOURS
Status: DISCONTINUED | OUTPATIENT
Start: 2019-10-17 | End: 2019-10-18

## 2019-10-17 RX ORDER — POTASSIUM CHLORIDE 20 MEQ/15ML
40 SOLUTION ORAL ONCE
Status: COMPLETED | OUTPATIENT
Start: 2019-10-17 | End: 2019-10-17

## 2019-10-17 RX ORDER — BUDESONIDE 0.5 MG/2ML
0.5 INHALANT ORAL EVERY 12 HOURS
Status: DISCONTINUED | OUTPATIENT
Start: 2019-10-17 | End: 2019-10-24 | Stop reason: HOSPADM

## 2019-10-17 RX ADMIN — IPRATROPIUM BROMIDE AND ALBUTEROL SULFATE 3 ML: .5; 3 SOLUTION RESPIRATORY (INHALATION) at 03:10

## 2019-10-17 RX ADMIN — SERTRALINE HYDROCHLORIDE 25 MG: 25 TABLET ORAL at 08:10

## 2019-10-17 RX ADMIN — OXYBUTYNIN CHLORIDE 5 MG: 5 TABLET, EXTENDED RELEASE ORAL at 08:10

## 2019-10-17 RX ADMIN — RAMELTEON 8 MG: 8 TABLET ORAL at 08:10

## 2019-10-17 RX ADMIN — IPRATROPIUM BROMIDE AND ALBUTEROL SULFATE 3 ML: .5; 3 SOLUTION RESPIRATORY (INHALATION) at 12:10

## 2019-10-17 RX ADMIN — ASPIRIN 81 MG 81 MG: 81 TABLET ORAL at 08:10

## 2019-10-17 RX ADMIN — IPRATROPIUM BROMIDE AND ALBUTEROL SULFATE 3 ML: .5; 3 SOLUTION RESPIRATORY (INHALATION) at 01:10

## 2019-10-17 RX ADMIN — LEVOTHYROXINE SODIUM 25 MCG: 25 TABLET ORAL at 06:10

## 2019-10-17 RX ADMIN — METOPROLOL TARTRATE 5 MG: 1 INJECTION, SOLUTION INTRAVENOUS at 04:10

## 2019-10-17 RX ADMIN — OSELTAMIVIR PHOSPHATE 75 MG: 75 CAPSULE ORAL at 08:10

## 2019-10-17 RX ADMIN — IPRATROPIUM BROMIDE AND ALBUTEROL SULFATE 3 ML: .5; 3 SOLUTION RESPIRATORY (INHALATION) at 04:10

## 2019-10-17 RX ADMIN — POTASSIUM CHLORIDE 40 MEQ: 20 SOLUTION ORAL at 04:10

## 2019-10-17 RX ADMIN — PROMETHAZINE HYDROCHLORIDE 6.25 MG: 25 INJECTION INTRAMUSCULAR; INTRAVENOUS at 08:10

## 2019-10-17 RX ADMIN — IPRATROPIUM BROMIDE AND ALBUTEROL SULFATE 3 ML: .5; 3 SOLUTION RESPIRATORY (INHALATION) at 08:10

## 2019-10-17 RX ADMIN — PANTOPRAZOLE SODIUM 40 MG: 40 TABLET, DELAYED RELEASE ORAL at 08:10

## 2019-10-17 RX ADMIN — CEFEPIME HYDROCHLORIDE 1 G: 1 INJECTION, SOLUTION INTRAVENOUS at 06:10

## 2019-10-17 RX ADMIN — METOPROLOL SUCCINATE 50 MG: 50 TABLET, EXTENDED RELEASE ORAL at 08:10

## 2019-10-17 RX ADMIN — BUDESONIDE 0.5 MG: 0.5 INHALANT RESPIRATORY (INHALATION) at 08:10

## 2019-10-17 RX ADMIN — ONDANSETRON 8 MG: 4 TABLET, ORALLY DISINTEGRATING ORAL at 06:10

## 2019-10-17 RX ADMIN — VANCOMYCIN HYDROCHLORIDE 1000 MG: 1 INJECTION, POWDER, LYOPHILIZED, FOR SOLUTION INTRAVENOUS at 06:10

## 2019-10-17 NOTE — PLAN OF CARE
Remains in ICU.  Cont to have runs of SVT with wide QRS complexes throughout night.  Pt given IV metoprolol 5 mg X 1 dose.  Pt also c/o SOB, breathing tx initiated.  T-max 99.4.  BP WDL, HR in the 70s with runs of SVT of 160s-170s; A-fib on tele with PVCs.  IV abx per orders.  Remains on droplet isolation.  POC reviewed with pt; expressed understanding.   Problem: Fall Injury Risk  Goal: Absence of Fall and Fall-Related Injury  Outcome: Ongoing, Progressing     Problem: Adult Inpatient Plan of Care  Goal: Plan of Care Review  Outcome: Ongoing, Progressing  Goal: Patient-Specific Goal (Individualization)  Outcome: Ongoing, Progressing  Goal: Absence of Hospital-Acquired Illness or Injury  Outcome: Ongoing, Progressing  Goal: Optimal Comfort and Wellbeing  Outcome: Ongoing, Progressing  Goal: Readiness for Transition of Care  Outcome: Ongoing, Progressing  Goal: Rounds/Family Conference  Outcome: Ongoing, Progressing     Problem: Infection  Goal: Infection Symptom Resolution  Outcome: Ongoing, Progressing     Problem: Skin Injury Risk Increased  Goal: Skin Health and Integrity  Outcome: Ongoing, Progressing

## 2019-10-17 NOTE — PROGRESS NOTES
[facility] Hospital Medicine  Progress Note    Patient Name: Sandra Moody  MRN: [id number]  Patient Class: IP- Inpatient   Admission Date: 10/14/2019  Length of Stay: 3 days  Attending Physician: Cheryl Park MD  Primary Care Provider: Rox Montes MD        Subjective:     Principal Problem:Sepsis        HPI:  Sandra Moody is a 89 y.o. female that (in part)  has a past medical history of Allergy, Anxiety, Back pain, Cirrhosis, COPD (chronic obstructive pulmonary disease), Cryptogenic cirrhosis, Cystocele, Depression, Diverticulitis, Dyspnea, Family history of colon cancer, GERD (gastroesophageal reflux disease), History of colonic polyps, Hypertension, Hypothyroidism, Insomnia, Kidney stones, Lung cancer, Lung cancer, upper lobe, Menopause, Nuclear sclerosis, Osteopenia, PSVT (paroxysmal supraventricular tachycardia), PVC (premature ventricular contraction), Rectocele, Status post partial lobectomy of lung, Thyroid disease, Trochanteric bursitis, Urine incontinence, and Vitamin D deficiency disease.  Presents to [facility] Emergency Department initially complaining of nausea with vomiting.  She was taking a nap and she woke up today feeling ill.  Possible sick contacts.  Generalized fatigue, malaise, chills, and nonfocal subacute muscle weakness.  Says arrest Ali worsening.  No known exacerbating factors.  No relieving factors.  Constant duration.      She saw her PCP earlier today for right hip pain and follow-up for chronic medical conditions including cryptogenic cirrhosis.  She also complained of muscle cramps.  She received her influenza vaccine today.    In the emergency department she was noted to be tachycardic, febrile, and hypotensive.  She was given IV fluids.  Routine laboratory studies were obtained.  She had elevated lactic acid levels.  She was given IV fluids and continue to have arterial hypotension.  Her lactic acid remained  elevated at 5.3.  Sepsis protocol was initiated.  Influenza type B serology came back positive.    Hospital medicine has been asked to admit for further evaluation and treatment.     Overview/Hospital Course:  Ms. Moody was was admitted with sepsis secondary to influenza B.  Respiratory isolation initiated and treated with Tamiflu.  Chest x-ray with no definitive consolidation, only few scattered linear opacity consistent with subsegmental scarring versus atelectasis.  Empiric cefepime and vancomycin started given severity of sepsis until bacterial infection ruled out.  Blood cultures no growth to date.  Patient did not need pressure support.  However showed intermittent tachyarrhythmias due to infection along with holding beta-blocker which resolved IV beta-blocker push.  Blood pressure increase enough to initiate start of metoprolol.    Interval History:  Patient was stepped down to the floor but was held given heart rate jumped to the 170 status post IV metoprolol push x1 will return to normal sinus rhythm and heart rate in the 70s.    Review of Systems   Constitutional: Negative for chills and fever.   Respiratory: Positive for shortness of breath. Negative for cough.    Cardiovascular: Negative for chest pain.     Objective:     Vital Signs (Most Recent):  Temp: 99.4 °F (37.4 °C) (10/16/19 2301)  Pulse: 78 (10/17/19 0100)  Resp: (!) 23 (10/17/19 0100)  BP: 126/60 (10/17/19 0100)  SpO2: 96 % (10/17/19 0100) Vital Signs (24h Range):  Temp:  [98.5 °F (36.9 °C)-99.4 °F (37.4 °C)] 99.4 °F (37.4 °C)  Pulse:  [] 78  Resp:  [7-35] 23  SpO2:  [89 %-97 %] 96 %  BP: ()/() 126/60     Weight: 69 kg (152 lb 1.9 oz)  Body mass index is 26.95 kg/m².    Intake/Output Summary (Last 24 hours) at 10/17/2019 0136  Last data filed at 10/16/2019 1834  Gross per 24 hour   Intake 1385 ml   Output 2070 ml   Net -685 ml      Physical Exam   Constitutional: She is oriented to person, place, and time. She appears  well-developed. No distress.   Eyes: Pupils are equal, round, and reactive to light.   Neck: Normal range of motion.   Cardiovascular: Normal rate.   Pulmonary/Chest:   Slightly coarse breath sounds but with good air movement, no appreciable wheezing   Abdominal: Soft. Bowel sounds are normal. She exhibits no distension and no mass. There is no tenderness. There is no guarding.   Musculoskeletal: Normal range of motion.   Neurological: She is alert and oriented to person, place, and time.   Skin: Skin is warm and dry. Capillary refill takes less than 2 seconds. She is not diaphoretic.   Psychiatric: She has a normal mood and affect. Her behavior is normal. Thought content normal.       Significant Labs: All pertinent labs within the past 24 hours have been reviewed.    Significant Imaging: I have reviewed and interpreted all pertinent imaging results/findings within the past 24 hours.      Assessment/Plan:      * Sepsis  Patient met criteria for sepsis with secondary to influenza B  Treatment initiated with 5 days of Tamiflu along with respiratory isolation  Could not completely rule out concomitant pneumonia therefore continued empiric cefepime and vancomycin  Blood cultures are no growth to date and patient is improving steadily  Fever trend almost normalized  If cultures remain negative will plan to deescalate all antibiotics tomorrow and monitor  Continue supportive care with oxygen    Severe sepsis        Arterial hypotension  Resolved with IV fluid challenge and treatment of underlying condition    Ventricular tachycardia  Secondary to sepsis along with holding beta-blocker therapy  Patient with reported tachyarrhythmias in the past treated with beta-blockers   Status post IV metoprolol push with good response and will resume home regimen metoprolol today    Fever  As discussed above under sepsis    Lactic acidosis  Secondary to sepsis  Treat underlying condition, resolving      Chronic kidney disease, stage  II (mild)  Renal function at baseline  Will continue to monitor    Essential hypertension  Patient was initial hypotensive therefore all antihypertensive medications were held  Will restart metoprolol today given blood pressure improved and heart rate being issue      VTE Risk Mitigation (From admission, onward)         Ordered     IP VTE HIGH RISK PATIENT  Once      10/14/19 6453                Critical care time spent on the evaluation and treatment of severe organ dysfunction, review of pertinent labs and imaging studies, discussions with consulting providers and discussions with patient/family: 45 minutes.      Cheryl Park MD  Department of Hospital Medicine   [facility]

## 2019-10-17 NOTE — ASSESSMENT & PLAN NOTE
Patient met criteria for sepsis with secondary to influenza B  Treatment initiated with 5 days of Tamiflu along with respiratory isolation  Could not completely rule out concomitant pneumonia therefore continued empiric cefepime and vancomycin  Blood cultures are no growth to date and patient is improving steadily  Fever trend almost normalized  If cultures remain negative will plan to deescalate all antibiotics tomorrow and monitor  Continue supportive care with oxygen

## 2019-10-17 NOTE — ASSESSMENT & PLAN NOTE
Secondary to sepsis along with holding beta-blocker therapy  Patient with reported tachyarrhythmias in the past treated with beta-blockers   Status post IV metoprolol push with good response and will resume home regimen metoprolol today

## 2019-10-17 NOTE — ASSESSMENT & PLAN NOTE
Patient was initial hypotensive therefore all antihypertensive medications were held  Will restart metoprolol today given blood pressure improved and heart rate being issue

## 2019-10-17 NOTE — CARE UPDATE
[facility]  ICU Multidisciplinary Bedside Rounds     UPDATE     Date: 10/17/2019      Plan of care reviewed with the following, Nurse, Charge Nurse, Physician, Pulm CC, Resp. Therapist and .       Needs/ Goals for the day: monitor HR      Level of Care: OK to Transfer

## 2019-10-17 NOTE — ASSESSMENT & PLAN NOTE
Patient met criteria for sepsis secondary to influenza B  Treatment initiated with 5 days of Tamiflu along with respiratory isolation, day 3/5 today  Could not completely rule out concomitant early pneumonia  Therefore continued empiric cefepime and vancomycin until afebrile and blood cultures remain negative x 48 hours  Blood cultures are no growth to date and patient is improving steadily  Afebrile for greater than 24 hr  Will stop all antibiotics today and monitor  Continue supportive care with oxygen and wean as tolerated  Initiate therapy with PT/OT  Possible d/c tomorrow if patient remains AF and continues to do well

## 2019-10-17 NOTE — PROGRESS NOTES
Pt transported to room 316. Bedside report given to Deanna MONTALVO. Pt with no distress noted. Family at bedside

## 2019-10-17 NOTE — PLAN OF CARE
Problem: Adult Inpatient Plan of Care  Goal: Plan of Care Review  Outcome: Ongoing, Progressing   Respiratory: Patient receiving neb treatments as ordered. On oxygen at 3 lpm No signs of distress at this time.

## 2019-10-17 NOTE — SUBJECTIVE & OBJECTIVE
Interval History:  Patient in normal sinus rhythm with heart rate in the 80s this morning.  She reports difficulty sleeping last night, but otherwise no other complaints.    Review of Systems   Constitutional: Negative for chills and fever.   Respiratory: Positive for shortness of breath. Negative for cough.    Cardiovascular: Negative for chest pain.     Objective:     Vital Signs (Most Recent):  Temp: 99.1 °F (37.3 °C) (10/17/19 0701)  Pulse: 82 (10/17/19 0830)  Resp: 11 (10/17/19 0830)  BP: 125/65 (10/17/19 0830)  SpO2: 97 % (10/17/19 0830) Vital Signs (24h Range):  Temp:  [99 °F (37.2 °C)-99.4 °F (37.4 °C)] 99.1 °F (37.3 °C)  Pulse:  [] 82  Resp:  [4-41] 11  SpO2:  [88 %-98 %] 97 %  BP: (102-149)/(51-88) 125/65     Weight: 69 kg (152 lb 1.9 oz)  Body mass index is 26.95 kg/m².    Intake/Output Summary (Last 24 hours) at 10/17/2019 1021  Last data filed at 10/16/2019 1834  Gross per 24 hour   Intake 420 ml   Output 270 ml   Net 150 ml      Physical Exam   Constitutional: She is oriented to person, place, and time. She appears well-developed. No distress.   Eyes: Pupils are equal, round, and reactive to light.   Neck: Normal range of motion.   Cardiovascular: Normal rate.   Pulmonary/Chest:   Slightly coarse breath sounds but with good air movement, no appreciable wheezing   Abdominal: Soft. Bowel sounds are normal. She exhibits no distension and no mass. There is no tenderness. There is no guarding.   Musculoskeletal: Normal range of motion.   Neurological: She is alert and oriented to person, place, and time.   Skin: Skin is warm and dry. Capillary refill takes less than 2 seconds. She is not diaphoretic.   Psychiatric: She has a normal mood and affect. Her behavior is normal. Thought content normal.       Significant Labs: All pertinent labs within the past 24 hours have been reviewed.    Significant Imaging: I have reviewed and interpreted all pertinent imaging results/findings within the past 24  hours.

## 2019-10-17 NOTE — CARE UPDATE
[facility]  ICU Multidisciplinary Bedside Rounds   SUMMARY     Date: 10/17/2019    Prehospitalization: Home  Admit Date / LOS : 10/14/2019/ 3 days    Diagnosis: Sepsis    Consults:        Active: N/A       Needed: Cardio     Code Status: Full Code   Advanced Directive: <no information>    LDA: PIV       Central Lines/Site/Justification:Patient Does Not Have Central Line       Urinary Cath/Order/Justification:Patient Does Not Have Urinary Catheter    Vasopressors/Infusions:        GOALS: Volume/ Hemodynamic: N/A                     RASS: N/A    CAM ICU: Negative  Pain Management: none       Pain Controlled: yes     Rhythm: A-Fib    Respiratory Device: Nasal Cannula                  Most Recent SBT/ SAT: N/A         VTE Prophylaxis: Mechanical  Mobility: Ambulatory and A: Assist  Stress Ulcer Prophylaxis: Yes    Dietary: PO  Tolerance: yes     Isolation: Droplet    Restraints: No    Significant Dates:  Post Op Date: N/A  Rescue Date: N/A  Imaging/ Diagnostics: N/A    Noteworthy Labs:  K 3.1 (Replaced with 40mEq)    CBC/Anemia Labs: Coags:    Recent Labs   Lab 10/15/19  0527 10/16/19  0216 10/17/19  0240   WBC 6.29 3.33* 3.81*   HGB 13.3 13.2 12.7   HCT 41.4 41.9 38.6   PLT 87* 43* 47*   MCV 96 95 92   RDW 13.7 13.7 13.3    No results for input(s): PT, INR, APTT in the last 168 hours.     Chemistries:   Recent Labs   Lab 10/14/19  1640 10/15/19  0527 10/16/19  0216 10/17/19  0240    140 140 137   K 3.6 3.8 4.2 3.1*    109 109 101   CO2 24 23 25 30*   BUN 18 10 11 8   CREATININE 0.8 0.6 0.7 0.5   CALCIUM 10.1 7.4* 7.6* 7.8*   PROT 7.6  --   --   --    BILITOT 0.8  --   --   --    ALKPHOS 63  --   --   --    ALT 16  --   --   --    AST 16  --   --   --    MG  --   --  2.0  --    PHOS  --   --  1.7*  --         Cardiac Enzymes: Ejection Fractions:    Recent Labs     10/14/19  1640 10/14/19  2348 10/15/19  0527   TROPONINI <0.006 0.015 0.029*    No results found for: EF     POCT Glucose:  HbA1c:    No results for input(s): POCTGLUCOSE in the last 168 hours. Hemoglobin A1C   Date Value Ref Range Status   09/07/2017 5.4 4.0 - 5.6 % Final     Comment:     According to ADA guidelines, hemoglobin A1c <7.0% represents  optimal control in non-pregnant diabetic patients. Different  metrics may apply to specific patient populations.   Standards of Medical Care in Diabetes-2016.  For the purpose of screening for the presence of diabetes:  <5.7%     Consistent with the absence of diabetes  5.7-6.4%  Consistent with increasing risk for diabetes   (prediabetes)  >or=6.5%  Consistent with diabetes  Currently, no consensus exists for use of hemoglobin A1c  for diagnosis of diabetes for children.  This Hemoglobin A1c assay has significant interference with fetal   hemoglobin   (HbF). The results are invalid for patients with abnormal amounts of   HbF,   including those with known Hereditary Persistence   of Fetal Hemoglobin. Heterozygous hemoglobin variants (HbAS, HbAC,   HbAD, HbAE, HbA2) do not significantly interfere with this assay;   however, presence of multiple variants in a sample may impact the %   interference.          Needs from Care Team: Address cardiac issue; VTE.      ICU LOS 2d 7h  Level of Care: Critical Care

## 2019-10-17 NOTE — HOSPITAL COURSE
Ms. Moody was admitted with sepsis secondary to influenza B.  Respiratory isolation initiated and treated with Tamiflu.  Chest x-ray with no definitive consolidation, only few scattered linear opacity consistent with subsegmental scarring versus atelectasis.  Empiric cefepime and vancomycin started given severity of sepsis until bacterial infection ruled out.  Blood cultures negative.  Patient did not need pressor support.  Patient with h/o intermittent tachyarrhythmias controlled with metoprolol in the past and with worsening control due to infection along with holding beta-blocker given initial hypotension which resolved with IV beta-blocker push.  Blood pressure increased enough to initiate start of metoprolol. Stepped down to floor. Patient continued to have episodes of paroxymal tachycardia. Cardiology consulted. Per their eval, patient has paroxysmal AFib. Recommend GI eval to clear patient for anticoagulation given underlying, uncomplicated cirrhosis of liver. Completed 7 days of oseltamivir. Slow to wean off O2. Trial of steroids was done.  GI ordered US liver to R/O portal HTN,may need EGD to R/O  esophageal varices's.patient's HR was increased occasionally,,procedure could not be performed,spoke with cardiology BB is increased,still had over night some occasional increased ER,but currently HR is optimized and stable,patient is medically clear proceed with procedure.EGD done,shoe no sign of varices,she was started on OAC with eliquis,side affect with irreversible bleeding explained to patient and family,comparison with coumadin was done,patient and family was agree with taking eliquis,.  She remains stable,did well with PT,OT,was on RA at DC time.  Patient has paul discharged home with HH and follow up with PCP in next few days.

## 2019-10-17 NOTE — NURSING
Pt c/o SOB; pt noted with RR in the 30s; inspiratory wheezes noted.  Sats 89-90% on 3 L with exertion; 96% when pt is resting still.  Pt states that she feels like she cannot catch her breathe.  MD notified; awaiting orders.

## 2019-10-17 NOTE — SUBJECTIVE & OBJECTIVE
Interval History:  Patient was stepped down to the floor but was held given heart rate jumped to the 170 status post IV metoprolol push x1 will return to normal sinus rhythm and heart rate in the 70s.    Review of Systems   Constitutional: Negative for chills and fever.   Respiratory: Positive for shortness of breath. Negative for cough.    Cardiovascular: Negative for chest pain.     Objective:     Vital Signs (Most Recent):  Temp: 99.4 °F (37.4 °C) (10/16/19 2301)  Pulse: 78 (10/17/19 0100)  Resp: (!) 23 (10/17/19 0100)  BP: 126/60 (10/17/19 0100)  SpO2: 96 % (10/17/19 0100) Vital Signs (24h Range):  Temp:  [98.5 °F (36.9 °C)-99.4 °F (37.4 °C)] 99.4 °F (37.4 °C)  Pulse:  [] 78  Resp:  [7-35] 23  SpO2:  [89 %-97 %] 96 %  BP: ()/() 126/60     Weight: 69 kg (152 lb 1.9 oz)  Body mass index is 26.95 kg/m².    Intake/Output Summary (Last 24 hours) at 10/17/2019 0136  Last data filed at 10/16/2019 1834  Gross per 24 hour   Intake 1385 ml   Output 2070 ml   Net -685 ml      Physical Exam   Constitutional: She is oriented to person, place, and time. She appears well-developed. No distress.   Eyes: Pupils are equal, round, and reactive to light.   Neck: Normal range of motion.   Cardiovascular: Normal rate.   Pulmonary/Chest:   Slightly coarse breath sounds but with good air movement, no appreciable wheezing   Abdominal: Soft. Bowel sounds are normal. She exhibits no distension and no mass. There is no tenderness. There is no guarding.   Musculoskeletal: Normal range of motion.   Neurological: She is alert and oriented to person, place, and time.   Skin: Skin is warm and dry. Capillary refill takes less than 2 seconds. She is not diaphoretic.   Psychiatric: She has a normal mood and affect. Her behavior is normal. Thought content normal.       Significant Labs: All pertinent labs within the past 24 hours have been reviewed.    Significant Imaging: I have reviewed and interpreted all pertinent imaging  results/findings within the past 24 hours.

## 2019-10-17 NOTE — PLAN OF CARE
Problem: Infection  Goal: Infection Symptom Resolution  Outcome: Ongoing, Progressing  Intervention: Prevent or Manage Infection  Flowsheets (Taken 10/17/2019 1607)  Infection Management: aseptic technique maintained  Isolation Precautions: droplet precautions maintained     Problem: Infection  Goal: Infection Symptom Resolution  Intervention: Prevent or Manage Infection  Flowsheets (Taken 10/17/2019 1607)  Infection Management: aseptic technique maintained  Isolation Precautions: droplet precautions maintained

## 2019-10-17 NOTE — NURSING TRANSFER
Nursing Transfer Note      10/17/2019     Transfer transfer from room 278 to room 316  Transfer via wheelchair    Transfer with  to O2, cardiac monitoring    Transported by staff    Medicines sent: na    Chart send with patient: Yes    Notified: daughter    Patient reassessed at:  (date, time)    Upon arrival to floor: cardiac monitor applied, patient oriented to room, call bell in reach and bed in lowest position

## 2019-10-18 PROBLEM — A41.3: Status: ACTIVE | Noted: 2019-10-15

## 2019-10-18 PROBLEM — R50.9 FEVER: Status: RESOLVED | Noted: 2019-10-15 | Resolved: 2019-10-18

## 2019-10-18 LAB
ANION GAP SERPL CALC-SCNC: 7 MMOL/L (ref 8–16)
BASOPHILS # BLD AUTO: 0.01 K/UL (ref 0–0.2)
BASOPHILS NFR BLD: 0.3 % (ref 0–1.9)
BUN SERPL-MCNC: 8 MG/DL (ref 8–23)
CALCIUM SERPL-MCNC: 7.9 MG/DL (ref 8.7–10.5)
CHLORIDE SERPL-SCNC: 97 MMOL/L (ref 95–110)
CO2 SERPL-SCNC: 34 MMOL/L (ref 23–29)
CREAT SERPL-MCNC: 0.5 MG/DL (ref 0.5–1.4)
DIFFERENTIAL METHOD: ABNORMAL
EOSINOPHIL # BLD AUTO: 0 K/UL (ref 0–0.5)
EOSINOPHIL NFR BLD: 0.3 % (ref 0–8)
ERYTHROCYTE [DISTWIDTH] IN BLOOD BY AUTOMATED COUNT: 13.3 % (ref 11.5–14.5)
EST. GFR  (AFRICAN AMERICAN): >60 ML/MIN/1.73 M^2
EST. GFR  (NON AFRICAN AMERICAN): >60 ML/MIN/1.73 M^2
GLUCOSE SERPL-MCNC: 106 MG/DL (ref 70–110)
HCT VFR BLD AUTO: 38 % (ref 37–48.5)
HGB BLD-MCNC: 12.3 G/DL (ref 12–16)
IMM GRANULOCYTES # BLD AUTO: 0.02 K/UL (ref 0–0.04)
IMM GRANULOCYTES NFR BLD AUTO: 0.5 % (ref 0–0.5)
LYMPHOCYTES # BLD AUTO: 1.2 K/UL (ref 1–4.8)
LYMPHOCYTES NFR BLD: 30.8 % (ref 18–48)
MCH RBC QN AUTO: 30.4 PG (ref 27–31)
MCHC RBC AUTO-ENTMCNC: 32.4 G/DL (ref 32–36)
MCV RBC AUTO: 94 FL (ref 82–98)
MONOCYTES # BLD AUTO: 0.3 K/UL (ref 0.3–1)
MONOCYTES NFR BLD: 7.7 % (ref 4–15)
NEUTROPHILS # BLD AUTO: 2.4 K/UL (ref 1.8–7.7)
NEUTROPHILS NFR BLD: 60.4 % (ref 38–73)
NRBC BLD-RTO: 0 /100 WBC
PLATELET # BLD AUTO: 57 K/UL (ref 150–350)
PLATELET BLD QL SMEAR: ABNORMAL
PMV BLD AUTO: 9.7 FL (ref 9.2–12.9)
POTASSIUM SERPL-SCNC: 3.7 MMOL/L (ref 3.5–5.1)
RBC # BLD AUTO: 4.05 M/UL (ref 4–5.4)
SODIUM SERPL-SCNC: 138 MMOL/L (ref 136–145)
WBC # BLD AUTO: 3.89 K/UL (ref 3.9–12.7)

## 2019-10-18 PROCEDURE — 25000003 PHARM REV CODE 250: Mod: HCNC | Performed by: HOSPITALIST

## 2019-10-18 PROCEDURE — 80048 BASIC METABOLIC PNL TOTAL CA: CPT | Mod: HCNC

## 2019-10-18 PROCEDURE — 25000242 PHARM REV CODE 250 ALT 637 W/ HCPCS: Mod: HCNC | Performed by: HOSPITALIST

## 2019-10-18 PROCEDURE — 97162 PT EVAL MOD COMPLEX 30 MIN: CPT | Mod: HCNC

## 2019-10-18 PROCEDURE — 97165 OT EVAL LOW COMPLEX 30 MIN: CPT | Mod: HCNC

## 2019-10-18 PROCEDURE — 27000221 HC OXYGEN, UP TO 24 HOURS: Mod: HCNC

## 2019-10-18 PROCEDURE — 36415 COLL VENOUS BLD VENIPUNCTURE: CPT | Mod: HCNC

## 2019-10-18 PROCEDURE — 94761 N-INVAS EAR/PLS OXIMETRY MLT: CPT | Mod: HCNC

## 2019-10-18 PROCEDURE — 21400001 HC TELEMETRY ROOM: Mod: HCNC

## 2019-10-18 PROCEDURE — 85025 COMPLETE CBC W/AUTO DIFF WBC: CPT | Mod: HCNC

## 2019-10-18 PROCEDURE — 94640 AIRWAY INHALATION TREATMENT: CPT | Mod: HCNC

## 2019-10-18 PROCEDURE — 97530 THERAPEUTIC ACTIVITIES: CPT | Mod: HCNC

## 2019-10-18 RX ORDER — IPRATROPIUM BROMIDE AND ALBUTEROL SULFATE 2.5; .5 MG/3ML; MG/3ML
3 SOLUTION RESPIRATORY (INHALATION)
Status: DISCONTINUED | OUTPATIENT
Start: 2019-10-18 | End: 2019-10-23

## 2019-10-18 RX ADMIN — ASPIRIN 81 MG 81 MG: 81 TABLET ORAL at 08:10

## 2019-10-18 RX ADMIN — PANTOPRAZOLE SODIUM 40 MG: 40 TABLET, DELAYED RELEASE ORAL at 08:10

## 2019-10-18 RX ADMIN — IPRATROPIUM BROMIDE AND ALBUTEROL SULFATE 3 ML: .5; 3 SOLUTION RESPIRATORY (INHALATION) at 07:10

## 2019-10-18 RX ADMIN — LEVOTHYROXINE SODIUM 25 MCG: 25 TABLET ORAL at 05:10

## 2019-10-18 RX ADMIN — SERTRALINE HYDROCHLORIDE 25 MG: 25 TABLET ORAL at 08:10

## 2019-10-18 RX ADMIN — BUDESONIDE 0.5 MG: 0.5 INHALANT RESPIRATORY (INHALATION) at 07:10

## 2019-10-18 RX ADMIN — METOPROLOL SUCCINATE 50 MG: 50 TABLET, EXTENDED RELEASE ORAL at 09:10

## 2019-10-18 RX ADMIN — OXYBUTYNIN CHLORIDE 5 MG: 5 TABLET, EXTENDED RELEASE ORAL at 08:10

## 2019-10-18 RX ADMIN — IPRATROPIUM BROMIDE AND ALBUTEROL SULFATE 3 ML: .5; 3 SOLUTION RESPIRATORY (INHALATION) at 02:10

## 2019-10-18 RX ADMIN — OSELTAMIVIR PHOSPHATE 75 MG: 75 CAPSULE ORAL at 08:10

## 2019-10-18 RX ADMIN — RAMELTEON 8 MG: 8 TABLET ORAL at 09:10

## 2019-10-18 RX ADMIN — IPRATROPIUM BROMIDE AND ALBUTEROL SULFATE 3 ML: .5; 3 SOLUTION RESPIRATORY (INHALATION) at 12:10

## 2019-10-18 RX ADMIN — BUDESONIDE 0.5 MG: 0.5 INHALANT RESPIRATORY (INHALATION) at 12:10

## 2019-10-18 NOTE — PLAN OF CARE
Problem: Physical Therapy Goal  Goal: Physical Therapy Goal  Description  Goals to be met by: 19     Patient will increase functional independence with mobility by performin. Supine to sit with Modified [place]  2. Rolling to Left and Right with Modified [place]  3. Sit to stand transfer with Modified [place] using RW  4. Bed to chair transfer with Modified [place] using Rolling Walker  5. Gait >250 feet with Modified [place] using Rolling Walker   6. Lower extremity exercise program x10 reps per handout, with independence     Outcome: Ongoing, Progressing    Pt ambulated ~100 ft with CGA using RW and 3L O2 NC.  Pt with increased HR ~180's bpm during ambulation this am.

## 2019-10-18 NOTE — NURSING
Report Recieved from off going nurse Adelaida Snyder. Patient is resting in Bed, chest rise and fall noted, NAD. Bed in lowest position, wheels locked, call light in reach. IV clean, dry and intact.

## 2019-10-18 NOTE — ASSESSMENT & PLAN NOTE
Patient was initial hypotensive therefore all antihypertensive medications were held  Continue metoprolol. Hold off on antihypertensives

## 2019-10-18 NOTE — NURSING
End of shift bedside report given to KATIA Son. Patient in no apparent distress.     12 hour chart check complete.

## 2019-10-18 NOTE — PROGRESS NOTES
[facility] Hospital Medicine  Progress Note    Patient Name: Sandra Moody  MRN: [id number]  Patient Class: IP- Inpatient   Admission Date: 10/14/2019  Length of Stay: 4 days  Attending Physician: Cheryl Park MD  Primary Care Provider: Rox Montes MD        Subjective:     Principal Problem:Sepsis        HPI:  Sandra Moody is a 89 y.o. female that (in part)  has a past medical history of Allergy, Anxiety, Back pain, Cirrhosis, COPD (chronic obstructive pulmonary disease), Cryptogenic cirrhosis, Cystocele, Depression, Diverticulitis, Dyspnea, Family history of colon cancer, GERD (gastroesophageal reflux disease), History of colonic polyps, Hypertension, Hypothyroidism, Insomnia, Kidney stones, Lung cancer, Lung cancer, upper lobe, Menopause, Nuclear sclerosis, Osteopenia, PSVT (paroxysmal supraventricular tachycardia), PVC (premature ventricular contraction), Rectocele, Status post partial lobectomy of lung, Thyroid disease, Trochanteric bursitis, Urine incontinence, and Vitamin D deficiency disease.  Presents to [facility] Emergency Department initially complaining of nausea with vomiting.  She was taking a nap and she woke up today feeling ill.  Possible sick contacts.  Generalized fatigue, malaise, chills, and nonfocal subacute muscle weakness.  Says arrest Ali worsening.  No known exacerbating factors.  No relieving factors.  Constant duration.      She saw her PCP earlier today for right hip pain and follow-up for chronic medical conditions including cryptogenic cirrhosis.  She also complained of muscle cramps.  She received her influenza vaccine today.    In the emergency department she was noted to be tachycardic, febrile, and hypotensive.  She was given IV fluids.  Routine laboratory studies were obtained.  She had elevated lactic acid levels.  She was given IV fluids and continue to have arterial hypotension.  Her lactic acid remained  elevated at 5.3.  Sepsis protocol was initiated.  Influenza type B serology came back positive.    Hospital medicine has been asked to admit for further evaluation and treatment.     Overview/Hospital Course:  Ms. Moody was admitted with sepsis secondary to influenza B.  Respiratory isolation initiated and treated with Tamiflu.  Chest x-ray with no definitive consolidation, only few scattered linear opacity consistent with subsegmental scarring versus atelectasis.  Empiric cefepime and vancomycin started given severity of sepsis until bacterial infection ruled out.  Blood cultures no growth to date.  Patient did not need pressor support.  Patient with h/o intermittent tachyarrhythmias controlled with metoprolol in the past and with worsening control due to infection along with holding beta-blocker given initial hypotension which resolved with IV beta-blocker push.  Blood pressure increased enough to initiate start of metoprolol.     Interval History:  Patient in normal sinus rhythm with heart rate in the 80s this morning.  She reports difficulty sleeping last night, but otherwise no other complaints.    Review of Systems   Constitutional: Negative for chills and fever.   Respiratory: Positive for shortness of breath. Negative for cough.    Cardiovascular: Negative for chest pain.     Objective:     Vital Signs (Most Recent):  Temp: 99.1 °F (37.3 °C) (10/17/19 0701)  Pulse: 82 (10/17/19 0830)  Resp: 11 (10/17/19 0830)  BP: 125/65 (10/17/19 0830)  SpO2: 97 % (10/17/19 0830) Vital Signs (24h Range):  Temp:  [99 °F (37.2 °C)-99.4 °F (37.4 °C)] 99.1 °F (37.3 °C)  Pulse:  [] 82  Resp:  [4-41] 11  SpO2:  [88 %-98 %] 97 %  BP: (102-149)/(51-88) 125/65     Weight: 69 kg (152 lb 1.9 oz)  Body mass index is 26.95 kg/m².    Intake/Output Summary (Last 24 hours) at 10/17/2019 1021  Last data filed at 10/16/2019 1834  Gross per 24 hour   Intake 420 ml   Output 270 ml   Net 150 ml      Physical Exam   Constitutional:  She is oriented to person, place, and time. She appears well-developed. No distress.   Eyes: Pupils are equal, round, and reactive to light.   Neck: Normal range of motion.   Cardiovascular: Normal rate.   Pulmonary/Chest:   Slightly coarse breath sounds but with good air movement, no appreciable wheezing   Abdominal: Soft. Bowel sounds are normal. She exhibits no distension and no mass. There is no tenderness. There is no guarding.   Musculoskeletal: Normal range of motion.   Neurological: She is alert and oriented to person, place, and time.   Skin: Skin is warm and dry. Capillary refill takes less than 2 seconds. She is not diaphoretic.   Psychiatric: She has a normal mood and affect. Her behavior is normal. Thought content normal.       Significant Labs: All pertinent labs within the past 24 hours have been reviewed.    Significant Imaging: I have reviewed and interpreted all pertinent imaging results/findings within the past 24 hours.      Assessment/Plan:      * Sepsis  Patient met criteria for sepsis secondary to influenza B  Treatment initiated with 5 days of Tamiflu along with respiratory isolation, day 3/5 today  Could not completely rule out concomitant early pneumonia  Therefore continued empiric cefepime and vancomycin until afebrile and blood cultures remain negative x 48 hours  Blood cultures are no growth to date and patient is improving steadily  Afebrile for greater than 24 hr  Will stop all antibiotics today and monitor  Continue supportive care with oxygen and wean as tolerated  Initiate therapy with PT/OT  Possible d/c tomorrow if patient remains AF and continues to do well    Influenza B  On day 3/5 of Tamiflu  Respiratory isolation     Arterial hypotension  Resolved with IV fluid challenge and treatment of underlying condition    Ventricular tachycardia  Secondary to sepsis along with holding beta-blocker therapy  Patient with reported tachyarrhythmias in the past treated with beta-blockers    Status post IV metoprolol push with good response   Resumed home regimen metoprolol and HR controlled     Fever  As discussed above under sepsis    Lactic acidosis  Secondary to sepsis  Treat underlying condition, resolved    Chronic kidney disease, stage II (mild)  Renal function at baseline  Will continue to monitor    Essential hypertension  Patient was initial hypotensive therefore all antihypertensive medications were held  Will restart metoprolol today given blood pressure improved and heart rate being issue      VTE Risk Mitigation (From admission, onward)         Ordered     IP VTE HIGH RISK PATIENT  Once      10/14/19 1858                      Cheryl Park MD  Department of Hospital Medicine   [facility]

## 2019-10-18 NOTE — PLAN OF CARE
Problem: Fall Injury Risk  Goal: Absence of Fall and Fall-Related Injury  Intervention: Identify and Manage Contributors to Fall Injury Risk  Flowsheets (Taken 10/18/2019 1837)  Self-Care Promotion: independence encouraged; BADL personal objects within reach; BADL personal routines maintained; meal setup provided; safe use of adaptive equipment encouraged  Medication Review/Management: medications reviewed  Intervention: Promote Injury-Free Environment  Flowsheets (Taken 10/18/2019 1837)  Safety Promotion/Fall Prevention: assistive device/personal item within reach; bed alarm set; commode/urinal/bedpan at bedside; nonskid shoes/socks when out of bed; medications reviewed; instructed to call staff for mobility; side rails raised x 2; room near unit station  Environmental Safety Modification: assistive device/personal items within reach; lighting adjusted; room near unit station; room organization consistent; clutter free environment maintained     Problem: Fall Injury Risk  Goal: Absence of Fall and Fall-Related Injury  Intervention: Identify and Manage Contributors to Fall Injury Risk  Flowsheets (Taken 10/18/2019 1837)  Self-Care Promotion: independence encouraged; BADL personal objects within reach; BADL personal routines maintained; meal setup provided; safe use of adaptive equipment encouraged  Medication Review/Management: medications reviewed     Problem: Fall Injury Risk  Goal: Absence of Fall and Fall-Related Injury  Intervention: Identify and Manage Contributors to Fall Injury Risk  Flowsheets (Taken 10/18/2019 1837)  Self-Care Promotion: independence encouraged; BADL personal objects within reach; BADL personal routines maintained; meal setup provided; safe use of adaptive equipment encouraged  Medication Review/Management: medications reviewed     Problem: Fall Injury Risk  Goal: Absence of Fall and Fall-Related Injury  Intervention: Promote Injury-Free Environment  Flowsheets (Taken 10/18/2019  1837)  Safety Promotion/Fall Prevention: assistive device/personal item within reach; bed alarm set; commode/urinal/bedpan at bedside; nonskid shoes/socks when out of bed; medications reviewed; instructed to call staff for mobility; side rails raised x 2; room near unit station  Environmental Safety Modification: assistive device/personal items within reach; lighting adjusted; room near unit station; room organization consistent; clutter free environment maintained     Problem: Fall Injury Risk  Goal: Absence of Fall and Fall-Related Injury  Intervention: Promote Injury-Free Environment  Flowsheets (Taken 10/18/2019 1837)  Safety Promotion/Fall Prevention: assistive device/personal item within reach; bed alarm set; commode/urinal/bedpan at bedside; nonskid shoes/socks when out of bed; medications reviewed; instructed to call staff for mobility; side rails raised x 2; room near unit station  Environmental Safety Modification: assistive device/personal items within reach; lighting adjusted; room near unit station; room organization consistent; clutter free environment maintained     Problem: Adult Inpatient Plan of Care  Goal: Plan of Care Review  Flowsheets (Taken 10/18/2019 1837)  Plan of Care Reviewed With: patient; family

## 2019-10-18 NOTE — PT/OT/SLP EVAL
Physical Therapy Evaluation    Patient Name:  Sandra Moody   MRN:  [id number]    Recommendations:     Discharge Recommendations:  home health PT(with family assistance)   Discharge Equipment Recommendations: oxygen; Family deciding on bath bench vs shower chair for home.   Barriers to discharge: None    Assessment:     Sandra Moody is a 89 y.o. female admitted with a medical diagnosis of Sepsis.  She presents with the following impairments/functional limitations:  weakness, impaired endurance, impaired functional mobilty, gait instability, impaired balance, decreased lower extremity function, decreased safety awareness, impaired cardiopulmonary response to activity.    Rehab Prognosis: Good; patient would benefit from acute skilled PT services to address these deficits and reach maximum level of function.    Recent Surgery: * No surgery found *      Plan:     During this hospitalization, patient to be seen daily to address the identified rehab impairments via gait training, therapeutic activities, therapeutic exercises and progress toward the following goals:    · Plan of Care Expires:  11/01/19    Subjective     Chief Complaint: SOB; Pt/family c/o chronic R shoulder and R hip issues 2* arthritis.    Patient/Family Comments/goals: to be independent   Pain/Comfort:  Pain Rating 1: 0/10    Living Environment:  Pt lives alone in a SS house with no concerns.   Prior to admission, patients level of function was mod I with ambulation using str cane PRN.  Pt was driving PTA.  Equipment used at home: cane, straight, rollator, wheelchair.  Upon discharge, patient will have assistance from son and daughter-in-law.    Objective:     Communicated with nurse Hardy prior to session.  Patient found up in chair with peripheral IV, telemetry upon PT entry to room.    General Precautions: Standard, fall, respiratory, droplet, hearing impaired   Orthopedic Precautions:N/A   Braces: N/A     Exams:  · Cognitive  Exam:  Patient was able to follow multiple commands.   · Gross Motor Coordination:  WFL  · Postural Exam:  Patient presented with the following abnormalities:    · -       Rounded shoulders  · Sensation:    · -       Intact  light/touch BLE  · Skin Integrity/Edema:      · -       Skin integrity: Visible skin intact  · -       Edema: None noted BLE  · BLE ROM: WFL  · BLE Strength: WFL    Functional Mobility:  Pt found up in chair after ambulating to the bathroom without O2 and family present.  spO2 on RA ~79% and HR ~95 bpm at rest.  Pt placed back on 3L O2, spO2 increased ~92% at rest.  Pt with generalized weakness and decreased endurance, HR increased ~180's bpm during ambulation.  Pt placed back in bed after therapy, HR ~55 bpm and spO2 on 3L ~95%.     · Bed Mobility:     · Scooting: stand by assistance  · Sit to Supine: stand by assistance  · Transfers:     · Sit to Stand:  stand by assistance with rolling walker  · Gait: Pt ambulated ~100 ft with CGA-SBA using RW and 3L O2 NC.  Pt required 1 standing rest break, HR ~180's bpm and spO2 on 3L 95%.  Pt with increased B hip ER, decreased step length, and decreased beatriz.  Pt with decreased endurance.   · Balance: Pt with fair+ balance.       Therapeutic Activities and Exercises:  Pt/family educated on acute skilled PT services and goals.  Pt/family also educated on DME's for home.  Pt/family informed that pt required O2 supplement at this time.  Pt/family verbalized good understanding.     AM-PAC 6 CLICK MOBILITY  Total Score:21     Patient left with bed in chair position with all lines intact, call button in reach, nurse Hayley notified and son and daughter-in-law present.    GOALS:   Multidisciplinary Problems     Physical Therapy Goals        Problem: Physical Therapy Goal    Goal Priority Disciplines Outcome Goal Variances Interventions   Physical Therapy Goal     PT, PT/OT      Description:  Goals to be met by: 11/1/19     Patient will increase functional  independence with mobility by performin. Supine to sit with Modified [place]  2. Rolling to Left and Right with Modified [place]  3. Sit to stand transfer with Modified [place] using RW  4. Bed to chair transfer with Modified [place] using Rolling Walker  5. Gait >250 feet with Modified [place] using Rolling Walker   6. Lower extremity exercise program x10 reps per handout, with independence                      History:     Past Medical History:   Diagnosis Date    Allergy     Anxiety     Back pain     Cirrhosis     COPD (chronic obstructive pulmonary disease) 10/16/2012    Cryptogenic cirrhosis 2014    Cystocele     Depression     Diverticulitis     Dyspnea 2012    Family history of colon cancer 10/2/2017    GERD (gastroesophageal reflux disease)     History of colonic polyps 10/2/2017    Hypertension     Hypothyroidism 4/10/2014    Insomnia 2013    Kidney stones     Lung cancer     adenocarcinoma, RUL    Lung cancer, upper lobe 3/4/2013    Menopause     AGE 47    Nuclear sclerosis 2014    Osteopenia     dr. lj- fosamax    PSVT (paroxysmal supraventricular tachycardia)     dr. marx    PVC (premature ventricular contraction) 11/10/2012    Rectocele     Status post partial lobectomy of lung 2010    Thyroid disease     hypothyroidism    Trochanteric bursitis 2013    Urine incontinence 2015    Vitamin D deficiency disease 2014       Past Surgical History:   Procedure Laterality Date    ADENOIDECTOMY      APPENDECTOMY      CHOLECYSTECTOMY      COLONOSCOPY      CYSTOSCOPY      EYE SURGERY Bilateral     HYSTERECTOMY      LUNG LOBECTOMY Right     OOPHORECTOMY      TONSILLECTOMY      UPPER GASTROINTESTINAL ENDOSCOPY         Time Tracking:     PT Received On: 10/18/19  PT Start Time: 53     PT Stop Time: 1019  PT Total Time (min): 26 min     Billable Minutes: Evaluation 16 min co-eval with OT and  Therapeutic Activity 10 min      Naz Quezada, PT  10/18/2019

## 2019-10-18 NOTE — PLAN OF CARE
Problem: Occupational Therapy Goal  Goal: Occupational Therapy Goal  Description  Goals to be met by: 11/1/19    Patient will increase functional independence with ADLs by performing:    UE Dressing with Modified [place].  LE Dressing with Modified [place].  Grooming while standing at sink with Modified [place].  Supine to sit with Modified [place].  Step transfer with Modified [place]  Toilet transfer to toilet with Modified [place].  Upper extremity exercise program x10 reps per handout, with assistance as needed.  Educate the patient re: Home safety and Energy Conservation     Outcome: Ongoing, Progressing   The patient will benefit from OT to address functional deficits. The patient was encouraged to sit in the chair for meals and request nursing assist to amb to the bathroom.

## 2019-10-18 NOTE — PROVIDER TRANSFER
89 y/o F was admitted with sepsis secondary to influenza B.  Respiratory isolation initiated and treated with Tamiflu. Chest x-ray with no definitive consolidation, only few scattered linear opacity consistent with subsegmental scarring versus atelectasis.  Empiric cefepime and vancomycin started given severity of sepsis until bacterial infection ruled out. Blood cultures no growth to date.  Patient did not need pressor support.  Patient with h/o intermittent tachyarrhythmias controlled with metoprolol in the past and with worsening control due to infection along with holding beta-blocker given initial hypotension which resolved with IV beta-blocker push.  Blood pressure increased enough to initiate re-start of metoprolol with no further issues. All antibiotics stopped on 10/17 and stepped down to floor. Observe off antibiotics, PT/OT consulted and will likely need at least home health, follow up on cultures, and she will need to complete 5 days total of Tamiflu. Possible discharge in am depending on continued improvement and test results along with therapy recommendations.    LULU Park MD

## 2019-10-18 NOTE — PROGRESS NOTES
[facility] Hospital Medicine  Progress Note    Patient Name: Sandra Moody  MRN: [id number]  Patient Class: IP- Inpatient   Admission Date: 10/14/2019  Length of Stay: 4 days  Attending Physician: Nicole Huston MD  Primary Care Provider: Rox Montes MD        Subjective:     Principal Problem:Sepsis due to Haemophilus influenzae type B        HPI:  Sandra Moody is a 89 y.o. female that (in part)  has a past medical history of Allergy, Anxiety, Back pain, Cirrhosis, COPD (chronic obstructive pulmonary disease), Cryptogenic cirrhosis, Cystocele, Depression, Diverticulitis, Dyspnea, Family history of colon cancer, GERD (gastroesophageal reflux disease), History of colonic polyps, Hypertension, Hypothyroidism, Insomnia, Kidney stones, Lung cancer, Lung cancer, upper lobe, Menopause, Nuclear sclerosis, Osteopenia, PSVT (paroxysmal supraventricular tachycardia), PVC (premature ventricular contraction), Rectocele, Status post partial lobectomy of lung, Thyroid disease, Trochanteric bursitis, Urine incontinence, and Vitamin D deficiency disease.  Presents to [facility] Emergency Department initially complaining of nausea with vomiting.  She was taking a nap and she woke up today feeling ill.  Possible sick contacts.  Generalized fatigue, malaise, chills, and nonfocal subacute muscle weakness.  Says arrest Ali worsening.  No known exacerbating factors.  No relieving factors.  Constant duration.      She saw her PCP earlier today for right hip pain and follow-up for chronic medical conditions including cryptogenic cirrhosis.  She also complained of muscle cramps.  She received her influenza vaccine today.    In the emergency department she was noted to be tachycardic, febrile, and hypotensive.  She was given IV fluids.  Routine laboratory studies were obtained.  She had elevated lactic acid levels.  She was given IV fluids and continue to have arterial  hypotension.  Her lactic acid remained elevated at 5.3.  Sepsis protocol was initiated.  Influenza type B serology came back positive.    Hospital medicine has been asked to admit for further evaluation and treatment.     Overview/Hospital Course:  Ms. Moody was admitted with sepsis secondary to influenza B.  Respiratory isolation initiated and treated with Tamiflu.  Chest x-ray with no definitive consolidation, only few scattered linear opacity consistent with subsegmental scarring versus atelectasis.  Empiric cefepime and vancomycin started given severity of sepsis until bacterial infection ruled out.  Blood cultures no growth to date.  Patient did not need pressor support.  Patient with h/o intermittent tachyarrhythmias controlled with metoprolol in the past and with worsening control due to infection along with holding beta-blocker given initial hypotension which resolved with IV beta-blocker push.  Blood pressure increased enough to initiate start of metoprolol.     Interval History:  Patient in normal sinus rhythm with heart rate in the 80s this morning.  She reports difficulty sleeping last night, but otherwise no other complaints.    Review of Systems   Constitutional: Negative for chills and fever.   Respiratory: Positive for shortness of breath (better). Negative for cough.    Cardiovascular: Negative for chest pain.     Objective:     Vital Signs (Most Recent):  Temp: 96.6 °F (35.9 °C) (10/18/19 1226)  Pulse: 70 (10/18/19 1413)  Resp: 16 (10/18/19 1413)  BP: (!) 121/58 (10/18/19 1226)  SpO2: 96 % (10/18/19 1413) Vital Signs (24h Range):  Temp:  [96.6 °F (35.9 °C)-99.1 °F (37.3 °C)] 96.6 °F (35.9 °C)  Pulse:  [70-92] 70  Resp:  [16-18] 16  SpO2:  [91 %-96 %] 96 %  BP: (109-137)/(58-71) 121/58     Weight: 68.9 kg (152 lb)  Body mass index is 26.93 kg/m².    Intake/Output Summary (Last 24 hours) at 10/18/2019 1444  Last data filed at 10/18/2019 0906  Gross per 24 hour   Intake 120 ml   Output --   Net  120 ml      Physical Exam   Constitutional: She is oriented to person, place, and time. She appears well-developed. No distress.   Eyes: Pupils are equal, round, and reactive to light.   Neck: Normal range of motion.   Cardiovascular: Normal rate.   Pulmonary/Chest:   Slightly coarse breath sounds but with good air movement  Slight wheezing  Speaking mid sentences   Abdominal: Soft. Bowel sounds are normal. She exhibits no distension and no mass. There is no tenderness. There is no guarding.   Musculoskeletal: Normal range of motion.   Neurological: She is alert and oriented to person, place, and time.   Skin: Skin is warm and dry. Capillary refill takes less than 2 seconds. She is not diaphoretic.   Psychiatric: She has a normal mood and affect. Her behavior is normal. Thought content normal.       Significant Labs: All pertinent labs within the past 24 hours have been reviewed.    Significant Imaging: I have reviewed and interpreted all pertinent imaging results/findings within the past 24 hours.      Assessment/Plan:      * Sepsis due to Haemophilus influenzae type B  Patient met criteria for sepsis secondary to influenza B  Treatment initiated with 5 days of Tamiflu along with respiratory isolation, day 4/5 today  Gradually improving but still dependent on supplemental O2  Will obtain CXR and continue bronchodilators  Ok to keep off abx as superimposed bacterial infection unlikely at this point  Activity as tolerated      Influenza B  On day 4/5 of Tamiflu  Respiratory isolation   Management as above      Arterial hypotension  Resolved with IV fluid challenge and treatment of underlying condition    Ventricular tachycardia  Secondary to sepsis along with holding beta-blocker therapy  Patient with reported tachyarrhythmias in the past treated with beta-blockers   Status post IV metoprolol push with good response   Resumed home regimen metoprolol and HR controlled   Stable. Continue cardiac monitoring    Severe  sepsis        Lactic acidosis  Secondary to sepsis  Treat underlying condition, resolved      Chronic kidney disease, stage II (mild)  Renal function at baseline  Will continue to monitor    Essential hypertension  Patient was initial hypotensive therefore all antihypertensive medications were held  Continue metoprolol. Hold off on antihypertensives    VTE Risk Mitigation (From admission, onward)         Ordered     IP VTE HIGH RISK PATIENT  Once      10/14/19 7590                Dispo: home with HH once weaned off supplemental O2    Nicole Marroquin MD  Department of Hospital Medicine   [facility]

## 2019-10-18 NOTE — PT/OT/SLP EVAL
Occupational Therapy   Evaluation    Name: Sandra Moody  MRN: [id number]  Admitting Diagnosis:  Sepsis      Recommendations:     Discharge Recommendations: home health OT(with family assist)  Discharge Equipment Recommendations:  oxygen  Barriers to discharge:       Assessment:     Sandra Moody is a 89 y.o. female with a medical diagnosis of Sepsis.  She presents with self care and functional mobility deficits with SOB with amb and elevated HR (190's). Performance deficits affecting function: weakness, impaired endurance, impaired self care skills, impaired balance, gait instability, impaired functional mobilty, decreased upper extremity function, decreased safety awareness, impaired cardiopulmonary response to activity.      Rehab Prognosis: Good; patient would benefit from acute skilled OT services to address these deficits and reach maximum level of function.       Plan:     Patient to be seen 3 x/week to address the above listed problems via self-care/home management, therapeutic activities, therapeutic exercises  · Plan of Care Expires: 11/01/19  · Plan of Care Reviewed with: patient, son(and dtr-in-law)    Subjective     Chief Complaint: wants to fix her hair  Patient/Family Comments/goals: go home with family assist.       Occupational Profile:  Living Environment: The patient lives alone in a SS house with 1 DAVIE. The patient has a tub/shower comb and a walk in shower.  Previous level of function: Mod I amb and self care  Roles and Routines: The patient drives and prepares food. Per family, the patient gets tired easily.  Equipment Used at Home:  cane, straight, rollator, wheelchair  Assistance upon Discharge: son, dtr-in-law , daughter    Pain/Comfort:  · Pain Rating 1: 0/10    Patients cultural, spiritual, Hinduism conflicts given the current situation: no    Objective:     Communicated with: nurse prior to session.  Patient found up in chair with peripheral IV, telemetry(O2 was off )  upon OT entry to room.    General Precautions: Standard, fall, respiratory, droplet, hearing impaired   Orthopedic Precautions:N/A   Braces: N/A     Occupational Performance:    Bed Mobility:    · Patient completed Scooting/Bridging with stand by assistance  · Patient completed Sit to Supine with contact guard assistance and with leg lift    Functional Mobility/Transfers:  · Patient completed Sit <> Stand Transfer with stand by assistance  with  rolling walker   · Functional Mobility: The patient amb using a RW with CGA and 3 L NC. The patient with elevated HR with amb from 90's-190's. HR decreased with rest.    Activities of Daily Living:  · Grooming: The patient combed her hair while seated on the EOB.    · Upper Body Dressing: minimum assistance to don back gown  · Lower Body Dressing: stand by assistance to don/doff B socks    Cognitive/Visual Perceptual:  Cognitive/Psychosocial Skills:     -       Oriented to: Person, Place and Situation   -       Follows Commands/attention:Follows two-step commands with some difficulty 2* [place]  -       Communication: clear/fluent  -       Memory: No Deficits noted  -       Safety awareness/insight to disability: impaired   -       Mood/Affect/Coping skills/emotional control: Appropriate to situation    Physical Exam:  Balance: -       fair+  Postural examination/scapula alignment:    -       No postural abnormalities identified  Skin integrity: Visible skin intact  Edema:  None noted  Upper Extremity Range of Motion:     -       Right Upper Extremity: WFL (with c/o arthritis in right shldr)  -       Left Upper Extremity: WFL  Upper Extremity Strength:    -       Right Upper Extremity: WFL  -       Left Upper Extremity: WFL    AMPAC 6 Click ADL:  AMPAC Total Score: 21    Treatment & Education:  The patient participated in OT eval. The patient and family were educated re: OT role and POC. The patient was encouraged to sit in the chair for meals and request nursing assist to amb  to the bathroom.  Education:    Patient left HOB elevated with all lines intact, call button in reach and family present    GOALS:   Multidisciplinary Problems     Occupational Therapy Goals        Problem: Occupational Therapy Goal    Goal Priority Disciplines Outcome Interventions   Occupational Therapy Goal     OT, PT/OT Ongoing, Progressing    Description:  Goals to be met by: 11/1/19    Patient will increase functional independence with ADLs by performing:    UE Dressing with Modified [place].  LE Dressing with Modified [place].  Grooming while standing at sink with Modified [place].  Supine to sit with Modified [place].  Step transfer with Modified [place]  Toilet transfer to toilet with Modified [place].  Upper extremity exercise program x10 reps per handout, with assistance as needed.  Educate the patient re: Home safety and Energy Conservation                      History:     Past Medical History:   Diagnosis Date    Allergy     Anxiety     Back pain     Cirrhosis     COPD (chronic obstructive pulmonary disease) 10/16/2012    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    Cystocele     Depression     Diverticulitis     Dyspnea 11/8/2012    Family history of colon cancer 10/2/2017    GERD (gastroesophageal reflux disease)     History of colonic polyps 10/2/2017    Hypertension     Hypothyroidism 4/10/2014    Insomnia 4/18/2013    Kidney stones     Lung cancer     adenocarcinoma, RUL    Lung cancer, upper lobe 3/4/2013    Menopause     AGE 47    Nuclear sclerosis 7/17/2014    Osteopenia     dr. lj- fosamax    PSVT (paroxysmal supraventricular tachycardia)     dr. marx    PVC (premature ventricular contraction) 11/10/2012    Rectocele     Status post partial lobectomy of lung 2/2010    Thyroid disease     hypothyroidism    Trochanteric bursitis 4/18/2013    Urine incontinence 2/24/2015    Vitamin D deficiency disease 9/4/2014       Past Surgical  History:   Procedure Laterality Date    ADENOIDECTOMY      APPENDECTOMY      CHOLECYSTECTOMY      COLONOSCOPY      CYSTOSCOPY      EYE SURGERY Bilateral     HYSTERECTOMY      LUNG LOBECTOMY Right     OOPHORECTOMY      TONSILLECTOMY      UPPER GASTROINTESTINAL ENDOSCOPY         Time Tracking:     OT Date of Treatment: 10/18/19  OT Start Time: 0955  OT Stop Time: 1019  OT Total Time (min): 24 min    Billable Minutes:Evaluation 15 (with PT)  Total Time 24    Susantoño Patel OT  10/18/2019

## 2019-10-18 NOTE — PLAN OF CARE
10/18/19 1640   Discharge Reassessment   Assessment Type Discharge Planning Reassessment   Provided patient/caregiver education on the expected discharge date and the discharge plan No   Do you have any problems affording any of your prescribed medications? No   Discharge Plan A Home Health   Discharge Plan B Home Health   DME Needed Upon Discharge  oxygen   Patient choice form signed by patient/caregiver No   Anticipated Discharge Disposition Home  (Will need home health at discharge.)   Can the patient answer the patient profile reliably? Yes, cognitively intact   How does the patient rate their overall health at the present time? Fair   Describe the patient's ability to walk at the present time. Minor restrictions or changes   How often would a person be available to care for the patient? Whenever needed   Number of comorbid conditions (as recorded on the chart) Five or more   During the past month, has the patient often been bothered by feeling down, depressed or hopeless? No   During the past month, has the patient often been bothered by little interest or pleasure in doing things? No   Post-Acute Status   Post-Acute Authorization Placement;Home Health/Hospice   Post-Acute Placement Status Awaiting Internal Medical Clearance   Home Health/Hospice Status Awaiting Internal Medical Clearance

## 2019-10-18 NOTE — ASSESSMENT & PLAN NOTE
Patient met criteria for sepsis secondary to influenza B  Treatment initiated with 5 days of Tamiflu along with respiratory isolation, day 4/5 today  Gradually improving but still dependent on supplemental O2  Will obtain CXR and continue bronchodilators  Ok to keep off abx as superimposed bacterial infection unlikely at this point  Activity as tolerated

## 2019-10-18 NOTE — ASSESSMENT & PLAN NOTE
Secondary to sepsis along with holding beta-blocker therapy  Patient with reported tachyarrhythmias in the past treated with beta-blockers   Status post IV metoprolol push with good response   Resumed home regimen metoprolol and HR controlled

## 2019-10-18 NOTE — SUBJECTIVE & OBJECTIVE
Interval History:  Patient in normal sinus rhythm with heart rate in the 80s this morning.  She reports difficulty sleeping last night, but otherwise no other complaints.    Review of Systems   Constitutional: Negative for chills and fever.   Respiratory: Positive for shortness of breath (better). Negative for cough.    Cardiovascular: Negative for chest pain.     Objective:     Vital Signs (Most Recent):  Temp: 96.6 °F (35.9 °C) (10/18/19 1226)  Pulse: 70 (10/18/19 1413)  Resp: 16 (10/18/19 1413)  BP: (!) 121/58 (10/18/19 1226)  SpO2: 96 % (10/18/19 1413) Vital Signs (24h Range):  Temp:  [96.6 °F (35.9 °C)-99.1 °F (37.3 °C)] 96.6 °F (35.9 °C)  Pulse:  [70-92] 70  Resp:  [16-18] 16  SpO2:  [91 %-96 %] 96 %  BP: (109-137)/(58-71) 121/58     Weight: 68.9 kg (152 lb)  Body mass index is 26.93 kg/m².    Intake/Output Summary (Last 24 hours) at 10/18/2019 1444  Last data filed at 10/18/2019 0906  Gross per 24 hour   Intake 120 ml   Output --   Net 120 ml      Physical Exam   Constitutional: She is oriented to person, place, and time. She appears well-developed. No distress.   Eyes: Pupils are equal, round, and reactive to light.   Neck: Normal range of motion.   Cardiovascular: Normal rate.   Pulmonary/Chest:   Slightly coarse breath sounds but with good air movement  Slight wheezing  Speaking mid sentences   Abdominal: Soft. Bowel sounds are normal. She exhibits no distension and no mass. There is no tenderness. There is no guarding.   Musculoskeletal: Normal range of motion.   Neurological: She is alert and oriented to person, place, and time.   Skin: Skin is warm and dry. Capillary refill takes less than 2 seconds. She is not diaphoretic.   Psychiatric: She has a normal mood and affect. Her behavior is normal. Thought content normal.       Significant Labs: All pertinent labs within the past 24 hours have been reviewed.    Significant Imaging: I have reviewed and interpreted all pertinent imaging results/findings  within the past 24 hours.

## 2019-10-18 NOTE — ASSESSMENT & PLAN NOTE
Secondary to sepsis along with holding beta-blocker therapy  Patient with reported tachyarrhythmias in the past treated with beta-blockers   Status post IV metoprolol push with good response   Resumed home regimen metoprolol and HR controlled   Stable. Continue cardiac monitoring

## 2019-10-19 LAB
ANION GAP SERPL CALC-SCNC: 9 MMOL/L (ref 8–16)
BACTERIA BLD CULT: NORMAL
BACTERIA BLD CULT: NORMAL
BASOPHILS # BLD AUTO: 0 K/UL (ref 0–0.2)
BASOPHILS NFR BLD: 0 % (ref 0–1.9)
BUN SERPL-MCNC: 11 MG/DL (ref 8–23)
CALCIUM SERPL-MCNC: 8.2 MG/DL (ref 8.7–10.5)
CHLORIDE SERPL-SCNC: 95 MMOL/L (ref 95–110)
CO2 SERPL-SCNC: 35 MMOL/L (ref 23–29)
CREAT SERPL-MCNC: 0.6 MG/DL (ref 0.5–1.4)
DIFFERENTIAL METHOD: ABNORMAL
EOSINOPHIL # BLD AUTO: 0 K/UL (ref 0–0.5)
EOSINOPHIL NFR BLD: 0.7 % (ref 0–8)
ERYTHROCYTE [DISTWIDTH] IN BLOOD BY AUTOMATED COUNT: 13.2 % (ref 11.5–14.5)
EST. GFR  (AFRICAN AMERICAN): >60 ML/MIN/1.73 M^2
EST. GFR  (NON AFRICAN AMERICAN): >60 ML/MIN/1.73 M^2
GLUCOSE SERPL-MCNC: 140 MG/DL (ref 70–110)
HCT VFR BLD AUTO: 42.4 % (ref 37–48.5)
HGB BLD-MCNC: 13.6 G/DL (ref 12–16)
IMM GRANULOCYTES # BLD AUTO: 0.03 K/UL (ref 0–0.04)
IMM GRANULOCYTES NFR BLD AUTO: 0.7 % (ref 0–0.5)
LYMPHOCYTES # BLD AUTO: 0.8 K/UL (ref 1–4.8)
LYMPHOCYTES NFR BLD: 17.4 % (ref 18–48)
MCH RBC QN AUTO: 29.7 PG (ref 27–31)
MCHC RBC AUTO-ENTMCNC: 32.1 G/DL (ref 32–36)
MCV RBC AUTO: 93 FL (ref 82–98)
MONOCYTES # BLD AUTO: 0.2 K/UL (ref 0.3–1)
MONOCYTES NFR BLD: 4.8 % (ref 4–15)
NEUTROPHILS # BLD AUTO: 3.4 K/UL (ref 1.8–7.7)
NEUTROPHILS NFR BLD: 76.4 % (ref 38–73)
NRBC BLD-RTO: 0 /100 WBC
PLATELET # BLD AUTO: 81 K/UL (ref 150–350)
PMV BLD AUTO: 10.4 FL (ref 9.2–12.9)
POTASSIUM SERPL-SCNC: 3.2 MMOL/L (ref 3.5–5.1)
RBC # BLD AUTO: 4.58 M/UL (ref 4–5.4)
SODIUM SERPL-SCNC: 139 MMOL/L (ref 136–145)
WBC # BLD AUTO: 4.38 K/UL (ref 3.9–12.7)

## 2019-10-19 PROCEDURE — 97110 THERAPEUTIC EXERCISES: CPT | Mod: HCNC

## 2019-10-19 PROCEDURE — 94640 AIRWAY INHALATION TREATMENT: CPT | Mod: HCNC

## 2019-10-19 PROCEDURE — 25000242 PHARM REV CODE 250 ALT 637 W/ HCPCS: Mod: HCNC | Performed by: HOSPITALIST

## 2019-10-19 PROCEDURE — 25000003 PHARM REV CODE 250: Mod: HCNC | Performed by: HOSPITALIST

## 2019-10-19 PROCEDURE — 94761 N-INVAS EAR/PLS OXIMETRY MLT: CPT | Mod: HCNC

## 2019-10-19 PROCEDURE — 80048 BASIC METABOLIC PNL TOTAL CA: CPT | Mod: HCNC

## 2019-10-19 PROCEDURE — 27000221 HC OXYGEN, UP TO 24 HOURS: Mod: HCNC

## 2019-10-19 PROCEDURE — 63600175 PHARM REV CODE 636 W HCPCS: Mod: HCNC | Performed by: HOSPITALIST

## 2019-10-19 PROCEDURE — 85025 COMPLETE CBC W/AUTO DIFF WBC: CPT | Mod: HCNC

## 2019-10-19 PROCEDURE — 36415 COLL VENOUS BLD VENIPUNCTURE: CPT | Mod: HCNC

## 2019-10-19 PROCEDURE — 97116 GAIT TRAINING THERAPY: CPT | Mod: HCNC

## 2019-10-19 PROCEDURE — 21400001 HC TELEMETRY ROOM: Mod: HCNC

## 2019-10-19 RX ADMIN — OSELTAMIVIR PHOSPHATE 75 MG: 75 CAPSULE ORAL at 08:10

## 2019-10-19 RX ADMIN — ASPIRIN 81 MG 81 MG: 81 TABLET ORAL at 09:10

## 2019-10-19 RX ADMIN — BUDESONIDE 0.5 MG: 0.5 INHALANT RESPIRATORY (INHALATION) at 07:10

## 2019-10-19 RX ADMIN — LEVOTHYROXINE SODIUM 25 MCG: 25 TABLET ORAL at 06:10

## 2019-10-19 RX ADMIN — IPRATROPIUM BROMIDE AND ALBUTEROL SULFATE 3 ML: .5; 3 SOLUTION RESPIRATORY (INHALATION) at 07:10

## 2019-10-19 RX ADMIN — OSELTAMIVIR PHOSPHATE 75 MG: 75 CAPSULE ORAL at 09:10

## 2019-10-19 RX ADMIN — SERTRALINE HYDROCHLORIDE 25 MG: 25 TABLET ORAL at 09:10

## 2019-10-19 RX ADMIN — RAMELTEON 8 MG: 8 TABLET ORAL at 10:10

## 2019-10-19 RX ADMIN — PANTOPRAZOLE SODIUM 40 MG: 40 TABLET, DELAYED RELEASE ORAL at 09:10

## 2019-10-19 RX ADMIN — IPRATROPIUM BROMIDE AND ALBUTEROL SULFATE 3 ML: .5; 3 SOLUTION RESPIRATORY (INHALATION) at 01:10

## 2019-10-19 RX ADMIN — OXYBUTYNIN CHLORIDE 5 MG: 5 TABLET, EXTENDED RELEASE ORAL at 09:10

## 2019-10-19 RX ADMIN — METOPROLOL SUCCINATE 50 MG: 50 TABLET, EXTENDED RELEASE ORAL at 09:10

## 2019-10-19 RX ADMIN — PROMETHAZINE HYDROCHLORIDE 6.25 MG: 25 INJECTION INTRAMUSCULAR; INTRAVENOUS at 06:10

## 2019-10-19 RX ADMIN — ONDANSETRON 8 MG: 4 TABLET, ORALLY DISINTEGRATING ORAL at 04:10

## 2019-10-19 NOTE — NURSING
Report given to oncoming nurse Sofiya RN, Patient is awake and alert. Daughter at the bedside. Bed is in lowest position, wheels locked, call light is in reach. 12 hour chart check completed

## 2019-10-19 NOTE — PLAN OF CARE
Patient is currently on a 3 lpm nasal cannula with oxygen saturation of 90%.  No apparent distress noted at present time.  Will administer Respiratory treatments as ordered.  Will continue to monitor.

## 2019-10-19 NOTE — PLAN OF CARE
Problem: Physical Therapy Goal  Goal: Physical Therapy Goal  Description  Goals to be met by: 19     Patient will increase functional independence with mobility by performin. Supine to sit with Modified [place]  2. Rolling to Left and Right with Modified [place]  3. Sit to stand transfer with Modified [place] using RW  4. Bed to chair transfer with Modified [place] using Rolling Walker  5. Gait >250 feet with Modified [place] using Rolling Walker   6. Lower extremity exercise program x10 reps per handout, with independence     Outcome: Ongoing, Progressing   Pt ambulated ~ 140 ft with RW, SBA (3L O2NC) .

## 2019-10-19 NOTE — PT/OT/SLP PROGRESS
Physical Therapy Treatment    Patient Name:  Sandra Moody   MRN:  [id number]    Recommendations:     Discharge Recommendations:  home health PT(with family assistance)   Discharge Equipment Recommendations: oxygen   Barriers to discharge: None    Assessment:     Sandra Moody is a 89 y.o. female admitted with a medical diagnosis of Sepsis due to Haemophilus influenzae type B.  She presents with the following impairments/functional limitations:  weakness, impaired endurance, gait instability, impaired balance, decreased upper extremity function, decreased lower extremity function, decreased safety awareness, impaired cardiopulmonary response to activity .    Rehab Prognosis: Good; patient would benefit from acute skilled PT services to address these deficits and reach maximum level of function.    Recent Surgery: * No surgery found *      Plan:     During this hospitalization, patient to be seen daily to address the identified rehab impairments via gait training, therapeutic activities, therapeutic exercises and progress toward the following goals:    · Plan of Care Expires:  11/01/19    Subjective     Chief Complaint: NONE  Patient/Family Comments/goals: to go home  Pain/Comfort:  · Pain Rating 1: 0/10  · Pain Rating Post-Intervention 1: 0/10      Objective:     Communicated with nurse Dennison  prior to session.  Patient found supine with telemetry, oxygen upon PT entry to room.     General Precautions: Standard, fall, respiratory   Orthopedic Precautions:N/A   Braces: N/A     Functional Mobility:  · Bed Mobility:     · Scooting: modified independence  · Supine to Sit: modified independence  · Transfers:     · Sit to Stand:  stand by assistance with rolling walker  · Gait:  Pt ambulated ~ 140 ft with RW, SBA (3LO2NC). Noted with decreased beatriz, decreased step length. V/T cues for safety technique ,walker management and pursed lip breathing technique.   · Balance: fair +        AM-PAC 6 CLICK  MOBILITY  Turning over in bed (including adjusting bedclothes, sheets and blankets)?: 4  Sitting down on and standing up from a chair with arms (e.g., wheelchair, bedside commode, etc.): 4  Moving from lying on back to sitting on the side of the bed?: 4  Moving to and from a bed to a chair (including a wheelchair)?: 4  Need to walk in hospital room?: 3  Climbing 3-5 steps with a railing?: 3  Basic Mobility Total Score: 22       Therapeutic Activities and Exercises:   BLE HEP given with instructions and demonstrations (pt and pt's daughter verbalize understanding). Encouraged pt to perform BLE ex's per handout throughout the day.   Pt performed seated BLE 10 reps x 2 trials:   AP, LAQ, HS,  Hip abd/add with pillow , Hip flexion. V/T's cues for technique and sequence. Pt tolerated well.   Educated pt on safety awareness with all OOB mobility , transfer and gait training.     Patient left up in chair with all lines intact, call button in reach, nurse notified and daughter present..    GOALS:   Multidisciplinary Problems     Physical Therapy Goals        Problem: Physical Therapy Goal    Goal Priority Disciplines Outcome Goal Variances Interventions   Physical Therapy Goal     PT, PT/OT Ongoing, Progressing     Description:  Goals to be met by: 19     Patient will increase functional independence with mobility by performin. Supine to sit with Modified [place]  2. Rolling to Left and Right with Modified [place]  3. Sit to stand transfer with Modified [place] using RW  4. Bed to chair transfer with Modified [place] using Rolling Walker  5. Gait >250 feet with Modified [place] using Rolling Walker   6. Lower extremity exercise program x10 reps per handout, with independence                      Time Tracking:     PT Received On: 10/19/19  PT Start Time: 1335     PT Stop Time: 1405  PT Total Time (min): 30 min     Billable Minutes: Gait Training 15 and Therapeutic Exercise  15    Treatment Type: Treatment  PT/PTA: PTA     PTA Visit Number: 1     Seble Bach, PTA  10/19/2019

## 2019-10-19 NOTE — PT/OT/SLP PROGRESS
Occupational Therapy   Treatment    Name: Sandra Moody  MRN: [id number]  Admitting Diagnosis:  Sepsis due to Haemophilus influenzae type B       Recommendations:     Discharge Recommendations: home health OT(with family assist)  Discharge Equipment Recommendations:  oxygen  Barriers to discharge:  None    Assessment:     Sandra Moody is a 89 y.o. female with a medical diagnosis of Sepsis due to Haemophilus influenzae type B.  She presents as motivated to participate as she takes pride in her independence in daily life. Performance deficits affecting function are weakness, impaired self care skills, impaired balance, decreased safety awareness, impaired endurance, impaired functional mobilty, gait instability, impaired cardiopulmonary response to activity.     Rehab Prognosis:  Good; patient would benefit from acute skilled OT services to address these deficits and reach maximum level of function.       Plan:     Patient to be seen 3 x/week to address the above listed problems via self-care/home management, therapeutic activities, therapeutic exercises  · Plan of Care Expires: 11/01/19  · Plan of Care Reviewed with: patient, family    Subjective     Chief complaint: none reported    Patient's comments/goals: R shoulder hurts today- sleeping on it all night     Pain/Comfort:  · Pain Rating 1: (did not rate)  · Location - Side 1: Right  · Location 1: shoulder  · Pain Addressed 1: Reposition    Objective:     Communicated with: nurseJesi, prior to session.  Patient found supine with oxygen, peripheral IV, telemetry upon OT entry to room.    General Precautions: Standard, fall, respiratory, droplet, hearing impaired   Orthopedic Precautions:N/A   Braces: N/A     Occupational Performance:     Bed Mobility:    · Patient completed Scooting/Bridging with supervision  · Patient completed Supine to Sit with supervision     Functional Mobility/Transfers:  · Patient completed Sit <> Stand Transfer with  contact guard assistance  with  no assistive device   · Functional Mobility: Pt stood to complete UE AROM exercises with CGA for safety.     Activities of Daily Living:  · Pt reported no ADL needs at this time.       [facility] 6 Click ADL: 21    Treatment & Education:  · Pt re-educated on OT role/POC.   · Importance of OOB activity with staff assistance.  · Handout with OT review with pt/family member: safe pacing of ADLs/energy conservation techniques   · Standing with CGA pt completed 1x10:   · Elbow flexion/extenion BUE  · Shoulder flexion/extension LUE only (d/t R shldr pain)   · Shoulder elevation/depression BUE   · Rest breaks in between sets to implement pursed lip breathing technique   · Seated with SUP, pt completed 1x15:   · Forward circles with LUE in abduction at 90*  · Backward circles with LUE in abduction at 90*   · Full ROM LUE ab/dduction   · Pt edu on pain-free range of motion for exercises with RUE d/t shldr pain   · All questions/concerns answered within OT scope of practice       Patient left sitting EOB with all lines intact, call button in reach and family/friend and PCT presentEducation:      GOALS:   Multidisciplinary Problems     Occupational Therapy Goals        Problem: Occupational Therapy Goal    Goal Priority Disciplines Outcome Interventions   Occupational Therapy Goal     OT, PT/OT Ongoing, Progressing    Description:  Goals to be met by: 11/1/19    Patient will increase functional independence with ADLs by performing:    UE Dressing with Modified [place].  LE Dressing with Modified [place].  Grooming while standing at sink with Modified [place].  Supine to sit with Modified [place].  Step transfer with Modified [place]  Toilet transfer to toilet with Modified [place].  Upper extremity exercise program x10 reps per handout, with assistance as needed.  Educate the patient re: Home safety and Energy Conservation                      Time Tracking:     OT Date  of Treatment: 10/19/19  OT Start Time: 1503  OT Stop Time: 1517  OT Total Time (min): 14 min    Billable Minutes:Therapeutic Exercise 14 min    Alexandria Camarena OT  10/19/2019

## 2019-10-19 NOTE — PLAN OF CARE
Problem: Occupational Therapy Goal  Goal: Occupational Therapy Goal  Description  Goals to be met by: 11/1/19    Patient will increase functional independence with ADLs by performing:    UE Dressing with Modified [place].  LE Dressing with Modified [place].  Grooming while standing at sink with Modified [place].  Supine to sit with Modified [place].  Step transfer with Modified [place]  Toilet transfer to toilet with Modified [place].  Upper extremity exercise program x10 reps per handout, with assistance as needed.  Educate the patient re: Home safety and Energy Conservation     Outcome: Ongoing, Progressing   Pt participated well with session. OT and pt reviewed handout outlining safe pacing of ADLs/energy conservation techniques. OT rec. HHOT at d/c to regain PLOF.

## 2019-10-19 NOTE — NURSING
Note that patient awake, alert, and fully oriented c/o nausea; PRN phenergan infusing (given by KATIA Hare);  Patient denies pain; On 3L NC w/ SPO2@94%;    Droplet precautions maintained;   Will continue to f/u;

## 2019-10-19 NOTE — NURSING
Patient's  on telemetry. Upon entering the room patient was vomiting, moaning. She reports she just had zofran given to her. Pulse 135, Slow deep breathing encouraged, relaxation encouraged. HR down to 82. Ice pack provided for comfort.

## 2019-10-19 NOTE — PROGRESS NOTES
[facility] Hospital Medicine  Progress Note    Patient Name: Sandra Moody  MRN: [id number]  Patient Class: IP- Inpatient   Admission Date: 10/14/2019  Length of Stay: 5 days  Attending Physician: Nicole Huston MD  Primary Care Provider: Rox Montes MD        Subjective:     Principal Problem:Sepsis due to Haemophilus influenzae type B        HPI:  Sandra Moody is a 89 y.o. female that (in part)  has a past medical history of Allergy, Anxiety, Back pain, Cirrhosis, COPD (chronic obstructive pulmonary disease), Cryptogenic cirrhosis, Cystocele, Depression, Diverticulitis, Dyspnea, Family history of colon cancer, GERD (gastroesophageal reflux disease), History of colonic polyps, Hypertension, Hypothyroidism, Insomnia, Kidney stones, Lung cancer, Lung cancer, upper lobe, Menopause, Nuclear sclerosis, Osteopenia, PSVT (paroxysmal supraventricular tachycardia), PVC (premature ventricular contraction), Rectocele, Status post partial lobectomy of lung, Thyroid disease, Trochanteric bursitis, Urine incontinence, and Vitamin D deficiency disease.  Presents to [facility] Emergency Department initially complaining of nausea with vomiting.  She was taking a nap and she woke up today feeling ill.  Possible sick contacts.  Generalized fatigue, malaise, chills, and nonfocal subacute muscle weakness.  Says arrest Ali worsening.  No known exacerbating factors.  No relieving factors.  Constant duration.      She saw her PCP earlier today for right hip pain and follow-up for chronic medical conditions including cryptogenic cirrhosis.  She also complained of muscle cramps.  She received her influenza vaccine today.    In the emergency department she was noted to be tachycardic, febrile, and hypotensive.  She was given IV fluids.  Routine laboratory studies were obtained.  She had elevated lactic acid levels.  She was given IV fluids and continue to have arterial  hypotension.  Her lactic acid remained elevated at 5.3.  Sepsis protocol was initiated.  Influenza type B serology came back positive.    Hospital medicine has been asked to admit for further evaluation and treatment.     Overview/Hospital Course:  Ms. Moody was admitted with sepsis secondary to influenza B.  Respiratory isolation initiated and treated with Tamiflu.  Chest x-ray with no definitive consolidation, only few scattered linear opacity consistent with subsegmental scarring versus atelectasis.  Empiric cefepime and vancomycin started given severity of sepsis until bacterial infection ruled out.  Blood cultures no growth to date.  Patient did not need pressor support.  Patient with h/o intermittent tachyarrhythmias controlled with metoprolol in the past and with worsening control due to infection along with holding beta-blocker given initial hypotension which resolved with IV beta-blocker push.  Blood pressure increased enough to initiate start of metoprolol.     Interval History: nausea and vomiting overnight. Feels better now but would like to rest as she did not get much sleep last night.     Review of Systems   Respiratory: Negative.    Cardiovascular: Negative.    Gastrointestinal: Positive for nausea and vomiting.     Objective:     Vital Signs (Most Recent):  Temp: 98.2 °F (36.8 °C) (10/19/19 1519)  Pulse: 80 (10/19/19 1519)  Resp: 18 (10/19/19 1519)  BP: (!) 143/68 (10/19/19 1519)  SpO2: (!) 94 % (10/19/19 1519) Vital Signs (24h Range):  Temp:  [98.2 °F (36.8 °C)-99 °F (37.2 °C)] 98.2 °F (36.8 °C)  Pulse:  [] 80  Resp:  [16-20] 18  SpO2:  [90 %-97 %] 94 %  BP: (112-143)/(57-76) 143/68     Weight: 66 kg (145 lb 8.1 oz)  Body mass index is 25.77 kg/m².    Intake/Output Summary (Last 24 hours) at 10/19/2019 1609  Last data filed at 10/18/2019 1800  Gross per 24 hour   Intake 120 ml   Output 200 ml   Net -80 ml      Physical Exam   Constitutional: She is oriented to person, place, and time.  She appears well-developed. No distress.   Eyes: Pupils are equal, round, and reactive to light.   Neck: Normal range of motion.   Cardiovascular: Normal rate.   Pulmonary/Chest: She has no wheezes. She has no rales.   Speaking in full sentences with low flow NC in place   Abdominal: Soft. Bowel sounds are normal. She exhibits no distension and no mass. There is no tenderness. There is no guarding.   Musculoskeletal: Normal range of motion.   Neurological: She is alert and oriented to person, place, and time.   Skin: Skin is warm and dry. Capillary refill takes less than 2 seconds. She is not diaphoretic.   Psychiatric: She has a normal mood and affect. Her behavior is normal. Thought content normal.   Nursing note and vitals reviewed.      Significant Labs: All pertinent labs within the past 24 hours have been reviewed.    Significant Imaging: I have reviewed all pertinent imaging results/findings within the past 24 hours.  I have reviewed and interpreted all pertinent imaging results/findings within the past 24 hours.      Assessment/Plan:      * Sepsis due to Haemophilus influenzae type B  Patient met criteria for sepsis secondary to influenza B  Treatment initiated with 5 days of Tamiflu along with respiratory isolation, day 5/5 today  Continues to improve from respiratory standpoint. Hopefully at room air by tomorrow  Ok to keep off abx as superimposed bacterial infection unlikely at this point  Activity as tolerated      Influenza B  On day 5/5 of Tamiflu  Respiratory isolation   Management as above      Arterial hypotension  Resolved with IV fluid challenge and treatment of underlying condition    Ventricular tachycardia  Secondary to sepsis along with holding beta-blocker therapy  Patient with reported tachyarrhythmias in the past treated with beta-blockers   Status post IV metoprolol push with good response   Resumed home regimen metoprolol and HR controlled   Stable. Continue cardiac monitoring    Severe  sepsis        Lactic acidosis  Secondary to sepsis  Treat underlying condition, resolved      Chronic kidney disease, stage II (mild)  Renal function at baseline  Will continue to monitor    Essential hypertension  Patient was initial hypotensive therefore all antihypertensive medications were held  Continue metoprolol. Hold off on antihypertensives    VTE Risk Mitigation (From admission, onward)         Ordered     IP VTE HIGH RISK PATIENT  Once      10/14/19 5149                      Nicole Marroquin MD  Department of Hospital Medicine   [facility]

## 2019-10-19 NOTE — PLAN OF CARE
Problem: Fall Injury Risk  Goal: Absence of Fall and Fall-Related Injury  10/19/2019 0713 by Derek Cook RN  Outcome: Ongoing, Progressing  10/19/2019 0712 by Derek Cook RN  Outcome: Ongoing, Progressing  Intervention: Identify and Manage Contributors to Fall Injury Risk  Flowsheets (Taken 10/19/2019 0713)  Self-Care Promotion: independence encouraged; BADL personal routines maintained; BADL personal objects within reach  Medication Review/Management: medications reviewed  Intervention: Promote Injury-Free Environment  Flowsheets (Taken 10/19/2019 0713)  Safety Promotion/Fall Prevention: assistive device/personal item within reach; bed alarm set; instructed to call staff for mobility; side rails raised x 3; nonskid shoes/socks when out of bed; medications reviewed; Fall Risk reviewed with patient/family  Environmental Safety Modification: assistive device/personal items within reach; clutter free environment maintained; room organization consistent

## 2019-10-19 NOTE — NURSING
Pt is lying quietly in bed watching TV with daughter at bedside. NAD noted at this time.  Request for sleep medication later.

## 2019-10-19 NOTE — ASSESSMENT & PLAN NOTE
Patient met criteria for sepsis secondary to influenza B  Treatment initiated with 5 days of Tamiflu along with respiratory isolation, day 5/5 today  Continues to improve from respiratory standpoint. Hopefully at room air by tomorrow  Ok to keep off abx as superimposed bacterial infection unlikely at this point  Activity as tolerated

## 2019-10-19 NOTE — SUBJECTIVE & OBJECTIVE
Interval History: nausea and vomiting overnight. Feels better now but would like to rest as she did not get much sleep last night.     Review of Systems   Respiratory: Negative.    Cardiovascular: Negative.    Gastrointestinal: Positive for nausea and vomiting.     Objective:     Vital Signs (Most Recent):  Temp: 98.2 °F (36.8 °C) (10/19/19 1519)  Pulse: 80 (10/19/19 1519)  Resp: 18 (10/19/19 1519)  BP: (!) 143/68 (10/19/19 1519)  SpO2: (!) 94 % (10/19/19 1519) Vital Signs (24h Range):  Temp:  [98.2 °F (36.8 °C)-99 °F (37.2 °C)] 98.2 °F (36.8 °C)  Pulse:  [] 80  Resp:  [16-20] 18  SpO2:  [90 %-97 %] 94 %  BP: (112-143)/(57-76) 143/68     Weight: 66 kg (145 lb 8.1 oz)  Body mass index is 25.77 kg/m².    Intake/Output Summary (Last 24 hours) at 10/19/2019 1609  Last data filed at 10/18/2019 1800  Gross per 24 hour   Intake 120 ml   Output 200 ml   Net -80 ml      Physical Exam   Constitutional: She is oriented to person, place, and time. She appears well-developed. No distress.   Eyes: Pupils are equal, round, and reactive to light.   Neck: Normal range of motion.   Cardiovascular: Normal rate.   Pulmonary/Chest: She has no wheezes. She has no rales.   Speaking in full sentences with low flow NC in place   Abdominal: Soft. Bowel sounds are normal. She exhibits no distension and no mass. There is no tenderness. There is no guarding.   Musculoskeletal: Normal range of motion.   Neurological: She is alert and oriented to person, place, and time.   Skin: Skin is warm and dry. Capillary refill takes less than 2 seconds. She is not diaphoretic.   Psychiatric: She has a normal mood and affect. Her behavior is normal. Thought content normal.   Nursing note and vitals reviewed.      Significant Labs: All pertinent labs within the past 24 hours have been reviewed.    Significant Imaging: I have reviewed all pertinent imaging results/findings within the past 24 hours.  I have reviewed and interpreted all pertinent imaging  results/findings within the past 24 hours.

## 2019-10-19 NOTE — PROGRESS NOTES
Bedside report given to oncoming nurse VEDA Dennison noted. Pt lying in bed, Respirations even and unlabored. Safety maintained, Call light within reach.     24 hr chart check completed.

## 2019-10-20 PROCEDURE — 94799 UNLISTED PULMONARY SVC/PX: CPT | Mod: HCNC

## 2019-10-20 PROCEDURE — 97116 GAIT TRAINING THERAPY: CPT | Mod: HCNC

## 2019-10-20 PROCEDURE — 25000003 PHARM REV CODE 250: Mod: HCNC | Performed by: HOSPITALIST

## 2019-10-20 PROCEDURE — 93010 ELECTROCARDIOGRAM REPORT: CPT | Mod: HCNC,,, | Performed by: INTERNAL MEDICINE

## 2019-10-20 PROCEDURE — 94640 AIRWAY INHALATION TREATMENT: CPT | Mod: HCNC

## 2019-10-20 PROCEDURE — 99900035 HC TECH TIME PER 15 MIN (STAT): Mod: HCNC

## 2019-10-20 PROCEDURE — 94761 N-INVAS EAR/PLS OXIMETRY MLT: CPT | Mod: HCNC

## 2019-10-20 PROCEDURE — 93005 ELECTROCARDIOGRAM TRACING: CPT | Mod: HCNC

## 2019-10-20 PROCEDURE — 92610 EVALUATE SWALLOWING FUNCTION: CPT | Mod: HCNC

## 2019-10-20 PROCEDURE — 25000242 PHARM REV CODE 250 ALT 637 W/ HCPCS: Mod: HCNC | Performed by: HOSPITALIST

## 2019-10-20 PROCEDURE — 93010 EKG 12-LEAD: ICD-10-PCS | Mod: HCNC,,, | Performed by: INTERNAL MEDICINE

## 2019-10-20 PROCEDURE — 21400001 HC TELEMETRY ROOM: Mod: HCNC

## 2019-10-20 RX ADMIN — OSELTAMIVIR PHOSPHATE 75 MG: 75 CAPSULE ORAL at 09:10

## 2019-10-20 RX ADMIN — OSELTAMIVIR PHOSPHATE 75 MG: 75 CAPSULE ORAL at 08:10

## 2019-10-20 RX ADMIN — IPRATROPIUM BROMIDE AND ALBUTEROL SULFATE 3 ML: .5; 3 SOLUTION RESPIRATORY (INHALATION) at 07:10

## 2019-10-20 RX ADMIN — SERTRALINE HYDROCHLORIDE 25 MG: 25 TABLET ORAL at 08:10

## 2019-10-20 RX ADMIN — RAMELTEON 8 MG: 8 TABLET ORAL at 09:10

## 2019-10-20 RX ADMIN — METOPROLOL SUCCINATE 50 MG: 50 TABLET, EXTENDED RELEASE ORAL at 08:10

## 2019-10-20 RX ADMIN — IPRATROPIUM BROMIDE AND ALBUTEROL SULFATE 3 ML: .5; 3 SOLUTION RESPIRATORY (INHALATION) at 01:10

## 2019-10-20 RX ADMIN — PANTOPRAZOLE SODIUM 40 MG: 40 TABLET, DELAYED RELEASE ORAL at 08:10

## 2019-10-20 RX ADMIN — BUDESONIDE 0.5 MG: 0.5 INHALANT RESPIRATORY (INHALATION) at 07:10

## 2019-10-20 RX ADMIN — OXYBUTYNIN CHLORIDE 5 MG: 5 TABLET, EXTENDED RELEASE ORAL at 08:10

## 2019-10-20 RX ADMIN — ASPIRIN 81 MG 81 MG: 81 TABLET ORAL at 08:10

## 2019-10-20 RX ADMIN — LEVOTHYROXINE SODIUM 25 MCG: 25 TABLET ORAL at 06:10

## 2019-10-20 NOTE — ASSESSMENT & PLAN NOTE
Patient met criteria for sepsis secondary to influenza B  Although improved patient still dependent on supplemental O2  Will extend tamiflu to 7 days and obtain CT of chest for further assessment as she reported Hx of lung cancer s/p lobectomy  States no adjuvant therapy given and has been cancer free since. Last CT of chest was a long time ago, it seems  Activity as tolerated

## 2019-10-20 NOTE — PT/OT/SLP EVAL
Speech Language Pathology Evaluation  Bedside Swallow    Patient Name:  Sandra Moody   MRN:  [id number]  Admitting Diagnosis: Sepsis due to Haemophilus influenzae type B    Recommendations:                 General Recommendations:  Follow-up not indicated  Diet recommendations:  Regular, Thin   Aspiration Precautions: 1 bite/sip at a time   General Precautions: Standard,    Communication strategies:  none    History:   Sandra Moody is a 89 y.o. female that (in part)  has a past medical history of Allergy, Anxiety, Back pain, Cirrhosis, COPD (chronic obstructive pulmonary disease), Cryptogenic cirrhosis, Cystocele, Depression, Diverticulitis, Dyspnea, Family history of colon cancer, GERD (gastroesophageal reflux disease), History of colonic polyps, Hypertension, Hypothyroidism, Insomnia, Kidney stones, Lung cancer, Lung cancer, upper lobe, Menopause, Nuclear sclerosis, Osteopenia, PSVT (paroxysmal supraventricular tachycardia), PVC (premature ventricular contraction), Rectocele, Status post partial lobectomy of lung, Thyroid disease, Trochanteric bursitis, Urine incontinence, and Vitamin D deficiency disease.  Presents to [facility] Department initially complaining of nausea with vomiting.  She was taking a nap and she woke up today feeling ill.  Possible sick contacts.  Generalized fatigue, malaise, chills, and nonfocal subacute muscle weakness.  Says arrest Ali worsening.  No known exacerbating factors.  No relieving factors.  Constant duration.       She saw her PCP earlier today for right hip pain and follow-up for chronic medical conditions including cryptogenic cirrhosis.  She also complained of muscle cramps.  She received her influenza vaccine today.     Past Medical History:   Diagnosis Date    Allergy     Anxiety     Back pain     Cirrhosis     COPD (chronic obstructive pulmonary disease) 10/16/2012    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    Cystocele      Depression     Diverticulitis     Dyspnea 11/8/2012    Family history of colon cancer 10/2/2017    GERD (gastroesophageal reflux disease)     History of colonic polyps 10/2/2017    Hypertension     Hypothyroidism 4/10/2014    Insomnia 4/18/2013    Kidney stones     Lung cancer     adenocarcinoma, RUL    Lung cancer, upper lobe 3/4/2013    Menopause     AGE 47    Nuclear sclerosis 7/17/2014    Osteopenia     dr. lj- fosamax    PSVT (paroxysmal supraventricular tachycardia)     dr. marx    PVC (premature ventricular contraction) 11/10/2012    Rectocele     Status post partial lobectomy of lung 2/2010    Thyroid disease     hypothyroidism    Trochanteric bursitis 4/18/2013    Urine incontinence 2/24/2015    Vitamin D deficiency disease 9/4/2014       Past Surgical History:   Procedure Laterality Date    ADENOIDECTOMY      APPENDECTOMY      CHOLECYSTECTOMY      COLONOSCOPY      CYSTOSCOPY      EYE SURGERY Bilateral     HYSTERECTOMY      LUNG LOBECTOMY Right     OOPHORECTOMY      TONSILLECTOMY      UPPER GASTROINTESTINAL ENDOSCOPY       Social History: Patient (I) for ADLs.    Modified Barium Swallow: n/a    Prior diet: regular with thin    Occupation/hobbies/homemaking: retired     Subjective   Pt reporting this am she had and aspiration incident with doctor present. She reports this happens infrequently, that she was distracted while drawing form straw. She reports no significant difficulty swallow.   Patient goals: return to medical status baseline    Pain/Comfort:  · Pain Rating 1: 0/10    Objective:     Oral Musculature Evaluation  · Oral Musculature: WFL  · Dentition: present and adequate  · Secretion Management: adequate  · Mandibular Strength and Mobility: WFL  · Oral Labial Strength and Mobility: WFL  · Lingual Strength and Mobility: WFL  · Velar Elevation: WFL  · Buccal Strength and Mobility: WFL    Bedside Swallow Eval:   Consistencies Assessed:  · Thin  liquids 6oz across multiple trials via self presented straw  · Solids 1/2 cracker     Oral Phase:   · WFL    Pharyngeal Phase:   · no overt clinical signs/symptoms of aspiration    Compensatory Strategies  · None    Treatment: please note silent aspiration cannot be r/o at bedside     Assessment:     Sandra Moody is a 89 y.o. female with dx Sepsis due to Haemophilus influenzae type B she presents with functional swallow at reported baseline level of function.     Goals:   Multidisciplinary Problems     SLP Goals     Not on file          Multidisciplinary Problems (Resolved)        Problem: SLP Goal    Goal Priority Disciplines Outcome   SLP Goal   (Resolved)    Low SLP Met   Description:  Pt will participate in bedside swallow (goal met 10/20/19                    Plan:     · Plan of Care expires:  10/20/19  · Plan of Care reviewed with:      · SLP Follow-Up:  No       Discharge recommendations:    home  Barriers to Discharge:  None    Time Tracking:     SLP Treatment Date:   10/20/19  Speech Start Time:  1210  Speech Stop Time:  1218     Speech Total Time (min):  8 min    Billable Minutes: Eval Swallow and Oral Function 8    Sandra Angel CCC-SLP  10/20/2019

## 2019-10-20 NOTE — NURSING
Patient is leaving the floor to radiology for scheduled CT scan of chest procedure via wheelchair. NAD noted. O2 at 3 liters via NC. Surgical mask is placed on pt. Transport is also wearing a surgical mask. No IV therapy.

## 2019-10-20 NOTE — PLAN OF CARE
Problem: Infection  Goal: Infection Symptom Resolution  Outcome: Ongoing, Progressing  Intervention: Prevent or Manage Infection  Flowsheets (Taken 10/20/2019 0437)  Fever Reduction/Comfort Measures: fluid intake increased  Infection Management: aseptic technique maintained  Isolation Precautions: droplet precautions maintained

## 2019-10-20 NOTE — PLAN OF CARE
Problem: Infection  Goal: Infection Symptom Resolution  10/20/2019 1708 by Yancy Roy RN  Outcome: Ongoing, Progressing  10/20/2019 1708 by Yancy Roy RN  Reactivated  Intervention: Prevent or Manage Infection  Flowsheets (Taken 10/20/2019 1708)  Fever Reduction/Comfort Measures: medication administered  Infection Management: aseptic technique maintained  Isolation Precautions: droplet precautions maintained   Droplet precautions maintained for flu. Pt has remained afebrile.

## 2019-10-20 NOTE — PROGRESS NOTES
[facility] Hospital Medicine  Progress Note    Patient Name: Sandra Moody  MRN: [id number]  Patient Class: IP- Inpatient   Admission Date: 10/14/2019  Length of Stay: 6 days  Attending Physician: Nicole Huston MD  Primary Care Provider: Rox Montes MD        Subjective:     Principal Problem:Sepsis due to Haemophilus influenzae type B        HPI:  Sandra Moody is a 89 y.o. female that (in part)  has a past medical history of Allergy, Anxiety, Back pain, Cirrhosis, COPD (chronic obstructive pulmonary disease), Cryptogenic cirrhosis, Cystocele, Depression, Diverticulitis, Dyspnea, Family history of colon cancer, GERD (gastroesophageal reflux disease), History of colonic polyps, Hypertension, Hypothyroidism, Insomnia, Kidney stones, Lung cancer, Lung cancer, upper lobe, Menopause, Nuclear sclerosis, Osteopenia, PSVT (paroxysmal supraventricular tachycardia), PVC (premature ventricular contraction), Rectocele, Status post partial lobectomy of lung, Thyroid disease, Trochanteric bursitis, Urine incontinence, and Vitamin D deficiency disease.  Presents to [facility] Emergency Department initially complaining of nausea with vomiting.  She was taking a nap and she woke up today feeling ill.  Possible sick contacts.  Generalized fatigue, malaise, chills, and nonfocal subacute muscle weakness.  Says arrest Ali worsening.  No known exacerbating factors.  No relieving factors.  Constant duration.      She saw her PCP earlier today for right hip pain and follow-up for chronic medical conditions including cryptogenic cirrhosis.  She also complained of muscle cramps.  She received her influenza vaccine today.    In the emergency department she was noted to be tachycardic, febrile, and hypotensive.  She was given IV fluids.  Routine laboratory studies were obtained.  She had elevated lactic acid levels.  She was given IV fluids and continue to have arterial  hypotension.  Her lactic acid remained elevated at 5.3.  Sepsis protocol was initiated.  Influenza type B serology came back positive.    Hospital medicine has been asked to admit for further evaluation and treatment.     Overview/Hospital Course:  Ms. Moody was admitted with sepsis secondary to influenza B.  Respiratory isolation initiated and treated with Tamiflu.  Chest x-ray with no definitive consolidation, only few scattered linear opacity consistent with subsegmental scarring versus atelectasis.  Empiric cefepime and vancomycin started given severity of sepsis until bacterial infection ruled out.  Blood cultures no growth to date.  Patient did not need pressor support.  Patient with h/o intermittent tachyarrhythmias controlled with metoprolol in the past and with worsening control due to infection along with holding beta-blocker given initial hypotension which resolved with IV beta-blocker push.  Blood pressure increased enough to initiate start of metoprolol.     Interval History: nausea but no vomiting. Choked on water earer. Thinks she was too aggressive with the straw. Denies dysphagia. Speech agrees, no dysphagia. Could not be weaned from O2 today. States she has Hx of lung cancer s/p lobectomy > 5 years ago. Also states having cirrhosis but has never required paracentesis.     Review of Systems   Respiratory: Positive for shortness of breath.    Cardiovascular: Negative.    Gastrointestinal: Positive for nausea. Negative for vomiting.     Objective:     Vital Signs (Most Recent):  Temp: 97.5 °F (36.4 °C) (10/20/19 1129)  Pulse: 72 (10/20/19 1129)  Resp: 19 (10/20/19 1129)  BP: 133/60 (10/20/19 1129)  SpO2: 97 % (10/20/19 1129) Vital Signs (24h Range):  Temp:  [97.5 °F (36.4 °C)-98.3 °F (36.8 °C)] 97.5 °F (36.4 °C)  Pulse:  [68-80] 72  Resp:  [16-19] 19  SpO2:  [91 %-97 %] 97 %  BP: (124-157)/(58-77) 133/60     Weight: 67 kg (147 lb 11.3 oz)  Body mass index is 26.17 kg/m².    Intake/Output Summary  (Last 24 hours) at 10/20/2019 1252  Last data filed at 10/20/2019 1158  Gross per 24 hour   Intake 360 ml   Output --   Net 360 ml      Physical Exam   Constitutional: She is oriented to person, place, and time. She appears well-developed. No distress.   Eyes: Pupils are equal, round, and reactive to light.   Neck: Normal range of motion.   Cardiovascular: Normal rate.   Pulmonary/Chest: She has no wheezes. She has no rales.   Speaking in full sentences with low flow NC in place   Abdominal: Soft. Bowel sounds are normal. She exhibits no distension and no mass. There is no tenderness. There is no guarding.   Musculoskeletal: Normal range of motion.   Neurological: She is alert and oriented to person, place, and time.   Skin: Skin is warm and dry. Capillary refill takes less than 2 seconds. She is not diaphoretic.   Psychiatric: She has a normal mood and affect. Her behavior is normal. Thought content normal.   Nursing note and vitals reviewed.      Significant Labs: All pertinent labs within the past 24 hours have been reviewed.    Significant Imaging: I have reviewed all pertinent imaging results/findings within the past 24 hours.  I have reviewed and interpreted all pertinent imaging results/findings within the past 24 hours.      Assessment/Plan:      * Sepsis due to Haemophilus influenzae type B  Patient met criteria for sepsis secondary to influenza B  Although improved patient still dependent on supplemental O2  Will extend tamiflu to 7 days and obtain CT of chest for further assessment as she reported Hx of lung cancer s/p lobectomy  States no adjuvant therapy given and has been cancer free since. Last CT of chest was a long time ago, it seems  Activity as tolerated      Influenza B  Respiratory isolation   Management as above      Arterial hypotension  Resolved with IV fluid challenge and treatment of underlying condition    Ventricular tachycardia  Secondary to sepsis along with holding beta-blocker  therapy  Patient with reported tachyarrhythmias in the past treated with beta-blockers   Status post IV metoprolol push with good response   Resumed home regimen metoprolol and HR controlled   Stable. Continue cardiac monitoring    Severe sepsis        Lactic acidosis  Secondary to sepsis  Treat underlying condition, resolved      Chronic kidney disease, stage II (mild)  Renal function at baseline  Will continue to monitor    Essential hypertension  Patient was initial hypotensive therefore all antihypertensive medications were held  Continue metoprolol. Hold off on antihypertensives    VTE Risk Mitigation (From admission, onward)         Ordered     IP VTE HIGH RISK PATIENT  Once      10/14/19 8099                      Nicole Marroquin MD  Department of Hospital Medicine   [facility]

## 2019-10-20 NOTE — NURSING
Pt is back to room 316 from radiology for scheduled CT scan of chest. Pt has surgical mask in place. O2 at 3 liters NC.

## 2019-10-20 NOTE — PLAN OF CARE
Problem: Physical Therapy Goal  Goal: Physical Therapy Goal  Description  Goals to be met by: 19     Patient will increase functional independence with mobility by performin. Supine to sit with Modified [place]- Met 10/20/19  2. Rolling to Left and Right with Modified Toney  3. Sit to stand transfer with Modified Toney using RW  4. Bed to chair transfer with Modified Toney using Rolling Walker  5. Gait >250 feet with Modified Toney using Rolling Walker- met 10/20/19  6. Lower extremity exercise program x10 reps per handout, with independence      Outcome: Ongoing, Progressing   Pt with increased gait distance today.  Pt ambulated ~260ft with RW, Mod I on 3LO2 NC.  Pt able to tolerate static standing with no AD, Sup along EOB.

## 2019-10-20 NOTE — SUBJECTIVE & OBJECTIVE
Interval History: nausea but no vomiting. Choked on water earer. Thinks she was too aggressive with the straw. Denies dysphagia. Speech agrees, no dysphagia. Could not be weaned from O2 today. States she has Hx of lung cancer s/p lobectomy > 5 years ago. Also states having cirrhosis but has never required paracentesis.     Review of Systems   Respiratory: Positive for shortness of breath.    Cardiovascular: Negative.    Gastrointestinal: Positive for nausea. Negative for vomiting.     Objective:     Vital Signs (Most Recent):  Temp: 97.5 °F (36.4 °C) (10/20/19 1129)  Pulse: 72 (10/20/19 1129)  Resp: 19 (10/20/19 1129)  BP: 133/60 (10/20/19 1129)  SpO2: 97 % (10/20/19 1129) Vital Signs (24h Range):  Temp:  [97.5 °F (36.4 °C)-98.3 °F (36.8 °C)] 97.5 °F (36.4 °C)  Pulse:  [68-80] 72  Resp:  [16-19] 19  SpO2:  [91 %-97 %] 97 %  BP: (124-157)/(58-77) 133/60     Weight: 67 kg (147 lb 11.3 oz)  Body mass index is 26.17 kg/m².    Intake/Output Summary (Last 24 hours) at 10/20/2019 1252  Last data filed at 10/20/2019 1158  Gross per 24 hour   Intake 360 ml   Output --   Net 360 ml      Physical Exam   Constitutional: She is oriented to person, place, and time. She appears well-developed. No distress.   Eyes: Pupils are equal, round, and reactive to light.   Neck: Normal range of motion.   Cardiovascular: Normal rate.   Pulmonary/Chest: She has no wheezes. She has no rales.   Speaking in full sentences with low flow NC in place   Abdominal: Soft. Bowel sounds are normal. She exhibits no distension and no mass. There is no tenderness. There is no guarding.   Musculoskeletal: Normal range of motion.   Neurological: She is alert and oriented to person, place, and time.   Skin: Skin is warm and dry. Capillary refill takes less than 2 seconds. She is not diaphoretic.   Psychiatric: She has a normal mood and affect. Her behavior is normal. Thought content normal.   Nursing note and vitals reviewed.      Significant Labs: All  pertinent labs within the past 24 hours have been reviewed.    Significant Imaging: I have reviewed all pertinent imaging results/findings within the past 24 hours.  I have reviewed and interpreted all pertinent imaging results/findings within the past 24 hours.

## 2019-10-20 NOTE — PLAN OF CARE
Pt remains on 3 L/M NC. Continue duoneb svn tx and IS therapy as ordered. Will continue to monitor. GIOVANNA Boyce, RRT

## 2019-10-20 NOTE — PLAN OF CARE
10/20/19 No significant oral or pharyngeal dysphagia; ST  RECS: continue current diet. No further ST is warranted. Sandra Angel, FRANCISCO-SLP

## 2019-10-21 ENCOUNTER — TELEPHONE (OUTPATIENT)
Dept: FAMILY MEDICINE | Facility: CLINIC | Age: 84
End: 2019-10-21

## 2019-10-21 DIAGNOSIS — R11.2 NAUSEA AND VOMITING, INTRACTABILITY OF VOMITING NOT SPECIFIED, UNSPECIFIED VOMITING TYPE: ICD-10-CM

## 2019-10-21 PROBLEM — I48.0 PAROXYSMAL ATRIAL FIBRILLATION: Status: ACTIVE | Noted: 2019-10-21

## 2019-10-21 PROBLEM — I95.9 ARTERIAL HYPOTENSION: Status: RESOLVED | Noted: 2019-10-15 | Resolved: 2019-10-21

## 2019-10-21 LAB
ALBUMIN SERPL BCP-MCNC: 3.1 G/DL (ref 3.5–5.2)
ALP SERPL-CCNC: 68 U/L (ref 55–135)
ALT SERPL W/O P-5'-P-CCNC: 37 U/L (ref 10–44)
ANION GAP SERPL CALC-SCNC: 7 MMOL/L (ref 8–16)
AST SERPL-CCNC: 32 U/L (ref 10–40)
BILIRUB SERPL-MCNC: 0.6 MG/DL (ref 0.1–1)
BUN SERPL-MCNC: 7 MG/DL (ref 8–23)
CALCIUM SERPL-MCNC: 8.5 MG/DL (ref 8.7–10.5)
CHLORIDE SERPL-SCNC: 97 MMOL/L (ref 95–110)
CO2 SERPL-SCNC: 39 MMOL/L (ref 23–29)
CREAT SERPL-MCNC: 0.6 MG/DL (ref 0.5–1.4)
EST. GFR  (AFRICAN AMERICAN): >60 ML/MIN/1.73 M^2
EST. GFR  (NON AFRICAN AMERICAN): >60 ML/MIN/1.73 M^2
GLUCOSE SERPL-MCNC: 117 MG/DL (ref 70–110)
POTASSIUM SERPL-SCNC: 4.5 MMOL/L (ref 3.5–5.1)
PROT SERPL-MCNC: 5.6 G/DL (ref 6–8.4)
SODIUM SERPL-SCNC: 143 MMOL/L (ref 136–145)

## 2019-10-21 PROCEDURE — 21400001 HC TELEMETRY ROOM: Mod: HCNC

## 2019-10-21 PROCEDURE — 99223 PR INITIAL HOSPITAL CARE,LEVL III: ICD-10-PCS | Mod: HCNC,,, | Performed by: INTERNAL MEDICINE

## 2019-10-21 PROCEDURE — 80053 COMPREHEN METABOLIC PANEL: CPT | Mod: HCNC

## 2019-10-21 PROCEDURE — 97110 THERAPEUTIC EXERCISES: CPT | Mod: HCNC

## 2019-10-21 PROCEDURE — 94799 UNLISTED PULMONARY SVC/PX: CPT | Mod: HCNC

## 2019-10-21 PROCEDURE — 93010 EKG 12-LEAD: ICD-10-PCS | Mod: HCNC,,, | Performed by: INTERNAL MEDICINE

## 2019-10-21 PROCEDURE — 93005 ELECTROCARDIOGRAM TRACING: CPT | Mod: HCNC

## 2019-10-21 PROCEDURE — 99223 1ST HOSP IP/OBS HIGH 75: CPT | Mod: HCNC,,, | Performed by: INTERNAL MEDICINE

## 2019-10-21 PROCEDURE — 63600175 PHARM REV CODE 636 W HCPCS: Mod: HCNC | Performed by: INTERNAL MEDICINE

## 2019-10-21 PROCEDURE — 97116 GAIT TRAINING THERAPY: CPT | Mod: HCNC

## 2019-10-21 PROCEDURE — 99900035 HC TECH TIME PER 15 MIN (STAT): Mod: HCNC

## 2019-10-21 PROCEDURE — 25000003 PHARM REV CODE 250: Mod: HCNC | Performed by: HOSPITALIST

## 2019-10-21 PROCEDURE — 93010 ELECTROCARDIOGRAM REPORT: CPT | Mod: HCNC,,, | Performed by: INTERNAL MEDICINE

## 2019-10-21 PROCEDURE — 94640 AIRWAY INHALATION TREATMENT: CPT | Mod: HCNC

## 2019-10-21 PROCEDURE — 25000242 PHARM REV CODE 250 ALT 637 W/ HCPCS: Mod: HCNC | Performed by: HOSPITALIST

## 2019-10-21 PROCEDURE — 27000221 HC OXYGEN, UP TO 24 HOURS: Mod: HCNC

## 2019-10-21 PROCEDURE — 36415 COLL VENOUS BLD VENIPUNCTURE: CPT | Mod: HCNC

## 2019-10-21 PROCEDURE — 25000003 PHARM REV CODE 250: Mod: HCNC | Performed by: INTERNAL MEDICINE

## 2019-10-21 PROCEDURE — 94761 N-INVAS EAR/PLS OXIMETRY MLT: CPT | Mod: HCNC

## 2019-10-21 RX ORDER — ONDANSETRON 4 MG/1
4 TABLET, FILM COATED ORAL EVERY 6 HOURS PRN
Qty: 20 TABLET | Refills: 0 | Status: SHIPPED | OUTPATIENT
Start: 2019-10-21 | End: 2020-01-13

## 2019-10-21 RX ORDER — OSELTAMIVIR PHOSPHATE 75 MG/1
75 CAPSULE ORAL 2 TIMES DAILY
Status: COMPLETED | OUTPATIENT
Start: 2019-10-21 | End: 2019-10-21

## 2019-10-21 RX ADMIN — BUDESONIDE 0.5 MG: 0.5 INHALANT RESPIRATORY (INHALATION) at 08:10

## 2019-10-21 RX ADMIN — SERTRALINE HYDROCHLORIDE 25 MG: 25 TABLET ORAL at 08:10

## 2019-10-21 RX ADMIN — PANTOPRAZOLE SODIUM 40 MG: 40 TABLET, DELAYED RELEASE ORAL at 08:10

## 2019-10-21 RX ADMIN — LEVOTHYROXINE SODIUM 25 MCG: 25 TABLET ORAL at 06:10

## 2019-10-21 RX ADMIN — OSELTAMIVIR PHOSPHATE 75 MG: 75 CAPSULE ORAL at 09:10

## 2019-10-21 RX ADMIN — OXYBUTYNIN CHLORIDE 5 MG: 5 TABLET, EXTENDED RELEASE ORAL at 08:10

## 2019-10-21 RX ADMIN — IPRATROPIUM BROMIDE AND ALBUTEROL SULFATE 3 ML: .5; 3 SOLUTION RESPIRATORY (INHALATION) at 04:10

## 2019-10-21 RX ADMIN — ASPIRIN 81 MG 81 MG: 81 TABLET ORAL at 08:10

## 2019-10-21 RX ADMIN — RAMELTEON 8 MG: 8 TABLET ORAL at 09:10

## 2019-10-21 RX ADMIN — METHYLPREDNISOLONE SODIUM SUCCINATE 40 MG: 40 INJECTION, POWDER, FOR SOLUTION INTRAMUSCULAR; INTRAVENOUS at 04:10

## 2019-10-21 RX ADMIN — METHYLPREDNISOLONE SODIUM SUCCINATE 40 MG: 40 INJECTION, POWDER, FOR SOLUTION INTRAMUSCULAR; INTRAVENOUS at 09:10

## 2019-10-21 RX ADMIN — OSELTAMIVIR PHOSPHATE 75 MG: 75 CAPSULE ORAL at 08:10

## 2019-10-21 RX ADMIN — METOPROLOL SUCCINATE 50 MG: 50 TABLET, EXTENDED RELEASE ORAL at 08:10

## 2019-10-21 RX ADMIN — IPRATROPIUM BROMIDE AND ALBUTEROL SULFATE 3 ML: .5; 3 SOLUTION RESPIRATORY (INHALATION) at 08:10

## 2019-10-21 NOTE — CONSULTS
.[facility]  Gastroenterology  Consult Note    Patient Name: Sandra Moody  MRN: [id number]  Admission Date: 10/14/2019  Hospital Length of Stay: 7 days  Code Status: Full Code   Primary Care Physician: Rox Montes MD  Principal Problem:Sepsis due to Haemophilus influenzae type B    Inpatient consult to Gastroenterology  Consult performed by: Baylee Peoples PA-C  Consult ordered by: Cameron Munguia MD        Subjective:     Chief complaint: Cirrhosis     HPI: The patient is a 89 year old female here with a history of cryptogenic cirrhosis, HTN, CKD, lung cancer s/p lobectomy, hypothyroidism and PAF admitted with sepsis due to H. Influenza.  We are consulted for opinion on OAC given her cirrhosis.  Patient has a history of well-compensated cryptogenic cirrhosis, diagnosed in 2014.  She follows with hepatology as an outpatient and was last seen in April 2019.  EGD in 2017 did not reveal varices.  She has no ascites or liver lesions on prior imaging.  No gastrointestinal bleeding reported.  No abdominal pain or vomiting.  She does have abdominal distention which she states has been present for a while.  No confusion.      Past medical history:  Hypertension.  Chronic kidney disease.  Cryptogenic cirrhosis.  Paroxysmal atrial fibrillation.  GERD.  Hypothyroidism.   History of lung cancer.     Past surgical history:  Cholecystectomy.  Appendectomy.  Hysterectomy.  Lobectomy.   Tonsillectomy.  Eye surgery.     Social history:  No alcohol, tobacco or illicit drug use.     Family history:  No known family history of colon cancer.    Medications:  Medications Prior to Admission   Medication Sig Dispense Refill Last Dose    albuterol (PROVENTIL HFA) 90 mcg/actuation inhaler Inhale 2 puffs into the lungs every 6 (six) hours as needed (ANY equivalent covered is OK). 1 Inhaler 2 10/14/2019    alendronate (FOSAMAX) 70 MG tablet Take 1 tablet (70 mg total) by mouth every 7 days. 12 tablet 1  10/14/2019    amLODIPine (NORVASC) 5 MG tablet TAKE 2 TABLETS EVERY  tablet 12 10/14/2019    aspirin 81 mg Tab Take 1 tablet by mouth Daily.    10/14/2019    estradiol (ESTRACE) 0.01 % (0.1 mg/g) vaginal cream Pea-size dab of cream to urethra/introitus at bed time. 42.5 g 1 10/14/2019    levothyroxine (SYNTHROID) 25 MCG tablet TAKE 1 TABLET EVERY DAY 90 tablet 3 10/14/2019    lisinopril (PRINIVIL,ZESTRIL) 40 MG tablet TAKE 1 TABLET EVERY DAY 90 tablet 3 10/14/2019    melatonin 10 mg Tab Take by mouth once daily.    10/14/2019    metoprolol succinate (TOPROL-XL) 50 MG 24 hr tablet TAKE 1 TABLET EVERY DAY 90 tablet 3 10/14/2019    nystatin (MYCOSTATIN) cream Apply topically 2 (two) times daily. 30 g 3 10/14/2019    oxybutynin (DITROPAN-XL) 10 MG 24 hr tablet TAKE 1 TABLET ONE TIME DAILY 90 tablet 11 10/14/2019    sertraline (ZOLOFT) 25 MG tablet TAKE 1 TABLET BY MOUTH EVERY DAY 30 tablet 0 10/14/2019    temazepam (RESTORIL) 30 mg capsule TAKE 1 CAPSULE BY MOUTH EVERY DAY AT BEDTIME 30 capsule 2 10/14/2019    traMADol (ULTRAM) 50 mg tablet Take 1 tablet (50 mg total) by mouth every 24 hours as needed for Pain (MAY CAUSE DROWSINESS). 30 tablet 2 10/14/2019    triamcinolone acetonide 0.5% (KENALOG) 0.5 % Crea Apply topically 2 (two) times daily. 30 g 3 10/14/2019     Allergies:  No known drug allergies.     Review of systems:  CONSTITUTIONAL: Negative for fever, chills, weakness, weight loss, weight gain.  HEENT: Negative for blurred vision, hearing loss, nasal congestion, dry mouth, sore throat.  CARDIOVASCULAR: Positive for palpitations. Negative for chest pain.  RESPIRATORY: Positive for shortness of breath. Negative for cough.  GASTROINTESTINAL: See HPI  GENITOURINARY: Negative for dysuria or hematuria.  MUSCULOSKELETAL: Negative for osteoarthritis or muscle pain.  SKIN: Negative for rashes/lesions.  NEUROLOGIC: Negative for headaches, numbness/tingling.  ENDOCRINE: Negative for diabetes or  thyroid abnormalities.  HEMATOLOGIC: Negative for anemia or blood dyscrasias.    Objective:     Vital Signs (Most Recent):  Temp: 98 °F (36.7 °C) (10/21/19 1519)  Pulse: 68 (10/21/19 1519)  Resp: 19 (10/21/19 1519)  BP: (!) 142/65 (10/21/19 1519)  SpO2: (!) 94 % (10/21/19 1519) Vital Signs (24h Range):  Temp:  [98 °F (36.7 °C)-98.9 °F (37.2 °C)] 98 °F (36.7 °C)  Pulse:  [63-87] 68  Resp:  [16-19] 19  SpO2:  [92 %-98 %] 94 %  BP: (111-149)/(61-70) 142/65     Physical examination:  General: Elderly white female in no apparent distress  HENT: NCAT, atraumatic, hearing grossly intact, no visible or palpable thyroid mass  Eyes: PERRL, EOMI, anicteric sclera  Cardiovascular: Regular rate and rhythm. No peripheral edema.   Lungs: Non-labored respirations. Breath sounds equal.   Abdomen: soft, NT, moderately distended, normoactive BS  Extremities: No C/C, 2+ dorsalis pedis pulses bilaterally  Neuro: AA&O x 3, no asterixes or tremors  Psych: Appropriate mood and affect. No SI.  Skin: No jaundice, rashes or lesions  Musculoskeletal: 5/5 strength bilaterally    Recent Labs   Lab 10/21/19  0608   *   CALCIUM 8.5*   ALBUMIN 3.1*   PROT 5.6*      K 4.5   CO2 39*   CL 97   BUN 7*   CREATININE 0.6   ALKPHOS 68   ALT 37   AST 32   BILITOT 0.6     Imaging:  Ultrasound abdomen (4/2/19):  Impression:  Heterogeneous appearance of the liver suggestive of cirrhosis.  Splenomegaly.  Mild prominence of the common bile duct status post cholecystectomy.      Assessment:   89 year old female here with a history of cryptogenic cirrhosis, HTN, CKD, lung cancer s/p lobectomy, hypothyroidism and PAF admitted with sepsis due to H. influenza.  GI consulted for anticoagulation clearance.  No varices on EGD in 2017.  No ascites on ultrasound in 4/2019.  Has worsening thrombocytopenia this admission which may be due to sepsis; now improving.    Plan:   Ultrasound liver with doppler to evaluate for ascites/portal HTN.  If ultrasound reveals  evidence of significant portal HTN, would consider EGD prior to initiating OAC.  If ultrasound is stable from 4/2019, OK for initiating anticoagulation from GI standpoint.  Case discussed with Dr. Du.      Thank you for your consult.     Baylee Peoples PA-C  Gastroenterology  [facility]

## 2019-10-21 NOTE — PLAN OF CARE
Problem: Physical Therapy Goal  Goal: Physical Therapy Goal  Description  Goals to be met by: 19     Patient will increase functional independence with mobility by performin. Supine to sit with Modified [place]- Met 10/20/19  2. Rolling to Left and Right with Modified [place]  3. Sit to stand transfer with Modified Snow using RW  4. Bed to chair transfer with Modified Snow using Rolling Walker  5. Gait >250 feet with Modified Snow using Rolling Walker- met 10/20/19  6. Lower extremity exercise program x10 reps per handout, with independence      Outcome: Ongoing, Progressing   Pt ambulated ~ 125 ft x 2 trials with RW, Mod I (3L O2NC). Pt required 1 standing rest break during gait training 2* fatigue (HR : 96 bpm per telemetry tech ).

## 2019-10-21 NOTE — SUBJECTIVE & OBJECTIVE
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 lj- fosamax    PSVT (paroxysmal supraventricular tachycardia)     dr. marx    PVC (premature ventricular contraction) 11/10/2012    Rectocele     Status post partial lobectomy of lung 2/2010    Thyroid disease     hypothyroidism    Trochanteric bursitis 4/18/2013    Urine incontinence 2/24/2015    Vitamin D deficiency disease 9/4/2014       Past Surgical History:   Procedure Laterality Date    ADENOIDECTOMY      APPENDECTOMY      CHOLECYSTECTOMY      COLONOSCOPY      CYSTOSCOPY      EYE SURGERY Bilateral     HYSTERECTOMY      LUNG LOBECTOMY Right     OOPHORECTOMY      TONSILLECTOMY      UPPER GASTROINTESTINAL ENDOSCOPY         Review of patient's allergies indicates:  No Known Allergies    No current facility-administered medications on file prior to encounter.      Current Outpatient Medications on File Prior to Encounter   Medication Sig    albuterol (PROVENTIL HFA) 90 mcg/actuation inhaler Inhale 2 puffs into the lungs every 6 (six) hours as needed (ANY equivalent covered is OK).    alendronate (FOSAMAX) 70 MG tablet Take 1 tablet (70 mg total) by mouth every 7 days.    amLODIPine (NORVASC) 5 MG tablet TAKE 2 TABLETS EVERY DAY    aspirin 81 mg Tab Take 1 tablet by mouth Daily.     estradiol (ESTRACE) 0.01 % (0.1 mg/g) vaginal cream Pea-size dab of cream  to urethra/introitus at bed time.    levothyroxine (SYNTHROID) 25 MCG tablet TAKE 1 TABLET EVERY DAY    lisinopril (PRINIVIL,ZESTRIL) 40 MG tablet TAKE 1 TABLET EVERY DAY    melatonin 10 mg Tab Take by mouth once daily.     metoprolol succinate (TOPROL-XL) 50 MG 24 hr tablet TAKE 1 TABLET EVERY DAY    nystatin (MYCOSTATIN) cream Apply topically 2 (two) times daily.    oxybutynin (DITROPAN-XL) 10 MG 24 hr tablet TAKE 1 TABLET ONE TIME DAILY    sertraline (ZOLOFT) 25 MG tablet TAKE 1 TABLET BY MOUTH EVERY DAY    temazepam (RESTORIL) 30 mg capsule TAKE 1 CAPSULE BY MOUTH EVERY DAY AT BEDTIME    traMADol (ULTRAM) 50 mg tablet Take 1 tablet (50 mg total) by mouth every 24 hours as needed for Pain (MAY CAUSE DROWSINESS).    triamcinolone acetonide 0.5% (KENALOG) 0.5 % Crea Apply topically 2 (two) times daily.    [DISCONTINUED] ondansetron (ZOFRAN) 4 MG tablet Take 1 tablet (4 mg total) by mouth every 6 (six) hours as needed.     Family History     Problem Relation (Age of Onset)    Alcohol abuse Brother, Brother    Aneurysm Father    Birth defects Paternal Grandfather    Cancer Mother, Brother    Hypertension Mother    Kidney failure Sister    Pacemaker/defibrilator Brother        Tobacco Use    Smoking status: Never Smoker    Smokeless tobacco: Never Used   Substance and Sexual Activity    Alcohol use: No    Drug use: No    Sexual activity: Never     Review of Systems   Constitution: Negative.   HENT: Negative.    Eyes: Negative.    Cardiovascular: Positive for palpitations.   Respiratory: Negative.    Endocrine: Negative.    Hematologic/Lymphatic: Negative.    Skin: Negative.    Musculoskeletal: Negative.    Gastrointestinal: Negative.    Genitourinary: Negative.    Neurological: Negative.    Psychiatric/Behavioral: Negative.    Allergic/Immunologic: Negative.      Objective:     Vital Signs (Most Recent):  Temp: 98 °F (36.7 °C) (10/21/19 0802)  Pulse: 75 (10/21/19 0852)  Resp: 16 (10/21/19 0852)  BP:  131/64 (10/21/19 0802)  SpO2: 98 % (10/21/19 0852) Vital Signs (24h Range):  Temp:  [97.5 °F (36.4 °C)-98.9 °F (37.2 °C)] 98 °F (36.7 °C)  Pulse:  [63-87] 75  Resp:  [16-19] 16  SpO2:  [92 %-98 %] 98 %  BP: (111-157)/(60-70) 131/64     Weight: 65.5 kg (144 lb 6.4 oz)  Body mass index is 25.58 kg/m².    SpO2: 98 %  O2 Device (Oxygen Therapy): nasal cannula      Intake/Output Summary (Last 24 hours) at 10/21/2019 1122  Last data filed at 10/21/2019 0828  Gross per 24 hour   Intake 360 ml   Output --   Net 360 ml       Lines/Drains/Airways     Peripheral Intravenous Line                 Peripheral IV - Single Lumen 10/20/19 1800 22 G Left;Anterior Forearm less than 1 day                Physical Exam   Constitutional: She is oriented to person, place, and time. She appears well-developed and well-nourished.   HENT:   Head: Normocephalic.   Eyes: Pupils are equal, round, and reactive to light.   Neck: Normal range of motion. Neck supple.   Cardiovascular: Normal rate and regular rhythm.   Pulmonary/Chest: Effort normal and breath sounds normal.   Abdominal: Soft. Normal appearance and bowel sounds are normal. There is no tenderness.   Musculoskeletal: Normal range of motion.   Neurological: She is alert and oriented to person, place, and time.   Skin: Skin is warm.   Psychiatric: She has a normal mood and affect.   Nursing note and vitals reviewed.      Significant Labs:   BMP:   Recent Labs   Lab 10/21/19  0608   *      K 4.5   CL 97   CO2 39*   BUN 7*   CREATININE 0.6   CALCIUM 8.5*   , CMP   Recent Labs   Lab 10/21/19  0608      K 4.5   CL 97   CO2 39*   *   BUN 7*   CREATININE 0.6   CALCIUM 8.5*   PROT 5.6*   ALBUMIN 3.1*   BILITOT 0.6   ALKPHOS 68   AST 32   ALT 37   ANIONGAP 7*   ESTGFRAFRICA >60   EGFRNONAA >60   , CBC No results for input(s): WBC, HGB, HCT, PLT in the last 48 hours., INR No results for input(s): INR, PROTIME in the last 48 hours., Lipid Panel No results for input(s):  CHOL, HDL, LDLCALC, TRIG, CHOLHDL in the last 48 hours., Troponin No results for input(s): TROPONINI in the last 48 hours. and All pertinent lab results from the last 24 hours have been reviewed.    Significant Imaging: Echocardiogram:   Transthoracic echo (TTE) complete (Cupid Only):   Results for orders placed or performed during the hospital encounter of 10/14/19   Echo Color Flow Doppler? Yes; Bubble Contrast? Yes   Result Value Ref Range    BSA 1.75 m2    LA WIDTH 3.42 cm    AORTIC VALVE CUSP SEPERATION 1.91 cm    PV PEAK VELOCITY 0.80 cm/s    LVIDD 4.70 3.5 - 6.0 cm    IVS 1.23 (A) 0.6 - 1.1 cm    PW 0.98 0.6 - 1.1 cm    Ao root annulus 3.12 cm    LVIDS 3.22 2.1 - 4.0 cm    FS 31 28 - 44 %    LA volume 54.90 cm3    Sinus 3.21 cm    STJ 2.70 cm    Ascending aorta 3.97 cm    LV mass 188.73 g    LA size 3.90 cm    RVDD 4.22 cm    TAPSE 1.95 cm    Left Ventricle Relative Wall Thickness 0.42 cm    AV mean gradient 9 mmHg    AV valve area 2.10 cm2    AV Velocity Ratio 0.52     AV index (prosthetic) 0.51     E/A ratio 1.02     E wave decelartion time 211.76 msec    IVRT 0.08 msec    Pulm vein S/D ratio 1.21     LVOT diameter 2.29 cm    LVOT area 4.1 cm2    LVOT peak dajuan 1.01 m/s    LVOT peak VTI 21.39 cm    Ao peak dajuan 1.95 m/s    Ao VTI 41.95 cm    LVOT stroke volume 88.05 cm3    AV peak gradient 15 mmHg    MV Peak E Dajuan 1.27 m/s    TR Max Dajuan 3.08 m/s    MV Peak A Dajuan 1.24 m/s    PV Peak S Dajuan 0.52 m/s    PV Peak D Dajuan 0.43 m/s    LV Systolic Volume 41.68 mL    LV Systolic Volume Index 24.2 mL/m2    LV Diastolic Volume 102.53 mL    LV Diastolic Volume Index 59.58 mL/m2    LA Volume Index 31.9 mL/m2    LV Mass Index 110 g/m2    RA Major Axis 4.63 cm    Left Atrium Minor Axis 4.57 cm    Left Atrium Major Axis 5.15 cm    Triscuspid Valve Regurgitation Peak Gradient 38 mmHg    RA Width 3.79 cm    Right Atrial Pressure (from IVC) 8 mmHg    TV rest pulmonary artery pressure 46 mmHg    Narrative    · Normal left  ventricular systolic function. The estimated ejection   fraction is 55%  · Mild eccentric left ventricular hypertrophy.  · No wall motion abnormalities.  · Normal right ventricular systolic function.  · Mild mitral regurgitation.  · Mild to moderate tricuspid regurgitation.  · The estimated PA systolic pressure is 46 mm Hg  · Pulmonary hypertension present.

## 2019-10-21 NOTE — ASSESSMENT & PLAN NOTE
Patient with afib. Now resolved. Patient has atrial fibrillation.  I had a detailed discussion with the patient regarding the etiology, the pathophysiology and treatment for atrial fibrillation. We discussed the increased risk of stroke in atrial fibrillation and the reasoning behind anticoagulation. Based on patient's CHADSVASC score there is a benefit of anticoagulation.    We discussed the risks and benefits of anticoagulation and the risks and benefits of anticoagulation including the decreased risk of stroke as well as the increased risk of bleeding with anticoagulation.  We also discussed the various anticoagulant options including warfarin and the newer anticoagulant agents. However, complicated by the fact that she has liver cirrhosis per the patient. Will get clearance from GI prior to initiation of anti-coagulation.

## 2019-10-21 NOTE — PT/OT/SLP PROGRESS
Physical Therapy Treatment    Patient Name:  Sandra Moody   MRN:  [id number]    Recommendations:     Discharge Recommendations:  home health PT(with family assist)   Discharge Equipment Recommendations: oxygen   Barriers to discharge: None    Assessment:     Sandra Moody is a 89 y.o. female admitted with a medical diagnosis of Sepsis due to Haemophilus influenzae type B.  She presents with the following impairments/functional limitations:  weakness, impaired endurance, decreased upper extremity function, decreased lower extremity function, pain, decreased safety awareness, impaired balance, decreased ROM, impaired cardiopulmonary response to activity, gait instability .    Rehab Prognosis: Good; patient would benefit from acute skilled PT services to address these deficits and reach maximum level of function.    Recent Surgery: * No surgery found *      Plan:     During this hospitalization, patient to be seen daily to address the identified rehab impairments via gait training, therapeutic activities, therapeutic exercises and progress toward the following goals:    · Plan of Care Expires:  11/01/19    Subjective     Chief Complaint: had a rough night   Patient/Family Comments/goals: pt agreeable to treatment.   Pain/Comfort:  · Pain Rating 1: 0/10  · Pain Rating Post-Intervention 1: 0/10      Objective:     Communicated with nurse  prior to session.  Patient found HOB elevated with telemetry, oxygen upon PT entry to room.     General Precautions: Standard, fall, respiratory   Orthopedic Precautions:N/A   Braces: N/A     Functional Mobility:  · Bed Mobility:  Rolling Left:  modified independence  · Scooting: modified independence  · Supine to Sit: modified independence  · Transfers:     · Sit to Stand:  supervision with rolling walker  · Gait: Pt ambulated ~ 125 ft x 2 trials with RW, Mod I (3L O2NC). Pt required 1 standing rest break during gait training 2* fatigue (HR : 96 bpm per telemetry  tech ). Noted with decreased beatriz, decreased step length. VC's for safety technique, walker management and pursed lip breathing technique .  · Balance: good-      AM-PAC 6 CLICK MOBILITY  Turning over in bed (including adjusting bedclothes, sheets and blankets)?: 4  Sitting down on and standing up from a chair with arms (e.g., wheelchair, bedside commode, etc.): 4  Moving from lying on back to sitting on the side of the bed?: 4  Moving to and from a bed to a chair (including a wheelchair)?: 4  Need to walk in hospital room?: 3  Climbing 3-5 steps with a railing?: 3  Basic Mobility Total Score: 22       Therapeutic Activities and Exercises:   Pt performed seated BLE 10 reps x 2 trials:   AP, LAQ, HS,  Hip abd/add with pillow , Hip flexion. V/T's cues for technique and sequence. Pt tolerated well.   Educated pt on safety awareness with all OOB mobility , transfer and gait training.   Educated pt on the benefit of OOB mobility and BLE ex's per handout throughout the day. Pt verbalized understanding.      Patient left up in chair with lunch table set up all lines intact, call button in reach, nurse notified and son and daughter present..    GOALS:   Multidisciplinary Problems     Physical Therapy Goals        Problem: Physical Therapy Goal    Goal Priority Disciplines Outcome Goal Variances Interventions   Physical Therapy Goal     PT, PT/OT Ongoing, Progressing     Description:  Goals to be met by: 19     Patient will increase functional independence with mobility by performin. Supine to sit with Modified Pickett- Met 10/20/19  2. Rolling to Left and Right with Modified [place]  3. Sit to stand transfer with Modified [place] using RW  4. Bed to chair transfer with Modified [place]ett using Rolling Walker  5. Gait >250 feet with Modified Pickett using Rolling Walker- met 10/20/19  6. Lower extremity exercise program x10 reps per handout, with independence                       Time  Tracking:     PT Received On: 10/21/19  PT Start Time: 1133     PT Stop Time: 1202  PT Total Time (min): 29 min     Billable Minutes: Gait Training 15 and Therapeutic Exercise 14    Treatment Type: Treatment  PT/PTA: PTA     PTA Visit Number: 3     Seble Bach, UMER  10/21/2019

## 2019-10-21 NOTE — PROGRESS NOTES
[facility] Hospital Medicine  Progress Note    Patient Name: Sandra Moody  MRN: [id number]  Patient Class: IP- Inpatient   Admission Date: 10/14/2019  Length of Stay: 7 days  Attending Physician: Nicole Huston MD  Primary Care Provider: Rox Montes MD        Subjective:     Principal Problem:Sepsis due to Haemophilus influenzae type B        HPI:  Sandra Moody is a 89 y.o. female that (in part)  has a past medical history of Allergy, Anxiety, Back pain, Cirrhosis, COPD (chronic obstructive pulmonary disease), Cryptogenic cirrhosis, Cystocele, Depression, Diverticulitis, Dyspnea, Family history of colon cancer, GERD (gastroesophageal reflux disease), History of colonic polyps, Hypertension, Hypothyroidism, Insomnia, Kidney stones, Lung cancer, Lung cancer, upper lobe, Menopause, Nuclear sclerosis, Osteopenia, PSVT (paroxysmal supraventricular tachycardia), PVC (premature ventricular contraction), Rectocele, Status post partial lobectomy of lung, Thyroid disease, Trochanteric bursitis, Urine incontinence, and Vitamin D deficiency disease.  Presents to [facility] Emergency Department initially complaining of nausea with vomiting.  She was taking a nap and she woke up today feeling ill.  Possible sick contacts.  Generalized fatigue, malaise, chills, and nonfocal subacute muscle weakness.  Says arrest Ali worsening.  No known exacerbating factors.  No relieving factors.  Constant duration.      She saw her PCP earlier today for right hip pain and follow-up for chronic medical conditions including cryptogenic cirrhosis.  She also complained of muscle cramps.  She received her influenza vaccine today.    In the emergency department she was noted to be tachycardic, febrile, and hypotensive.  She was given IV fluids.  Routine laboratory studies were obtained.  She had elevated lactic acid levels.  She was given IV fluids and continue to have arterial  hypotension.  Her lactic acid remained elevated at 5.3.  Sepsis protocol was initiated.  Influenza type B serology came back positive.    Hospital medicine has been asked to admit for further evaluation and treatment.     Overview/Hospital Course:  Ms. Moody was admitted with sepsis secondary to influenza B.  Respiratory isolation initiated and treated with Tamiflu.  Chest x-ray with no definitive consolidation, only few scattered linear opacity consistent with subsegmental scarring versus atelectasis.  Empiric cefepime and vancomycin started given severity of sepsis until bacterial infection ruled out.  Blood cultures negative.  Patient did not need pressor support.  Patient with h/o intermittent tachyarrhythmias controlled with metoprolol in the past and with worsening control due to infection along with holding beta-blocker given initial hypotension which resolved with IV beta-blocker push.  Blood pressure increased enough to initiate start of metoprolol. Stepped down to floor. Patient continued to have episodes of paroxymal tachycardia. Cardiology consulted. Per their eval, patient has paroxysmal AFib. Recommend GI eval to clear patient for anticoagulation given underlying, uncomplicated cirrhosis of liver. Completed 7 days of oseltamivir. Slow to wean off O2. Trial of steroids.     Interval History: states breathing a little better. Wants to go home. Still on O2. With afib    Review of Systems   Respiratory: Negative.    Cardiovascular: Negative.    Gastrointestinal:        Loose stools     Objective:     Vital Signs (Most Recent):  Temp: 98 °F (36.7 °C) (10/21/19 1519)  Pulse: 68 (10/21/19 1519)  Resp: 19 (10/21/19 1519)  BP: (!) 142/65 (10/21/19 1519)  SpO2: (!) 94 % (10/21/19 1519) Vital Signs (24h Range):  Temp:  [98 °F (36.7 °C)-98.9 °F (37.2 °C)] 98 °F (36.7 °C)  Pulse:  [63-87] 68  Resp:  [16-19] 19  SpO2:  [92 %-98 %] 94 %  BP: (111-149)/(61-70) 142/65     Weight: 65.5 kg (144 lb 6.4 oz)  Body mass  index is 25.58 kg/m².    Intake/Output Summary (Last 24 hours) at 10/21/2019 1550  Last data filed at 10/21/2019 0828  Gross per 24 hour   Intake 240 ml   Output --   Net 240 ml      Physical Exam   Constitutional: She is oriented to person, place, and time. She appears well-developed. No distress.   Eyes: Pupils are equal, round, and reactive to light.   Neck: Normal range of motion.   Cardiovascular: Normal rate.   Pulmonary/Chest: Effort normal. She has no wheezes. She has no rales.   Speaking in full sentences with low flow NC in place   Abdominal: Soft. Bowel sounds are normal. She exhibits no distension and no mass. There is no tenderness. There is no guarding.   Musculoskeletal: Normal range of motion.   Neurological: She is alert and oriented to person, place, and time.   Skin: Skin is warm and dry. Capillary refill takes less than 2 seconds. She is not diaphoretic.   Psychiatric: She has a normal mood and affect. Her behavior is normal. Thought content normal.   Nursing note and vitals reviewed.      Significant Labs: All pertinent labs within the past 24 hours have been reviewed.    Significant Imaging: I have reviewed all pertinent imaging results/findings within the past 24 hours.  I have reviewed and interpreted all pertinent imaging results/findings within the past 24 hours.      Assessment/Plan:      * Sepsis due to Haemophilus influenzae type B  Patient met criteria for sepsis secondary to influenza B  Although improved patient still dependent on supplemental O2  Is to complete 7 days of oseltamivir tonight. Ok to dc droplet precautions after last dose given  CT shows multiple areas of ground glass opacity. No rales or wheezing on exam  Patient states feeling a little better with incentive spirometer. Will start trial of short course of solumedrol to hopefully wean off NC  Continue nebs  Activity as tolerated      Paroxysmal atrial fibrillation  Cardiology consulted  No valvular issues and LVEF  preserved  On metoprolol and managing hypoxia  I agree with GI consult to clear patient for anticoagulation for her reported liver cirrhosis  Seems uncomplicated as she has not experienced bleeding, encephalopathy nor significant ascites  INR and pTT in AM. Stop ASA for now. Resume if recommendation is agains anticoagulation  Appreciate GI recs regarding anticoagulation use      Influenza B  Respiratory isolation   Management as above      Ventricular tachycardia  With wide complex tachycardia at times, even when sleeping  Currently with evidence of AFib  Cardiology on board    Severe sepsis        Lactic acidosis  Secondary to sepsis  Treat underlying condition, resolved      History of lung cancer  S/p lobectomy with not adjuvant therapy  CT of chest shows no evidence of mass/recurrence      Hypothyroidism  Continue home regimen      Chronic kidney disease, stage II (mild)  Renal function at baseline  Will continue to monitor    Essential hypertension  Patient was initial hypotensive therefore all antihypertensive medications were held  Continue metoprolol. Hold off on antihypertensives      VTE Risk Mitigation (From admission, onward)         Ordered     IP VTE HIGH RISK PATIENT  Once      10/14/19 2333             Assessment and plan discussed with patient and sons at bedside. All questions answered to satisfaction.    Dispo: home with HH once weaned off NC    Nicole Marroquin MD  Department of Hospital Medicine   [facility]

## 2019-10-21 NOTE — NURSING
Bedside shift report received from KATIA Veronica. Communication board has been updated. NAD noted. Will continue to monitor. Pt HR has been in the 150-160s throughout the shift. EKG was ordered. Pt was occasionally in A fib.

## 2019-10-21 NOTE — ASSESSMENT & PLAN NOTE
Cardiology consulted  No valvular issues and LVEF preserved  On metoprolol and managing hypoxia  I agree with GI consult to clear patient for anticoagulation for her reported liver cirrhosis  Seems uncomplicated as she has not experienced bleeding, encephalopathy nor significant ascites  INR and pTT in AM. Stop ASA for now. Resume if recommendation is agains anticoagulation  Appreciate GI recs regarding anticoagulation use

## 2019-10-21 NOTE — ASSESSMENT & PLAN NOTE
Patient met criteria for sepsis secondary to influenza B  Although improved patient still dependent on supplemental O2  Is to complete 7 days of oseltamivir tonight. Ok to dc droplet precautions after last dose given  CT shows multiple areas of ground glass opacity. No rales or wheezing on exam  Patient states feeling a little better with incentive spirometer. Will start trial of short course of solumedrol to hopefully wean off NC  Continue nebs  Activity as tolerated

## 2019-10-21 NOTE — CONSULTS
[facility]  Cardiology  Consult Note    Patient Name: Sandra Moody  MRN: [id number]  Admission Date: 10/14/2019  Hospital Length of Stay: 7 days  Code Status: Full Code   Attending Provider: Nicole Huston MD   Consulting Provider: Cameron Munguia MD  Primary Care Physician: Rox Montes MD  Principal Problem:Sepsis due to Haemophilus influenzae type B    Patient information was obtained from patient and ER records.     Inpatient consult to Cardiology  Consult performed by: Cameron Munguia MD  Consult ordered by: Nicole Huston MD        Subjective:     Chief Complaint:  afib     HPI:   Sandra Moody is a 89 y.o. female that (in part)  has a past medical history of Allergy, Anxiety, Back pain, Cirrhosis, COPD (chronic obstructive pulmonary disease), Cryptogenic cirrhosis, Cystocele, Depression, Diverticulitis, Dyspnea, Family history of colon cancer, GERD (gastroesophageal reflux disease), History of colonic polyps, Hypertension, Hypothyroidism, Insomnia, Kidney stones, Lung cancer, Lung cancer, upper lobe, Menopause, Nuclear sclerosis, Osteopenia, PSVT (paroxysmal supraventricular tachycardia), PVC (premature ventricular contraction), Rectocele, Status post partial lobectomy of lung, Thyroid disease, Trochanteric bursitis, Urine incontinence, and Vitamin D deficiency disease.  Presents to [facility] Emergency Department initially complaining of nausea with vomiting.  She was taking a nap and she woke up today feeling ill.  Possible sick contacts.  Generalized fatigue, malaise, chills, and nonfocal subacute muscle weakness.  Says arrest Ali worsening.  No known exacerbating factors.  No relieving factors.  Constant duration.       She saw her PCP earlier today for right hip pain and follow-up for chronic medical conditions including cryptogenic cirrhosis.  She also complained of muscle cramps.  She received her influenza vaccine today.     In the  "emergency department she was noted to be tachycardic, febrile, and hypotensive.  She was given IV fluids.  Routine laboratory studies were obtained.  She had elevated lactic acid levels.  She was given IV fluids and continue to have arterial hypotension.  Her lactic acid remained elevated at 5.3.  Sepsis protocol was initiated.  Influenza type B serology came back positive.     Hospital medicine has been asked to admit for further evaluation and treatment.      Overview/Hospital Course:  Ms. Moody was admitted with sepsis secondary to influenza B.  Respiratory isolation initiated and treated with Tamiflu.  Chest x-ray with no definitive consolidation, only few scattered linear opacity consistent with subsegmental scarring versus atelectasis.  Empiric cefepime and vancomycin started given severity of sepsis until bacterial infection ruled out.  Blood cultures no growth to date.  Patient did not need pressor support.  Patient with h/o intermittent tachyarrhythmias controlled with metoprolol in the past and with worsening control due to infection along with holding beta-blocker given initial hypotension which resolved with IV beta-blocker push.  Blood pressure increased enough to initiate start of metoprolol.      Interval History: nausea but no vomiting. Choked on water earer. Thinks she was too aggressive with the straw. Denies dysphagia. Speech agrees, no dysphagia. Could not be weaned from O2 today. States she has Hx of lung cancer s/p lobectomy > 5 years ago. Also states having cirrhosis but has never required paracentesis.       Patient states that she has had Afib for many year, on and off. Usually it does not bother her, however las night it "kept on going" per her. Tele reviewed and she was in aflutter with RVR, 2: 1 block, 150 bpm. Has converted back to NSR. Has not been on anticoagulation. Discussed in detail R/B of anticoagulation with patient and increased risk of stroke in the absence of " anticoagulation. Denies CP/ orthopnea/ PND.         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 lj- fosamax    PSVT (paroxysmal supraventricular tachycardia)     dr. marx    PVC (premature ventricular contraction) 11/10/2012    Rectocele     Status post partial lobectomy of lung 2/2010    Thyroid disease     hypothyroidism    Trochanteric bursitis 4/18/2013    Urine incontinence 2/24/2015    Vitamin D deficiency disease 9/4/2014       Past Surgical History:   Procedure Laterality Date    ADENOIDECTOMY      APPENDECTOMY      CHOLECYSTECTOMY      COLONOSCOPY      CYSTOSCOPY      EYE SURGERY Bilateral     HYSTERECTOMY      LUNG LOBECTOMY Right     OOPHORECTOMY      TONSILLECTOMY      UPPER GASTROINTESTINAL ENDOSCOPY         Review of patient's allergies indicates:  No Known Allergies    No current facility-administered medications on file prior to encounter.      Current Outpatient Medications on File Prior to Encounter   Medication Sig    albuterol (PROVENTIL HFA) 90 mcg/actuation inhaler Inhale 2 puffs into the lungs every 6 (six) hours as needed (ANY equivalent covered is OK).    alendronate (FOSAMAX) 70 MG tablet Take 1 tablet (70 mg total) by mouth every 7 days.    amLODIPine (NORVASC) 5 MG tablet TAKE 2 TABLETS EVERY DAY    aspirin 81 mg Tab Take 1 tablet by mouth Daily.     estradiol (ESTRACE)  0.01 % (0.1 mg/g) vaginal cream Pea-size dab of cream to urethra/introitus at bed time.    levothyroxine (SYNTHROID) 25 MCG tablet TAKE 1 TABLET EVERY DAY    lisinopril (PRINIVIL,ZESTRIL) 40 MG tablet TAKE 1 TABLET EVERY DAY    melatonin 10 mg Tab Take by mouth once daily.     metoprolol succinate (TOPROL-XL) 50 MG 24 hr tablet TAKE 1 TABLET EVERY DAY    nystatin (MYCOSTATIN) cream Apply topically 2 (two) times daily.    oxybutynin (DITROPAN-XL) 10 MG 24 hr tablet TAKE 1 TABLET ONE TIME DAILY    sertraline (ZOLOFT) 25 MG tablet TAKE 1 TABLET BY MOUTH EVERY DAY    temazepam (RESTORIL) 30 mg capsule TAKE 1 CAPSULE BY MOUTH EVERY DAY AT BEDTIME    traMADol (ULTRAM) 50 mg tablet Take 1 tablet (50 mg total) by mouth every 24 hours as needed for Pain (MAY CAUSE DROWSINESS).    triamcinolone acetonide 0.5% (KENALOG) 0.5 % Crea Apply topically 2 (two) times daily.    [DISCONTINUED] ondansetron (ZOFRAN) 4 MG tablet Take 1 tablet (4 mg total) by mouth every 6 (six) hours as needed.     Family History     Problem Relation (Age of Onset)    Alcohol abuse Brother, Brother    Aneurysm Father    Birth defects Paternal Grandfather    Cancer Mother, Brother    Hypertension Mother    Kidney failure Sister    Pacemaker/defibrilator Brother        Tobacco Use    Smoking status: Never Smoker    Smokeless tobacco: Never Used   Substance and Sexual Activity    Alcohol use: No    Drug use: No    Sexual activity: Never     Review of Systems   Constitution: Negative.   HENT: Negative.    Eyes: Negative.    Cardiovascular: Positive for palpitations.   Respiratory: Negative.    Endocrine: Negative.    Hematologic/Lymphatic: Negative.    Skin: Negative.    Musculoskeletal: Negative.    Gastrointestinal: Negative.    Genitourinary: Negative.    Neurological: Negative.    Psychiatric/Behavioral: Negative.    Allergic/Immunologic: Negative.      Objective:     Vital Signs (Most Recent):  Temp: 98 °F (36.7 °C) (10/21/19  0802)  Pulse: 75 (10/21/19 0852)  Resp: 16 (10/21/19 0852)  BP: 131/64 (10/21/19 0802)  SpO2: 98 % (10/21/19 0852) Vital Signs (24h Range):  Temp:  [97.5 °F (36.4 °C)-98.9 °F (37.2 °C)] 98 °F (36.7 °C)  Pulse:  [63-87] 75  Resp:  [16-19] 16  SpO2:  [92 %-98 %] 98 %  BP: (111-157)/(60-70) 131/64     Weight: 65.5 kg (144 lb 6.4 oz)  Body mass index is 25.58 kg/m².    SpO2: 98 %  O2 Device (Oxygen Therapy): nasal cannula      Intake/Output Summary (Last 24 hours) at 10/21/2019 1122  Last data filed at 10/21/2019 0828  Gross per 24 hour   Intake 360 ml   Output --   Net 360 ml       Lines/Drains/Airways     Peripheral Intravenous Line                 Peripheral IV - Single Lumen 10/20/19 1800 22 G Left;Anterior Forearm less than 1 day                Physical Exam   Constitutional: She is oriented to person, place, and time. She appears well-developed and well-nourished.   HENT:   Head: Normocephalic.   Eyes: Pupils are equal, round, and reactive to light.   Neck: Normal range of motion. Neck supple.   Cardiovascular: Normal rate and regular rhythm.   Pulmonary/Chest: Effort normal and breath sounds normal.   Abdominal: Soft. Normal appearance and bowel sounds are normal. There is no tenderness.   Musculoskeletal: Normal range of motion.   Neurological: She is alert and oriented to person, place, and time.   Skin: Skin is warm.   Psychiatric: She has a normal mood and affect.   Nursing note and vitals reviewed.      Significant Labs:   BMP:   Recent Labs   Lab 10/21/19  0608   *      K 4.5   CL 97   CO2 39*   BUN 7*   CREATININE 0.6   CALCIUM 8.5*   , CMP   Recent Labs   Lab 10/21/19  0608      K 4.5   CL 97   CO2 39*   *   BUN 7*   CREATININE 0.6   CALCIUM 8.5*   PROT 5.6*   ALBUMIN 3.1*   BILITOT 0.6   ALKPHOS 68   AST 32   ALT 37   ANIONGAP 7*   ESTGFRAFRICA >60   EGFRNONAA >60   , CBC No results for input(s): WBC, HGB, HCT, PLT in the last 48 hours., INR No results for input(s): INR, PROTIME  in the last 48 hours., Lipid Panel No results for input(s): CHOL, HDL, LDLCALC, TRIG, CHOLHDL in the last 48 hours., Troponin No results for input(s): TROPONINI in the last 48 hours. and All pertinent lab results from the last 24 hours have been reviewed.    Significant Imaging: Echocardiogram:   Transthoracic echo (TTE) complete (Cupid Only):   Results for orders placed or performed during the hospital encounter of 10/14/19   Echo Color Flow Doppler? Yes; Bubble Contrast? Yes   Result Value Ref Range    BSA 1.75 m2    LA WIDTH 3.42 cm    AORTIC VALVE CUSP SEPERATION 1.91 cm    PV PEAK VELOCITY 0.80 cm/s    LVIDD 4.70 3.5 - 6.0 cm    IVS 1.23 (A) 0.6 - 1.1 cm    PW 0.98 0.6 - 1.1 cm    Ao root annulus 3.12 cm    LVIDS 3.22 2.1 - 4.0 cm    FS 31 28 - 44 %    LA volume 54.90 cm3    Sinus 3.21 cm    STJ 2.70 cm    Ascending aorta 3.97 cm    LV mass 188.73 g    LA size 3.90 cm    RVDD 4.22 cm    TAPSE 1.95 cm    Left Ventricle Relative Wall Thickness 0.42 cm    AV mean gradient 9 mmHg    AV valve area 2.10 cm2    AV Velocity Ratio 0.52     AV index (prosthetic) 0.51     E/A ratio 1.02     E wave decelartion time 211.76 msec    IVRT 0.08 msec    Pulm vein S/D ratio 1.21     LVOT diameter 2.29 cm    LVOT area 4.1 cm2    LVOT peak dajuan 1.01 m/s    LVOT peak VTI 21.39 cm    Ao peak dajuan 1.95 m/s    Ao VTI 41.95 cm    LVOT stroke volume 88.05 cm3    AV peak gradient 15 mmHg    MV Peak E Dajuan 1.27 m/s    TR Max Dajuan 3.08 m/s    MV Peak A Dajuan 1.24 m/s    PV Peak S Dajuan 0.52 m/s    PV Peak D Dajuan 0.43 m/s    LV Systolic Volume 41.68 mL    LV Systolic Volume Index 24.2 mL/m2    LV Diastolic Volume 102.53 mL    LV Diastolic Volume Index 59.58 mL/m2    LA Volume Index 31.9 mL/m2    LV Mass Index 110 g/m2    RA Major Axis 4.63 cm    Left Atrium Minor Axis 4.57 cm    Left Atrium Major Axis 5.15 cm    Triscuspid Valve Regurgitation Peak Gradient 38 mmHg    RA Width 3.79 cm    Right Atrial Pressure (from IVC) 8 mmHg    TV rest pulmonary  artery pressure 46 mmHg    Narrative    · Normal left ventricular systolic function. The estimated ejection   fraction is 55%  · Mild eccentric left ventricular hypertrophy.  · No wall motion abnormalities.  · Normal right ventricular systolic function.  · Mild mitral regurgitation.  · Mild to moderate tricuspid regurgitation.  · The estimated PA systolic pressure is 46 mm Hg  · Pulmonary hypertension present.        Assessment and Plan:     * Sepsis due to Haemophilus influenzae type B        Paroxysmal atrial fibrillation  Patient with afib. Now resolved. Patient has atrial fibrillation.  I had a detailed discussion with the patient regarding the etiology, the pathophysiology and treatment for atrial fibrillation. We discussed the increased risk of stroke in atrial fibrillation and the reasoning behind anticoagulation. Based on patient's CHADSVASC score there is a benefit of anticoagulation.    We discussed the risks and benefits of anticoagulation and the risks and benefits of anticoagulation including the decreased risk of stroke as well as the increased risk of bleeding with anticoagulation.  We also discussed the various anticoagulant options including warfarin and the newer anticoagulant agents. However, complicated by the fact that she has liver cirrhosis per the patient. Will get clearance from GI prior to initiation of anti-coagulation.     History of lung cancer        Chronic kidney disease, stage II (mild)        Essential hypertension            VTE Risk Mitigation (From admission, onward)         Ordered     IP VTE HIGH RISK PATIENT  Once      10/14/19 5752                Thank you for your consult. I will follow-up with patient. Please contact us if you have any additional questions.    Cameron Munguia MD  Cardiology   [facility]

## 2019-10-21 NOTE — NURSING
Patient bedside report has been completed and given to KATIA Veronica. Chart check has been completed. NAD noted. Pt is awake and lying in bed. O2 has remained at 3 liters today as she failed to go on RA.

## 2019-10-21 NOTE — HPI
Sandra Moody is a 89 y.o. female that (in part)  has a past medical history of Allergy, Anxiety, Back pain, Cirrhosis, COPD (chronic obstructive pulmonary disease), Cryptogenic cirrhosis, Cystocele, Depression, Diverticulitis, Dyspnea, Family history of colon cancer, GERD (gastroesophageal reflux disease), History of colonic polyps, Hypertension, Hypothyroidism, Insomnia, Kidney stones, Lung cancer, Lung cancer, upper lobe, Menopause, Nuclear sclerosis, Osteopenia, PSVT (paroxysmal supraventricular tachycardia), PVC (premature ventricular contraction), Rectocele, Status post partial lobectomy of lung, Thyroid disease, Trochanteric bursitis, Urine incontinence, and Vitamin D deficiency disease.  Presents to [facility] Emergency Department initially complaining of nausea with vomiting.  She was taking a nap and she woke up today feeling ill.  Possible sick contacts.  Generalized fatigue, malaise, chills, and nonfocal subacute muscle weakness.  Says arrest Ali worsening.  No known exacerbating factors.  No relieving factors.  Constant duration.       She saw her PCP earlier today for right hip pain and follow-up for chronic medical conditions including cryptogenic cirrhosis.  She also complained of muscle cramps.  She received her influenza vaccine today.     In the emergency department she was noted to be tachycardic, febrile, and hypotensive.  She was given IV fluids.  Routine laboratory studies were obtained.  She had elevated lactic acid levels.  She was given IV fluids and continue to have arterial hypotension.  Her lactic acid remained elevated at 5.3.  Sepsis protocol was initiated.  Influenza type B serology came back positive.     Hospital medicine has been asked to admit for further evaluation and treatment.      Overview/Hospital Course:  Ms. Moody was admitted with sepsis secondary to influenza B.  Respiratory isolation initiated and treated with Tamiflu.  Chest  "x-ray with no definitive consolidation, only few scattered linear opacity consistent with subsegmental scarring versus atelectasis.  Empiric cefepime and vancomycin started given severity of sepsis until bacterial infection ruled out.  Blood cultures no growth to date.  Patient did not need pressor support.  Patient with h/o intermittent tachyarrhythmias controlled with metoprolol in the past and with worsening control due to infection along with holding beta-blocker given initial hypotension which resolved with IV beta-blocker push.  Blood pressure increased enough to initiate start of metoprolol.      Interval History: nausea but no vomiting. Choked on water earer. Thinks she was too aggressive with the straw. Denies dysphagia. Speech agrees, no dysphagia. Could not be weaned from O2 today. States she has Hx of lung cancer s/p lobectomy > 5 years ago. Also states having cirrhosis but has never required paracentesis.       Patient states that she has had Afib for many year, on and off. Usually it does not bother her, however las night it "kept on going" per her. Tele reviewed and she was in aflutter with RVR, 2: 1 block, 150 bpm. Has converted back to NSR. Has not been on anticoagulation. Discussed in detail R/B of anticoagulation with patient and increased risk of stroke in the absence of anticoagulation. Denies CP/ orthopnea/ PND.       "

## 2019-10-21 NOTE — SUBJECTIVE & OBJECTIVE
Interval History: states breathing a little better. Wants to go home. Still on O2. With afib    Review of Systems   Respiratory: Negative.    Cardiovascular: Negative.    Gastrointestinal:        Loose stools     Objective:     Vital Signs (Most Recent):  Temp: 98 °F (36.7 °C) (10/21/19 1519)  Pulse: 68 (10/21/19 1519)  Resp: 19 (10/21/19 1519)  BP: (!) 142/65 (10/21/19 1519)  SpO2: (!) 94 % (10/21/19 1519) Vital Signs (24h Range):  Temp:  [98 °F (36.7 °C)-98.9 °F (37.2 °C)] 98 °F (36.7 °C)  Pulse:  [63-87] 68  Resp:  [16-19] 19  SpO2:  [92 %-98 %] 94 %  BP: (111-149)/(61-70) 142/65     Weight: 65.5 kg (144 lb 6.4 oz)  Body mass index is 25.58 kg/m².    Intake/Output Summary (Last 24 hours) at 10/21/2019 1550  Last data filed at 10/21/2019 0828  Gross per 24 hour   Intake 240 ml   Output --   Net 240 ml      Physical Exam   Constitutional: She is oriented to person, place, and time. She appears well-developed. No distress.   Eyes: Pupils are equal, round, and reactive to light.   Neck: Normal range of motion.   Cardiovascular: Normal rate.   Pulmonary/Chest: Effort normal. She has no wheezes. She has no rales.   Speaking in full sentences with low flow NC in place   Abdominal: Soft. Bowel sounds are normal. She exhibits no distension and no mass. There is no tenderness. There is no guarding.   Musculoskeletal: Normal range of motion.   Neurological: She is alert and oriented to person, place, and time.   Skin: Skin is warm and dry. Capillary refill takes less than 2 seconds. She is not diaphoretic.   Psychiatric: She has a normal mood and affect. Her behavior is normal. Thought content normal.   Nursing note and vitals reviewed.      Significant Labs: All pertinent labs within the past 24 hours have been reviewed.    Significant Imaging: I have reviewed all pertinent imaging results/findings within the past 24 hours.  I have reviewed and interpreted all pertinent imaging results/findings within the past 24 hours.

## 2019-10-21 NOTE — ASSESSMENT & PLAN NOTE
With wide complex tachycardia at times, even when sleeping  Currently with evidence of AFib  Cardiology on board

## 2019-10-21 NOTE — NURSING
Patient's heart rate 150's sustained, patient lying in bed skin warm and dry denies pain or discomfort. Dr Julian notified EKG ordered. Heart rate converted to Afib around 0300 with runs of SVT...

## 2019-10-21 NOTE — TELEPHONE ENCOUNTER
Patient was prescribed ONDANSETRON 4MG, Pharmacy is stating that it needs PA, sent to [facility] for PA. LM for patient and told them we sent PA  [facility] for approval, if any questions please call [phone]

## 2019-10-22 PROBLEM — I47.29 VENTRICULAR TACHYCARDIA, NONSUSTAINED: Status: ACTIVE | Noted: 2019-10-15

## 2019-10-22 LAB
ALBUMIN SERPL BCP-MCNC: 3.6 G/DL (ref 3.5–5.2)
ALP SERPL-CCNC: 74 U/L (ref 55–135)
ALT SERPL W/O P-5'-P-CCNC: 41 U/L (ref 10–44)
ANION GAP SERPL CALC-SCNC: 11 MMOL/L (ref 8–16)
ANISOCYTOSIS BLD QL SMEAR: SLIGHT
APTT BLDCRRT: 22.9 SEC (ref 21–32)
AST SERPL-CCNC: 29 U/L (ref 10–40)
BASOPHILS NFR BLD: 0 % (ref 0–1.9)
BILIRUB SERPL-MCNC: 0.6 MG/DL (ref 0.1–1)
BUN SERPL-MCNC: 8 MG/DL (ref 8–23)
CALCIUM SERPL-MCNC: 9.2 MG/DL (ref 8.7–10.5)
CHLORIDE SERPL-SCNC: 93 MMOL/L (ref 95–110)
CO2 SERPL-SCNC: 36 MMOL/L (ref 23–29)
CREAT SERPL-MCNC: 0.7 MG/DL (ref 0.5–1.4)
DIFFERENTIAL METHOD: ABNORMAL
EOSINOPHIL NFR BLD: 0 % (ref 0–8)
ERYTHROCYTE [DISTWIDTH] IN BLOOD BY AUTOMATED COUNT: 13.3 % (ref 11.5–14.5)
EST. GFR  (AFRICAN AMERICAN): >60 ML/MIN/1.73 M^2
EST. GFR  (NON AFRICAN AMERICAN): >60 ML/MIN/1.73 M^2
GLUCOSE SERPL-MCNC: 162 MG/DL (ref 70–110)
HCT VFR BLD AUTO: 43.7 % (ref 37–48.5)
HGB BLD-MCNC: 13.9 G/DL (ref 12–16)
IMM GRANULOCYTES # BLD AUTO: ABNORMAL K/UL (ref 0–0.04)
IMM GRANULOCYTES NFR BLD AUTO: ABNORMAL % (ref 0–0.5)
INR PPP: 1.1 (ref 0.8–1.2)
LYMPHOCYTES NFR BLD: 11 % (ref 18–48)
MCH RBC QN AUTO: 29.8 PG (ref 27–31)
MCHC RBC AUTO-ENTMCNC: 31.8 G/DL (ref 32–36)
MCV RBC AUTO: 94 FL (ref 82–98)
MONOCYTES NFR BLD: 2 % (ref 4–15)
NEUTROPHILS NFR BLD: 83 % (ref 38–73)
NEUTS BAND NFR BLD MANUAL: 4 %
NRBC BLD-RTO: 0 /100 WBC
PLATELET # BLD AUTO: 160 K/UL (ref 150–350)
PLATELET BLD QL SMEAR: ABNORMAL
PMV BLD AUTO: 9.8 FL (ref 9.2–12.9)
POLYCHROMASIA BLD QL SMEAR: ABNORMAL
POTASSIUM SERPL-SCNC: 4.4 MMOL/L (ref 3.5–5.1)
PROT SERPL-MCNC: 6.7 G/DL (ref 6–8.4)
PROTHROMBIN TIME: 11.4 SEC (ref 9–12.5)
RBC # BLD AUTO: 4.66 M/UL (ref 4–5.4)
SODIUM SERPL-SCNC: 140 MMOL/L (ref 136–145)
WBC # BLD AUTO: 4.07 K/UL (ref 3.9–12.7)

## 2019-10-22 PROCEDURE — 94640 AIRWAY INHALATION TREATMENT: CPT | Mod: HCNC

## 2019-10-22 PROCEDURE — 25000003 PHARM REV CODE 250: Mod: HCNC | Performed by: HOSPITALIST

## 2019-10-22 PROCEDURE — 63600175 PHARM REV CODE 636 W HCPCS: Mod: HCNC | Performed by: INTERNAL MEDICINE

## 2019-10-22 PROCEDURE — 85027 COMPLETE CBC AUTOMATED: CPT | Mod: HCNC

## 2019-10-22 PROCEDURE — 99233 SBSQ HOSP IP/OBS HIGH 50: CPT | Mod: 25,HCNC,, | Performed by: INTERNAL MEDICINE

## 2019-10-22 PROCEDURE — 21400001 HC TELEMETRY ROOM: Mod: HCNC

## 2019-10-22 PROCEDURE — 85730 THROMBOPLASTIN TIME PARTIAL: CPT | Mod: HCNC

## 2019-10-22 PROCEDURE — 25000003 PHARM REV CODE 250: Mod: HCNC | Performed by: INTERNAL MEDICINE

## 2019-10-22 PROCEDURE — 85610 PROTHROMBIN TIME: CPT | Mod: HCNC

## 2019-10-22 PROCEDURE — 85007 BL SMEAR W/DIFF WBC COUNT: CPT | Mod: HCNC

## 2019-10-22 PROCEDURE — 93010 ELECTROCARDIOGRAM REPORT: CPT | Mod: HCNC,,, | Performed by: INTERNAL MEDICINE

## 2019-10-22 PROCEDURE — 25000242 PHARM REV CODE 250 ALT 637 W/ HCPCS: Mod: HCNC | Performed by: HOSPITALIST

## 2019-10-22 PROCEDURE — 93005 ELECTROCARDIOGRAM TRACING: CPT | Mod: HCNC

## 2019-10-22 PROCEDURE — 94761 N-INVAS EAR/PLS OXIMETRY MLT: CPT | Mod: HCNC

## 2019-10-22 PROCEDURE — 93010 EKG 12-LEAD: ICD-10-PCS | Mod: HCNC,,, | Performed by: INTERNAL MEDICINE

## 2019-10-22 PROCEDURE — 94799 UNLISTED PULMONARY SVC/PX: CPT | Mod: HCNC

## 2019-10-22 PROCEDURE — 80053 COMPREHEN METABOLIC PANEL: CPT | Mod: HCNC

## 2019-10-22 PROCEDURE — 27000221 HC OXYGEN, UP TO 24 HOURS: Mod: HCNC

## 2019-10-22 PROCEDURE — 99233 PR SUBSEQUENT HOSPITAL CARE,LEVL III: ICD-10-PCS | Mod: 25,HCNC,, | Performed by: INTERNAL MEDICINE

## 2019-10-22 PROCEDURE — 36415 COLL VENOUS BLD VENIPUNCTURE: CPT | Mod: HCNC

## 2019-10-22 RX ORDER — METOPROLOL SUCCINATE 50 MG/1
50 TABLET, EXTENDED RELEASE ORAL ONCE
Status: COMPLETED | OUTPATIENT
Start: 2019-10-22 | End: 2019-10-22

## 2019-10-22 RX ORDER — METOPROLOL SUCCINATE 50 MG/1
100 TABLET, EXTENDED RELEASE ORAL DAILY
Status: DISCONTINUED | OUTPATIENT
Start: 2019-10-23 | End: 2019-10-24 | Stop reason: HOSPADM

## 2019-10-22 RX ADMIN — BUDESONIDE 0.5 MG: 0.5 INHALANT RESPIRATORY (INHALATION) at 08:10

## 2019-10-22 RX ADMIN — SERTRALINE HYDROCHLORIDE 25 MG: 25 TABLET ORAL at 04:10

## 2019-10-22 RX ADMIN — IPRATROPIUM BROMIDE AND ALBUTEROL SULFATE 3 ML: .5; 3 SOLUTION RESPIRATORY (INHALATION) at 01:10

## 2019-10-22 RX ADMIN — IPRATROPIUM BROMIDE AND ALBUTEROL SULFATE 3 ML: .5; 3 SOLUTION RESPIRATORY (INHALATION) at 08:10

## 2019-10-22 RX ADMIN — PANTOPRAZOLE SODIUM 40 MG: 40 TABLET, DELAYED RELEASE ORAL at 04:10

## 2019-10-22 RX ADMIN — METHYLPREDNISOLONE SODIUM SUCCINATE 40 MG: 40 INJECTION, POWDER, FOR SOLUTION INTRAMUSCULAR; INTRAVENOUS at 10:10

## 2019-10-22 RX ADMIN — METOPROLOL SUCCINATE 50 MG: 50 TABLET, EXTENDED RELEASE ORAL at 04:10

## 2019-10-22 RX ADMIN — IPRATROPIUM BROMIDE AND ALBUTEROL SULFATE 3 ML: .5; 3 SOLUTION RESPIRATORY (INHALATION) at 07:10

## 2019-10-22 RX ADMIN — OXYBUTYNIN CHLORIDE 5 MG: 5 TABLET, EXTENDED RELEASE ORAL at 04:10

## 2019-10-22 RX ADMIN — METHYLPREDNISOLONE SODIUM SUCCINATE 40 MG: 40 INJECTION, POWDER, FOR SOLUTION INTRAMUSCULAR; INTRAVENOUS at 03:10

## 2019-10-22 RX ADMIN — BUDESONIDE 0.5 MG: 0.5 INHALANT RESPIRATORY (INHALATION) at 01:10

## 2019-10-22 RX ADMIN — RAMELTEON 8 MG: 8 TABLET ORAL at 10:10

## 2019-10-22 RX ADMIN — LEVOTHYROXINE SODIUM 25 MCG: 25 TABLET ORAL at 06:10

## 2019-10-22 RX ADMIN — METHYLPREDNISOLONE SODIUM SUCCINATE 40 MG: 40 INJECTION, POWDER, FOR SOLUTION INTRAMUSCULAR; INTRAVENOUS at 06:10

## 2019-10-22 NOTE — NURSING
19:00 Received bedside report from KATIA Haines.Patient in USS department at time of report.      20:00 Patient transferred back from [facility].PAtient alert and oriented.No pain. No sign of distress. IV line intact over Lt FA - saline locked.Call bell given on her reach. Instructed to call for assistance all the time. Bed alarm on. Bed on lowest position. Safety maintained.

## 2019-10-22 NOTE — PLAN OF CARE
Problem: Infection  Goal: Infection Symptom Resolution  Outcome: Ongoing, Progressing  Intervention: Prevent or Manage Infection  Flowsheets (Taken 10/22/2019 4723)  Infection Management: aseptic technique maintained

## 2019-10-22 NOTE — PLAN OF CARE
GI Note    Abdominal U/S w/ doppler reviewed- no ascites but she does have reversal of flow in the PV suggesting portal HTN.  Will plan for EGD today to rule out significant varices that would require treatment prior to starting anticoagulation.    If large varices are found, she may need banding.  If only small varices are seen, this could be managed with a non-selective beta blocker (nadolol/propranolol) or carvedilol (would switch from metoprolol).

## 2019-10-22 NOTE — PLAN OF CARE
GI note    Pt came to endo for EGD, but HR intermittently going to 180.  Had NSVT last night and afib.  Anesthesia recommends getting better rate control before proceeding with non-urgent EGD to screen for varices.  Agree with Dr. Holliday.  Will tentatively plan on EGD tomorrow if better rate/rhythm control.  NPO after midnight.  Okay with regular diet tonight.

## 2019-10-22 NOTE — NURSING
16:39 SVT of 180.    17:12 Patient with VT x 7. Strip printed. Dr. Munguia notified, who states that he doesn't think patient is having true v-tach. He has increased her Metoprolol to control the rapid rate.

## 2019-10-22 NOTE — PROGRESS NOTES
[facility] Hospital Medicine  Progress Note    Patient Name: Sandra Moody  MRN: [id number]  Patient Class: IP- Inpatient   Admission Date: 10/14/2019  Length of Stay: 8 days  Attending Physician: Naresh Clemente MD  Primary Care Provider: Rox Montes MD        Subjective:     Principal Problem:Sepsis due to Haemophilus influenzae type B        HPI:  Sandra Moody is a 89 y.o. female that (in part)  has a past medical history of Allergy, Anxiety, Back pain, Cirrhosis, COPD (chronic obstructive pulmonary disease), Cryptogenic cirrhosis, Cystocele, Depression, Diverticulitis, Dyspnea, Family history of colon cancer, GERD (gastroesophageal reflux disease), History of colonic polyps, Hypertension, Hypothyroidism, Insomnia, Kidney stones, Lung cancer, Lung cancer, upper lobe, Menopause, Nuclear sclerosis, Osteopenia, PSVT (paroxysmal supraventricular tachycardia), PVC (premature ventricular contraction), Rectocele, Status post partial lobectomy of lung, Thyroid disease, Trochanteric bursitis, Urine incontinence, and Vitamin D deficiency disease.  Presents to [facility] Emergency Department initially complaining of nausea with vomiting.  She was taking a nap and she woke up today feeling ill.  Possible sick contacts.  Generalized fatigue, malaise, chills, and nonfocal subacute muscle weakness.  Says arrest Ali worsening.  No known exacerbating factors.  No relieving factors.  Constant duration.      She saw her PCP earlier today for right hip pain and follow-up for chronic medical conditions including cryptogenic cirrhosis.  She also complained of muscle cramps.  She received her influenza vaccine today.    In the emergency department she was noted to be tachycardic, febrile, and hypotensive.  She was given IV fluids.  Routine laboratory studies were obtained.  She had elevated lactic acid levels.  She was given IV fluids and continue to have  arterial hypotension.  Her lactic acid remained elevated at 5.3.  Sepsis protocol was initiated.  Influenza type B serology came back positive.    Hospital medicine has been asked to admit for further evaluation and treatment.     Overview/Hospital Course:  Ms. Moody was admitted with sepsis secondary to influenza B.  Respiratory isolation initiated and treated with Tamiflu.  Chest x-ray with no definitive consolidation, only few scattered linear opacity consistent with subsegmental scarring versus atelectasis.  Empiric cefepime and vancomycin started given severity of sepsis until bacterial infection ruled out.  Blood cultures negative.  Patient did not need pressor support.  Patient with h/o intermittent tachyarrhythmias controlled with metoprolol in the past and with worsening control due to infection along with holding beta-blocker given initial hypotension which resolved with IV beta-blocker push.  Blood pressure increased enough to initiate start of metoprolol. Stepped down to floor. Patient continued to have episodes of paroxymal tachycardia. Cardiology consulted. Per their eval, patient has paroxysmal AFib. Recommend GI eval to clear patient for anticoagulation given underlying, uncomplicated cirrhosis of liver. Completed 7 days of oseltamivir. Slow to wean off O2. Trial of steroids.   GI ordered US liver to R/O portal HTN,may need EGD to esophageal varices's.    Interval History: states breathing a little better. Wants to go home. Still on O2. With afib    Review of Systems   Respiratory: Negative.    Cardiovascular: Negative.    Gastrointestinal:        Loose stools     Objective:     Vital Signs (Most Recent):  Temp: 97.5 °F (36.4 °C) (10/22/19 0748)  Pulse: 76 (10/22/19 0748)  Resp: 17 (10/22/19 0748)  BP: 134/70 (10/22/19 0748)  SpO2: (!) 88 % (10/22/19 0748) Vital Signs (24h Range):  Temp:  [97.5 °F (36.4 °C)-98.5 °F (36.9 °C)] 97.5 °F (36.4 °C)  Pulse:  [64-76] 76  Resp:  [16-19] 17  SpO2:  [88  %-99 %] 88 %  BP: (127-183)/(53-86) 134/70     Weight: 65.5 kg (144 lb 6.4 oz)  Body mass index is 25.58 kg/m².    Intake/Output Summary (Last 24 hours) at 10/22/2019 0826  Last data filed at 10/22/2019 0648  Gross per 24 hour   Intake 580 ml   Output 350 ml   Net 230 ml      Physical Exam   Constitutional: She is oriented to person, place, and time. She appears well-developed. No distress.   Eyes: Pupils are equal, round, and reactive to light.   Neck: Normal range of motion.   Cardiovascular: Normal rate.   Pulmonary/Chest: Effort normal. She has no wheezes. She has no rales.   Speaking in full sentences with low flow NC in place   Abdominal: Soft. Bowel sounds are normal. She exhibits no distension and no mass. There is no tenderness. There is no guarding.   Musculoskeletal: Normal range of motion.   Neurological: She is alert and oriented to person, place, and time.   Skin: Skin is warm and dry. Capillary refill takes less than 2 seconds. She is not diaphoretic.   Psychiatric: She has a normal mood and affect. Her behavior is normal. Thought content normal.   Nursing note and vitals reviewed.      Significant Labs: All pertinent labs within the past 24 hours have been reviewed.    Significant Imaging: I have reviewed all pertinent imaging results/findings within the past 24 hours.  I have reviewed and interpreted all pertinent imaging results/findings within the past 24 hours.      Assessment/Plan:      * Sepsis due to Haemophilus influenzae type B  Patient met criteria for sepsis secondary to influenza B  Although improved patient still dependent on supplemental O2  Is to complete 7 days of oseltamivir tonight. Ok to dc droplet precautions after last dose given  CT shows multiple areas of ground glass opacity. No rales or wheezing on exam  Patient states feeling a little better with incentive spirometer. Will start trial of short course of solumedrol to hopefully wean off NC  Continue nebs  Activity as  tolerated      Paroxysmal atrial fibrillation  Cardiology consulted  No valvular issues and LVEF preserved  On metoprolol and managing hypoxia  I agree with GI consult to clear patient for anticoagulation for her reported liver cirrhosis  Seems uncomplicated as she has not experienced bleeding, encephalopathy nor significant ascites  INR and pTT  Stop ASA for now. Resume if recommendation is agains anticoagulation  Appreciate GI recs regarding anticoagulation use  GI ordered US liver to R/O portal HTN,may need EGD to esophageal varices's.    Influenza B  Respiratory isolation   Management as above      Ventricular tachycardia, nonsustained  With wide complex tachycardia at times, even when sleeping  Currently with evidence of AFib  Cardiology on board,  Had episode on VT over night.    Severe sepsis        Lactic acidosis  Secondary to sepsis  Treat underlying condition, resolved      History of lung cancer  S/p lobectomy with not adjuvant therapy  CT of chest shows no evidence of mass/recurrence      Hypothyroidism  Continue home regimen      Chronic kidney disease, stage II (mild)  Renal function at baseline  Will continue to monitor    Essential hypertension  Patient was initial hypotensive therefore all antihypertensive medications were held  Continue metoprolol. Hold off on antihypertensives      VTE Risk Mitigation (From admission, onward)         Ordered     IP VTE HIGH RISK PATIENT  Once      10/14/19 0319                      Naresh Clemente MD  Department of Hospital Medicine   [facility]

## 2019-10-22 NOTE — PT/OT/SLP PROGRESS
Physical Therapy      Patient Name:  Sandra Moody   MRN:  [id number]    Patient not seen today secondary to Unavailable (pt is off the unit for EGD). Will follow-up as able.    Seble Bach, PTA

## 2019-10-22 NOTE — PROGRESS NOTES
1435 TN met with pt at 1145 to discuss home health, no preference. Pt stated she does need oxygen. PCP is already scheduled.

## 2019-10-22 NOTE — NURSING
Patient leaving off unit via wheelchair with transport to [facility].  Telemetry monitor in place. Patient in no apparent distress. Will continue to monitor upon return to unit.

## 2019-10-22 NOTE — NURSING
Patient have episode of 6 beast of VTACH ,Dr. Julian informed - no further  Order.      1:52 Patient had further episode fo VT 6 beats, asymptomatic. V/S /53, RR 17, O2 Sat 96% on air. Will continue to monitor.

## 2019-10-22 NOTE — NURSING
Patient is leaving the floor to radiology for scheduled US of the liver via wheelchair. NAD noted. O2 at 3 liters via NC. No IV therapy. Mask is placed on transporter and pt.

## 2019-10-22 NOTE — NURSING
Testing for home O2    O2 Sats on RA at rest= 94%    O2 Sats on RA with exercise= 90%    O2 Sats RECOVERY RA= 93%

## 2019-10-22 NOTE — NURSING
Patient return to unit via wheelchair with transport. 2L O2 in place, telemetry monitor in place. Patient in no apparent distress. Will continue to monitor.     Patient returned to unit with 2 Liters O2 on due to sats dropping while down in Endo.

## 2019-10-22 NOTE — ASSESSMENT & PLAN NOTE
With wide complex tachycardia at times, even when sleeping  Currently with evidence of AFib  Cardiology on board,  Had episode on VT over night.

## 2019-10-22 NOTE — PLAN OF CARE
Problem: Fall Injury Risk  Goal: Absence of Fall and Fall-Related Injury  Intervention: Identify and Manage Contributors to Fall Injury Risk  Flowsheets (Taken 10/22/2019 0422)  Self-Care Promotion: independence encouraged; BADL personal objects within reach; safe use of adaptive equipment encouraged  Medication Review/Management: medications reviewed     Problem: Fall Injury Risk  Goal: Absence of Fall and Fall-Related Injury  Intervention: Promote Injury-Free Environment  Flowsheets (Taken 10/22/2019 0422)  Safety Promotion/Fall Prevention: assistive device/personal item within reach; bed alarm set; commode/urinal/bedpan at bedside; nonskid shoes/socks when out of bed; side rails raised x 2; instructed to call staff for mobility  Environmental Safety Modification: assistive device/personal items within reach; lighting adjusted; clutter free environment maintained; mobility aid in reach; room near unit station; room organization consistent

## 2019-10-22 NOTE — PLAN OF CARE
10/22/19 1441   Post-Acute Status   Post-Acute Authorization Placement;Home Health/Hospice  (Home)   Post-Acute Placement Status Awaiting Internal Medical Clearance   Home Health/Hospice Status Awaiting Orders for HH

## 2019-10-22 NOTE — NURSING
Patient bedside report has been completed and given to KATIA Veronica. Chart check has been completed. Pt is still in radiology for scheduled US of the liver.  Pt is on 3 liters NC.

## 2019-10-23 ENCOUNTER — ANESTHESIA EVENT (OUTPATIENT)
Dept: ENDOSCOPY | Facility: HOSPITAL | Age: 84
DRG: 872 | End: 2019-10-23
Payer: MEDICARE

## 2019-10-23 ENCOUNTER — ANESTHESIA (OUTPATIENT)
Dept: ENDOSCOPY | Facility: HOSPITAL | Age: 84
DRG: 872 | End: 2019-10-23
Payer: MEDICARE

## 2019-10-23 PROCEDURE — 25000242 PHARM REV CODE 250 ALT 637 W/ HCPCS: Mod: HCNC | Performed by: HOSPITALIST

## 2019-10-23 PROCEDURE — 99233 PR SUBSEQUENT HOSPITAL CARE,LEVL III: ICD-10-PCS | Mod: HCNC,,, | Performed by: INTERNAL MEDICINE

## 2019-10-23 PROCEDURE — 25000003 PHARM REV CODE 250: Mod: HCNC | Performed by: HOSPITALIST

## 2019-10-23 PROCEDURE — 63600175 PHARM REV CODE 636 W HCPCS: Mod: HCNC | Performed by: INTERNAL MEDICINE

## 2019-10-23 PROCEDURE — D9220A PRA ANESTHESIA: Mod: HCNC,ANES,, | Performed by: ANESTHESIOLOGY

## 2019-10-23 PROCEDURE — 43235 EGD DIAGNOSTIC BRUSH WASH: CPT | Mod: HCNC | Performed by: INTERNAL MEDICINE

## 2019-10-23 PROCEDURE — 21400001 HC TELEMETRY ROOM: Mod: HCNC

## 2019-10-23 PROCEDURE — 37000008 HC ANESTHESIA 1ST 15 MINUTES: Mod: HCNC | Performed by: INTERNAL MEDICINE

## 2019-10-23 PROCEDURE — 25000003 PHARM REV CODE 250: Mod: HCNC | Performed by: INTERNAL MEDICINE

## 2019-10-23 PROCEDURE — 99233 SBSQ HOSP IP/OBS HIGH 50: CPT | Mod: HCNC,,, | Performed by: INTERNAL MEDICINE

## 2019-10-23 PROCEDURE — 94640 AIRWAY INHALATION TREATMENT: CPT | Mod: HCNC

## 2019-10-23 PROCEDURE — 94799 UNLISTED PULMONARY SVC/PX: CPT | Mod: HCNC

## 2019-10-23 PROCEDURE — 97535 SELF CARE MNGMENT TRAINING: CPT | Mod: HCNC

## 2019-10-23 PROCEDURE — 97802 MEDICAL NUTRITION INDIV IN: CPT | Mod: HCNC

## 2019-10-23 PROCEDURE — 97110 THERAPEUTIC EXERCISES: CPT | Mod: HCNC

## 2019-10-23 PROCEDURE — D9220A PRA ANESTHESIA: ICD-10-PCS | Mod: HCNC,ANES,, | Performed by: ANESTHESIOLOGY

## 2019-10-23 PROCEDURE — 27000221 HC OXYGEN, UP TO 24 HOURS: Mod: HCNC

## 2019-10-23 PROCEDURE — 37000009 HC ANESTHESIA EA ADD 15 MINS: Mod: HCNC | Performed by: INTERNAL MEDICINE

## 2019-10-23 PROCEDURE — 63600175 PHARM REV CODE 636 W HCPCS: Mod: HCNC | Performed by: NURSE ANESTHETIST, CERTIFIED REGISTERED

## 2019-10-23 PROCEDURE — 97116 GAIT TRAINING THERAPY: CPT | Mod: HCNC

## 2019-10-23 RX ORDER — SODIUM CHLORIDE 0.9 % (FLUSH) 0.9 %
10 SYRINGE (ML) INJECTION
Status: DISCONTINUED | OUTPATIENT
Start: 2019-10-23 | End: 2019-10-23 | Stop reason: HOSPADM

## 2019-10-23 RX ORDER — LEVALBUTEROL INHALATION SOLUTION 0.63 MG/3ML
0.63 SOLUTION RESPIRATORY (INHALATION) EVERY 8 HOURS
Status: DISCONTINUED | OUTPATIENT
Start: 2019-10-23 | End: 2019-10-23

## 2019-10-23 RX ORDER — SODIUM CHLORIDE 9 MG/ML
INJECTION, SOLUTION INTRAVENOUS CONTINUOUS PRN
Status: DISCONTINUED | OUTPATIENT
Start: 2019-10-23 | End: 2019-10-23

## 2019-10-23 RX ORDER — PROPOFOL 10 MG/ML
VIAL (ML) INTRAVENOUS
Status: COMPLETED
Start: 2019-10-23 | End: 2019-10-23

## 2019-10-23 RX ORDER — METOPROLOL TARTRATE 1 MG/ML
5 INJECTION, SOLUTION INTRAVENOUS ONCE
Status: DISCONTINUED | OUTPATIENT
Start: 2019-10-23 | End: 2019-10-23

## 2019-10-23 RX ORDER — LIDOCAINE HYDROCHLORIDE 20 MG/ML
INJECTION, SOLUTION EPIDURAL; INFILTRATION; INTRACAUDAL; PERINEURAL
Status: DISPENSED
Start: 2019-10-23 | End: 2019-10-23

## 2019-10-23 RX ORDER — LIDOCAINE HYDROCHLORIDE 10 MG/ML
1 INJECTION, SOLUTION EPIDURAL; INFILTRATION; INTRACAUDAL; PERINEURAL ONCE
Status: DISCONTINUED | OUTPATIENT
Start: 2019-10-23 | End: 2019-10-24 | Stop reason: HOSPADM

## 2019-10-23 RX ORDER — PROPOFOL 10 MG/ML
VIAL (ML) INTRAVENOUS
Status: DISCONTINUED | OUTPATIENT
Start: 2019-10-23 | End: 2019-10-23

## 2019-10-23 RX ORDER — LIDOCAINE HCL/PF 100 MG/5ML
SYRINGE (ML) INTRAVENOUS
Status: DISCONTINUED | OUTPATIENT
Start: 2019-10-23 | End: 2019-10-23

## 2019-10-23 RX ORDER — SODIUM CHLORIDE 9 MG/ML
INJECTION, SOLUTION INTRAVENOUS CONTINUOUS
Status: DISCONTINUED | OUTPATIENT
Start: 2019-10-23 | End: 2019-10-24

## 2019-10-23 RX ORDER — METOPROLOL TARTRATE 1 MG/ML
5 INJECTION, SOLUTION INTRAVENOUS ONCE
Status: COMPLETED | OUTPATIENT
Start: 2019-10-23 | End: 2019-10-23

## 2019-10-23 RX ADMIN — METOPROLOL TARTRATE 5 MG: 1 INJECTION, SOLUTION INTRAVENOUS at 01:10

## 2019-10-23 RX ADMIN — PROPOFOL 50 MG: 10 INJECTION, EMULSION INTRAVENOUS at 09:10

## 2019-10-23 RX ADMIN — PROPOFOL 10 MG: 10 INJECTION, EMULSION INTRAVENOUS at 09:10

## 2019-10-23 RX ADMIN — SERTRALINE HYDROCHLORIDE 25 MG: 25 TABLET ORAL at 08:10

## 2019-10-23 RX ADMIN — RAMELTEON 8 MG: 8 TABLET ORAL at 10:10

## 2019-10-23 RX ADMIN — BUDESONIDE 0.5 MG: 0.5 INHALANT RESPIRATORY (INHALATION) at 08:10

## 2019-10-23 RX ADMIN — BUDESONIDE 0.5 MG: 0.5 INHALANT RESPIRATORY (INHALATION) at 07:10

## 2019-10-23 RX ADMIN — METHYLPREDNISOLONE SODIUM SUCCINATE 40 MG: 40 INJECTION, POWDER, FOR SOLUTION INTRAMUSCULAR; INTRAVENOUS at 05:10

## 2019-10-23 RX ADMIN — PANTOPRAZOLE SODIUM 40 MG: 40 TABLET, DELAYED RELEASE ORAL at 08:10

## 2019-10-23 RX ADMIN — METOPROLOL SUCCINATE 100 MG: 50 TABLET, EXTENDED RELEASE ORAL at 08:10

## 2019-10-23 RX ADMIN — OXYBUTYNIN CHLORIDE 5 MG: 5 TABLET, EXTENDED RELEASE ORAL at 08:10

## 2019-10-23 RX ADMIN — LIDOCAINE HYDROCHLORIDE 50 MG: 20 INJECTION, SOLUTION INTRAVENOUS at 09:10

## 2019-10-23 RX ADMIN — APIXABAN 5 MG: 5 TABLET, FILM COATED ORAL at 06:10

## 2019-10-23 RX ADMIN — SODIUM CHLORIDE: 0.9 INJECTION, SOLUTION INTRAVENOUS at 09:10

## 2019-10-23 RX ADMIN — IPRATROPIUM BROMIDE AND ALBUTEROL SULFATE 3 ML: .5; 3 SOLUTION RESPIRATORY (INHALATION) at 07:10

## 2019-10-23 NOTE — NURSING
Patient leaving off unit via wheelchair with transport to [facility].  2L O2 in place, telemetry monitor in place. Patient in no apparent distress. Will continue to monitor upon return to unit.

## 2019-10-23 NOTE — NURSING
End of shift bedside report given to KATIA Sparks. Patient in no apparent distress.     12 hour chart check complete.

## 2019-10-23 NOTE — ASSESSMENT & PLAN NOTE
Patient with afib. Now resolved. Patient has atrial fibrillation.  I had a detailed discussion with the patient regarding the etiology, the pathophysiology and treatment for atrial fibrillation. We discussed the increased risk of stroke in atrial fibrillation and the reasoning behind anticoagulation. Based on patient's CHADSVASC score there is a benefit of anticoagulation.    We discussed the risks and benefits of anticoagulation and the risks and benefits of anticoagulation including the decreased risk of stroke as well as the increased risk of bleeding with anticoagulation.  We also discussed the various anticoagulant options including warfarin and the newer anticoagulant agents. However, complicated by the fact that she has liver cirrhosis per the patient. Will get clearance from GI prior to initiation of anti-coagulation.   Increased toprol xl to 100 mg daily for better rate control.

## 2019-10-23 NOTE — ASSESSMENT & PLAN NOTE
With wide complex tachycardia at times, even when sleeping  Currently with evidence of AFib  Cardiology on board,  Had episode of increased HR over night,but at this time is stable,

## 2019-10-23 NOTE — PLAN OF CARE
Recommendations     Recommendation/Intervention:   1. Continue cardiac diet as tolerated.      Goals: PO intake > 85% EEN, EPB  Nutrition Goal Status: goal met  Communication of RD Recs: other (comment)(POC)    Assessment and Plan     Nutrition Problem  Altered nutrition-related laboratory values      Related to (etiology):   Liver, Cardiac dysfunction      Signs and Symptoms (as evidenced by):   Cholesterol 201, Triglycerides 198      Interventions:  Collaboration of care with other providers  Nutrition education - low salt, low saturated fat      Nutrition Diagnosis Status:   New

## 2019-10-23 NOTE — UM SECONDARY REVIEW
Physician Advisor Internal    IP Extended Stay > 10    LOS: approved w/o recommendations due to severity of clinical picture 9 DAY LOS    DR. TELLES APPROVED CONTINUED STAY  DCP HOME WITH FAMILY AND C

## 2019-10-23 NOTE — PROGRESS NOTES
[facility]  Cardiology  Progress Note    Patient Name: Sandra Moody  MRN: [id number]  Admission Date: 10/14/2019  Hospital Length of Stay: 9 days  Code Status: Full Code   Attending Physician: Naresh Clemente MD   Primary Care Physician: Rox Montes MD  Expected Discharge Date:   Principal Problem:Sepsis due to Haemophilus influenzae type B    Subjective:       Interval History:  Patient denies any chest pains at rest on exertion, orthopnea, PND.    Review of Systems   Constitution: Negative.   HENT: Negative.    Eyes: Negative.    Cardiovascular: Negative.    Respiratory: Negative.    Endocrine: Negative.    Hematologic/Lymphatic: Negative.    Skin: Negative.    Musculoskeletal: Negative.    Gastrointestinal: Negative.    Genitourinary: Negative.    Neurological: Negative.    Psychiatric/Behavioral: Negative.    Allergic/Immunologic: Negative.      Objective:     Vital Signs (Most Recent):  Temp: 98.1 °F (36.7 °C) (10/23/19 1136)  Pulse: 73 (10/23/19 1136)  Resp: 18 (10/23/19 1136)  BP: (!) 151/69 (10/23/19 1136)  SpO2: (!) 94 % (10/23/19 1136) Vital Signs (24h Range):  Temp:  [97.5 °F (36.4 °C)-98.7 °F (37.1 °C)] 98.1 °F (36.7 °C)  Pulse:  [61-95] 73  Resp:  [10-20] 18  SpO2:  [90 %-98 %] 94 %  BP: (109-163)/(59-76) 151/69     Weight: 65.5 kg (144 lb 6.4 oz)  Body mass index is 25.58 kg/m².     SpO2: (!) 94 %  O2 Device (Oxygen Therapy): nasal cannula      Intake/Output Summary (Last 24 hours) at 10/23/2019 1834  Last data filed at 10/23/2019 1200  Gross per 24 hour   Intake 590 ml   Output --   Net 590 ml       Lines/Drains/Airways     Peripheral Intravenous Line                 Peripheral IV - Single Lumen 10/20/19 1800 22 G Left;Anterior Forearm 3 days                Physical Exam   Constitutional: She is oriented to person, place, and time. She appears well-developed and well-nourished.   HENT:   Head: Normocephalic.   Eyes: Pupils are equal, round, and reactive to light.    Neck: Normal range of motion. Neck supple.   Cardiovascular: Normal rate. An irregularly irregular rhythm present.   Pulmonary/Chest: Effort normal and breath sounds normal.   Abdominal: Soft. Normal appearance and bowel sounds are normal. There is no tenderness.   Musculoskeletal: Normal range of motion.   Neurological: She is alert and oriented to person, place, and time.   Skin: Skin is warm.   Psychiatric: She has a normal mood and affect.       Significant Labs:   BMP:   Recent Labs   Lab 10/22/19  0435   *      K 4.4   CL 93*   CO2 36*   BUN 8   CREATININE 0.7   CALCIUM 9.2   , CMP   Recent Labs   Lab 10/22/19  0435      K 4.4   CL 93*   CO2 36*   *   BUN 8   CREATININE 0.7   CALCIUM 9.2   PROT 6.7   ALBUMIN 3.6   BILITOT 0.6   ALKPHOS 74   AST 29   ALT 41   ANIONGAP 11   ESTGFRAFRICA >60   EGFRNONAA >60   , CBC   Recent Labs   Lab 10/22/19  0435   WBC 4.07   HGB 13.9   HCT 43.7      , INR   Recent Labs   Lab 10/22/19  0435   INR 1.1   , Lipid Panel No results for input(s): CHOL, HDL, LDLCALC, TRIG, CHOLHDL in the last 48 hours., Troponin No results for input(s): TROPONINI in the last 48 hours. and All pertinent lab results from the last 24 hours have been reviewed.    Significant Imaging: Echocardiogram:   Transthoracic echo (TTE) complete (Cupid Only):   Results for orders placed or performed during the hospital encounter of 10/14/19   Echo Color Flow Doppler? Yes; Bubble Contrast? Yes   Result Value Ref Range    BSA 1.75 m2    LA WIDTH 3.42 cm    AORTIC VALVE CUSP SEPERATION 1.91 cm    PV PEAK VELOCITY 0.80 cm/s    LVIDD 4.70 3.5 - 6.0 cm    IVS 1.23 (A) 0.6 - 1.1 cm    PW 0.98 0.6 - 1.1 cm    Ao root annulus 3.12 cm    LVIDS 3.22 2.1 - 4.0 cm    FS 31 28 - 44 %    LA volume 54.90 cm3    Sinus 3.21 cm    STJ 2.70 cm    Ascending aorta 3.97 cm    LV mass 188.73 g    LA size 3.90 cm    RVDD 4.22 cm    TAPSE 1.95 cm    Left Ventricle Relative Wall Thickness 0.42 cm    AV mean  gradient 9 mmHg    AV valve area 2.10 cm2    AV Velocity Ratio 0.52     AV index (prosthetic) 0.51     E/A ratio 1.02     E wave decelartion time 211.76 msec    IVRT 0.08 msec    Pulm vein S/D ratio 1.21     LVOT diameter 2.29 cm    LVOT area 4.1 cm2    LVOT peak dajuan 1.01 m/s    LVOT peak VTI 21.39 cm    Ao peak dajuan 1.95 m/s    Ao VTI 41.95 cm    LVOT stroke volume 88.05 cm3    AV peak gradient 15 mmHg    MV Peak E Dajuan 1.27 m/s    TR Max Dajuan 3.08 m/s    MV Peak A Dajuan 1.24 m/s    PV Peak S Dajuan 0.52 m/s    PV Peak D Dajuan 0.43 m/s    LV Systolic Volume 41.68 mL    LV Systolic Volume Index 24.2 mL/m2    LV Diastolic Volume 102.53 mL    LV Diastolic Volume Index 59.58 mL/m2    LA Volume Index 31.9 mL/m2    LV Mass Index 110 g/m2    RA Major Axis 4.63 cm    Left Atrium Minor Axis 4.57 cm    Left Atrium Major Axis 5.15 cm    Triscuspid Valve Regurgitation Peak Gradient 38 mmHg    RA Width 3.79 cm    Right Atrial Pressure (from IVC) 8 mmHg    TV rest pulmonary artery pressure 46 mmHg    Narrative    · Normal left ventricular systolic function. The estimated ejection   fraction is 55%  · Mild eccentric left ventricular hypertrophy.  · No wall motion abnormalities.  · Normal right ventricular systolic function.  · Mild mitral regurgitation.  · Mild to moderate tricuspid regurgitation.  · The estimated PA systolic pressure is 46 mm Hg  · Pulmonary hypertension present.        Assessment and Plan:         * Sepsis due to Haemophilus influenzae type B        Paroxysmal atrial fibrillation  Patient with afib. Now resolved. Patient has atrial fibrillation.  I had a detailed discussion with the patient regarding the etiology, the pathophysiology and treatment for atrial fibrillation. We discussed the increased risk of stroke in atrial fibrillation and the reasoning behind anticoagulation. Based on patient's CHADSVASC score there is a benefit of anticoagulation.    We discussed the risks and benefits of anticoagulation and the risks  and benefits of anticoagulation including the decreased risk of stroke as well as the increased risk of bleeding with anticoagulation.  We also discussed the various anticoagulant options including warfarin and the newer anticoagulant agents. However, complicated by the fact that she has liver cirrhosis per the patient. Will get clearance from GI prior to initiation of anti-coagulation.   Increased toprol xl to 100 mg daily for better rate control.     Gastroenterology has cleared patient for anticoagulation.  I had a detailed discussion with the patient's son about various options for anticoagulation.  Patient son does not want to have his mother on Coumadin.  Start Eliquis.  Follow-up in Cardiology Clinic.    History of lung cancer        Chronic kidney disease, stage II (mild)        Essential hypertension            VTE Risk Mitigation (From admission, onward)         Ordered     apixaban tablet 5 mg  2 times daily      10/23/19 1745     IP VTE HIGH RISK PATIENT  Once      10/14/19 9116                Cameron Munguia MD  Cardiology  [facility]

## 2019-10-23 NOTE — PROGRESS NOTES
The patient was in atrial fibrillation with rapid ventricular rate again through the night.    Vitals:    10/23/19 0119 10/23/19 0438 10/23/19 0728 10/23/19 0734   BP: (!) 146/66 (!) 140/65  (!) 163/72   BP Location:  Left arm  Left arm   Patient Position:  Lying  Lying   Pulse: 65 66 64 70   Resp: 17 19 18 19   Temp:  97.5 °F (36.4 °C)  97.9 °F (36.6 °C)   TempSrc:  Oral  Oral   SpO2: 95% (!) 90% (!) 94% (!) 92%   Weight:       Height:          albuterol-ipratropium  3 mL Nebulization Q6H WAKE    budesonide  0.5 mg Nebulization Q12H    levothyroxine  25 mcg Oral Before breakfast    metoprolol succinate  100 mg Oral Daily    oxybutynin  5 mg Oral Daily    pantoprazole  40 mg Oral Daily    sertraline  25 mg Oral Daily     P.E.:  GEN: A x O x 3, NAD  HEENT: EOMI, PERRL, anicteric sclera  CV: Irregularly irregular, no M/R/G  Chest: CTA B  Abdomen: soft, NTND, normoactive BS  Ext: No C/C/E. 2+ dorsalis pedis pulses B    Labs:  Recent Results (from the past 336 hour(s))   CBC auto differential    Collection Time: 10/22/19  4:35 AM   Result Value Ref Range    WBC 4.07 3.90 - 12.70 K/uL    Hemoglobin 13.9 12.0 - 16.0 g/dL    Hematocrit 43.7 37.0 - 48.5 %    Platelets 160 150 - 350 K/uL   CBC auto differential    Collection Time: 10/19/19  4:44 AM   Result Value Ref Range    WBC 4.38 3.90 - 12.70 K/uL    Hemoglobin 13.6 12.0 - 16.0 g/dL    Hematocrit 42.4 37.0 - 48.5 %    Platelets 81 (L) 150 - 350 K/uL   CBC auto differential    Collection Time: 10/18/19  5:40 AM   Result Value Ref Range    WBC 3.89 (L) 3.90 - 12.70 K/uL    Hemoglobin 12.3 12.0 - 16.0 g/dL    Hematocrit 38.0 37.0 - 48.5 %    Platelets 57 (L) 150 - 350 K/uL     CMP  Sodium   Date Value Ref Range Status   10/22/2019 140 136 - 145 mmol/L Final     Potassium   Date Value Ref Range Status   10/22/2019 4.4 3.5 - 5.1 mmol/L Final     Chloride   Date Value Ref Range Status   10/22/2019 93 (L) 95 - 110 mmol/L Final     CO2   Date Value Ref Range Status    10/22/2019 36 (H) 23 - 29 mmol/L Final     Glucose   Date Value Ref Range Status   10/22/2019 162 (H) 70 - 110 mg/dL Final     BUN, Bld   Date Value Ref Range Status   10/22/2019 8 8 - 23 mg/dL Final     Creatinine   Date Value Ref Range Status   10/22/2019 0.7 0.5 - 1.4 mg/dL Final     Calcium   Date Value Ref Range Status   10/22/2019 9.2 8.7 - 10.5 mg/dL Final     Total Protein   Date Value Ref Range Status   10/22/2019 6.7 6.0 - 8.4 g/dL Final     Albumin   Date Value Ref Range Status   10/22/2019 3.6 3.5 - 5.2 g/dL Final     Total Bilirubin   Date Value Ref Range Status   10/22/2019 0.6 0.1 - 1.0 mg/dL Final     Comment:     For infants and newborns, interpretation of results should be based  on gestational age, weight and in agreement with clinical  observations.  Premature Infant recommended reference ranges:  Up to 24 hours.............<8.0 mg/dL  Up to 48 hours............<12.0 mg/dL  3-5 days..................<15.0 mg/dL  6-29 days.................<15.0 mg/dL       Alkaline Phosphatase   Date Value Ref Range Status   10/22/2019 74 55 - 135 U/L Final     AST   Date Value Ref Range Status   10/22/2019 29 10 - 40 U/L Final     ALT   Date Value Ref Range Status   10/22/2019 41 10 - 44 U/L Final     Anion Gap   Date Value Ref Range Status   10/22/2019 11 8 - 16 mmol/L Final     eGFR if    Date Value Ref Range Status   10/22/2019 >60 >60 mL/min/1.73 m^2 Final     eGFR if non    Date Value Ref Range Status   10/22/2019 >60 >60 mL/min/1.73 m^2 Final     Comment:     Calculation used to obtain the estimated glomerular filtration  rate (eGFR) is the CKD-EPI equation.          No results for input(s): PT, INR, APTT in the last 24 hours.    Doppler Ultrasound:       1. Heterogeneous hepatic echotexture and splenomegaly consistent with provided history of cirrhosis.  2. Reversal of flow in the main portal vein indicating portal hypertension.  Remaining portal vein branches demonstrate  appropriate flow directionality.  3. Post cholecystectomy.     A/P:  The patient is a 89 year old woman with a history of HTN, atrial fibrillation, chronic renal insufficiency, cryptogenic cirrhosis, hypothyroidism, GERD, and lung cancer admitted with H. influenza sepsis and atrial fibrillation with rapid ventricular rate.  1.  Atrial Fibrillation - given her history of cirrhosis with evidence of portal hypertension, an EGD was planned again today to evaluate for varices and portal hypertension prior to starting oral anticoagulation.  This again needs to be postponed as she remains in rapid ventricular rate.  If this can be better controlled, an EGD can be attempted tomorrow.  Otherwise, she may need to be started on oral anticoagulation without an EGD if anticoagulation is truly needed.  She is followed by [facility].

## 2019-10-23 NOTE — DISCHARGE SUMMARY
[facility]  Discharge Summary      Admit Date: 10/14/2019    Discharge Date and Time:  10/23/2019 9:53 AM    Attending Physician: Naresh Clemente MD     Reason for Admission: Atrial fibrillation    Procedures Performed: Procedure(s) (LRB):  EGD (ESOPHAGOGASTRODUODENOSCOPY) (N/A)    Hospital Course (synopsis of major diagnoses, care, treatment, and services provided during the course of the hospital stay): Ongoing     Consults: GI    Significant Diagnostic Studies: EGD    Final Diagnoses:    Principal Problem: Sepsis due to Haemophilus influenzae type B   Secondary Diagnoses: EGD    Discharged Condition: good    Disposition: Admitted as an Inpatient    Follow Up/Patient Instructions: Follow-up with referring physician             Resume previous diet and activity.    Medications:  Transfer Medications (for Discharge Readmit only):   Current Facility-Administered Medications   Medication Dose Route Frequency Provider Last Rate Last Dose    acetaminophen tablet 650 mg  650 mg Oral Q6H PRN Cheryl Park MD        budesonide nebulizer solution 0.5 mg  0.5 mg Nebulization Q12H Cheryl Park MD   0.5 mg at 10/23/19 0728    levalbuterol nebulizer solution 0.63 mg  0.63 mg Nebulization Q8H Naresh Clemente MD        levothyroxine tablet 25 mcg  25 mcg Oral Before breakfast Cheryl Park MD   25 mcg at 10/22/19 0623    lidocaine (PF) 20 mg/mL (2%) 20 mg/mL (2 %) injection             metoprolol succinate (TOPROL-XL) 24 hr tablet 100 mg  100 mg Oral Daily Cameron Munguia MD   100 mg at 10/23/19 0817    ondansetron disintegrating tablet 8 mg  8 mg Oral Q8H PRN Cheryl Park MD   8 mg at 10/19/19 0421    oxybutynin 24 hr tablet 5 mg  5 mg Oral Daily Cheryl Park MD   5 mg at 10/23/19 0816    pantoprazole EC tablet 40 mg  40 mg Oral Daily Cheryl Park MD   40 mg at 10/23/19 0817    promethazine (PHENERGAN) 6.25 mg in dextrose 5 % 50 mL IVPB  6.25 mg Intravenous Q6H PRN Cheryl Park MD  150 mL/hr at 10/19/19 0659 6.25 mg at 10/19/19 0659    ramelteon tablet 8 mg  8 mg Oral Nightly PRN Cheryl Park MD   8 mg at 10/22/19 2235    sertraline tablet 25 mg  25 mg Oral Daily Cheryl Park MD   25 mg at 10/23/19 0817    sodium chloride 0.9% flush 10 mL  10 mL Intravenous PRN Cheryl Park MD        sodium chloride 0.9% flush 10 mL  10 mL Intravenous PRN Archie Humphrey MD         Facility-Administered Medications Ordered in Other Encounters   Medication Dose Route Frequency Provider Last Rate Last Dose    0.9%  NaCl infusion    Continuous PRN Eugenia C. Belair, CRNA        lidocaine (cardiac) injection    PRN Eugenia C. Belair, CRNA   50 mg at 10/23/19 0945    propofol (DIPRIVAN) 10 mg/mL infusion    PRN Eugenia C. Belair, CRNA   10 mg at 10/23/19 0949     No discharge procedures on file.  Follow-up Information     Rox Montes MD On 10/28/2019.    Specialties:  Internal Medicine, Wound Care  Why:  Outpatient Services PCP Follow-Up Monday at 10:20 AM  Contact information:  [address] [address]  [address]  [place] [address] [phone]             Cameron Munguia MD.    Specialties:  Cardiology, INTERVENTIONAL CARDIOLOGY  Contact information:  [address]  [address]  [facility] [address] [phone]

## 2019-10-23 NOTE — OR NURSING
PATIENT AWAKE AND ALERT, DENIES CONCERNS. REPORT CALLED TO KATIA JULIAN. SON AT BEDSIDE. DR. ZAMBRANO DISCUSSED RESULTS. UNDERSTANDING VERBALIZED. REPORT GIVEN TO SON.

## 2019-10-23 NOTE — PT/OT/SLP PROGRESS
Physical Therapy      Patient Name:  Sandra Moody   MRN:  [id number]    Patient not seen at this time for PT tx secondary to Unavailable (pt off unit for medical procedures). Will follow-up as able.    Naz Quezada, PT

## 2019-10-23 NOTE — PLAN OF CARE
Problem: Physical Therapy Goal  Goal: Physical Therapy Goal  Description  Goals to be met by: 19     Patient will increase functional independence with mobility by performin. Supine to sit with Modified [place]  2. Rolling to Left and Right with Modified [place]  3. Sit to stand transfer with Modified [place] using str cane  4. Bed to chair transfer with Modified [place] using str cane  5. Gait >250 feet with Modified [place] using str cane  6. Lower extremity exercise program x10 reps per handout, with independence       Outcome: Ongoing, Progressing    Pt ambulated ~80 ft and ~160 ft with hand-held assistance.  Pt with a-fib HR ~70's - 160's bpm, spO2 RA ~90% during ambulation.

## 2019-10-23 NOTE — ASSESSMENT & PLAN NOTE
Cardiology consulted  No valvular issues and LVEF preserved  On metoprolol and managing hypoxia  I agree with GI consult to clear patient for anticoagulation for her reported liver cirrhosis  Seems uncomplicated as she has not experienced bleeding, encephalopathy nor significant ascites  INR and pTT  Stop ASA for now. Resume if recommendation is agains anticoagulation  Appreciate GI recs regarding anticoagulation use  GI ordered US liver to R/O portal HTN,may need EGD to esophageal varices's.GI ordered US liver to R/O portal HTN,may need EGD to esophageal varices's.patient's HR was increased yesterday,procedure could not be performed,spoke with cardiology BB is increased,still had over night some occasional increased ER,but currently HR is optimized and stable,patient is medically clear proceed with procedure.

## 2019-10-23 NOTE — PROGRESS NOTES
[facility]  Cardiology  Progress Note    Patient Name: Sandra Moody  MRN: [id number]  Admission Date: 10/14/2019  Hospital Length of Stay: 8 days  Code Status: Full Code   Attending Physician: Naresh Clemente MD   Primary Care Physician: Rox Montes MD  Expected Discharge Date:   Principal Problem:Sepsis due to Haemophilus influenzae type B    Subjective:       Interval History: no cp/orthopnea/pnd. Had epidode of afib rvr brief. Tele reviewed. Brief episodes of afib and afib with aberrancy.      Review of Systems   Constitution: Negative.   HENT: Negative.    Eyes: Negative.    Cardiovascular: Negative.    Respiratory: Negative.    Endocrine: Negative.    Hematologic/Lymphatic: Negative.    Skin: Negative.    Musculoskeletal: Negative.    Gastrointestinal: Negative.    Genitourinary: Negative.    Neurological: Negative.    Psychiatric/Behavioral: Negative.    Allergic/Immunologic: Negative.      Objective:     Vital Signs (Most Recent):  Temp: 98.4 °F (36.9 °C) (10/22/19 2022)  Pulse: 75 (10/22/19 2033)  Resp: 18 (10/22/19 2033)  BP: 136/69 (10/22/19 2022)  SpO2: 98 % (10/22/19 2033) Vital Signs (24h Range):  Temp:  [97.5 °F (36.4 °C)-98.5 °F (36.9 °C)] 98.4 °F (36.9 °C)  Pulse:  [] 75  Resp:  [17-19] 18  SpO2:  [84 %-99 %] 98 %  BP: (127-183)/(53-86) 136/69     Weight: 65.5 kg (144 lb 6.4 oz)  Body mass index is 25.58 kg/m².     SpO2: 98 %  O2 Device (Oxygen Therapy): nasal cannula      Intake/Output Summary (Last 24 hours) at 10/22/2019 2130  Last data filed at 10/22/2019 1700  Gross per 24 hour   Intake 340 ml   Output 350 ml   Net -10 ml       Lines/Drains/Airways     Peripheral Intravenous Line                 Peripheral IV - Single Lumen 10/20/19 1800 22 G Left;Anterior Forearm 2 days                Physical Exam   Constitutional: She is oriented to person, place, and time. She appears well-developed and well-nourished.   HENT:   Head: Normocephalic.   Eyes: Pupils  are equal, round, and reactive to light.   Neck: Normal range of motion. Neck supple.   Cardiovascular: Normal rate. An irregularly irregular rhythm present.   Pulmonary/Chest: Effort normal and breath sounds normal.   Abdominal: Soft. Normal appearance and bowel sounds are normal. There is no tenderness.   Musculoskeletal: Normal range of motion.   Neurological: She is alert and oriented to person, place, and time.   Skin: Skin is warm.   Psychiatric: She has a normal mood and affect.       Significant Labs:   BMP:   Recent Labs   Lab 10/21/19  0608 10/22/19  0435   * 162*    140   K 4.5 4.4   CL 97 93*   CO2 39* 36*   BUN 7* 8   CREATININE 0.6 0.7   CALCIUM 8.5* 9.2   , CMP   Recent Labs   Lab 10/21/19  0608 10/22/19  0435    140   K 4.5 4.4   CL 97 93*   CO2 39* 36*   * 162*   BUN 7* 8   CREATININE 0.6 0.7   CALCIUM 8.5* 9.2   PROT 5.6* 6.7   ALBUMIN 3.1* 3.6   BILITOT 0.6 0.6   ALKPHOS 68 74   AST 32 29   ALT 37 41   ANIONGAP 7* 11   ESTGFRAFRICA >60 >60   EGFRNONAA >60 >60   , CBC   Recent Labs   Lab 10/22/19  0435   WBC 4.07   HGB 13.9   HCT 43.7      , INR   Recent Labs   Lab 10/22/19  0435   INR 1.1   , Lipid Panel No results for input(s): CHOL, HDL, LDLCALC, TRIG, CHOLHDL in the last 48 hours., Troponin No results for input(s): TROPONINI in the last 48 hours. and All pertinent lab results from the last 24 hours have been reviewed.    Significant Imaging: Echocardiogram:   Transthoracic echo (TTE) complete (Cupid Only):   Results for orders placed or performed during the hospital encounter of 10/14/19   Echo Color Flow Doppler? Yes; Bubble Contrast? Yes   Result Value Ref Range    BSA 1.75 m2    LA WIDTH 3.42 cm    AORTIC VALVE CUSP SEPERATION 1.91 cm    PV PEAK VELOCITY 0.80 cm/s    LVIDD 4.70 3.5 - 6.0 cm    IVS 1.23 (A) 0.6 - 1.1 cm    PW 0.98 0.6 - 1.1 cm    Ao root annulus 3.12 cm    LVIDS 3.22 2.1 - 4.0 cm    FS 31 28 - 44 %    LA volume 54.90 cm3    Sinus 3.21 cm     STJ 2.70 cm    Ascending aorta 3.97 cm    LV mass 188.73 g    LA size 3.90 cm    RVDD 4.22 cm    TAPSE 1.95 cm    Left Ventricle Relative Wall Thickness 0.42 cm    AV mean gradient 9 mmHg    AV valve area 2.10 cm2    AV Velocity Ratio 0.52     AV index (prosthetic) 0.51     E/A ratio 1.02     E wave decelartion time 211.76 msec    IVRT 0.08 msec    Pulm vein S/D ratio 1.21     LVOT diameter 2.29 cm    LVOT area 4.1 cm2    LVOT peak dajuan 1.01 m/s    LVOT peak VTI 21.39 cm    Ao peak dajuan 1.95 m/s    Ao VTI 41.95 cm    LVOT stroke volume 88.05 cm3    AV peak gradient 15 mmHg    MV Peak E Dajuan 1.27 m/s    TR Max Dajuan 3.08 m/s    MV Peak A Dajuan 1.24 m/s    PV Peak S Dajuan 0.52 m/s    PV Peak D Dajuan 0.43 m/s    LV Systolic Volume 41.68 mL    LV Systolic Volume Index 24.2 mL/m2    LV Diastolic Volume 102.53 mL    LV Diastolic Volume Index 59.58 mL/m2    LA Volume Index 31.9 mL/m2    LV Mass Index 110 g/m2    RA Major Axis 4.63 cm    Left Atrium Minor Axis 4.57 cm    Left Atrium Major Axis 5.15 cm    Triscuspid Valve Regurgitation Peak Gradient 38 mmHg    RA Width 3.79 cm    Right Atrial Pressure (from IVC) 8 mmHg    TV rest pulmonary artery pressure 46 mmHg    Narrative    · Normal left ventricular systolic function. The estimated ejection   fraction is 55%  · Mild eccentric left ventricular hypertrophy.  · No wall motion abnormalities.  · Normal right ventricular systolic function.  · Mild mitral regurgitation.  · Mild to moderate tricuspid regurgitation.  · The estimated PA systolic pressure is 46 mm Hg  · Pulmonary hypertension present.        Assessment and Plan:       * Sepsis due to Haemophilus influenzae type B        Paroxysmal atrial fibrillation  Patient with afib. Now resolved. Patient has atrial fibrillation.  I had a detailed discussion with the patient regarding the etiology, the pathophysiology and treatment for atrial fibrillation. We discussed the increased risk of stroke in atrial fibrillation and the reasoning  behind anticoagulation. Based on patient's CHADSVASC score there is a benefit of anticoagulation.    We discussed the risks and benefits of anticoagulation and the risks and benefits of anticoagulation including the decreased risk of stroke as well as the increased risk of bleeding with anticoagulation.  We also discussed the various anticoagulant options including warfarin and the newer anticoagulant agents. However, complicated by the fact that she has liver cirrhosis per the patient. Will get clearance from GI prior to initiation of anti-coagulation.   Increased toprol xl to 100 mg daily for better rate control.     History of lung cancer        Chronic kidney disease, stage II (mild)        Essential hypertension            VTE Risk Mitigation (From admission, onward)         Ordered     IP VTE HIGH RISK PATIENT  Once      10/14/19 8654                Cameron Munguia MD  Cardiology  [facility]

## 2019-10-23 NOTE — PLAN OF CARE
Problem: Occupational Therapy Goal  Goal: Occupational Therapy Goal  Description  Goals to be met by: 11/1/19    Patient will increase functional independence with ADLs by performing:    UE Dressing with Modified [place].  LE Dressing with Modified [place].  Grooming while standing at sink with Modified [place].  Supine to sit with Modified [place].  Step transfer with Modified [place]  Toilet transfer to toilet with Modified [place].  Upper extremity exercise program x10 reps per handout, with assistance as needed.  Educate the patient re: Home safety and Energy Conservation     Outcome: Ongoing, Progressing   The patient tolerated amb to the sink with SBA without AD, while on RA with a PSO2 of 93%.

## 2019-10-23 NOTE — NURSING
22:18 Monitor tech reported patient having tachycardia upto 160's on and off. Checked patient she is watching , asymptomatic but can feel heart is raising. V/S checked / 66, RR 19 HR 95. Will monitor continuously.      Dr. Julian informed of frequent tachycardic episodes for the last hour and  Ordered Metoprolol 5mg /IV once - not given at the moment as BP drop to 109/ 59 and HR maintained 65 -75 in the last 25 mins -  and Charge nurse informed. Will monitor .      1:18 Patient have further episodes of tachycardia upto 150's while asleep. BP improved to 127/61. IV metoprolol given as ordered. Blood pressure  post IV metoprolol 146/66.

## 2019-10-23 NOTE — SUBJECTIVE & OBJECTIVE
Interval History: no cp/orthopnea/pnd. Had epidode of afib rvr brief. Tele reviewed. Brief episodes of afib and afib with aberrancy.      Review of Systems   Constitution: Negative.   HENT: Negative.    Eyes: Negative.    Cardiovascular: Negative.    Respiratory: Negative.    Endocrine: Negative.    Hematologic/Lymphatic: Negative.    Skin: Negative.    Musculoskeletal: Negative.    Gastrointestinal: Negative.    Genitourinary: Negative.    Neurological: Negative.    Psychiatric/Behavioral: Negative.    Allergic/Immunologic: Negative.      Objective:     Vital Signs (Most Recent):  Temp: 98.4 °F (36.9 °C) (10/22/19 2022)  Pulse: 75 (10/22/19 2033)  Resp: 18 (10/22/19 2033)  BP: 136/69 (10/22/19 2022)  SpO2: 98 % (10/22/19 2033) Vital Signs (24h Range):  Temp:  [97.5 °F (36.4 °C)-98.5 °F (36.9 °C)] 98.4 °F (36.9 °C)  Pulse:  [] 75  Resp:  [17-19] 18  SpO2:  [84 %-99 %] 98 %  BP: (127-183)/(53-86) 136/69     Weight: 65.5 kg (144 lb 6.4 oz)  Body mass index is 25.58 kg/m².     SpO2: 98 %  O2 Device (Oxygen Therapy): nasal cannula      Intake/Output Summary (Last 24 hours) at 10/22/2019 2130  Last data filed at 10/22/2019 1700  Gross per 24 hour   Intake 340 ml   Output 350 ml   Net -10 ml       Lines/Drains/Airways     Peripheral Intravenous Line                 Peripheral IV - Single Lumen 10/20/19 1800 22 G Left;Anterior Forearm 2 days                Physical Exam   Constitutional: She is oriented to person, place, and time. She appears well-developed and well-nourished.   HENT:   Head: Normocephalic.   Eyes: Pupils are equal, round, and reactive to light.   Neck: Normal range of motion. Neck supple.   Cardiovascular: Normal rate. An irregularly irregular rhythm present.   Pulmonary/Chest: Effort normal and breath sounds normal.   Abdominal: Soft. Normal appearance and bowel sounds are normal. There is no tenderness.   Musculoskeletal: Normal range of motion.   Neurological: She is alert and oriented to person,  place, and time.   Skin: Skin is warm.   Psychiatric: She has a normal mood and affect.       Significant Labs:   BMP:   Recent Labs   Lab 10/21/19  0608 10/22/19  0435   * 162*    140   K 4.5 4.4   CL 97 93*   CO2 39* 36*   BUN 7* 8   CREATININE 0.6 0.7   CALCIUM 8.5* 9.2   , CMP   Recent Labs   Lab 10/21/19  0608 10/22/19  0435    140   K 4.5 4.4   CL 97 93*   CO2 39* 36*   * 162*   BUN 7* 8   CREATININE 0.6 0.7   CALCIUM 8.5* 9.2   PROT 5.6* 6.7   ALBUMIN 3.1* 3.6   BILITOT 0.6 0.6   ALKPHOS 68 74   AST 32 29   ALT 37 41   ANIONGAP 7* 11   ESTGFRAFRICA >60 >60   EGFRNONAA >60 >60   , CBC   Recent Labs   Lab 10/22/19  0435   WBC 4.07   HGB 13.9   HCT 43.7      , INR   Recent Labs   Lab 10/22/19  0435   INR 1.1   , Lipid Panel No results for input(s): CHOL, HDL, LDLCALC, TRIG, CHOLHDL in the last 48 hours., Troponin No results for input(s): TROPONINI in the last 48 hours. and All pertinent lab results from the last 24 hours have been reviewed.    Significant Imaging: Echocardiogram:   Transthoracic echo (TTE) complete (Cupid Only):   Results for orders placed or performed during the hospital encounter of 10/14/19   Echo Color Flow Doppler? Yes; Bubble Contrast? Yes   Result Value Ref Range    BSA 1.75 m2    LA WIDTH 3.42 cm    AORTIC VALVE CUSP SEPERATION 1.91 cm    PV PEAK VELOCITY 0.80 cm/s    LVIDD 4.70 3.5 - 6.0 cm    IVS 1.23 (A) 0.6 - 1.1 cm    PW 0.98 0.6 - 1.1 cm    Ao root annulus 3.12 cm    LVIDS 3.22 2.1 - 4.0 cm    FS 31 28 - 44 %    LA volume 54.90 cm3    Sinus 3.21 cm    STJ 2.70 cm    Ascending aorta 3.97 cm    LV mass 188.73 g    LA size 3.90 cm    RVDD 4.22 cm    TAPSE 1.95 cm    Left Ventricle Relative Wall Thickness 0.42 cm    AV mean gradient 9 mmHg    AV valve area 2.10 cm2    AV Velocity Ratio 0.52     AV index (prosthetic) 0.51     E/A ratio 1.02     E wave decelartion time 211.76 msec    IVRT 0.08 msec    Pulm vein S/D ratio 1.21     LVOT diameter 2.29 cm     LVOT area 4.1 cm2    LVOT peak dajuan 1.01 m/s    LVOT peak VTI 21.39 cm    Ao peak dajuan 1.95 m/s    Ao VTI 41.95 cm    LVOT stroke volume 88.05 cm3    AV peak gradient 15 mmHg    MV Peak E Dajuan 1.27 m/s    TR Max Dajuan 3.08 m/s    MV Peak A Dajuan 1.24 m/s    PV Peak S Dajuan 0.52 m/s    PV Peak D Dajuan 0.43 m/s    LV Systolic Volume 41.68 mL    LV Systolic Volume Index 24.2 mL/m2    LV Diastolic Volume 102.53 mL    LV Diastolic Volume Index 59.58 mL/m2    LA Volume Index 31.9 mL/m2    LV Mass Index 110 g/m2    RA Major Axis 4.63 cm    Left Atrium Minor Axis 4.57 cm    Left Atrium Major Axis 5.15 cm    Triscuspid Valve Regurgitation Peak Gradient 38 mmHg    RA Width 3.79 cm    Right Atrial Pressure (from IVC) 8 mmHg    TV rest pulmonary artery pressure 46 mmHg    Narrative    · Normal left ventricular systolic function. The estimated ejection   fraction is 55%  · Mild eccentric left ventricular hypertrophy.  · No wall motion abnormalities.  · Normal right ventricular systolic function.  · Mild mitral regurgitation.  · Mild to moderate tricuspid regurgitation.  · The estimated PA systolic pressure is 46 mm Hg  · Pulmonary hypertension present.

## 2019-10-23 NOTE — PROGRESS NOTES
"  [facility] [place]  Consult Note    SUMMARY     Recommendations    Recommendation/Intervention:   1. Continue cardiac diet as tolerated.     Goals: PO intake > 85% EEN, EPB  Nutrition Goal Status: goal met  Communication of RD Recs: other (comment)(POC)    Reason for Assessment    Reason For Assessment: length of stay  Diagnosis: infection/sepsis(2/2 Influenzae type B)  Relevant Medical History: Cirrhosis, COPD< Cytocele, Diverticulitis, GERD, Hyperthyrodism, Lung CA  Interdisciplinary Rounds: did not attend  General Information Comments: Pt admitted with emesis on 10/15, N/V has since resolved. Pt reports having a good appetite, PO intake ~ 50% of meals which is consistent to her portion sizes at home. Pt denies any recent wt loss; confirmed via chart # since 2016. Denies D/C. Pt states she follows low Na, low saturated fat diet at home. NFPE completed, no s/s of malnutrition.   Nutrition Discharge Planning: d/c on cardiac diet    Nutrition Risk Screen    Nutrition Risk Screen: no indicators present    Nutrition/Diet History    Patient Reported Diet/Restrictions/Preferences: low salt  Spiritual, Cultural Beliefs, Mandaeism Practices, Values that Affect Care: no    Anthropometrics    Temp: 98.1 °F (36.7 °C)  Height Method: Stated  Height: 5' 3" (160 cm)  Height (inches): 63 in  Weight Method: Bed Scale  Weight: 65.5 kg (144 lb 6.4 oz)  Weight (lb): 144.4 lb  Ideal Body Weight (IBW), Female: 115 lb  % Ideal Body Weight, Female (lb): 125.57 lb  BMI (Calculated): 25.6  BMI Grade: 25 - 29.9 - overweight     Lab/Procedures/Meds    Pertinent Labs Reviewed: reviewed  Pertinent Labs Comments: Glu 162, Lactate 2.9, Cholesterol 20, Triglycerdies 198  Pertinent Medications Reviewed: reviewed  Pertinent Medications Comments:   budesonide   levalbuterol   levothyroxine   metoprolol succinate   oxybutynin   pantoprazole   sertraline     Estimated/Assessed Needs    Weight Used For Calorie " Calculations: 65.5 kg (144 lb 6.4 oz)  Energy Calorie Requirements (kcal): 1305 kcal/day  Energy Need Method: [place]-St Jeor(PAL 1.25)  Protein Requirements: 59-79 g/day(1.0-1.2 g/kg)  Weight Used For Protein Calculations: 65.5 kg (144 lb 6.4 oz)  Fluid Requirements (mL): 1 mL/kcal or PER MD   Estimated Fluid Requirement Method: RDA Method  RDA Method (mL): 1305     Nutrition Prescription Ordered    Current Diet Order: cardiac    Evaluation of Received Nutrient/Fluid Intake    I/O: 540/0  Energy Calories Required: meeting needs  Protein Required: meeting needs  Fluid Required: meeting needs  Comments: LBM: 10/21  Tolerance: tolerating  % Intake of Estimated Energy Needs: 50 - 75 %   % Meal Intake: 50 - 75 %     Nutrition Risk    Level of Risk/Frequency of Follow-up: high(1x/week)     Assessment and Plan    Nutrition Problem  Altered nutrition-related laboratory values     Related to (etiology):   Liver, Cardiac dysfunction     Signs and Symptoms (as evidenced by):   Cholesterol 201, Triglycerides 198     Interventions:  Collaboration of care with other providers  Nutrition education - low salt, low saturated fat     Nutrition Diagnosis Status:   New    Monitor and Evaluation    Food and Nutrient Intake: energy intake, food and beverage intake  Food and Nutrient Adminstration: diet order  Anthropometric Measurements: weight, weight change, body mass index  Biochemical Data, Medical Tests and Procedures: electrolyte and renal panel, gastrointestinal profile, glucose/endocrine profile, inflammatory profile, lipid profile  Nutrition-Focused Physical Findings: overall appearance     Malnutrition Assessment     Subcutaneous Fat Loss (Final Summary): well nourished  Muscle Loss Evaluation (Final Summary): well nourished       Nutrition Follow-Up    RD Follow-up?: Yes

## 2019-10-23 NOTE — PT/OT/SLP PROGRESS
Physical Therapy Treatment    Patient Name:  Sandra Moody   MRN:  [id number]    Recommendations:     Discharge Recommendations:  home health PT(with family assistance)   Discharge Equipment Recommendations: none   Barriers to discharge: None    Assessment:     Sandra Moody is a 89 y.o. female admitted with a medical diagnosis of Sepsis due to Haemophilus influenzae type B.  She presents with the following impairments/functional limitations:  weakness, impaired endurance, impaired functional mobilty, gait instability, impaired balance, decreased lower extremity function, decreased safety awareness, impaired cardiopulmonary response to activity.    Rehab Prognosis: Good; patient would benefit from acute skilled PT services to address these deficits and reach maximum level of function.    Recent Surgery: Procedure(s) (LRB):  EGD (ESOPHAGOGASTRODUODENOSCOPY) (N/A) Day of Surgery    Plan:     During this hospitalization, patient to be seen 3 x/week to address the identified rehab impairments via gait training, therapeutic activities, therapeutic exercises and progress toward the following goals:    · Plan of Care Expires:  11/01/19    Subjective     Chief Complaint: weakness  Patient/Family Comments/goals: Pt would like to go home and get her hair done.    Pain/Comfort:  Pain Rating 1: 0/10      Objective:     Communicated with nurse Alas prior to session.  Patient found HOB elevated with peripheral IV, telemetry upon PT entry to room.     General Precautions: Standard, fall, respiratory   Orthopedic Precautions:N/A   Braces: N/A    Functional Mobility:   · Bed Mobility:     · Scooting: supervision  · Supine to Sit: supervision with HOB elevated   · Transfers:     · Sit to Stand:  CGA-stand by assistance with no AD and hand-held assist x 2 trials   · Bed to Chair: contact guard assistance with  hand-held assist  using  Step Transfer  · Gait: Pt ambulated ~80 ft (HR ~160's bpm and spO2 on RA ~90%)  and ~160 ft (HR ~70's bpm and spO2 RA ~90%) with R hand-held assistance.  Pt with decreased step length and beatriz.  Pt with a-fib, c/o fatigue after 2nd trial of ambulation.  Pt educated on pursed lip breathing, able to increase spO2 on RA ~95%.     · Balance: Pt with fair dynamic standing balance.      AM-PAC 6 CLICK MOBILITY  Turning over in bed (including adjusting bedclothes, sheets and blankets)?: 4  Sitting down on and standing up from a chair with arms (e.g., wheelchair, bedside commode, etc.): 4  Moving from lying on back to sitting on the side of the bed?: 4  Moving to and from a bed to a chair (including a wheelchair)?: 4  Need to walk in hospital room?: 3  Climbing 3-5 steps with a railing?: 3  Basic Mobility Total Score: 22       Therapeutic Activities and Exercises:  BLE seated therex 10 reps: hip flex, LAQ, and AP    Patient left up in chair with all lines intact, call button in reach and son present.  Tray table closed by.      GOALS:   Multidisciplinary Problems     Physical Therapy Goals        Problem: Physical Therapy Goal    Goal Priority Disciplines Outcome Goal Variances Interventions   Physical Therapy Goal     PT, PT/OT Ongoing, Progressing     Description:  Goals to be met by: 19     Patient will increase functional independence with mobility by performin. Supine to sit with Modified [place]  2. Rolling to Left and Right with Modified [place]  3. Sit to stand transfer with Modified [place] using str cane  4. Bed to chair transfer with Modified [place] using str cane  5. Gait >250 feet with Modified [place] using str cane  6. Lower extremity exercise program x10 reps per handout, with independence                        Time Tracking:     PT Received On: 10/23/19  PT Start Time: 1555     PT Stop Time: 1611  PT Total Time (min): 16 min     Billable Minutes: Gait Training 16 min    Treatment Type: Treatment  PT/PTA: PT     PTA Visit Number: 0     Naz WADSWORTH  Pilar, PT  10/23/2019

## 2019-10-23 NOTE — PT/OT/SLP PROGRESS
Occupational Therapy   Treatment    Name: Sandra Moody  MRN: [id number]  Admitting Diagnosis:  Sepsis due to Haemophilus influenzae type B  Day of Surgery    Recommendations:     Discharge Recommendations: home health OT(with family assist)  Discharge Equipment Recommendations:  none  Barriers to discharge:  None    Assessment:     Sandra Moody is a 89 y.o. female with a medical diagnosis of Sepsis due to Haemophilus influenzae type B.  She presents with improved activity tolerance for participation in self care and functional mobility deficits. Performance deficits affecting function are weakness, impaired endurance, impaired self care skills, gait instability, impaired functional mobilty, impaired balance, decreased upper extremity function, decreased ROM, impaired cardiopulmonary response to activity.     Rehab Prognosis:  Good; patient would benefit from acute skilled OT services to address these deficits and reach maximum level of function.       Plan:     Patient to be seen 3 x/week to address the above listed problems via self-care/home management, therapeutic activities, therapeutic exercises  · Plan of Care Expires: 11/01/19  · Plan of Care Reviewed with: patient, son    Subjective     Pain/Comfort:  · Pain Rating 1: 0/10    Objective:     Communicated with: nurseDeanna prior to session.  Patient found HOB elevated with telemetry, oxygen upon OT entry to room.    General Precautions: Standard, fall, respiratory   Orthopedic Precautions:N/A   Braces: N/A     Occupational Performance:     Bed Mobility:    · Patient completed Scooting/Bridging with stand by assistance  · Patient completed Supine to Sit with stand by assistance     Functional Mobility/Transfers:  · Patient completed Sit <> Stand Transfer with stand by assistance  with  no assistive device   · Functional Mobility: The patient amb to the sink to chair with SBA and No AD. The patiens O2 was removed with a PSO2 of 94% on  RA, HR of 71 with activity    Activities of Daily Living:  · Feeding:  independence    · Grooming: supervision to stand at the sink to wash her hands, Brush her teeth and comb her hair.  · Upper Body Dressing: contact guard assistance to don back gown      [facility] 21    Treatment & Education:  The patient participated in grooming at the sink and was educated re: pursed lipped breathing and PSO2 with activity. The patient was educated re: use of yellow therabanb for UE/HEP. The patient performed UE therex x10 reps per handout while seated in the chair. The patient was given a handout, yellow theraband and energy conservation and home safety handouts for home.    Patient left up in chair with all lines intact, call button in reach, nurse, Deanna notified and son presentEducation:      GOALS:   Multidisciplinary Problems     Occupational Therapy Goals        Problem: Occupational Therapy Goal    Goal Priority Disciplines Outcome Interventions   Occupational Therapy Goal     OT, PT/OT Ongoing, Progressing    Description:  Goals to be met by: 11/1/19    Patient will increase functional independence with ADLs by performing:    UE Dressing with Modified [place].  LE Dressing with Modified [place].  Grooming while standing at sink with Modified [place].  Supine to sit with Modified [place].  Step transfer with Modified [place]  Toilet transfer to toilet with Modified [place].  Upper extremity exercise program x10 reps per handout, with assistance as needed.  Educate the patient re: Home safety and Energy Conservation                      Time Tracking:     OT Date of Treatment: 10/23/19  OT Start Time: 1053  OT Stop Time: 1120  OT Total Time (min): 27 min    Billable Minutes:Self Care/Home Management 17  Therapeutic Exercise 10  Total Time 27    Susan Patel OT  10/23/2019

## 2019-10-23 NOTE — NURSING
Patient return to unit via wheelchair with transport. 2L O2 in place, telemetry monitor in place. Patient in no apparent distress. Will continue to monitor.

## 2019-10-23 NOTE — [employer] PATIENT INSTRUCTIONS
Discharge Summary/Instructions after an Endoscopic Procedure  Patient Name: Sandra Moody  Patient MRN: [id number]  Patient YOB: 1928  Wednesday, October 23, 2019  Archie Humphrey MD  RESTRICTIONS:  During your procedure today, you received medications for sedation.  These   medications may affect your judgment, balance and coordination.  Therefore,   for 24 hours, you have the following restrictions:   - DO NOT drive a car, operate machinery, make legal/financial decisions,   sign important papers or drink alcohol.    ACTIVITY:  Today: no heavy lifting, straining or running due to procedural   sedation/anesthesia.  The following day: return to full activity including work.  DIET:  Eat and drink normally unless instructed otherwise.     TREATMENT FOR COMMON SIDE EFFECTS:  - Mild abdominal pain, nausea, belching, bloating or excessive gas:  rest,   eat lightly and use a heating pad.  - Sore Throat: treat with throat lozenges and/or gargle with warm salt   water.  - Because air was used during the procedure, expelling large amounts of air   from your rectum or belching is normal.  - If a bowel prep was taken, you may not have a bowel movement for 1-3 days.    This is normal.  SYMPTOMS TO WATCH FOR AND REPORT TO YOUR PHYSICIAN:  1. Abdominal pain or bloating, other than gas cramps.  2. Chest pain.  3. Back pain.  4. Signs of infection such as: chills or fever occurring within 24 hours   after the procedure.  5. Rectal bleeding, which would show as bright red, maroon, or black stools.   (A tablespoon of blood from the rectum is not serious, especially if   hemorrhoids are present.)  6. Vomiting.  7. Weakness or dizziness.  GO DIRECTLY TO THE NEAREST EMERGENCY ROOM IF YOU HAVE ANY OF THE FOLLOWING:      Difficulty breathing              Chills and/or fever over 101 F   Persistent vomiting and/or vomiting blood   Severe abdominal pain   Severe chest pain   Black, tarry stools   Bleeding- more than one  tablespoon   Any other symptom or condition that you feel may need urgent attention  Your doctor recommends these additional instructions:  If any biopsies were taken, your doctors clinic will contact you in 1 to 2   weeks with any results.  - Oral anticoagulation can be started.  - Return patient to hospital sr for ongoing care.  For questions, problems or results please call your physician - Archie Humphrey MD at Work:  [phone].  [facility] can be reached at [phone]     IF A COMPLICATION OR EMERGENCY SITUATION ARISES AND YOU ARE UNABLE TO REACH   YOUR PHYSICIAN - GO DIRECTLY TO THE EMERGENCY ROOM.  Archie Humphrey MD  10/23/2019 9:59:04 AM  This report has been verified and signed electronically.  [employer]

## 2019-10-23 NOTE — SUBJECTIVE & OBJECTIVE
Interval History:  Patient denies any chest pains at rest on exertion, orthopnea, PND.    Review of Systems   Constitution: Negative.   HENT: Negative.    Eyes: Negative.    Cardiovascular: Negative.    Respiratory: Negative.    Endocrine: Negative.    Hematologic/Lymphatic: Negative.    Skin: Negative.    Musculoskeletal: Negative.    Gastrointestinal: Negative.    Genitourinary: Negative.    Neurological: Negative.    Psychiatric/Behavioral: Negative.    Allergic/Immunologic: Negative.      Objective:     Vital Signs (Most Recent):  Temp: 98.1 °F (36.7 °C) (10/23/19 1136)  Pulse: 73 (10/23/19 1136)  Resp: 18 (10/23/19 1136)  BP: (!) 151/69 (10/23/19 1136)  SpO2: (!) 94 % (10/23/19 1136) Vital Signs (24h Range):  Temp:  [97.5 °F (36.4 °C)-98.7 °F (37.1 °C)] 98.1 °F (36.7 °C)  Pulse:  [61-95] 73  Resp:  [10-20] 18  SpO2:  [90 %-98 %] 94 %  BP: (109-163)/(59-76) 151/69     Weight: 65.5 kg (144 lb 6.4 oz)  Body mass index is 25.58 kg/m².     SpO2: (!) 94 %  O2 Device (Oxygen Therapy): nasal cannula      Intake/Output Summary (Last 24 hours) at 10/23/2019 1834  Last data filed at 10/23/2019 1200  Gross per 24 hour   Intake 590 ml   Output --   Net 590 ml       Lines/Drains/Airways     Peripheral Intravenous Line                 Peripheral IV - Single Lumen 10/20/19 1800 22 G Left;Anterior Forearm 3 days                Physical Exam   Constitutional: She is oriented to person, place, and time. She appears well-developed and well-nourished.   HENT:   Head: Normocephalic.   Eyes: Pupils are equal, round, and reactive to light.   Neck: Normal range of motion. Neck supple.   Cardiovascular: Normal rate. An irregularly irregular rhythm present.   Pulmonary/Chest: Effort normal and breath sounds normal.   Abdominal: Soft. Normal appearance and bowel sounds are normal. There is no tenderness.   Musculoskeletal: Normal range of motion.   Neurological: She is alert and oriented to person, place, and time.   Skin: Skin is warm.    Psychiatric: She has a normal mood and affect.       Significant Labs:   BMP:   Recent Labs   Lab 10/22/19  0435   *      K 4.4   CL 93*   CO2 36*   BUN 8   CREATININE 0.7   CALCIUM 9.2   , CMP   Recent Labs   Lab 10/22/19  0435      K 4.4   CL 93*   CO2 36*   *   BUN 8   CREATININE 0.7   CALCIUM 9.2   PROT 6.7   ALBUMIN 3.6   BILITOT 0.6   ALKPHOS 74   AST 29   ALT 41   ANIONGAP 11   ESTGFRAFRICA >60   EGFRNONAA >60   , CBC   Recent Labs   Lab 10/22/19  0435   WBC 4.07   HGB 13.9   HCT 43.7      , INR   Recent Labs   Lab 10/22/19  0435   INR 1.1   , Lipid Panel No results for input(s): CHOL, HDL, LDLCALC, TRIG, CHOLHDL in the last 48 hours., Troponin No results for input(s): TROPONINI in the last 48 hours. and All pertinent lab results from the last 24 hours have been reviewed.    Significant Imaging: Echocardiogram:   Transthoracic echo (TTE) complete (Cupid Only):   Results for orders placed or performed during the hospital encounter of 10/14/19   Echo Color Flow Doppler? Yes; Bubble Contrast? Yes   Result Value Ref Range    BSA 1.75 m2    LA WIDTH 3.42 cm    AORTIC VALVE CUSP SEPERATION 1.91 cm    PV PEAK VELOCITY 0.80 cm/s    LVIDD 4.70 3.5 - 6.0 cm    IVS 1.23 (A) 0.6 - 1.1 cm    PW 0.98 0.6 - 1.1 cm    Ao root annulus 3.12 cm    LVIDS 3.22 2.1 - 4.0 cm    FS 31 28 - 44 %    LA volume 54.90 cm3    Sinus 3.21 cm    STJ 2.70 cm    Ascending aorta 3.97 cm    LV mass 188.73 g    LA size 3.90 cm    RVDD 4.22 cm    TAPSE 1.95 cm    Left Ventricle Relative Wall Thickness 0.42 cm    AV mean gradient 9 mmHg    AV valve area 2.10 cm2    AV Velocity Ratio 0.52     AV index (prosthetic) 0.51     E/A ratio 1.02     E wave decelartion time 211.76 msec    IVRT 0.08 msec    Pulm vein S/D ratio 1.21     LVOT diameter 2.29 cm    LVOT area 4.1 cm2    LVOT peak patricia 1.01 m/s    LVOT peak VTI 21.39 cm    Ao peak patricia 1.95 m/s    Ao VTI 41.95 cm    LVOT stroke volume 88.05 cm3    AV peak gradient 15  mmHg    MV Peak E Dajuan 1.27 m/s    TR Max Dajuan 3.08 m/s    MV Peak A Dajuan 1.24 m/s    PV Peak S Dajuan 0.52 m/s    PV Peak D Dajuan 0.43 m/s    LV Systolic Volume 41.68 mL    LV Systolic Volume Index 24.2 mL/m2    LV Diastolic Volume 102.53 mL    LV Diastolic Volume Index 59.58 mL/m2    LA Volume Index 31.9 mL/m2    LV Mass Index 110 g/m2    RA Major Axis 4.63 cm    Left Atrium Minor Axis 4.57 cm    Left Atrium Major Axis 5.15 cm    Triscuspid Valve Regurgitation Peak Gradient 38 mmHg    RA Width 3.79 cm    Right Atrial Pressure (from IVC) 8 mmHg    TV rest pulmonary artery pressure 46 mmHg    Narrative    · Normal left ventricular systolic function. The estimated ejection   fraction is 55%  · Mild eccentric left ventricular hypertrophy.  · No wall motion abnormalities.  · Normal right ventricular systolic function.  · Mild mitral regurgitation.  · Mild to moderate tricuspid regurgitation.  · The estimated PA systolic pressure is 46 mm Hg  · Pulmonary hypertension present.

## 2019-10-23 NOTE — PROGRESS NOTES
[facility] Hospital Medicine  Progress Note    Patient Name: Sandra Moody  MRN: [id number]  Patient Class: IP- Inpatient   Admission Date: 10/14/2019  Length of Stay: 9 days  Attending Physician: Naresh Clemente MD  Primary Care Provider: Rox Montes MD        Subjective:     Principal Problem:Sepsis due to Haemophilus influenzae type B        HPI:  Sandra Moody is a 89 y.o. female that (in part)  has a past medical history of Allergy, Anxiety, Back pain, Cirrhosis, COPD (chronic obstructive pulmonary disease), Cryptogenic cirrhosis, Cystocele, Depression, Diverticulitis, Dyspnea, Family history of colon cancer, GERD (gastroesophageal reflux disease), History of colonic polyps, Hypertension, Hypothyroidism, Insomnia, Kidney stones, Lung cancer, Lung cancer, upper lobe, Menopause, Nuclear sclerosis, Osteopenia, PSVT (paroxysmal supraventricular tachycardia), PVC (premature ventricular contraction), Rectocele, Status post partial lobectomy of lung, Thyroid disease, Trochanteric bursitis, Urine incontinence, and Vitamin D deficiency disease.  Presents to [facility] Emergency Department initially complaining of nausea with vomiting.  She was taking a nap and she woke up today feeling ill.  Possible sick contacts.  Generalized fatigue, malaise, chills, and nonfocal subacute muscle weakness.  Says arrest Ali worsening.  No known exacerbating factors.  No relieving factors.  Constant duration.      She saw her PCP earlier today for right hip pain and follow-up for chronic medical conditions including cryptogenic cirrhosis.  She also complained of muscle cramps.  She received her influenza vaccine today.    In the emergency department she was noted to be tachycardic, febrile, and hypotensive.  She was given IV fluids.  Routine laboratory studies were obtained.  She had elevated lactic acid levels.  She was given IV fluids and continue to have  arterial hypotension.  Her lactic acid remained elevated at 5.3.  Sepsis protocol was initiated.  Influenza type B serology came back positive.    Hospital medicine has been asked to admit for further evaluation and treatment.     Overview/Hospital Course:  Ms. Moody was admitted with sepsis secondary to influenza B.  Respiratory isolation initiated and treated with Tamiflu.  Chest x-ray with no definitive consolidation, only few scattered linear opacity consistent with subsegmental scarring versus atelectasis.  Empiric cefepime and vancomycin started given severity of sepsis until bacterial infection ruled out.  Blood cultures negative.  Patient did not need pressor support.  Patient with h/o intermittent tachyarrhythmias controlled with metoprolol in the past and with worsening control due to infection along with holding beta-blocker given initial hypotension which resolved with IV beta-blocker push.  Blood pressure increased enough to initiate start of metoprolol. Stepped down to floor. Patient continued to have episodes of paroxymal tachycardia. Cardiology consulted. Per their eval, patient has paroxysmal AFib. Recommend GI eval to clear patient for anticoagulation given underlying, uncomplicated cirrhosis of liver. Completed 7 days of oseltamivir. Slow to wean off O2. Trial of steroids.   GI ordered US liver to R/O portal HTN,may need EGD to esophageal varices's.patient's HR was increased yesterday,procedure could not be performed,spoke with cardiology BB is increased,still had over night some occasional increased ER,but currently HR is optimized and stable,patient is medically clear proceed with procedure.    Interval History: stable on Nc O 2    Review of Systems   Respiratory: Negative.    Cardiovascular: Negative.    Gastrointestinal:        Loose stools     Objective:     Vital Signs (Most Recent):  Temp: 97.9 °F (36.6 °C) (10/23/19 0734)  Pulse: 70 (10/23/19 0734)  Resp: 19 (10/23/19 0734)  BP: (!)  163/72 (10/23/19 0734)  SpO2: (!) 92 % (10/23/19 0734) Vital Signs (24h Range):  Temp:  [97.5 °F (36.4 °C)-98.7 °F (37.1 °C)] 97.9 °F (36.6 °C)  Pulse:  [] 70  Resp:  [17-19] 19  SpO2:  [84 %-98 %] 92 %  BP: (109-163)/(59-76) 163/72     Weight: 65.5 kg (144 lb 6.4 oz)  Body mass index is 25.58 kg/m².    Intake/Output Summary (Last 24 hours) at 10/23/2019 0816  Last data filed at 10/22/2019 2200  Gross per 24 hour   Intake 540 ml   Output --   Net 540 ml      Physical Exam   Constitutional: She is oriented to person, place, and time. She appears well-developed. No distress.   Eyes: Pupils are equal, round, and reactive to light.   Neck: Normal range of motion.   Cardiovascular: Normal rate.   Pulmonary/Chest: Effort normal. She has no wheezes. She has no rales.   Speaking in full sentences with low flow NC in place   Abdominal: Soft. Bowel sounds are normal. She exhibits no distension and no mass. There is no tenderness. There is no guarding.   Musculoskeletal: Normal range of motion.   Neurological: She is alert and oriented to person, place, and time.   Skin: Skin is warm and dry. Capillary refill takes less than 2 seconds. She is not diaphoretic.   Psychiatric: She has a normal mood and affect. Her behavior is normal. Thought content normal.   Nursing note and vitals reviewed.      Significant Labs: All pertinent labs within the past 24 hours have been reviewed.    Significant Imaging: I have reviewed all pertinent imaging results/findings within the past 24 hours.  I have reviewed and interpreted all pertinent imaging results/findings within the past 24 hours.      Assessment/Plan:      * Sepsis due to Haemophilus influenzae type B  Patient met criteria for sepsis secondary to influenza B  Although improved patient still dependent on supplemental O2  Is to complete 7 days of oseltamivir tonight. Ok to dc droplet precautions after last dose given  CT shows multiple areas of ground glass opacity. No rales or  wheezing on exam  Patient states feeling a little better with incentive spirometer. Will start trial of short course of solumedrol to hopefully wean off NC  Continue nebs  Activity as tolerated      Paroxysmal atrial fibrillation  Cardiology consulted  No valvular issues and LVEF preserved  On metoprolol and managing hypoxia  I agree with GI consult to clear patient for anticoagulation for her reported liver cirrhosis  Seems uncomplicated as she has not experienced bleeding, encephalopathy nor significant ascites  INR and pTT  Stop ASA for now. Resume if recommendation is agains anticoagulation  Appreciate GI recs regarding anticoagulation use  GI ordered US liver to R/O portal HTN,may need EGD to esophageal varices's.GI ordered US liver to R/O portal HTN,may need EGD to esophageal varices's.patient's HR was increased yesterday,procedure could not be performed,spoke with cardiology BB is increased,still had over night some occasional increased ER,but currently HR is optimized and stable,patient is medically clear proceed with procedure.    Influenza B  Respiratory isolation   Management as above      Ventricular tachycardia, nonsustained  With wide complex tachycardia at times, even when sleeping  Currently with evidence of AFib  Cardiology on board,  Had episode of increased HR over night,but at this time is stable,    Severe sepsis        Lactic acidosis  Secondary to sepsis  Treat underlying condition, resolved      History of lung cancer  S/p lobectomy with not adjuvant therapy  CT of chest shows no evidence of mass/recurrence      Hypothyroidism  Continue home regimen      Chronic kidney disease, stage II (mild)  Renal function at baseline  Will continue to monitor    Essential hypertension  Patient was initial hypotensive therefore all antihypertensive medications were held  Continue metoprolol. Hold off on antihypertensives      VTE Risk Mitigation (From admission, onward)         Ordered     IP VTE HIGH RISK  PATIENT  Once      10/14/19 9407                      Naresh Clemente MD  Department of Hospital Medicine   [facility]

## 2019-10-23 NOTE — TRANSFER OF CARE
"Anesthesia Transfer of Care Note    Patient: Sandra Moody    Procedure(s) Performed: Procedure(s) (LRB):  EGD (ESOPHAGOGASTRODUODENOSCOPY) (N/A)    Patient location: GI    Anesthesia Type: general    Transport from OR: Transported from OR on room air with adequate spontaneous ventilation    Post pain: adequate analgesia    Post assessment: no apparent anesthetic complications    Post vital signs: stable    Level of consciousness: awake, alert and oriented    Nausea/Vomiting: no nausea/vomiting    Complications: none    Transfer of care protocol was followed      Last vitals:   Visit Vitals  BP (!) 143/66 (BP Location: Right arm, Patient Position: Lying)   Pulse 63   Temp 36.6 °C (97.9 °F) (Oral)   Resp 10   Ht 5' 3" (1.6 m)   Wt 65.5 kg (144 lb 6.4 oz)   SpO2 95%   Breastfeeding? No   BMI 25.58 kg/m²     "

## 2019-10-23 NOTE — PLAN OF CARE
Problem: Fall Injury Risk  Goal: Absence of Fall and Fall-Related Injury  Intervention: Identify and Manage Contributors to Fall Injury Risk  Flowsheets (Taken 10/23/2019 0440)  Self-Care Promotion: independence encouraged; BADL personal objects within reach  Medication Review/Management: medications reviewed; dosing adjusted     Problem: Fall Injury Risk  Goal: Absence of Fall and Fall-Related Injury  Intervention: Promote Injury-Free Environment  Flowsheets (Taken 10/23/2019 0440)  Safety Promotion/Fall Prevention: assistive device/personal item within reach; bed alarm set; upper side rails raised x 2, lower siderails raised x 1 (Peds only); instructed to call staff for mobility  Environmental Safety Modification: assistive device/personal items within reach; clutter free environment maintained; lighting adjusted; mobility aid in reach; room near unit station; room organization consistent

## 2019-10-23 NOTE — PROGRESS NOTES
1649 TN sent clinicals via [facility] to 3 home health agencies including [facility]; awaiting review and acceptance.    4326 TN scheduled an electronic, cardiology  f/u appt with Dr. Munguia on 11/7/19 at 9:40 AM.

## 2019-10-23 NOTE — NURSING
Patient woke up sweaty. Heart rate in and out tachycardia despite of IV metoprolol. Patient asymptomatic - she stated she slept well. Assisted with wash this morning.

## 2019-10-23 NOTE — NURSING
19:05 Received report from KATIA Huerta. Patient alert and oriented. On 2L of Oxygen. No sign of distress. IV line intact over  Lt - FA - saline locked. Call bell given on her reach. Instructed to call for assistance all the time. Bed on lowest position. Bed alarm on. Safety maintained.

## 2019-10-23 NOTE — PLAN OF CARE
Problem: Arrhythmia (Acute Coronary Syndrome)  Goal: Normalized Cardiac Rhythm  Intervention: Monitor and Manage Cardiac Rhythm Effects  Flowsheets (Taken 10/23/2019 0413)  Dysrhythmia Management: other (see comments) (rate controlmeds given)

## 2019-10-23 NOTE — SUBJECTIVE & OBJECTIVE
Interval History: stable on Nc O 2    Review of Systems   Respiratory: Negative.    Cardiovascular: Negative.    Gastrointestinal:        Loose stools     Objective:     Vital Signs (Most Recent):  Temp: 97.9 °F (36.6 °C) (10/23/19 0734)  Pulse: 70 (10/23/19 0734)  Resp: 19 (10/23/19 0734)  BP: (!) 163/72 (10/23/19 0734)  SpO2: (!) 92 % (10/23/19 0734) Vital Signs (24h Range):  Temp:  [97.5 °F (36.4 °C)-98.7 °F (37.1 °C)] 97.9 °F (36.6 °C)  Pulse:  [] 70  Resp:  [17-19] 19  SpO2:  [84 %-98 %] 92 %  BP: (109-163)/(59-76) 163/72     Weight: 65.5 kg (144 lb 6.4 oz)  Body mass index is 25.58 kg/m².    Intake/Output Summary (Last 24 hours) at 10/23/2019 0816  Last data filed at 10/22/2019 2200  Gross per 24 hour   Intake 540 ml   Output --   Net 540 ml      Physical Exam   Constitutional: She is oriented to person, place, and time. She appears well-developed. No distress.   Eyes: Pupils are equal, round, and reactive to light.   Neck: Normal range of motion.   Cardiovascular: Normal rate.   Pulmonary/Chest: Effort normal. She has no wheezes. She has no rales.   Speaking in full sentences with low flow NC in place   Abdominal: Soft. Bowel sounds are normal. She exhibits no distension and no mass. There is no tenderness. There is no guarding.   Musculoskeletal: Normal range of motion.   Neurological: She is alert and oriented to person, place, and time.   Skin: Skin is warm and dry. Capillary refill takes less than 2 seconds. She is not diaphoretic.   Psychiatric: She has a normal mood and affect. Her behavior is normal. Thought content normal.   Nursing note and vitals reviewed.      Significant Labs: All pertinent labs within the past 24 hours have been reviewed.    Significant Imaging: I have reviewed all pertinent imaging results/findings within the past 24 hours.  I have reviewed and interpreted all pertinent imaging results/findings within the past 24 hours.

## 2019-10-23 NOTE — ANESTHESIA PREPROCEDURE EVALUATION
10/23/2019  Sandra Moody is a 89 y.o., female.    Anesthesia Evaluation         Review of Systems  Anesthesia Hx:  No previous Anesthesia   Social:  Non-Smoker    Hematology/Oncology:  Hematology Normal   Oncology Normal     EENT/Dental:EENT/Dental Normal   Cardiovascular:   Hypertension Dysrhythmias Hx of svt with recent bouts..seen by cards and metaprolo increased..cleared by kaci..had several discussions of which to be seen by cards and reassed for procedure..azim would like procedure to be done feels patient is optimal and breakthru afib is not unusual for patient..cleared by kaci   Pulmonary:   COPD Shortness of breath    Renal/:   Chronic Renal Disease    Hepatic/GI:   GERD Liver Disease,    Musculoskeletal:  Musculoskeletal Normal    Neurological:  Neurology Normal    Endocrine:   Hypothyroidism    Dermatological:  Skin Normal    Psych:   Psychiatric History          Physical Exam  General:  Well nourished    Airway/Jaw/Neck:  Airway Findings: Mallampati: II TM Distance: < 4 cm      Dental:  DENTAL FINDINGS: Normal   Chest/Lungs:  Chest/Lungs Clear    Heart/Vascular:  Heart Findings: Normal       Mental Status:  Mental Status Findings:  Cooperative, Alert and Oriented         Anesthesia Plan  Type of Anesthesia, risks & benefits discussed:  Anesthesia Type:  general  Patient's Preference:   Intra-op Monitoring Plan: standard ASA monitors  Intra-op Monitoring Plan Comments:   Post Op Pain Control Plan: multimodal analgesia, IV/PO Opioids PRN and per primary service following discharge from PACU  Post Op Pain Control Plan Comments:   Induction:    Beta Blocker:  Patient is not currently on a Beta-Blocker (No further documentation required).       Informed Consent: Patient understands risks and agrees with Anesthesia plan.  Questions answered. Anesthesia consent signed with patient.  ASA  Score: 3     Day of Surgery Review of History & Physical:    H&P update referred to the provider.  H&P completed by Anesthesiologist.   Anesthesia Plan Notes: npo        Ready For Surgery From Anesthesia Perspective.

## 2019-10-24 VITALS
DIASTOLIC BLOOD PRESSURE: 78 MMHG | RESPIRATION RATE: 18 BRPM | HEIGHT: 63 IN | SYSTOLIC BLOOD PRESSURE: 139 MMHG | TEMPERATURE: 98 F | BODY MASS INDEX: 25.58 KG/M2 | HEART RATE: 71 BPM | WEIGHT: 144.38 LBS | OXYGEN SATURATION: 93 %

## 2019-10-24 PROCEDURE — 25000003 PHARM REV CODE 250: Mod: HCNC | Performed by: HOSPITALIST

## 2019-10-24 PROCEDURE — 97110 THERAPEUTIC EXERCISES: CPT | Mod: HCNC

## 2019-10-24 PROCEDURE — 94640 AIRWAY INHALATION TREATMENT: CPT | Mod: HCNC

## 2019-10-24 PROCEDURE — 94761 N-INVAS EAR/PLS OXIMETRY MLT: CPT | Mod: HCNC

## 2019-10-24 PROCEDURE — 25000242 PHARM REV CODE 250 ALT 637 W/ HCPCS: Mod: HCNC | Performed by: HOSPITALIST

## 2019-10-24 PROCEDURE — 25000003 PHARM REV CODE 250: Mod: HCNC | Performed by: INTERNAL MEDICINE

## 2019-10-24 RX ORDER — METOPROLOL SUCCINATE 100 MG/1
100 TABLET, EXTENDED RELEASE ORAL DAILY
Qty: 30 TABLET | Refills: 0 | Status: SHIPPED | OUTPATIENT
Start: 2019-10-24 | End: 2019-10-28 | Stop reason: SDUPTHER

## 2019-10-24 RX ADMIN — SERTRALINE HYDROCHLORIDE 25 MG: 25 TABLET ORAL at 09:10

## 2019-10-24 RX ADMIN — METOPROLOL SUCCINATE 100 MG: 50 TABLET, EXTENDED RELEASE ORAL at 09:10

## 2019-10-24 RX ADMIN — PANTOPRAZOLE SODIUM 40 MG: 40 TABLET, DELAYED RELEASE ORAL at 09:10

## 2019-10-24 RX ADMIN — APIXABAN 5 MG: 5 TABLET, FILM COATED ORAL at 09:10

## 2019-10-24 RX ADMIN — OXYBUTYNIN CHLORIDE 5 MG: 5 TABLET, EXTENDED RELEASE ORAL at 09:10

## 2019-10-24 RX ADMIN — BUDESONIDE 0.5 MG: 0.5 INHALANT RESPIRATORY (INHALATION) at 07:10

## 2019-10-24 RX ADMIN — LEVOTHYROXINE SODIUM 25 MCG: 25 TABLET ORAL at 06:10

## 2019-10-24 NOTE — DISCHARGE SUMMARY
[facility] Hospital Medicine  Discharge Summary      Patient Name: Sandra Moody  MRN: [id number]  Admission Date: 10/14/2019  Hospital Length of Stay: 10 days  Discharge Date and Time:  10/24/2019 11:06 AM  Attending Physician: Naresh Clemente MD   Discharging Provider: Naresh Clemente MD  Primary Care Provider: Rox Montes MD      HPI:   Sandra Moody is a 89 y.o. female that (in part)  has a past medical history of Allergy, Anxiety, Back pain, Cirrhosis, COPD (chronic obstructive pulmonary disease), Cryptogenic cirrhosis, Cystocele, Depression, Diverticulitis, Dyspnea, Family history of colon cancer, GERD (gastroesophageal reflux disease), History of colonic polyps, Hypertension, Hypothyroidism, Insomnia, Kidney stones, Lung cancer, Lung cancer, upper lobe, Menopause, Nuclear sclerosis, Osteopenia, PSVT (paroxysmal supraventricular tachycardia), PVC (premature ventricular contraction), Rectocele, Status post partial lobectomy of lung, Thyroid disease, Trochanteric bursitis, Urine incontinence, and Vitamin D deficiency disease.  Presents to [facility] Emergency Department initially complaining of nausea with vomiting.  She was taking a nap and she woke up today feeling ill.  Possible sick contacts.  Generalized fatigue, malaise, chills, and nonfocal subacute muscle weakness.  Says arrest Ali worsening.  No known exacerbating factors.  No relieving factors.  Constant duration.      She saw her PCP earlier today for right hip pain and follow-up for chronic medical conditions including cryptogenic cirrhosis.  She also complained of muscle cramps.  She received her influenza vaccine today.    In the emergency department she was noted to be tachycardic, febrile, and hypotensive.  She was given IV fluids.  Routine laboratory studies were obtained.  She had elevated lactic acid levels.  She was given IV fluids and continue to have arterial  hypotension.  Her lactic acid remained elevated at 5.3.  Sepsis protocol was initiated.  Influenza type B serology came back positive.    Hospital medicine has been asked to admit for further evaluation and treatment.     Procedure(s) (LRB):  EGD (ESOPHAGOGASTRODUODENOSCOPY) (N/A)      Hospital Course:   Ms. Moody was admitted with sepsis secondary to influenza B.  Respiratory isolation initiated and treated with Tamiflu.  Chest x-ray with no definitive consolidation, only few scattered linear opacity consistent with subsegmental scarring versus atelectasis.  Empiric cefepime and vancomycin started given severity of sepsis until bacterial infection ruled out.  Blood cultures negative.  Patient did not need pressor support.  Patient with h/o intermittent tachyarrhythmias controlled with metoprolol in the past and with worsening control due to infection along with holding beta-blocker given initial hypotension which resolved with IV beta-blocker push.  Blood pressure increased enough to initiate start of metoprolol. Stepped down to floor. Patient continued to have episodes of paroxymal tachycardia. Cardiology consulted. Per their eval, patient has paroxysmal AFib. Recommend GI eval to clear patient for anticoagulation given underlying, uncomplicated cirrhosis of liver. Completed 7 days of oseltamivir. Slow to wean off O2. Trial of steroids was done.  GI ordered US liver to R/O portal HTN,may need EGD to R/O  esophageal varices's.patient's HR was increased occasionally,,procedure could not be performed,spoke with cardiology BB is increased,still had over night some occasional increased ER,but currently HR is optimized and stable,patient is medically clear proceed with procedure.EGD done,shoe no sign of varices,she was started on OAC with eliquis,side affect with irreversible bleeding explained to patient and family,comparison with coumadin was done,patient and family was agree with taking eliquis,.  She remains  stable,did well with PT,OT,was on RA at DC time.  Patient has paul discharged home with HH and follow up with PCP in next few days.     Consults:   Consults (From admission, onward)        Status Ordering Provider     Inpatient consult to Cardiology  Once     Provider:  Cameron Zaragoza MD    Completed HUYEN ROSAS     Inpatient consult to Gastroenterology  Once     Provider:  (Not yet assigned)    Completed CAMERON ZARAGOZA     Inpatient consult to Social Work/Case Management  Once     Provider:  (Not yet assigned)    Completed MATHEUS ASHLEY     IP consult to case management  Once     Provider:  (Not yet assigned)    Completed BELÉN RENEE          No new Assessment & Plan notes have been filed under this hospital service since the last note was generated.  Service: Hospital Medicine    Final Active Diagnoses:    Diagnosis Date Noted POA    PRINCIPAL PROBLEM:  Sepsis due to Haemophilus influenzae type B [A41.3] 10/15/2019 Yes    Paroxysmal atrial fibrillation [I48.0] 10/21/2019 Yes    Influenza B [J10.1] 10/17/2019 Yes    Ventricular tachycardia, nonsustained [I47.2] 10/15/2019 Yes    Lactic acidosis [E87.2] 10/14/2019 Yes    History of lung cancer [Z85.118] 12/05/2016 Not Applicable    Hypothyroidism [E03.9] 04/10/2014 Yes    Chronic kidney disease, stage II (mild) [N18.2] 01/09/2013 Yes    Essential hypertension [I10]  Yes      Problems Resolved During this Admission:    Diagnosis Date Noted Date Resolved POA    Fever [R50.9] 10/15/2019 10/18/2019 Yes    Arterial hypotension [I95.9] 10/15/2019 10/21/2019 Yes       Discharged Condition: stable    Disposition: Home-Health Care [facility]    Follow Up:  Follow-up Information     Rox Montes MD On 10/28/2019.    Specialties:  Internal Medicine, Wound Care  Why:  Outpatient Services PCP Follow-Up Monday at 10:20 AM  Contact information:  [address] [place] [address]  [address]  [place] [address] [phone]             Cameron Zaragoza MD On 11/7/2019.     Specialties:  Cardiology, INTERVENTIONAL CARDIOLOGY  Why:  Outpatient Services Cardiology Follow-Up Thursday at 9:40 AM  Contact information:  [address]  [address]  [place] [id number] [phone]             [facility]  [place].    Specialty:  Home Health Services  Why:  Home Health [facility] will contact patient to initiate services.  Contact information:  [address]  [address]  [place] [address]  [phone]                 Patient Instructions:      Activity as tolerated       Significant Diagnostic Studies: Labs: BMP: No results for input(s): GLU, NA, K, CL, CO2, BUN, CREATININE, CALCIUM, MG in the last 48 hours. and CBC No results for input(s): WBC, HGB, HCT, PLT in the last 48 hours.  Microbiology:   Blood Culture   Lab Results   Component Value Date    LABBLOO No growth after 5 days. 10/14/2019     Radiology: X-Ray: CXR: X-Ray Chest 1 View (CXR):   Results for orders placed or performed during the hospital encounter of 10/14/19   X-Ray Chest 1 View    Narrative    EXAMINATION:  XR CHEST 1 VIEW    CLINICAL HISTORY:  hypoxia;    TECHNIQUE:  Single frontal view of the chest was performed.    COMPARISON:  Chest 10/14/2019    FINDINGS:  There is again nonspecific elevation of the right hemidiaphragm.  Heart size at the upper limits of normal.  Since the previous examination there is mild central vascular congestion.  Postsurgical changes are seen in the right hilum.    Since the previous study there is slight increased blunting of the right costophrenic angle either from a small right effusion or a right costophrenic angle atelectasis/consolidation.  There is no pneumothorax.  Atherosclerosis of the aortic arch and the descending thoracic aorta.  There are cardiac monitoring leads over the chest.      Impression    1. Minimal blunting of the right costophrenic angle since the previous study, related to either a small effusion or a atelectasis/consolidation in the right costophrenic  angle.  2. Postoperative changes in the right hilum as above.  3. Mild central vascular congestion.      Electronically signed by: Renaldo Arellano MD  Date:    10/18/2019  Time:    15:28    and X-Ray Chest PA and Lateral (CXR): No results found for this visit on 10/14/19.    Pending Diagnostic Studies:     None         Medications:  Reconciled Home Medications:      Medication List      START taking these medications    apixaban 5 mg Tab  Commonly known as:  ELIQUIS  Take 1 tablet (5 mg total) by mouth 2 (two) times daily.        CHANGE how you take these medications    metoprolol succinate 100 MG 24 hr tablet  Commonly known as:  TOPROL-XL  Take 1 tablet (100 mg total) by mouth once daily.  What changed:    · medication strength  · how much to take        CONTINUE taking these medications    albuterol 90 mcg/actuation inhaler  Commonly known as:  PROVENTIL HFA  Inhale 2 puffs into the lungs every 6 (six) hours as needed (ANY equivalent covered is OK).     alendronate 70 MG tablet  Commonly known as:  FOSAMAX  Take 1 tablet (70 mg total) by mouth every 7 days.     aspirin 81 mg Tab  Take 1 tablet by mouth Daily.     estradiol 0.01 % (0.1 mg/gram) vaginal cream  Commonly known as:  ESTRACE  Pea-size dab of cream to urethra/introitus at bed time.     levothyroxine 25 MCG tablet  Commonly known as:  SYNTHROID  TAKE 1 TABLET EVERY DAY     melatonin 10 mg Tab  Take by mouth once daily.     nystatin cream  Commonly known as:  MYCOSTATIN  Apply topically 2 (two) times daily.     ondansetron 4 MG tablet  Commonly known as:  ZOFRAN  Take 1 tablet (4 mg total) by mouth every 6 (six) hours as needed.     oxybutynin 10 MG 24 hr tablet  Commonly known as:  DITROPAN-XL  TAKE 1 TABLET ONE TIME DAILY     sertraline 25 MG tablet  Commonly known as:  ZOLOFT  TAKE 1 TABLET BY MOUTH EVERY DAY     triamcinolone acetonide 0.5% 0.5 % Crea  Commonly known as:  KENALOG  Apply topically 2 (two) times daily.        STOP taking these medications     amLODIPine 5 MG tablet  Commonly known as:  NORVASC     HYDROcodone-acetaminophen 5-325 mg per tablet  Commonly known as:  NORCO     lisinopril 40 MG tablet  Commonly known as:  PRINIVIL,ZESTRIL     temazepam 30 mg capsule  Commonly known as:  RESTORIL     traMADol 50 mg tablet  Commonly known as:  ULTRAM            Indwelling Lines/Drains at time of discharge:   Lines/Drains/Airways     None                 Time spent on the discharge of patient: over 30  minutes  Patient was seen and examined on the date of discharge and determined to be suitable for discharge.         Naresh Clemente MD  Department of Hospital Medicine  [facility]

## 2019-10-24 NOTE — PLAN OF CARE
"   10/24/19 1222   Final Note   Assessment Type Final Discharge Note   Anticipated Discharge Disposition Home   What phone number can be called within the next 1-3 days to see how you are doing after discharge?   (Listed in chart)   Hospital Follow Up  Appt(s) scheduled? Yes   Discharge plans and expectations educations in teach back method with documentation complete? Yes   Right Care Referral Info   Post Acute Recommendation Home-care     EDUCATION:  TN provided with educational information on Sepsis.  Information reviewed and placed in :My Healthcare Packet" to be brought home for pt to use as resource after discharge.  Information included:  signs and symptoms to look for and call the doctor if experiencing, and symptoms that may indicate a medical emergency: CALL 911.  All questions answered.  Teach back method used.  Patient stated, "I will contact the on-call nurses".    TN informed floor nurse, Jesi, care management is complete and can proceed with discharge teaching.        "

## 2019-10-24 NOTE — ANESTHESIA POSTPROCEDURE EVALUATION
Anesthesia Post Evaluation    Patient: Sandra Moody    Procedure(s) Performed: Procedure(s) (LRB):  EGD (ESOPHAGOGASTRODUODENOSCOPY) (N/A)    Final Anesthesia Type: general  Patient location during evaluation: PACU  Patient participation: Yes- Able to Participate  Level of consciousness: awake and alert, oriented and awake  Post-procedure vital signs: reviewed and stable  Pain management: adequate  Airway patency: patent  PONV status at discharge: No PONV  Anesthetic complications: no      Cardiovascular status: blood pressure returned to baseline, hemodynamically stable and stable  Respiratory status: unassisted and spontaneous ventilation  Hydration status: euvolemic  Follow-up not needed.          Vitals Value Taken Time   /78 10/24/2019 11:25 AM   Temp 36.6 °C (97.9 °F) 10/24/2019 11:25 AM   Pulse 71 10/24/2019 11:25 AM   Resp 18 10/24/2019 11:25 AM   SpO2 93 % 10/24/2019 11:25 AM         Event Time     Out of Recovery 10/23/2019 10:30:00          Pain/Melania Score: Melania Score: 9 (10/23/2019 10:30 AM)  Modified Melania Score: 19 (10/23/2019 10:30 AM)

## 2019-10-24 NOTE — NURSING
Discharge Preparation:   Note that patient awake, alert and fully oriented with son at bedside;  Family aware that patient being discharged and they are in agreement with same; Patient denies pain; Also denies SOB;     Case management coordination complete;     IV removed without bleeding;     Reviewed discharge plan with family and they voiced understanding of plan;    Rx for Apixaban given to family;     Will continue to f/u;

## 2019-10-24 NOTE — PLAN OF CARE
Problem: Adult Inpatient Plan of Care  Goal: Plan of Care Review  Outcome: Ongoing, Progressing  Goal: Patient-Specific Goal (Individualization)  Outcome: Ongoing, Progressing     Problem: Infection  Goal: Infection Symptom Resolution  Outcome: Ongoing, Progressing     Problem: Adjustment to Illness (Acute Coronary Syndrome)  Goal: Optimal Adaptation to Illness  Outcome: Ongoing, Progressing     Problem: Arrhythmia (Acute Coronary Syndrome)  Goal: Normalized Cardiac Rhythm  Outcome: Ongoing, Progressing     Problem: Hemodynamic Instability (Acute Coronary Syndrome)  Goal: Effective Cardiac Pump Function  Outcome: Ongoing, Progressing     Problem: Tissue Perfusion (Acute Coronary Syndrome)  Goal: Adequate Tissue Perfusion  Outcome: Ongoing, Progressing     Problem: Fall Injury Risk  Goal: Absence of Fall and Fall-Related Injury  Outcome: Ongoing, Progressing

## 2019-10-24 NOTE — NURSING
End of shift bedside report given to ELVA Glass. Patient in no apparent distress.     12 hour chart check complete.

## 2019-10-24 NOTE — PT/OT/SLP PROGRESS
Occupational Therapy   Treatment/Discharge    Name: Sandra Moody  MRN: [id number]  Admitting Diagnosis:  Sepsis due to Haemophilus influenzae type B  1 Day Post-Op    Recommendations:     Discharge Recommendations: home health OT(with family assist)  Discharge Equipment Recommendations:  none  Barriers to discharge:  None    Assessment:     Sandra Moody is a 89 y.o. female with a medical diagnosis of Sepsis due to Haemophilus influenzae type B.  The patient tolerated UE therex x10 reps using yellow theraband. The patient son was present and states the patient is ready for D/C to home and will have assist as needed at home. Performance deficits affecting function are impaired endurance, impaired self care skills, impaired functional mobilty, impaired balance, decreased upper extremity function, impaired cardiopulmonary response to activity.     Rehab Prognosis:  Good; patient would benefit from acute skilled OT services to address these deficits and reach maximum level of function.       Plan:     Patient to be seen 3 x/week to address the above listed problems via self-care/home management, therapeutic activities, therapeutic exercises  · Plan of Care Expires: 11/01/19  · Plan of Care Reviewed with: patient, son    Subjective     Pain/Comfort:  · Pain Rating 1: 0/10    Objective:     Communicated with: nurseJesi prior to session.  Patient found up in chair with telemetry upon OT entry to room.    General Precautions: Standard, fall, respiratory   Orthopedic Precautions:N/A   Braces: N/A     Occupational Performance:     Bed Mobility:    · N/T     Functional Mobility/Transfers:    · Functional Mobility: N/T The patient declined amb with OT    Activities of Daily Living:  · The patient reports amb to the sink and toilet with her son's assist. The patient declined self care tasks with OT.      [facility] [address] [facility] 6 Click ADL: 21    Treatment & Education:  The patient performed  UE therex using yellow  theraband. The patient was able to perform B shoulder horiz abd, B shlder ext rotation, B elbow flex and left shldr ext x10 reps. The patient was unable to tolerate resistance to the right shldr in ext but was able to perform AAROM to the right shoulder without c/o pain. The patient required VC and demo to perform HEP per handout.    Patient left up in chair with all lines intact, call button in reach and son presentEducation:      GOALS:   Multidisciplinary Problems     Occupational Therapy Goals        Problem: Occupational Therapy Goal    Goal Priority Disciplines Outcome Interventions   Occupational Therapy Goal     OT, PT/OT Adequate for Care Transition    Description:  Goals to be met by: 11/1/19    Patient will increase functional independence with ADLs by performing:    UE Dressing with Modified [place].  LE Dressing with Modified [place].  Grooming while standing at sink with Modified [place].  Supine to sit with Modified [place].  Step transfer with Modified [place]  Toilet transfer to toilet with Modified [place].  Upper extremity exercise program x10 reps per handout, with assistance as needed. met  Educate the patient re: Home safety and Energy Conservation  met                       Time Tracking:     OT Date of Treatment: 10/24/19  OT Start Time: 0929  OT Stop Time: 0941  OT Total Time (min): 12 min    Billable Minutes:Therapeutic Exercise 12    Susan Patel OT  10/24/2019

## 2019-10-24 NOTE — PROGRESS NOTES
Follow-up Information     Rox Montes MD On 10/28/2019.    Specialties:  Internal Medicine, Wound Care  Why:  Outpatient Services PCP Follow-Up Monday at 10:20 AM  Contact information:  [address]  [address]  [place] [address] [phone]             Cameron Munguia MD On 11/7/2019.    Specialties:  Cardiology, INTERVENTIONAL CARDIOLOGY  Why:  Outpatient Services Cardiology Follow-Up Thursday at 9:40 AM  Contact information:  [address]  [address]  [place] [address] [phone]             [facility] [place].    Specialty:  Home Health Services  Why:  Home Health [facility] will contact patient to initiate services.  Contact information:  [address] [facility] [address] [facility] [place]  [address]  [place] [address] [phone]               PLEASE BRING TO ALL FOLLOW UP APPOINTMENTS:   1) A COPY YOUR DISCHARGE INSTRUCTIONS   2) ALL MEDICINES YOU ARE CURRENTLY TAKING IN THEIR ORIGINAL BOTTLES   3) IDENTIFICATION CARD   4) INSURANCE CARD    **PLEASE ARRIVE 15 MINUTES AHEAD OF SCHEDULED APPOINTMENT TIME   ++PLEASE CALL 24 HOURS IN ADVANCE IF YOU MUST RESCHEDULE YOUR APPOINTMENT DAY AND/OR TIME     [facility] CARE MANAGEMENT WRITTEN DISCHARGE INFORMATION    APPOINTMENTS AND RESOURCES TO HELP YOU MANAGE YOUR CARE AT HOME BASED ON YOUR PREFERENCES:  (If an appointment is not scheduled for you when you leave the hospital, call your doctor to schedule a follow up visit within a week)        Healthy Living Instructions to HELP MANAGE YOUR CARE AT HOME:  Things You are responsible for:  1.    Getting your prescriptions filled   2.    Taking your medications as directed, DO NOT MISS ANY DOSES!  3.    Following the diet and exercise recommended by your doctor  4.    Going to your follow-up doctor appointment. This is important because it allows the doctor to monitor your progress and determine if any changes need to made to your treatment plan.  5. If you have any questions about MANAGING YOUR CARE AT HOME Call the  Nurse Care Line for 24/7 Assistance [phone]       Please answer any calls you may receive from [facility]. We want to continue to support you as you manage your healthcare needs. [facility] is happy to have the opportunity to serve you.      Thank you for choosing [facility] for your healthcare needs!  Your [facility] Case Management Team,    Dali Ramires RN [place]  Registered Nurse Transition Navigator  [phone]

## 2019-10-24 NOTE — PLAN OF CARE
Problem: Occupational Therapy Goal  Goal: Occupational Therapy Goal  Description  Goals to be met by: 11/1/19    Patient will increase functional independence with ADLs by performing:    UE Dressing with Modified [place].  LE Dressing with Modified [place].  Grooming while standing at sink with Modified [place].  Supine to sit with Modified [place].  Step transfer with Modified [place]  Toilet transfer to toilet with Modified [place].  Upper extremity exercise program x10 reps per handout, with assistance as needed. met  Educate the patient re: Home safety and Energy Conservation  met      Outcome: Adequate for Care Transition   The patient was able to perform UE therex x10 reps using yellow theraband. The patient reports amb to the toilet and sink with the assist of her son today.     The patient with will benefit from HH OT.

## 2019-10-24 NOTE — PLAN OF CARE
[facility] HOME HEALTH ORDERS  FACE TO FACE ENCOUNTER    Patient Name: Sandra Moody  YOB: 1928    PCP: Rox Montes MD   PCP Address: [address] [place] [address] [address] [place] [address]  PCP Phone Number: [phone]  PCP Fax: [phone]    Encounter Date: 10/24/2019    Admit to Home Health    Diagnoses:  Active Hospital Problems    Diagnosis  POA    *Sepsis due to Haemophilus influenzae type B [A41.3]  Yes    Paroxysmal atrial fibrillation [I48.0]  Yes    Influenza B [J10.1]  Yes    Ventricular tachycardia, nonsustained [I47.2]  Yes    Lactic acidosis [E87.2]  Yes    History of lung cancer [Z85.118]  Not Applicable    Hypothyroidism [E03.9]  Yes    Chronic kidney disease, stage II (mild) [N18.2]  Yes    Essential hypertension [I10]  Yes      Resolved Hospital Problems    Diagnosis Date Resolved POA    Fever [R50.9] 10/18/2019 Yes    Arterial hypotension [I95.9] 10/21/2019 Yes       Future Appointments   Date Time Provider Department Center   10/28/2019 10:20 AM Rox Montes MD [facility] [place]   11/7/2019  9:40 AM Cameron Munguia MD [facility] CARDIO [facility]   2/24/2020  7:15 AM [facility]   2/24/2020  8:15 AM LAB,  HOSPITAL [facility] LAB [facility]   2/26/2020 10:40 AM Joseph Landeros PA-C [facility] HEPAT [place]     Follow-up Information     Rox Montes MD On 10/28/2019.    Specialties:  Internal Medicine, Wound Care  Why:  Outpatient Services PCP Follow-Up Monday at 10:20 AM  Contact information:  [address] [address]  [address]  [place] [address]  [phone]             Cameron Munguia MD On 11/7/2019.    Specialties:  Cardiology, INTERVENTIONAL CARDIOLOGY  Why:  Outpatient Services Cardiology Follow-Up Thursday at 9:40 AM  Contact information:  [address]  [address]  [place] [address]  [phone]             Rox Montes MD In 1 week.    Specialties:  Internal Medicine, Wound Care  Contact  information:  [address] [facility] [address]  [address]  [place] [address]  [phone]                     I have seen and examined this patient face to face today. My clinical findings that support the need for the home health skilled services and home bound status are the following:  Weakness/numbness causing balance and gait disturbance due to Weakness/Debility making it taxing to leave home.    Allergies:Review of patient's allergies indicates:  No Known Allergies    Diet: cardiac diet    Activities: activity as tolerated    Nursing:   SN to complete comprehensive assessment including routine vital signs. Instruct on disease process and s/s of complications to report to MD. Review/verify medication list sent home with the patient at time of discharge  and instruct patient/caregiver as needed. Frequency may be adjusted depending on start of care date.    Notify MD if SBP > 160 or < 90; DBP > 90 or < 50; HR > 120 or < 50; Temp > 101; Other:         CONSULTS:    Physical Therapy to evaluate and treat. Evaluate for home safety and equipment needs; Establish/upgrade home exercise program. Perform / instruct on therapeutic exercises, gait training, transfer training, and Range of Motion.  Occupational Therapy to evaluate and treat. Evaluate home environment for safety and equipment needs. Perform/Instruct on transfers, ADL training, ROM, and therapeutic exercises.    MISCELLANEOUS CARE:  Routine Skin for Bedridden Patients: Instruct patient/caregiver to apply moisture barrier cream to all skin folds and wet areas in perineal area daily and after baths and all bowel movements.    WOUND CARE ORDERS  n/a    Medications: Review discharge medications with patient and family and provide education.      I certify that this patient is confined to her home and needs intermittent skilled nursing care, physical therapy and occupational therapy.

## 2019-10-25 ENCOUNTER — PATIENT OUTREACH (OUTPATIENT)
Dept: ADMINISTRATIVE | Facility: CLINIC | Age: 84
End: 2019-10-25

## 2019-10-25 PROCEDURE — G0180 MD CERTIFICATION HHA PATIENT: HCPCS | Mod: ,,, | Performed by: HOSPITALIST

## 2019-10-25 PROCEDURE — G0180 PR HOME HEALTH MD CERTIFICATION: ICD-10-PCS | Mod: ,,, | Performed by: HOSPITALIST

## 2019-10-25 NOTE — PT/OT/SLP DISCHARGE
Physical Therapy Discharge Summary    Name: Sandra Moody  MRN: [id number]   Principal Problem: Sepsis due to Haemophilus influenzae type B     Patient Discharged from acute Physical Therapy on 10/24/19.  Please refer to prior PT notes for functional status.     Assessment:     Patient appropriate for care in another setting.    Objective:     GOALS:   Multidisciplinary Problems     Physical Therapy Goals     Not on file          Multidisciplinary Problems (Resolved)        Problem: Physical Therapy Goal    Goal Priority Disciplines Outcome Goal Variances Interventions   Physical Therapy Goal   (Resolved)     PT, PT/OT Met     Description:  Goals to be met by: 19     Patient will increase functional independence with mobility by performin. Supine to sit with Modified [place]  2. Rolling to Left and Right with Modified [place]  3. Sit to stand transfer with Modified [place] using str cane  4. Bed to chair transfer with Modified [place] using str cane  5. Gait >250 feet with Modified [place] using str cane  6. Lower extremity exercise program x10 reps per handout, with independence                        Reasons for Discontinuation of Therapy Services  Transfer to alternate level of care.      Plan:     Patient Discharged to: Home with Home Health Service.    Naz Quezada, PT  10/25/2019

## 2019-10-25 NOTE — PATIENT INSTRUCTIONS
Sepsis  Sepsis occurs when your body responds to bacteremia - the presence of bacteria in your bloodstream. Sepsis can be deadly. Blood pressure may drop and the lungs and kidneys may start to fail. Emergency care for sepsis is crucial.  Risk Factors  Those most at risk for sepsis are:  Infants or older adults  People who have an illness such as cancer, AIDS, or diabetes  People being treated with chemotherapy medications or radiation  People who have had a transplant  People with an infection such as pneumonia, meningitis, or a urinary tract infection  When to Go to the Emergency Department (ED)  Sepsis is a medical emergency. Go to the nearest emergency department if a fever is present with any of these symptoms:  Chills and shaking  Fever or low body temperature (hypothermia)  Rapid heartbeat and breathing; shortness of breath  Nausea or vomiting  Confusion, dizziness  Skin rash  Decreased urination  What to Expect in the ED  Blood and urine tests are done to look for the presence of bacteria.  A blood culture may be done. In this test, a blood sample is sent to a lab, where its placed in a special container. Any bacteria in the blood should grow in 24 hours.  X-rays may be taken or other imaging tests may be done.  A person with sepsis will be admitted to the hospital and treated with antibiotics. Treatment may also include oxygen and intravenous fluids.  © 3646-3108 [facility], [address] [place] [address], [place] [address]. All rights reserved. This information is not intended as a substitute for professional medical care. Always follow your healthcare professional's instructions.

## 2019-10-28 ENCOUNTER — TELEPHONE (OUTPATIENT)
Dept: FAMILY MEDICINE | Facility: CLINIC | Age: 84
End: 2019-10-28

## 2019-10-28 ENCOUNTER — OFFICE VISIT (OUTPATIENT)
Dept: FAMILY MEDICINE | Facility: CLINIC | Age: 84
End: 2019-10-28
Payer: MEDICARE

## 2019-10-28 VITALS
SYSTOLIC BLOOD PRESSURE: 138 MMHG | OXYGEN SATURATION: 99 % | HEART RATE: 102 BPM | TEMPERATURE: 98 F | BODY MASS INDEX: 24.76 KG/M2 | DIASTOLIC BLOOD PRESSURE: 80 MMHG | HEIGHT: 63 IN | WEIGHT: 139.75 LBS

## 2019-10-28 DIAGNOSIS — H91.93 BILATERAL HEARING LOSS, UNSPECIFIED HEARING LOSS TYPE: ICD-10-CM

## 2019-10-28 DIAGNOSIS — I48.0 PAROXYSMAL ATRIAL FIBRILLATION: ICD-10-CM

## 2019-10-28 DIAGNOSIS — I10 ESSENTIAL HYPERTENSION: ICD-10-CM

## 2019-10-28 DIAGNOSIS — G47.00 INSOMNIA, UNSPECIFIED TYPE: ICD-10-CM

## 2019-10-28 DIAGNOSIS — A41.3: Primary | ICD-10-CM

## 2019-10-28 PROCEDURE — 1101F PT FALLS ASSESS-DOCD LE1/YR: CPT | Mod: HCNC,CPTII,S$GLB, | Performed by: INTERNAL MEDICINE

## 2019-10-28 PROCEDURE — 99215 PR OFFICE/OUTPT VISIT, EST, LEVL V, 40-54 MIN: ICD-10-PCS | Mod: HCNC,S$GLB,, | Performed by: INTERNAL MEDICINE

## 2019-10-28 PROCEDURE — 99999 PR PBB SHADOW E&M-EST. PATIENT-LVL III: ICD-10-PCS | Mod: PBBFAC,HCNC,, | Performed by: INTERNAL MEDICINE

## 2019-10-28 PROCEDURE — 99215 OFFICE O/P EST HI 40 MIN: CPT | Mod: HCNC,S$GLB,, | Performed by: INTERNAL MEDICINE

## 2019-10-28 PROCEDURE — 1101F PR PT FALLS ASSESS DOC 0-1 FALLS W/OUT INJ PAST YR: ICD-10-PCS | Mod: HCNC,CPTII,S$GLB, | Performed by: INTERNAL MEDICINE

## 2019-10-28 PROCEDURE — 99499 RISK ADDL DX/OHS AUDIT: ICD-10-PCS | Mod: HCNC,S$GLB,, | Performed by: INTERNAL MEDICINE

## 2019-10-28 PROCEDURE — 99499 UNLISTED E&M SERVICE: CPT | Mod: HCNC,S$GLB,, | Performed by: INTERNAL MEDICINE

## 2019-10-28 PROCEDURE — 99999 PR PBB SHADOW E&M-EST. PATIENT-LVL III: CPT | Mod: PBBFAC,HCNC,, | Performed by: INTERNAL MEDICINE

## 2019-10-28 RX ORDER — TEMAZEPAM 30 MG/1
30 CAPSULE ORAL NIGHTLY PRN
Qty: 30 CAPSULE | Refills: 2 | Status: SHIPPED | OUTPATIENT
Start: 2019-10-28 | End: 2019-11-27

## 2019-10-28 RX ORDER — METOPROLOL SUCCINATE 100 MG/1
100 TABLET, EXTENDED RELEASE ORAL DAILY
Qty: 90 TABLET | Refills: 1 | Status: SHIPPED | OUTPATIENT
Start: 2019-10-28 | End: 2019-10-28 | Stop reason: SDUPTHER

## 2019-10-28 RX ORDER — METOPROLOL SUCCINATE 100 MG/1
100 TABLET, EXTENDED RELEASE ORAL DAILY
Qty: 30 TABLET | Refills: 0 | Status: SHIPPED | OUTPATIENT
Start: 2019-10-28 | End: 2019-11-07

## 2019-10-28 NOTE — PROGRESS NOTES
SUBJECTIVE     No chief complaint on file.      HPI  Sandra Moody is a 89 y.o. female with multiple medical diagnoses as listed in the medical history and problem list that presents for follow-up after recent hospitalization for Infuenza B. The pt presented to the ED with N/V and was found to be septic 2/2 Influenza B. She was started on Tamiflu and empiric Abx with improvement of symptoms. Hospital stay was complicated by episodes of paroxymal A Fib, so Cards recommended oral anticoagulation with clearance per GI with EGD given history of uncomplicated cirrhosis of liver. Pt has been doing okay since discharge. She continues to have some SOB, lightheadedness, and hear fluttering. Pt denies any further N/V or body aches. Home health has been by to talk with her, but they have not started therapy to date. She is eating and drinking. Denies any fever, chills, but she still has night sweats on occasion.     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 lj- fosamax    Paroxysmal atrial fibrillation 10/21/2019    PSVT (paroxysmal supraventricular tachycardia)     dr. marx    PVC (premature ventricular contraction) 11/10/2012    Rectocele     Status post partial lobectomy of lung 2/2010    Thyroid disease     hypothyroidism    Trochanteric bursitis 4/18/2013    Urine incontinence  2/24/2015    Vitamin D deficiency disease 9/4/2014       PAST SURGICAL HISTORY:  Past Surgical History:   Procedure Laterality Date    ADENOIDECTOMY      APPENDECTOMY      CHOLECYSTECTOMY      COLONOSCOPY      CYSTOSCOPY      ESOPHAGOGASTRODUODENOSCOPY N/A 10/23/2019    Procedure: EGD (ESOPHAGOGASTRODUODENOSCOPY);  Surgeon: Archie Humphrey MD;  [facility];  Service: Endoscopy;  Laterality: N/A;    EYE SURGERY Bilateral     HYSTERECTOMY      LUNG LOBECTOMY Right     OOPHORECTOMY      TONSILLECTOMY      UPPER GASTROINTESTINAL ENDOSCOPY         SOCIAL HISTORY:  Social History     Socioeconomic History    Marital status:      Spouse name: Not on file    Number of children: Not on file    Years of education: Not on file    Highest education level: Not on file   Occupational History    Not on file   Social Needs    Financial resource strain: Not on file    Food insecurity:     Worry: Not on file     Inability: Not on file    Transportation needs:     Medical: Not on file     Non-medical: Not on file   Tobacco Use    Smoking status: Never Smoker    Smokeless tobacco: Never Used   Substance and Sexual Activity    Alcohol use: No    Drug use: No    Sexual activity: Never   Lifestyle    Physical activity:     Days per week: Not on file     Minutes per session: Not on file    Stress: Only a little   Relationships    Social connections:     Talks on phone: Not on file     Gets together: Not on file     Attends Yazidism service: Not on file     Active member of club or organization: Not on file     Attends meetings of clubs or organizations: Not on file     Relationship status: Not on file   Other Topics Concern    Not on file   Social History Narrative    Not on file       FAMILY HISTORY:  Family History   Problem Relation Age of Onset    Cancer Mother         COLON    Hypertension Mother     Aneurysm Father     Alcohol abuse Brother     Kidney failure Sister     Cancer  Brother         throat-smoker     Alcohol abuse Brother     Pacemaker/defibrilator Brother     Birth defects Paternal Grandfather         prostate     Asthma Neg Hx     Emphysema Neg Hx     Amblyopia Neg Hx     Blindness Neg Hx     Cataracts Neg Hx     Diabetes Neg Hx     Glaucoma Neg Hx     Macular degeneration Neg Hx     Retinal detachment Neg Hx     Strabismus Neg Hx     Stroke Neg Hx     Thyroid disease Neg Hx     Cirrhosis Neg Hx        ALLERGIES AND MEDICATIONS: updated and reviewed.  Review of patient's allergies indicates:  No Known Allergies  Current Outpatient Medications   Medication Sig Dispense Refill    albuterol (PROVENTIL HFA) 90 mcg/actuation inhaler Inhale 2 puffs into the lungs every 6 (six) hours as needed (ANY equivalent covered is OK). 1 Inhaler 2    apixaban (ELIQUIS) 5 mg Tab Take 1 tablet (5 mg total) by mouth 2 (two) times daily. 180 tablet 1    metoprolol succinate (TOPROL-XL) 100 MG 24 hr tablet Take 1 tablet (100 mg total) by mouth once daily. 30 tablet 0    alendronate (FOSAMAX) 70 MG tablet Take 1 tablet (70 mg total) by mouth every 7 days. 12 tablet 1    estradiol (ESTRACE) 0.01 % (0.1 mg/g) vaginal cream Pea-size dab of cream to urethra/introitus at bed time. 42.5 g 1    levothyroxine (SYNTHROID) 25 MCG tablet TAKE 1 TABLET EVERY DAY 90 tablet 3    melatonin 10 mg Tab Take by mouth once daily.       nystatin (MYCOSTATIN) cream Apply topically 2 (two) times daily. 30 g 3    ondansetron (ZOFRAN) 4 MG tablet Take 1 tablet (4 mg total) by mouth every 6 (six) hours as needed. 20 tablet 0    oxybutynin (DITROPAN-XL) 10 MG 24 hr tablet TAKE 1 TABLET ONE TIME DAILY 90 tablet 11    sertraline (ZOLOFT) 25 MG tablet TAKE 1 TABLET BY MOUTH EVERY DAY (Patient not taking: Reported on 10/25/2019) 30 tablet 0    temazepam (RESTORIL) 30 mg capsule Take 1 capsule (30 mg total) by mouth nightly as needed for Insomnia. 30 capsule 2    triamcinolone acetonide 0.5% (KENALOG)  "0.5 % Crea Apply topically 2 (two) times daily. 30 g 3     No current facility-administered medications for this visit.        ROS  Review of Systems   Constitutional: Negative for chills and fever.   HENT: Negative for hearing loss and sore throat.    Eyes: Negative for visual disturbance.   Respiratory: Positive for shortness of breath. Negative for cough.    Cardiovascular: Positive for palpitations. Negative for chest pain and leg swelling.   Gastrointestinal: Negative for abdominal pain, constipation, diarrhea, nausea and vomiting.   Genitourinary: Negative for dysuria, frequency and urgency.   Musculoskeletal: Negative for arthralgias, joint swelling and myalgias.   Skin: Negative for rash and wound.   Neurological: Positive for light-headedness. Negative for headaches.   Psychiatric/Behavioral: Negative for agitation and confusion. The patient is not nervous/anxious.          OBJECTIVE     Physical Exam  Vitals:    10/28/19 1034   BP: 138/80   Pulse: 102   Temp: 98.2 °F (36.8 °C)    Body mass index is 24.76 kg/m².  Weight: 63.4 kg (139 lb 12.4 oz)   Height: 5' 3" (160 cm)     Physical Exam   Constitutional: She is oriented to person, place, and time. She appears well-developed and well-nourished. No distress.   HENT:   Head: Normocephalic and atraumatic.   Right Ear: Hearing, tympanic membrane and external ear normal.   Left Ear: Hearing, tympanic membrane and external ear normal.   Nose: Nose normal. No rhinorrhea.   Mouth/Throat: Oropharynx is clear and moist. No uvula swelling. No posterior oropharyngeal edema or posterior oropharyngeal erythema.   Eyes: Conjunctivae and EOM are normal. Right eye exhibits no discharge. Left eye exhibits no discharge. No scleral icterus.   Neck: Normal range of motion. Neck supple. No JVD present. No tracheal deviation present.   Cardiovascular: Normal rate, regular rhythm and intact distal pulses. Exam reveals no gallop and no friction rub.   No murmur " heard.  Pulmonary/Chest: Effort normal and breath sounds normal. No respiratory distress. She has no wheezes.   Abdominal: Soft. Bowel sounds are normal. She exhibits no distension and no mass. There is no tenderness. There is no rebound and no guarding.   Musculoskeletal: Normal range of motion. She exhibits no edema, tenderness or deformity.   Neurological: She is alert and oriented to person, place, and time. She exhibits normal muscle tone. Coordination normal.   Skin: Skin is warm and dry. No rash noted. No erythema.   Psychiatric: She has a normal mood and affect. Her behavior is normal. Judgment and thought content normal.         Health Maintenance       Date Due Completion Date    Shingles Vaccine (1 of 2) 10/27/1978 ---    TETANUS VACCINE 09/13/2015 9/13/2005    DEXA SCAN 10/16/2019 10/16/2017 (Done)    Override on 10/16/2017: Done    Override on 4/10/2014: (N/S)    Lipid Panel 09/07/2022 9/7/2017            ASSESSMENT     89 y.o. female with     1. Sepsis due to Haemophilus influenzae type B    2. Paroxysmal atrial fibrillation    3. Insomnia, unspecified type    4. Essential hypertension    5. Bilateral hearing loss, unspecified hearing loss type        PLAN:     1. Sepsis due to Haemophilus influenzae type B  - Resolved  - Independently reviewed hospital labs/imaging/cultures  - No further medical therapy warranted, but pt to complete home health due to deconditioning     2. Paroxysmal atrial fibrillation  - Pt in NSR today but only taking Toprol 50; will increase Toprol from 50 to 100  - Continue Eliquis  - metoprolol succinate (TOPROL-XL) 100 MG 24 hr tablet; Take 1 tablet (100 mg total) by mouth once daily.  Dispense: 30 tablet; Refill: 0  - apixaban (ELIQUIS) 5 mg Tab; Take 1 tablet (5 mg total) by mouth 2 (two) times daily.  Dispense: 180 tablet; Refill: 1    3. Insomnia, unspecified type  - Stable; no acute issues  - Resume Restoril  - temazepam (RESTORIL) 30 mg capsule; Take 1 capsule (30 mg  total) by mouth nightly as needed for Insomnia.  Dispense: 30 capsule; Refill: 2    4. Essential hypertension  - BP well controlled; at goal of <140/90  - The current medical regimen is effective;  continue present plan and medications.    5. Bilateral hearing loss, unspecified hearing loss type  - Ambulatory Referral to Audiology        RTC in 6 weeks      Transitional Care Note    Family and/or Caretaker present at visit?  Yes.  Diagnostic tests reviewed/disposition: No diagnosic tests pending after this hospitalization.  Disease/illness education: Provided during visit  Home health/community services discussion/referrals: Patient has home health established at [facility].   Establishment or re-establishment of referral orders for community resources: No other necessary community resources.   Discussion with other health care providers: No discussion with other health care providers necessary.              Rox Montes MD  10/28/2019 10:49 AM        No follow-ups on file.

## 2019-10-28 NOTE — TELEPHONE ENCOUNTER
Return call placed to [facility], and informed that the Provider is aware of Pt being on both medications. She acknowledged understanding.

## 2019-10-28 NOTE — TELEPHONE ENCOUNTER
----- Message from Darrin Lee sent at 10/28/2019  4:46 PM CDT -----  Contact: Claudia ([facility])  Name of Who is Calling: Claudia ([facility])      What is the request in detail: Would like to know if physician is aware patient picked up a prescription for Tramdol before temazepam (RESTORIL) 30 mg capsule can be filled. Please advise      Can the clinic reply by [facility]: no      What Number to [facility] Back [facility]: [phone]

## 2019-11-03 ENCOUNTER — PATIENT OUTREACH (OUTPATIENT)
Dept: ADMINISTRATIVE | Facility: OTHER | Age: 84
End: 2019-11-03

## 2019-11-04 RX ORDER — ALENDRONATE SODIUM 70 MG/1
70 TABLET ORAL
Qty: 12 TABLET | Refills: 1 | OUTPATIENT
Start: 2019-11-04

## 2019-11-07 ENCOUNTER — OFFICE VISIT (OUTPATIENT)
Dept: CARDIOLOGY | Facility: CLINIC | Age: 84
End: 2019-11-07
Payer: MEDICARE

## 2019-11-07 VITALS
WEIGHT: 140.13 LBS | RESPIRATION RATE: 16 BRPM | HEART RATE: 75 BPM | SYSTOLIC BLOOD PRESSURE: 174 MMHG | BODY MASS INDEX: 24.82 KG/M2 | DIASTOLIC BLOOD PRESSURE: 81 MMHG | OXYGEN SATURATION: 94 %

## 2019-11-07 DIAGNOSIS — I48.0 PAROXYSMAL ATRIAL FIBRILLATION: ICD-10-CM

## 2019-11-07 DIAGNOSIS — R41.3 MEMORY CHANGE: Primary | ICD-10-CM

## 2019-11-07 PROCEDURE — 99999 PR PBB SHADOW E&M-EST. PATIENT-LVL IV: ICD-10-PCS | Mod: PBBFAC,HCNC,, | Performed by: INTERNAL MEDICINE

## 2019-11-07 PROCEDURE — 99499 UNLISTED E&M SERVICE: CPT | Mod: S$GLB,,, | Performed by: INTERNAL MEDICINE

## 2019-11-07 PROCEDURE — 99999 PR PBB SHADOW E&M-EST. PATIENT-LVL IV: CPT | Mod: PBBFAC,HCNC,, | Performed by: INTERNAL MEDICINE

## 2019-11-07 PROCEDURE — 99214 PR OFFICE/OUTPT VISIT, EST, LEVL IV, 30-39 MIN: ICD-10-PCS | Mod: HCNC,S$GLB,, | Performed by: INTERNAL MEDICINE

## 2019-11-07 PROCEDURE — 1101F PT FALLS ASSESS-DOCD LE1/YR: CPT | Mod: HCNC,CPTII,S$GLB, | Performed by: INTERNAL MEDICINE

## 2019-11-07 PROCEDURE — 99214 OFFICE O/P EST MOD 30 MIN: CPT | Mod: HCNC,S$GLB,, | Performed by: INTERNAL MEDICINE

## 2019-11-07 PROCEDURE — 99499 RISK ADDL DX/OHS AUDIT: ICD-10-PCS | Mod: S$GLB,,, | Performed by: INTERNAL MEDICINE

## 2019-11-07 PROCEDURE — 1101F PR PT FALLS ASSESS DOC 0-1 FALLS W/OUT INJ PAST YR: ICD-10-PCS | Mod: HCNC,CPTII,S$GLB, | Performed by: INTERNAL MEDICINE

## 2019-11-07 RX ORDER — METOPROLOL SUCCINATE 50 MG/1
150 TABLET, EXTENDED RELEASE ORAL DAILY
Qty: 270 TABLET | Refills: 9 | Status: SHIPPED | OUTPATIENT
Start: 2019-11-07 | End: 2019-12-06

## 2019-11-07 NOTE — PROGRESS NOTES
CARDIOVASCULAR CONSULTATION    REASON FOR CONSULT:   Sandra Moody is a 89 y.o. female who presents for evaluation of recent hospitalization for atrial fibrillation.      HISTORY OF PRESENT ILLNESS:     Patient is a pleasant 89-year-old lady.  Recently was noted to have atrial fibrillation during hospitalization.  Was discharged home on Eliquis.  Has been doing fine.  Is here with her family who is very involved with her care.  States that since her discharge she had 1 episode where she felt her palpitations and it lasted few minutes and then went away.  Since then she has stayed in normal sinus rhythm.  She has been taking her Eliquis without any problems.  No bleeding.  The family is concerned about short-term memory loss which has been slowly worsening.  Referral to neurology has been made.      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 lj- fosamax    Paroxysmal atrial fibrillation 10/21/2019    PSVT (paroxysmal supraventricular tachycardia)     dr. marx    PVC (premature ventricular contraction) 11/10/2012    Rectocele     Status post partial lobectomy of lung 2/2010    Thyroid disease     hypothyroidism    Trochanteric bursitis 4/18/2013    Urine incontinence 2/24/2015    Vitamin D deficiency disease 9/4/2014       PAST SURGICAL HISTORY:     Past Surgical History:    Procedure Laterality Date    ADENOIDECTOMY      APPENDECTOMY      CHOLECYSTECTOMY      COLONOSCOPY      CYSTOSCOPY      ESOPHAGOGASTRODUODENOSCOPY N/A 10/23/2019    Procedure: EGD (ESOPHAGOGASTRODUODENOSCOPY);  Surgeon: Archie Humphrey MD;  Location: [facility];  Service: Endoscopy;  Laterality: N/A;    EYE SURGERY Bilateral     HYSTERECTOMY      LUNG LOBECTOMY Right     OOPHORECTOMY      TONSILLECTOMY      UPPER GASTROINTESTINAL ENDOSCOPY         ALLERGIES AND MEDICATION:   Review of patient's allergies indicates:  No Known Allergies     Medication List           Accurate as of November 7, 2019  9:58 AM. If you have any questions, ask your nurse or doctor.               CONTINUE taking these medications    albuterol 90 mcg/actuation inhaler  Commonly known as:  PROVENTIL HFA  Inhale 2 puffs into the lungs every 6 (six) hours as needed (ANY equivalent covered is OK).     alendronate 70 MG tablet  Commonly known as:  FOSAMAX  Take 1 tablet (70 mg total) by mouth every 7 days.     apixaban 5 mg Tab  Commonly known as:  ELIQUIS  Take 1 tablet (5 mg total) by mouth 2 (two) times daily.     estradiol 0.01 % (0.1 mg/gram) vaginal cream  Commonly known as:  ESTRACE  Pea-size dab of cream to urethra/introitus at bed time.     levothyroxine 25 MCG tablet  Commonly known as:  SYNTHROID  TAKE 1 TABLET EVERY DAY     melatonin 10 mg Tab     metoprolol succinate 100 MG 24 hr tablet  Commonly known as:  TOPROL-XL  Take 1 tablet (100 mg total) by mouth once daily.     nystatin cream  Commonly known as:  MYCOSTATIN  Apply topically 2 (two) times daily.     ondansetron 4 MG tablet  Commonly known as:  ZOFRAN  Take 1 tablet (4 mg total) by mouth every 6 (six) hours as needed.     oxybutynin 10 MG 24 hr tablet  Commonly known as:  DITROPAN-XL  TAKE 1 TABLET ONE TIME DAILY     sertraline 25 MG tablet  Commonly known as:  ZOLOFT  TAKE 1 TABLET BY MOUTH EVERY DAY     temazepam 30 mg capsule  Commonly known as:   RESTORIL  Take 1 capsule (30 mg total) by mouth nightly as needed for Insomnia.     triamcinolone acetonide 0.5% 0.5 % Crea  Commonly known as:  KENALOG  Apply topically 2 (two) times daily.            SOCIAL HISTORY:     Social History     Socioeconomic History    Marital status:      Spouse name: Not on file    Number of children: Not on file    Years of education: Not on file    Highest education level: Not on file   Occupational History    Not on file   Social Needs    Financial resource strain: Not on file    Food insecurity:     Worry: Not on file     Inability: Not on file    Transportation needs:     Medical: Not on file     Non-medical: Not on file   Tobacco Use    Smoking status: Never Smoker    Smokeless tobacco: Never Used   Substance and Sexual Activity    Alcohol use: No    Drug use: No    Sexual activity: Never   Lifestyle    Physical activity:     Days per week: Not on file     Minutes per session: Not on file    Stress: Only a little   Relationships    Social connections:     Talks on phone: Not on file     Gets together: Not on file     Attends Sabianist service: Not on file     Active member of club or organization: Not on file     Attends meetings of clubs or organizations: Not on file     Relationship status: Not on file   Other Topics Concern    Not on file   Social History Narrative    Not on file       FAMILY HISTORY:     Family History   Problem Relation Age of Onset    Cancer Mother         COLON    Hypertension Mother     Aneurysm Father     Alcohol abuse Brother     Kidney failure Sister     Cancer Brother         throat-smoker     Alcohol abuse Brother     Pacemaker/defibrilator Brother     Birth defects Paternal Grandfather         prostate     Asthma Neg Hx     Emphysema Neg Hx     Amblyopia Neg Hx     Blindness Neg Hx     Cataracts Neg Hx     Diabetes Neg Hx     Glaucoma Neg Hx     Macular degeneration Neg Hx     Retinal detachment Neg Hx      Strabismus Neg Hx     Stroke Neg Hx     Thyroid disease Neg Hx     Cirrhosis Neg Hx        REVIEW OF SYSTEMS:   Review of Systems   Constitution: Negative.   HENT: Negative.    Eyes: Negative.    Cardiovascular: Positive for palpitations.   Respiratory: Negative.    Endocrine: Negative.    Hematologic/Lymphatic: Negative.    Skin: Negative.    Musculoskeletal: Negative.    Gastrointestinal: Negative.    Genitourinary: Negative.    Neurological: Negative.    Psychiatric/Behavioral: Negative.    Allergic/Immunologic: Negative.        A 10 point review of systems was performed and all the pertinent positives have been mentioned. Rest of review of systems was negative.        PHYSICAL EXAM:     Vitals:    11/07/19 0948   BP: (!) 184/87   Pulse:    Resp:     Body mass index is 24.82 kg/m².  Weight: 63.6 kg (140 lb 1.6 oz)         Physical Exam   Constitutional: She is oriented to person, place, and time. She appears well-developed and well-nourished.   HENT:   Head: Normocephalic.   Eyes: Pupils are equal, round, and reactive to light.   Neck: Normal range of motion. Neck supple.   Cardiovascular: Normal rate and regular rhythm.   Pulmonary/Chest: Effort normal and breath sounds normal.   Abdominal: Soft. Normal appearance and bowel sounds are normal. There is no tenderness.   Musculoskeletal: Normal range of motion.   Neurological: She is alert and oriented to person, place, and time.   Skin: Skin is warm.   Psychiatric: She has a normal mood and affect.         DATA:     Laboratory:  CBC:  Recent Labs   Lab 10/18/19  0540 10/19/19  0444 10/22/19  0435   WBC 3.89 L 4.38 4.07   Hemoglobin 12.3 13.6 13.9   Hematocrit 38.0 42.4 43.7   Platelets 57 L 81 L 160       CHEMISTRIES:  Recent Labs   Lab 10/16/19  0216 10/17/19  0240  10/19/19  0444 10/21/19  0608 10/22/19  0435   Glucose 97 128 H   < > 140 H 117 H 162 H   Sodium 140 137   < > 139 143 140   Potassium 4.2 3.1 L   < > 3.2 L 4.5 4.4   BUN, Bld 11 8   < > 11 7 L  8   Creatinine 0.7 0.5   < > 0.6 0.6 0.7   eGFR if African American >60 >60   < > >60 >60 >60   eGFR if non African American >60 >60   < > >60 >60 >60   Calcium 7.6 L 7.8 L   < > 8.2 L 8.5 L 9.2   Magnesium 2.0 1.8  --   --   --   --     < > = values in this interval not displayed.       CARDIAC BIOMARKERS:  Recent Labs   Lab 10/14/19  1640 10/14/19  2348 10/15/19  0527   Troponin I <0.006 0.015 0.029 H       COAGS:  Recent Labs   Lab 04/02/19  0738 08/29/19  0756 10/22/19  0435   INR 1.0 1.0 1.1       LIPIDS/LFTS:  Recent Labs   Lab 09/07/17  0731  10/14/19  1640 10/21/19  0608 10/22/19  0435   Cholesterol 201 H  --   --   --   --    Triglycerides 198 H  --   --   --   --    HDL 48  --   --   --   --    LDL Cholesterol 113.4  --   --   --   --    Non-HDL Cholesterol 153  --   --   --   --    AST 13   < > 16 32 29   ALT 13   < > 16 37 41    < > = values in this interval not displayed.       Hemoglobin A1C   Date Value Ref Range Status   09/07/2017 5.4 4.0 - 5.6 % Final     Comment:     According to ADA guidelines, hemoglobin A1c <7.0% represents  optimal control in non-pregnant diabetic patients. Different  metrics may apply to specific patient populations.   Standards of Medical Care in Diabetes-2016.  For the purpose of screening for the presence of diabetes:  <5.7%     Consistent with the absence of diabetes  5.7-6.4%  Consistent with increasing risk for diabetes   (prediabetes)  >or=6.5%  Consistent with diabetes  Currently, no consensus exists for use of hemoglobin A1c  for diagnosis of diabetes for children.  This Hemoglobin A1c assay has significant interference with fetal   hemoglobin   (HbF). The results are invalid for patients with abnormal amounts of   HbF,   including those with known Hereditary Persistence   of Fetal Hemoglobin. Heterozygous hemoglobin variants (HbAS, HbAC,   HbAD, HbAE, HbA2) do not significantly interfere with this assay;   however, presence of multiple variants in a sample may  impact the %   interference.         TSH  Recent Labs   Lab 09/07/17  0731 03/13/19  1002   TSH 3.131 1.979       The ASCVD Risk score (Charlotte DC Jr., et al., 2013) failed to calculate for the following reasons:    The 2013 ASCVD risk score is only valid for ages 40 to 79             ASSESSMENT AND PLAN     Patient Active Problem List   Diagnosis    Essential hypertension    Chronic kidney disease, stage II (mild)    GERD (gastroesophageal reflux disease)    Insomnia    Trochanteric bursitis    Hypothyroidism    Osteopenia    Nuclear sclerosis    Chorioretinal scar    Refractive error    Senile nuclear sclerosis    Vitamin D deficiency disease    Cryptogenic cirrhosis    Portal hypertension    Thrombocytopenia    History of lung cancer    Atherosclerosis of aorta    Anxiety and depression    History of colonic polyps    Chronic right shoulder pain    Candidal intertrigo    Lactic acidosis    Severe sepsis    Ventricular tachycardia, nonsustained    Sepsis due to Haemophilus influenzae type B    Influenza B    Paroxysmal atrial fibrillation       Patient is here for follow-up.  Paroxysmal atrial fibrillation.  Currently asymptomatic.  Continue Eliquis 5 mg b.i.d..    Hypertension:  Uncontrolled.  Increase Toprol-XL to 150 mg daily.    Follow-up in clinic in 3 months.          Thank you very much for involving me in the care of your patient.  Please do not hesitate to contact me if there are any questions.      Cameron Munguia MD, FACC, [facility]  Interventional Cardiologist, [facility].           This note was dictated with the help of speech recognition software.  There might be un-intended errors and/or substitutions.

## 2019-11-08 ENCOUNTER — HOSPITAL ENCOUNTER (OUTPATIENT)
Facility: HOSPITAL | Age: 84
Discharge: HOME | End: 2019-11-08
Attending: EMERGENCY MEDICINE | Admitting: EMERGENCY MEDICINE
Payer: MEDICARE

## 2019-11-08 ENCOUNTER — NURSE TRIAGE (OUTPATIENT)
Dept: ADMINISTRATIVE | Facility: CLINIC | Age: 84
End: 2019-11-08

## 2019-11-08 VITALS
BODY MASS INDEX: 25.32 KG/M2 | OXYGEN SATURATION: 93 % | TEMPERATURE: 98 F | HEART RATE: 70 BPM | WEIGHT: 142.88 LBS | HEIGHT: 63 IN | DIASTOLIC BLOOD PRESSURE: 69 MMHG | RESPIRATION RATE: 31 BRPM | SYSTOLIC BLOOD PRESSURE: 161 MMHG

## 2019-11-08 DIAGNOSIS — I48.91 A-FIB: ICD-10-CM

## 2019-11-08 DIAGNOSIS — R07.9 CHEST PAIN: ICD-10-CM

## 2019-11-08 DIAGNOSIS — R00.0 TACHYCARDIA: ICD-10-CM

## 2019-11-08 DIAGNOSIS — R79.89 ELEVATED TROPONIN: ICD-10-CM

## 2019-11-08 DIAGNOSIS — I10 ESSENTIAL HYPERTENSION: ICD-10-CM

## 2019-11-08 DIAGNOSIS — I48.0 PAROXYSMAL ATRIAL FIBRILLATION: Primary | ICD-10-CM

## 2019-11-08 PROBLEM — J10.1 INFLUENZA B: Status: RESOLVED | Noted: 2019-10-17 | Resolved: 2019-11-08

## 2019-11-08 PROBLEM — R65.20 SEVERE SEPSIS: Status: RESOLVED | Noted: 2019-10-15 | Resolved: 2019-11-08

## 2019-11-08 PROBLEM — A41.3: Status: RESOLVED | Noted: 2019-10-15 | Resolved: 2019-11-08

## 2019-11-08 PROBLEM — A41.9 SEVERE SEPSIS: Status: RESOLVED | Noted: 2019-10-15 | Resolved: 2019-11-08

## 2019-11-08 PROBLEM — E87.20 LACTIC ACIDOSIS: Status: RESOLVED | Noted: 2019-10-14 | Resolved: 2019-11-08

## 2019-11-08 PROBLEM — I47.29 VENTRICULAR TACHYCARDIA, NONSUSTAINED: Status: RESOLVED | Noted: 2019-10-15 | Resolved: 2019-11-08

## 2019-11-08 LAB
ALBUMIN SERPL BCP-MCNC: 4.1 G/DL (ref 3.5–5.2)
ALP SERPL-CCNC: 62 U/L (ref 55–135)
ALT SERPL W/O P-5'-P-CCNC: 16 U/L (ref 10–44)
ANION GAP SERPL CALC-SCNC: 11 MMOL/L (ref 8–16)
APTT BLDCRRT: 25 SEC (ref 21–32)
AST SERPL-CCNC: 17 U/L (ref 10–40)
BACTERIA #/AREA URNS HPF: ABNORMAL /HPF
BASOPHILS # BLD AUTO: 0.03 K/UL (ref 0–0.2)
BASOPHILS NFR BLD: 0.4 % (ref 0–1.9)
BILIRUB SERPL-MCNC: 1.1 MG/DL (ref 0.1–1)
BILIRUB UR QL STRIP: NEGATIVE
BNP SERPL-MCNC: 124 PG/ML (ref 0–99)
BUN SERPL-MCNC: 17 MG/DL (ref 10–30)
CALCIUM SERPL-MCNC: 9.5 MG/DL (ref 8.7–10.5)
CHLORIDE SERPL-SCNC: 106 MMOL/L (ref 95–110)
CLARITY UR: CLEAR
CO2 SERPL-SCNC: 25 MMOL/L (ref 23–29)
COLOR UR: YELLOW
CREAT SERPL-MCNC: 0.7 MG/DL (ref 0.5–1.4)
D DIMER PPP IA.FEU-MCNC: 1.14 MG/L FEU
DIFFERENTIAL METHOD: ABNORMAL
EOSINOPHIL # BLD AUTO: 0 K/UL (ref 0–0.5)
EOSINOPHIL NFR BLD: 0.4 % (ref 0–8)
ERYTHROCYTE [DISTWIDTH] IN BLOOD BY AUTOMATED COUNT: 14.1 % (ref 11.5–14.5)
EST. GFR  (AFRICAN AMERICAN): >60 ML/MIN/1.73 M^2
EST. GFR  (NON AFRICAN AMERICAN): >60 ML/MIN/1.73 M^2
GLUCOSE SERPL-MCNC: 140 MG/DL (ref 70–110)
GLUCOSE UR QL STRIP: NEGATIVE
HCT VFR BLD AUTO: 48.2 % (ref 37–48.5)
HGB BLD-MCNC: 15.5 G/DL (ref 12–16)
HGB UR QL STRIP: ABNORMAL
HYALINE CASTS #/AREA URNS LPF: 1 /LPF
IMM GRANULOCYTES # BLD AUTO: 0.04 K/UL (ref 0–0.04)
IMM GRANULOCYTES NFR BLD AUTO: 0.5 % (ref 0–0.5)
INR PPP: 1 (ref 0.8–1.2)
KETONES UR QL STRIP: NEGATIVE
LEUKOCYTE ESTERASE UR QL STRIP: ABNORMAL
LYMPHOCYTES # BLD AUTO: 1.7 K/UL (ref 1–4.8)
LYMPHOCYTES NFR BLD: 19.9 % (ref 18–48)
MAGNESIUM SERPL-MCNC: 1.9 MG/DL (ref 1.6–2.6)
MCH RBC QN AUTO: 30.2 PG (ref 27–31)
MCHC RBC AUTO-ENTMCNC: 32.2 G/DL (ref 32–36)
MCV RBC AUTO: 94 FL (ref 82–98)
MICROSCOPIC COMMENT: ABNORMAL
MONOCYTES # BLD AUTO: 0.4 K/UL (ref 0.3–1)
MONOCYTES NFR BLD: 5.2 % (ref 4–15)
NEUTROPHILS # BLD AUTO: 6.3 K/UL (ref 1.8–7.7)
NEUTROPHILS NFR BLD: 73.6 % (ref 38–73)
NITRITE UR QL STRIP: NEGATIVE
NON-SQ EPI CELLS #/AREA URNS HPF: 2 /HPF
NRBC BLD-RTO: 0 /100 WBC
PH UR STRIP: 5 [PH] (ref 5–8)
PHOSPHATE SERPL-MCNC: 2.3 MG/DL (ref 2.7–4.5)
PLATELET # BLD AUTO: 144 K/UL (ref 150–350)
PMV BLD AUTO: 9.7 FL (ref 9.2–12.9)
POTASSIUM SERPL-SCNC: 3.8 MMOL/L (ref 3.5–5.1)
PROT SERPL-MCNC: 7.4 G/DL (ref 6–8.4)
PROT UR QL STRIP: ABNORMAL
PROTHROMBIN TIME: 10.6 SEC (ref 9–12.5)
RBC # BLD AUTO: 5.13 M/UL (ref 4–5.4)
RBC #/AREA URNS HPF: 2 /HPF (ref 0–4)
SODIUM SERPL-SCNC: 142 MMOL/L (ref 136–145)
SP GR UR STRIP: 1.01 (ref 1–1.03)
TROPONIN I SERPL DL<=0.01 NG/ML-MCNC: 0.02 NG/ML (ref 0–0.03)
TROPONIN I SERPL DL<=0.01 NG/ML-MCNC: 0.06 NG/ML (ref 0–0.03)
TROPONIN I SERPL DL<=0.01 NG/ML-MCNC: 0.07 NG/ML (ref 0–0.03)
TSH SERPL DL<=0.005 MIU/L-ACNC: 2.38 UIU/ML (ref 0.4–4)
URN SPEC COLLECT METH UR: ABNORMAL
UROBILINOGEN UR STRIP-ACNC: NEGATIVE EU/DL
WBC # BLD AUTO: 8.51 K/UL (ref 3.9–12.7)
WBC #/AREA URNS HPF: 6 /HPF (ref 0–5)

## 2019-11-08 PROCEDURE — 84484 ASSAY OF TROPONIN QUANT: CPT | Mod: HCNC

## 2019-11-08 PROCEDURE — 93010 EKG 12-LEAD: ICD-10-PCS | Mod: HCNC,,, | Performed by: INTERNAL MEDICINE

## 2019-11-08 PROCEDURE — 93010 ELECTROCARDIOGRAM REPORT: CPT | Mod: HCNC,,, | Performed by: INTERNAL MEDICINE

## 2019-11-08 PROCEDURE — G0378 HOSPITAL OBSERVATION PER HR: HCPCS | Mod: HCNC

## 2019-11-08 PROCEDURE — 85610 PROTHROMBIN TIME: CPT | Mod: HCNC

## 2019-11-08 PROCEDURE — 93005 ELECTROCARDIOGRAM TRACING: CPT | Mod: HCNC

## 2019-11-08 PROCEDURE — 85379 FIBRIN DEGRADATION QUANT: CPT | Mod: HCNC

## 2019-11-08 PROCEDURE — 99219 PR INITIAL OBSERVATION CARE,LEVL II: CPT | Mod: HCNC,,, | Performed by: INTERNAL MEDICINE

## 2019-11-08 PROCEDURE — 84100 ASSAY OF PHOSPHORUS: CPT | Mod: HCNC

## 2019-11-08 PROCEDURE — 83880 ASSAY OF NATRIURETIC PEPTIDE: CPT | Mod: HCNC

## 2019-11-08 PROCEDURE — 99219 PR INITIAL OBSERVATION CARE,LEVL II: ICD-10-PCS | Mod: HCNC,,, | Performed by: INTERNAL MEDICINE

## 2019-11-08 PROCEDURE — 84443 ASSAY THYROID STIM HORMONE: CPT | Mod: HCNC

## 2019-11-08 PROCEDURE — 85025 COMPLETE CBC W/AUTO DIFF WBC: CPT | Mod: HCNC

## 2019-11-08 PROCEDURE — 99285 EMERGENCY DEPT VISIT HI MDM: CPT | Mod: 25,HCNC

## 2019-11-08 PROCEDURE — 81000 URINALYSIS NONAUTO W/SCOPE: CPT | Mod: HCNC

## 2019-11-08 PROCEDURE — 80053 COMPREHEN METABOLIC PANEL: CPT | Mod: HCNC

## 2019-11-08 PROCEDURE — 83735 ASSAY OF MAGNESIUM: CPT | Mod: HCNC

## 2019-11-08 PROCEDURE — 25000003 PHARM REV CODE 250: Mod: HCNC | Performed by: EMERGENCY MEDICINE

## 2019-11-08 PROCEDURE — 85730 THROMBOPLASTIN TIME PARTIAL: CPT | Mod: HCNC

## 2019-11-08 PROCEDURE — 25500020 PHARM REV CODE 255: Mod: HCNC | Performed by: EMERGENCY MEDICINE

## 2019-11-08 RX ORDER — RAMELTEON 8 MG/1
8 TABLET ORAL NIGHTLY PRN
Status: DISCONTINUED | OUTPATIENT
Start: 2019-11-08 | End: 2019-11-08 | Stop reason: HOSPADM

## 2019-11-08 RX ORDER — METOPROLOL SUCCINATE 50 MG/1
150 TABLET, EXTENDED RELEASE ORAL DAILY
Status: DISCONTINUED | OUTPATIENT
Start: 2019-11-09 | End: 2019-11-08 | Stop reason: HOSPADM

## 2019-11-08 RX ORDER — OXYBUTYNIN CHLORIDE 5 MG/1
10 TABLET, EXTENDED RELEASE ORAL DAILY
Status: DISCONTINUED | OUTPATIENT
Start: 2019-11-09 | End: 2019-11-08 | Stop reason: HOSPADM

## 2019-11-08 RX ORDER — SERTRALINE HYDROCHLORIDE 25 MG/1
25 TABLET, FILM COATED ORAL DAILY
Status: DISCONTINUED | OUTPATIENT
Start: 2019-11-09 | End: 2019-11-08 | Stop reason: HOSPADM

## 2019-11-08 RX ORDER — SODIUM CHLORIDE 0.9 % (FLUSH) 0.9 %
10 SYRINGE (ML) INJECTION
Status: DISCONTINUED | OUTPATIENT
Start: 2019-11-08 | End: 2019-11-08 | Stop reason: HOSPADM

## 2019-11-08 RX ORDER — METOPROLOL SUCCINATE 50 MG/1
100 TABLET, EXTENDED RELEASE ORAL
Status: COMPLETED | OUTPATIENT
Start: 2019-11-08 | End: 2019-11-08

## 2019-11-08 RX ORDER — LEVOTHYROXINE SODIUM 25 UG/1
25 TABLET ORAL DAILY
Status: DISCONTINUED | OUTPATIENT
Start: 2019-11-09 | End: 2019-11-08 | Stop reason: HOSPADM

## 2019-11-08 RX ORDER — ALBUTEROL SULFATE 90 UG/1
2 AEROSOL, METERED RESPIRATORY (INHALATION) EVERY 6 HOURS PRN
Status: DISCONTINUED | OUTPATIENT
Start: 2019-11-08 | End: 2019-11-08

## 2019-11-08 RX ADMIN — APIXABAN 5 MG: 5 TABLET, FILM COATED ORAL at 12:11

## 2019-11-08 RX ADMIN — POTASSIUM PHOSPHATE, MONOBASIC 500 MG: 500 TABLET, SOLUBLE ORAL at 02:11

## 2019-11-08 RX ADMIN — METOPROLOL SUCCINATE 100 MG: 50 TABLET, FILM COATED, EXTENDED RELEASE ORAL at 12:11

## 2019-11-08 RX ADMIN — IOHEXOL 75 ML: 350 INJECTION, SOLUTION INTRAVENOUS at 01:11

## 2019-11-08 NOTE — ASSESSMENT & PLAN NOTE
Pt presented with palps in setting of med nonadherence.  Minimal trop elev without ischemic sxs.  Recent echo normal  EKGs note NSR without isch changes  Doubt ACS, more likely demand  No plan for inpat stress test  Suggest continuing OAC and BBL without outpat follow in Dr. Munguia's office.

## 2019-11-08 NOTE — ED PROVIDER NOTES
"Encounter Date: 11/8/2019    SCRIBE #1 NOTE: I, Juan Diaz , am scribing for, and in the presence of,  Elder Gates MD . I have scribed the following portions of the note - Other sections scribed: HPI, ROS .       History     Chief Complaint   Patient presents with    Chest Pain     Pt c/o chest tightness and feeling weak earlier today while PT and home health was at her home. Son reports pt's heart rate jumped to 180's with this feeling. Pt with hx of a-fib.     This is a 89 year old female with a PMHx of Atrial Fibrilllation who presents to the Emergency Department with a cc of heart fluttering and tachycardia and then chest tightness that began earlier today in the presence of a home health care provider and her son.  Her spouse states that the pt began to feel dizzy and had a heart rate of 184 that lasted for six minutes.  After heart rate went down her symptoms resolved.  Her heart rate increase in the 180s when she was signing into the emergency department which then decreased back down into the 90s after about 30 sec.  Pt denies leg pain, rashes, lesions, fever, chills, nausea, or changes to her urinary output and bowel movement.  Pt reports that she currently feels "fluttery and anxious".  Pt has a surgical hx of lobectomy.  Pt has been prescribed to Eliquis, but has not taken her medications in the last two days.  Pt does not drink alcohol, smoke, or use recreational drugs.  No fevers or chills. Patient family states her baseline O2 sat is between 90 93%.    10/17/19  ECHO  · Normal left ventricular systolic function. The estimated ejection fraction is 55%  · Mild eccentric left ventricular hypertrophy.  · No wall motion abnormalities.  · Normal right ventricular systolic function.  · Mild mitral regurgitation.  · Mild to moderate tricuspid regurgitation.  · The estimated PA systolic pressure is 46 mm Hg  · Pulmonary hypertension present.    3/18  Stress test  Impression: NORMAL MYOCARDIAL " PERFUSION  1. The perfusion scan is free of evidence for myocardial ischemia or injury.   2. There is mild apical thinning which is a normal variant.   3. Resting wall motion is physiologic.   4. Resting LV function is normal.   5. The ventricular volumes are normal at rest and stress.   6. The extracardiac distribution of radioactivity is normal.         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 lj- fosamax    Paroxysmal atrial fibrillation 10/21/2019    PSVT (paroxysmal supraventricular tachycardia)     dr. marx    PVC (premature ventricular contraction) 11/10/2012    Rectocele     Status post partial lobectomy of lung 2/2010    Thyroid disease     hypothyroidism    Trochanteric bursitis 4/18/2013    Urine incontinence 2/24/2015    Vitamin D deficiency disease 9/4/2014     Past Surgical History:   Procedure Laterality Date    ADENOIDECTOMY      APPENDECTOMY      CHOLECYSTECTOMY      COLONOSCOPY      CYSTOSCOPY      ESOPHAGOGASTRODUODENOSCOPY N/A 10/23/2019    Procedure: EGD (ESOPHAGOGASTRODUODENOSCOPY);  Surgeon: Archie Humphrey MD;  Location: [facility];  Service: Endoscopy;  Laterality: N/A;    EYE SURGERY Bilateral     HYSTERECTOMY      LUNG LOBECTOMY Right     OOPHORECTOMY      TONSILLECTOMY      UPPER GASTROINTESTINAL ENDOSCOPY        Family History   Problem Relation Age of Onset    Cancer Mother         COLON    Hypertension Mother     Aneurysm Father     Alcohol abuse Brother     Kidney failure Sister     Cancer Brother         throat-smoker     Alcohol abuse Brother     Pacemaker/defibrilator Brother     Birth defects Paternal Grandfather         prostate     Asthma Neg Hx     Emphysema Neg Hx     Amblyopia Neg Hx     Blindness Neg Hx     Cataracts Neg Hx     Diabetes Neg Hx     Glaucoma Neg Hx     Macular degeneration Neg Hx     Retinal detachment Neg Hx     Strabismus Neg Hx     Stroke Neg Hx     Thyroid disease Neg Hx     Cirrhosis Neg Hx      Social History     Tobacco Use    Smoking status: Never Smoker    Smokeless tobacco: Never Used   Substance Use Topics    Alcohol use: No    Drug use: No     Review of Systems   Constitutional: Negative for chills and fever.   HENT: Negative for sore throat.    Respiratory: Positive for chest tightness. Negative for shortness of breath.    Cardiovascular: Negative for chest pain.   Gastrointestinal: Negative for nausea.   Genitourinary: Negative for decreased urine volume, dysuria and urgency.   Musculoskeletal: Negative for back pain and myalgias.   Skin: Negative for rash.   Neurological: Positive for dizziness. Negative for weakness.   Hematological: Does not bruise/bleed easily.   Psychiatric/Behavioral:        (+) anxious   All other systems reviewed and are negative.      Physical Exam     Initial Vitals [11/08/19 1140]   BP Pulse Resp Temp SpO2   133/78 92 18 98.6 °F (37 °C) (!) 91 %      MAP       --         Physical Exam    Nursing note and vitals reviewed.  Constitutional: She appears well-developed and well-nourished.   HENT:   Head: Normocephalic and atraumatic.   Eyes: Conjunctivae and EOM are normal.   Neck: Normal range of motion.   Cardiovascular: Normal rate and regular rhythm.   Pulmonary/Chest: Breath sounds normal. No respiratory distress.    Abdominal: Soft. Bowel sounds are normal.   Musculoskeletal: Normal range of motion. She exhibits no edema or tenderness.   Neurological: She is alert and oriented to person, place, and time. She has normal strength.   Skin: Skin is warm.   Psychiatric: She has a normal mood and affect. Thought content normal.         ED Course   Procedures  Labs Reviewed   B-TYPE NATRIURETIC PEPTIDE - Abnormal; Notable for the following components:       Result Value     (*)     All other components within normal limits   CBC W/ AUTO DIFFERENTIAL - Abnormal; Notable for the following components:    Platelets 144 (*)     Gran% 73.6 (*)     All other components within normal limits   COMPREHENSIVE METABOLIC PANEL - Abnormal; Notable for the following components:    Glucose 140 (*)     Total Bilirubin 1.1 (*)     All other components within normal limits   D DIMER, QUANTITATIVE - Abnormal; Notable for the following components:    D-Dimer 1.14 (*)     All other components within normal limits   PHOSPHORUS - Abnormal; Notable for the following components:    Phosphorus 2.3 (*)     All other components within normal limits   URINALYSIS, REFLEX TO URINE CULTURE - Abnormal; Notable for the following components:    Protein, UA 1+ (*)     Occult Blood UA 1+ (*)     Leukocytes, UA 2+ (*)     All other components within normal limits    Narrative:     Preferred Collection Type->Urine, Clean Catch   URINALYSIS MICROSCOPIC - Abnormal; Notable for the following components:    WBC, UA 6 (*)     Non-Squam Epith 2 (*)     All other components within normal limits    Narrative:     Preferred Collection Type->Urine, Clean Catch   TROPONIN I - Abnormal; Notable for the following components:    Troponin I 0.056 (*)     All other components within normal limits   APTT   MAGNESIUM   TROPONIN I   TSH   PROTIME-INR     EKG Readings: (Independently Interpreted)   EKG done at 11:30 a.m. showing normal sinus rhythm with PVCs with a rate of 98 with  left axis deviation.  No ST elevation.  Normal axis QRS.  .  This is prolonged.  No ST elevation.  Abnormal EKG.  QTC slightly more prolonged than last time.    EKG done 1423 showed normal sinus rhythm first-degree AV block with PVCs with a rate of 75.  LVH.  No ST elevation.  QTC is 480.  QRS 78.  Compared to previous is similar but improved with the QTC.     ECG Results          EKG 12-lead (In process)  Result time 11/08/19 13:30:37    In process by Interface, Lab In [facility] (11/08/19 13:30:37)                 Narrative:    Test Reason : R07.9,    Vent. Rate : 098 BPM     Atrial Rate : 098 BPM     P-R Int : 190 ms          QRS Dur : 078 ms      QT Int : 404 ms       P-R-T Axes : 086 -43 084 degrees     QTc Int : 515 ms    Sinus rhythm with frequent and consecutive Premature ventricular complexes  Left axis deviation  Pulmonary disease pattern  Minimal voltage criteria for LVH, may be normal variant  Prolonged QT  Abnormal ECG  When compared with ECG of 22-OCT-2019 13:07,  Significant changes have occurred    Referred By: [email or web address]   SELF           Confirmed By:                   In process by Interface, Lab In [facility] (11/08/19 13:24:28)                 Narrative:    Test Reason : R07.9,    Vent. Rate : 098 BPM     Atrial Rate : 098 BPM     P-R Int : 190 ms          QRS Dur : 078 ms      QT Int : 404 ms       P-R-T Axes : 086 -43 084 degrees     QTc Int : 515 ms    Sinus rhythm with frequent and consecutive Premature ventricular complexes  Left axis deviation  Pulmonary disease pattern  Minimal voltage criteria for LVH, may be normal variant  Prolonged QT  Abnormal ECG  When compared with ECG of 22-OCT-2019 13:07,  Significant changes have occurred    Referred By: [email or web address]   SELF           Confirmed By:                             Imaging Results          CTA Chest Non-Coronary - PE Study (Final result)  Result time 11/08/19 14:29:43    Final result by Juanita St MD (11/08/19 14:29:43)                  Impression:      There are no findings to suggest pulmonary embolus.      Electronically signed by: Juanita St MD  Date:    11/08/2019  Time:    14:29             Narrative:    EXAMINATION:  CTA CHEST NON CORONARY    CLINICAL HISTORY:  Chest pain, acute, PE suspected, intermed prob, positive D-dimer;    TECHNIQUE:  Low dose axial images, sagittal and coronal reformations were obtained from the thoracic inlet to the lung bases following the IV administration of 100 mL of Omnipaque 350.  Contrast timing was optimized to evaluate the pulmonary arteries.  MIP images were performed.    COMPARISON:  None    FINDINGS:  There are no pulmonary arterial filling defects.  There are degenerative changes of the spine.  The liver has a nodular contour.  There is no pneumothorax, pleural effusion or focal consolidation.  The osseous structures are unremarkable.                               X-Ray Chest AP Portable (Final result)  Result time 11/08/19 12:47:30    Final result by Cheikh Branham MD (11/08/19 12:47:30)                 Impression:      1. No acute cardiopulmonary process appreciated.      Electronically signed by: Cheikh Branham  Date:    11/08/2019  Time:    12:47             Narrative:    EXAMINATION:  XR CHEST AP PORTABLE    CLINICAL HISTORY:  chest pain;    TECHNIQUE:  Single frontal portable view of the chest was performed.    COMPARISON:  Chest radiograph 10/18/2019    FINDINGS:  Cardiomediastinal silhouette is within normal limits.    Postoperative changes compatible with right-sided lobectomy associated with persistent elevation of the right hemidiaphragm with overlying compressive atelectasis.  Otherwise, no focal consolidation, overt interstitial edema, sizable pleural effusion or pneumothorax.    Multilevel degenerative changes of the imaged spine.                                 Medical Decision Making:   Initial Assessment:   89-year-old female coming into what sounds like proximal atrial  fibrillation.  Unfortunately I was unable to actually get a rhythm strip for evaluate the patient when she went in to likely AFib with RVR.  She is back in normal sinus.  Will be getting labs look for any major electrolyte abnormalities, infections, end-organ damage, pneumonia, pulmonary edema.  Due to her tightness will get 2 troponins see if she will increase her troponin with demand ischemia.  Also D-dimer ordered on the patient.  Less likely consistent with dissection, esophageal rupture, pneumothorax, pneumonia.  EKG shows prolonged QTC.    Labs reassuring other than elevated D-dimer.  Get a CTA ordered on the patient.  Patient not have any complaints.    Repeat EKG improved.    CTA is negative on the patient.  Repeat troponin is elevated slightly.  This is likely demand ischemia.  Patient was previously given metoprolol and Eliquis on arrival to the emergency department.  Pulses in the 70s.  No AFib since getting here.  Will admit for observation to have trending troponins and cardiology evaluation.  Elder Gates    Clinical Tests:   Lab Tests: Ordered and Reviewed  Radiological Study: Reviewed and Ordered  Medical Tests: Ordered and Reviewed            Scribe Attestation:   Scribe #1: I performed the above scribed service and the documentation accurately describes the services I performed. I attest to the accuracy of the note.                          Clinical Impression:       ICD-10-CM ICD-9-CM   1. Tachycardia R00.0 785.0   2. Chest pain R07.9 786.50   3. A-fib I48.91 427.31   4. Elevated troponin R79.89 790.6            Scribe attestation: I, Elder Gates, personally performed the services described in this documentation. All medical record entries made by the scribe were at my direction and in my presence.  I have reviewed the chart and agree that the record reflects my personal performance and is accurate and complete.           Elder Gates MD  11/08/19 [id number]

## 2019-11-08 NOTE — NURSING
Received patient from ER to room via stretcher. Patient accompanied by transport and son and daughterInLaw. Pt stood and took a few steps to hospital bed, standby assist. Evaluated general patient appearance and condition. Admit assessment initiated. Tele monitoring initiated. Saline lock at RAC Intact. No apparent distress noted at this time.

## 2019-11-08 NOTE — HPI
"89 year old female with a PMHx of Atrial Fibrilllation who presents to the Emergency Department with a cc of heart fluttering and tachycardia and then chest tightness that began earlier today in the presence of a home health care provider and her son.  Her spouse states that the pt began to feel dizzy and had a heart rate of 184 that lasted for six minutes.  After heart rate went down her symptoms resolved.  Her heart rate increase in the 180s when she was signing into the emergency department which then decreased back down into the 90s after about 30 sec.  Pt denies leg pain, rashes, lesions, fever, chills, nausea, or changes to her urinary output and bowel movement.  Pt reports that she currently feels "fluttery and anxious".  Pt has a surgical hx of lobectomy.  Pt has been prescribed to Eliquis, but has not taken her medications in the last two days.  Pt does not drink alcohol, smoke, or use recreational drugs.  No fevers or chills. Patient family states her baseline O2 sat is between 90-93%.    Patient follows with Dr. Munguia, last seen 2 days ago.  Apparently, she has not been taking any of her medications for the past several days.    The patient was working with physical therapy at home today and noted some palpitations.  She presented self to emergency room.  She did have recurrent palpitations.  She denies any anginal symptoms.  She did feel little weak.  She admits to having missed several of her doses of medications over the past several days.  She is currently feeling fine.  Note is made of a minimally elevated troponin.  "

## 2019-11-08 NOTE — CONSULTS
"[facility]  Cardiology  Consult Note    Patient Name: Sandra Moody  MRN: [id number]  Admission Date: 11/8/2019  Hospital Length of Stay: 0 days  Code Status: Full Code   Attending Provider: Rahel Avery MD   Consulting Provider: Shayne Bridges MD  Primary Care Physician: Rox Montes MD  Principal Problem:Paroxysmal atrial fibrillation    Patient information was obtained from patient and ER records.     Inpatient consult to Cardiology  Consult performed by: Shayne Bridges MD  Consult ordered by: Rafi Tompkins Jr., NP  Reason for consult: elev trop        Subjective:     Chief Complaint:  palps     HPI:   89 year old female with a PMHx of Atrial Fibrilllation who presents to the Emergency Department with a cc of heart fluttering and tachycardia and then chest tightness that began earlier today in the presence of a home health care provider and her son.  Her spouse states that the pt began to feel dizzy and had a heart rate of 184 that lasted for six minutes.  After heart rate went down her symptoms resolved.  Her heart rate increase in the 180s when she was signing into the emergency department which then decreased back down into the 90s after about 30 sec.  Pt denies leg pain, rashes, lesions, fever, chills, nausea, or changes to her urinary output and bowel movement.  Pt reports that she currently feels "fluttery and anxious".  Pt has a surgical hx of lobectomy.  Pt has been prescribed to Eliquis, but has not taken her medications in the last two days.  Pt does not drink alcohol, smoke, or use recreational drugs.  No fevers or chills. Patient family states her baseline O2 sat is between 90-93%.    Patient follows with Dr. Munguia, last seen 2 days ago.  Apparently, she has not been taking any of her medications for the past several days.    The patient was working with physical therapy at home today and noted some palpitations.  She presented self to emergency room.  " She did have recurrent palpitations.  She denies any anginal symptoms.  She did feel little weak.  She admits to having missed several of her doses of medications over the past several days.  She is currently feeling fine.  Note is made of a minimally elevated troponin.    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 lj- fosamax    Paroxysmal atrial fibrillation 10/21/2019    PSVT (paroxysmal supraventricular tachycardia)     dr. marx    PVC (premature ventricular contraction) 11/10/2012    Rectocele     Short-term memory loss     Status post partial lobectomy of lung 2/2010    Thyroid disease     hypothyroidism    Trochanteric bursitis 4/18/2013    Urine incontinence 2/24/2015    Vitamin D deficiency disease 9/4/2014       Past Surgical History:   Procedure Laterality Date    ADENOIDECTOMY      APPENDECTOMY      CHOLECYSTECTOMY      COLONOSCOPY      CYSTOSCOPY      ESOPHAGOGASTRODUODENOSCOPY N/A 10/23/2019    Procedure: EGD (ESOPHAGOGASTRODUODENOSCOPY);  Surgeon: Archie Humphrey MD;  Location: [facility];  Service: Endoscopy;  Laterality: N/A;    EYE SURGERY Bilateral     HYSTERECTOMY      LUNG LOBECTOMY Right     OOPHORECTOMY      TONSILLECTOMY      UPPER GASTROINTESTINAL ENDOSCOPY         Review of patient's allergies indicates:  No Known Allergies    No current  facility-administered medications on file prior to encounter.      Current Outpatient Medications on File Prior to Encounter   Medication Sig    albuterol (PROVENTIL HFA) 90 mcg/actuation inhaler Inhale 2 puffs into the lungs every 6 (six) hours as needed (ANY equivalent covered is OK).    alendronate (FOSAMAX) 70 MG tablet Take 1 tablet (70 mg total) by mouth every 7 days.    apixaban (ELIQUIS) 5 mg Tab Take 1 tablet (5 mg total) by mouth 2 (two) times daily.    estradiol (ESTRACE) 0.01 % (0.1 mg/g) vaginal cream Pea-size dab of cream to urethra/introitus at bed time.    levothyroxine (SYNTHROID) 25 MCG tablet TAKE 1 TABLET EVERY DAY    melatonin 10 mg Tab Take by mouth once daily.     metoprolol succinate (TOPROL-XL) 50 MG 24 hr tablet Take 3 tablets (150 mg total) by mouth once daily.    nystatin (MYCOSTATIN) cream Apply topically 2 (two) times daily.    ondansetron (ZOFRAN) 4 MG tablet Take 1 tablet (4 mg total) by mouth every 6 (six) hours as needed.    oxybutynin (DITROPAN-XL) 10 MG 24 hr tablet TAKE 1 TABLET ONE TIME DAILY    sertraline (ZOLOFT) 25 MG tablet TAKE 1 TABLET BY MOUTH EVERY DAY    temazepam (RESTORIL) 30 mg capsule Take 1 capsule (30 mg total) by mouth nightly as needed for Insomnia.    triamcinolone acetonide 0.5% (KENALOG) 0.5 % Crea Apply topically 2 (two) times daily.     Family History     Problem Relation (Age of Onset)    Alcohol abuse Brother, Brother    Aneurysm Father    Birth defects Paternal Grandfather    Cancer Mother, Brother    Hypertension Mother    Kidney failure Sister    Pacemaker/defibrilator Brother        Tobacco Use    Smoking status: Never Smoker    Smokeless tobacco: Never Used   Substance and Sexual Activity    Alcohol use: No    Drug use: No    Sexual activity: Never     ROS  Objective:     Vital Signs (Most Recent):  Temp: 98.2 °F (36.8 °C) (11/08/19 1619)  Pulse: 70 (11/08/19 1603)  Resp: (!) 31 (11/08/19 1603)  BP: (!) 161/69 (11/08/19 1603)  SpO2:  (!) 93 % (11/08/19 1603) Vital Signs (24h Range):  Temp:  [98.2 °F (36.8 °C)-98.6 °F (37 °C)] 98.2 °F (36.8 °C)  Pulse:  [70-95] 70  Resp:  [18-33] 31  SpO2:  [91 %-95 %] 93 %  BP: (129-170)/(69-78) 161/69     Weight: 64.8 kg (142 lb 13.7 oz)  Body mass index is 25.31 kg/m².    SpO2: (!) 93 %  O2 Device (Oxygen Therapy): nasal cannula    No intake or output data in the 24 hours ending 11/08/19 1718    Lines/Drains/Airways     Peripheral Intravenous Line                 Peripheral IV - Single Lumen 11/08/19 1200 20 G Right Forearm less than 1 day                Physical Exam    Current Medications:   apixaban  5 mg Oral BID    [START ON 11/9/2019] levothyroxine  25 mcg Oral Daily    [START ON 11/9/2019] metoprolol succinate  150 mg Oral Daily    [START ON 11/9/2019] oxybutynin  10 mg Oral Daily    [START ON 11/9/2019] sertraline  25 mg Oral Daily       ramelteon, sodium chloride 0.9%    Laboratory (all labs reviewed):  CBC:  Recent Labs   Lab 10/17/19  0240 10/18/19  0540 10/19/19  0444 10/22/19  0435 11/08/19  1200   WBC 3.81 L 3.89 L 4.38 4.07 8.51   Hemoglobin 12.7 12.3 13.6 13.9 15.5   Hematocrit 38.6 38.0 42.4 43.7 48.2   Platelets 47 L 57 L 81 L 160 144 L       CHEMISTRIES:  Recent Labs   Lab 10/16/19  0216 10/17/19  0240 10/18/19  0540 10/19/19  0444 10/21/19  0608 10/22/19  0435 11/08/19  1200   Glucose 97 128 H 106 140 H 117 H 162 H 140 H   Sodium 140 137 138 139 143 140 142   Potassium 4.2 3.1 L 3.7 3.2 L 4.5 4.4 3.8   BUN, Bld 11 8 8 11 7 L 8 17   Creatinine 0.7 0.5 0.5 0.6 0.6 0.7 0.7   eGFR if  >60 >60 >60 >60 >60 >60 >60   eGFR if non African American >60 >60 >60 >60 >60 >60 >60   Calcium 7.6 L 7.8 L 7.9 L 8.2 L 8.5 L 9.2 9.5   Magnesium 2.0 1.8  --   --   --   --  1.9       CARDIAC BIOMARKERS:  Recent Labs   Lab 10/14/19  2348 10/15/19  0527 11/08/19  1200 11/08/19  1405 11/08/19  1618   Troponin I 0.015 0.029 H 0.023 0.056 H 0.067 H       COAGS:  Recent Labs   Lab 10/08/18  0760  04/02/19  0738 08/29/19  0756 10/22/19  0435 11/08/19  1200   INR 1.0 1.0 1.0 1.1 1.0       LIPIDS/LFTS:  Recent Labs   Lab 09/07/17  0731  08/29/19  0756 10/14/19  1640 10/21/19  0608 10/22/19  0435 11/08/19  1200   Cholesterol 201 H  --   --   --   --   --   --    Triglycerides 198 H  --   --   --   --   --   --    HDL 48  --   --   --   --   --   --    LDL Cholesterol 113.4  --   --   --   --   --   --    Non-HDL Cholesterol 153  --   --   --   --   --   --    AST 13   < > 15 16 32 29 17   ALT 13   < > 13 16 37 41 16    < > = values in this interval not displayed.       BNP:  Recent Labs   Lab 10/14/19  1640 11/08/19  1200   BNP 90 124 H       TSH:  Recent Labs   Lab 09/07/17  0731 03/13/19  1002 11/08/19  1200   TSH 3.131 1.979 2.379       Free T4:  Recent Labs   Lab 09/07/17  0731 03/13/19  1002   Free T4 0.88 0.94       Diagnostic Results:  ECG (personally reviewed and interpreted tracing(s)):  11/8/19 1130 SR 98, PRWP, PACs, PVCs    Chest X-Ray (personally reviewed and interpreted image(s)): 11/8/19 NAD, elev diaph R>L    CTA Chest 11/8/19  There are no findings to suggest pulmonary embolus.    Echo: 10/17/19  · Normal left ventricular systolic function. The estimated ejection fraction is 55%  · Mild eccentric left ventricular hypertrophy.  · No wall motion abnormalities.  · Normal right ventricular systolic function.  · Mild mitral regurgitation.  · Mild to moderate tricuspid regurgitation.  · The estimated PA systolic pressure is 46 mm Hg  · Pulmonary hypertension present.    Stress Test: L MPI 3/16/17  Nuclear Quantitative Functional Analysis:   LVEF: 63 %  Impression: NORMAL MYOCARDIAL PERFUSION  1. The perfusion scan is free of evidence for myocardial ischemia or injury.   2. There is mild apical thinning which is a normal variant.   3. Resting wall motion is physiologic.   4. Resting LV function is normal.   5. The ventricular volumes are normal at rest and stress.   6. The extracardiac distribution of  radioactivity is normal.           Assessment and Plan:     * Paroxysmal atrial fibrillation  Pt presented with palps in setting of med nonadherence.  Minimal trop elev without ischemic sxs.  Recent echo normal  EKGs note NSR without isch changes  Doubt ACS, more likely demand  No plan for inpat stress test  Suggest continuing OAC and BBL without outpat follow in Dr. Munguia's office.    Elevated troponin  As per PAF section    Essential hypertension  Cont med rx        VTE Risk Mitigation (From admission, onward)         Ordered     apixaban tablet 5 mg  2 times daily      11/08/19 1638     IP VTE HIGH RISK PATIENT  Once      11/08/19 1638     Place sequential compression device  Until discontinued      11/08/19 1638     Place ELIE hose  Until discontinued      11/08/19 1638              No further inpatient cardiac testing planned.  Please schedule f/u OV with Dr. Munguia.    Thank you for your consult. I will sign off. Please contact us if you have any additional questions.    Shayne Bridges MD  Cardiology   [facility]

## 2019-11-08 NOTE — ED TRIAGE NOTES
Patient reports to ED with c/o tachycardia, shortness of breath, and dizziness. Family states PT/OT, home health nurse was present and was about to start working on patient. Patient experienced midsternal chest pain, dizziness, SOB at rest; family states he placed pulse ox on and pt's heart rate was 170-180's sustained for approximately 6 minutes. Patient does have hx of A.Fib and is currently on eliquis but has not taken for 2 days. Patient with audible wheezing noted on arrival to ED; had right upper lobectomy and normal saturations 90-92%. Pt moderately tachypneic and placed on 2L NC; sats increased to 96%. Pt noted to be on NSR.

## 2019-11-08 NOTE — SUBJECTIVE & OBJECTIVE
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 lj- fosamax    Paroxysmal atrial fibrillation 10/21/2019    PSVT (paroxysmal supraventricular tachycardia)     dr. marx    PVC (premature ventricular contraction) 11/10/2012    Rectocele     Short-term memory loss     Status post partial lobectomy of lung 2/2010    Thyroid disease     hypothyroidism    Trochanteric bursitis 4/18/2013    Urine incontinence 2/24/2015    Vitamin D deficiency disease 9/4/2014       Past Surgical History:   Procedure Laterality Date    ADENOIDECTOMY      APPENDECTOMY      CHOLECYSTECTOMY      COLONOSCOPY      CYSTOSCOPY      ESOPHAGOGASTRODUODENOSCOPY N/A 10/23/2019    Procedure: EGD (ESOPHAGOGASTRODUODENOSCOPY);  Surgeon: Archie Humphrey MD;  [facility];  Service: Endoscopy;  Laterality: N/A;    EYE SURGERY Bilateral     HYSTERECTOMY      LUNG LOBECTOMY Right     OOPHORECTOMY      TONSILLECTOMY      UPPER GASTROINTESTINAL ENDOSCOPY         Review of patient's allergies indicates:  No Known Allergies    No current facility-administered medications on file prior to encounter.      Current Outpatient Medications on File Prior to Encounter   Medication Sig    albuterol (PROVENTIL HFA) 90 mcg/actuation inhaler Inhale 2 puffs into the lungs every 6 (six) hours as needed (ANY equivalent covered is  OK).    alendronate (FOSAMAX) 70 MG tablet Take 1 tablet (70 mg total) by mouth every 7 days.    apixaban (ELIQUIS) 5 mg Tab Take 1 tablet (5 mg total) by mouth 2 (two) times daily.    estradiol (ESTRACE) 0.01 % (0.1 mg/g) vaginal cream Pea-size dab of cream to urethra/introitus at bed time.    levothyroxine (SYNTHROID) 25 MCG tablet TAKE 1 TABLET EVERY DAY    melatonin 10 mg Tab Take by mouth once daily.     metoprolol succinate (TOPROL-XL) 50 MG 24 hr tablet Take 3 tablets (150 mg total) by mouth once daily.    nystatin (MYCOSTATIN) cream Apply topically 2 (two) times daily.    ondansetron (ZOFRAN) 4 MG tablet Take 1 tablet (4 mg total) by mouth every 6 (six) hours as needed.    oxybutynin (DITROPAN-XL) 10 MG 24 hr tablet TAKE 1 TABLET ONE TIME DAILY    sertraline (ZOLOFT) 25 MG tablet TAKE 1 TABLET BY MOUTH EVERY DAY    temazepam (RESTORIL) 30 mg capsule Take 1 capsule (30 mg total) by mouth nightly as needed for Insomnia.    triamcinolone acetonide 0.5% (KENALOG) 0.5 % Crea Apply topically 2 (two) times daily.     Family History     Problem Relation (Age of Onset)    Alcohol abuse Brother, Brother    Aneurysm Father    Birth defects Paternal Grandfather    Cancer Mother, Brother    Hypertension Mother    Kidney failure Sister    Pacemaker/defibrilator Brother        Tobacco Use    Smoking status: Never Smoker    Smokeless tobacco: Never Used   Substance and Sexual Activity    Alcohol use: No    Drug use: No    Sexual activity: Never     ROS  Objective:     Vital Signs (Most Recent):  Temp: 98.2 °F (36.8 °C) (11/08/19 1619)  Pulse: 70 (11/08/19 1603)  Resp: (!) 31 (11/08/19 1603)  BP: (!) 161/69 (11/08/19 1603)  SpO2: (!) 93 % (11/08/19 1603) Vital Signs (24h Range):  Temp:  [98.2 °F (36.8 °C)-98.6 °F (37 °C)] 98.2 °F (36.8 °C)  Pulse:  [70-95] 70  Resp:  [18-33] 31  SpO2:  [91 %-95 %] 93 %  BP: (129-170)/(69-78) 161/69     Weight: 64.8 kg (142 lb 13.7 oz)  Body mass index is 25.31  kg/m².    SpO2: (!) 93 %  O2 Device (Oxygen Therapy): nasal cannula    No intake or output data in the 24 hours ending 11/08/19 1718    Lines/Drains/Airways     Peripheral Intravenous Line                 Peripheral IV - Single Lumen 11/08/19 1200 20 G Right Forearm less than 1 day                Physical Exam    Current Medications:   apixaban  5 mg Oral BID    [START ON 11/9/2019] levothyroxine  25 mcg Oral Daily    [START ON 11/9/2019] metoprolol succinate  150 mg Oral Daily    [START ON 11/9/2019] oxybutynin  10 mg Oral Daily    [START ON 11/9/2019] sertraline  25 mg Oral Daily       ramelteon, sodium chloride 0.9%    Laboratory (all labs reviewed):  CBC:  Recent Labs   Lab 10/17/19  0240 10/18/19  0540 10/19/19  0444 10/22/19  0435 11/08/19  1200   WBC 3.81 L 3.89 L 4.38 4.07 8.51   Hemoglobin 12.7 12.3 13.6 13.9 15.5   Hematocrit 38.6 38.0 42.4 43.7 48.2   Platelets 47 L 57 L 81 L 160 144 L       CHEMISTRIES:  Recent Labs   Lab 10/16/19  0216 10/17/19  0240 10/18/19  0540 10/19/19  0444 10/21/19  0608 10/22/19  0435 11/08/19  1200   Glucose 97 128 H 106 140 H 117 H 162 H 140 H   Sodium 140 137 138 139 143 140 142   Potassium 4.2 3.1 L 3.7 3.2 L 4.5 4.4 3.8   BUN, Bld 11 8 8 11 7 L 8 17   Creatinine 0.7 0.5 0.5 0.6 0.6 0.7 0.7   eGFR if  >60 >60 >60 >60 >60 >60 >60   eGFR if non African American >60 >60 >60 >60 >60 >60 >60   Calcium 7.6 L 7.8 L 7.9 L 8.2 L 8.5 L 9.2 9.5   Magnesium 2.0 1.8  --   --   --   --  1.9       CARDIAC BIOMARKERS:  Recent Labs   Lab 10/14/19  2348 10/15/19  0527 11/08/19  1200 11/08/19  1405 11/08/19  1618   Troponin I 0.015 0.029 H 0.023 0.056 H 0.067 H       COAGS:  Recent Labs   Lab 10/08/18  0925 04/02/19  0738 08/29/19  0756 10/22/19  0435 11/08/19  1200   INR 1.0 1.0 1.0 1.1 1.0       LIPIDS/LFTS:  Recent Labs   Lab 09/07/17  0731  08/29/19  0756 10/14/19  1640 10/21/19  0608 10/22/19  0435 11/08/19  1200   Cholesterol 201 H  --   --   --   --   --   --     Triglycerides 198 H  --   --   --   --   --   --    HDL 48  --   --   --   --   --   --    LDL Cholesterol 113.4  --   --   --   --   --   --    Non-HDL Cholesterol 153  --   --   --   --   --   --    AST 13   < > 15 16 32 29 17   ALT 13   < > 13 16 37 41 16    < > = values in this interval not displayed.       BNP:  Recent Labs   Lab 10/14/19  1640 11/08/19  1200   BNP 90 124 H       TSH:  Recent Labs   Lab 09/07/17  0731 03/13/19  1002 11/08/19  1200   TSH 3.131 1.979 2.379       Free T4:  Recent Labs   Lab 09/07/17  0731 03/13/19  1002   Free T4 0.88 0.94       Diagnostic Results:  ECG (personally reviewed and interpreted tracing(s)):  11/8/19 1130 SR 98, PRWP, PACs, PVCs    Chest X-Ray (personally reviewed and interpreted image(s)): 11/8/19 NAD, elev diaph R>L    CTA Chest 11/8/19  There are no findings to suggest pulmonary embolus.    Echo: 10/17/19  · Normal left ventricular systolic function. The estimated ejection fraction is 55%  · Mild eccentric left ventricular hypertrophy.  · No wall motion abnormalities.  · Normal right ventricular systolic function.  · Mild mitral regurgitation.  · Mild to moderate tricuspid regurgitation.  · The estimated PA systolic pressure is 46 mm Hg  · Pulmonary hypertension present.    Stress Test: L MPI 3/16/17  Nuclear Quantitative Functional Analysis:   LVEF: 63 %  Impression: NORMAL MYOCARDIAL PERFUSION  1. The perfusion scan is free of evidence for myocardial ischemia or injury.   2. There is mild apical thinning which is a normal variant.   3. Resting wall motion is physiologic.   4. Resting LV function is normal.   5. The ventricular volumes are normal at rest and stress.   6. The extracardiac distribution of radioactivity is normal.

## 2019-11-08 NOTE — TELEPHONE ENCOUNTER
Reason for Disposition   Difficulty breathing    Additional Information   Negative: Passed out (i.e., fainted, collapsed and was not responding)   Negative: Shock suspected (e.g., cold/pale/clammy skin, too weak to stand, low BP, rapid pulse)   Negative: Difficult to awaken or acting confused (e.g., disoriented, slurred speech)   Negative: Visible sweat on face or sweat dripping down face   Negative: Unable to walk, or can only walk with assistance (e.g., requires support)   Negative: Received SHOCK from implantable cardiac defibrillator and has persisting symptoms (i.e., palpitations, lightheadedness)   Negative: Sounds like a life-threatening emergency to the triager    Protocols used: HEART RATE AND HEARTBEAT [id number]    Caregiver called stated the home health nurse and occupational therapist were with patient when she all of a sudden started to c/o dizziness. Heart 178 and 181. Pt also has sob. Care advice recommends pt go to Er now.

## 2019-11-09 NOTE — NURSING
Discharge instructions given to patient and son and daughterInLaw at bedside. Patient verbalized understanding of instructions. Patient states willingness to comply. Saline lock removed. Tele monitoring removed.

## 2019-11-09 NOTE — ASSESSMENT & PLAN NOTE
Palpitations and tachycardia likely related to missing her medications.  Currently NSR and asymptomatic.  Cardiology consult, appreciate recs.  Continue metoprolol and apixaban.

## 2019-11-09 NOTE — DISCHARGE SUMMARY
[facility] Hospital Medicine  Discharge Summary      Patient Name: Sandra Moody  MRN: [id number]  Admission Date: 11/8/2019  Hospital Length of Stay: 0 days  Discharge Date and Time: 11/8/2019  7:03 PM  Attending Physician: Rahel Avery MD  Discharging Provider: Rafi Tompkins Jr, NP  Primary Care Provider: Rox Montes MD      HPI:   89 y.o. female with paroxysmal AFib, hypertension CKD stage 2, GERD, insomnia, hypothyroidism, osteopenia, vitamin-D deficiency, cryptogenic cirrhosis, portal hypertension, thrombocytopenia, anxiety and depression, chronic right shoulder pain, and history of lung cancer presents with a complaint palpitations.  Described as my heart beating really fast, onset while working with physical therapy at home today, associated lightheadedness.  Heart rate measured at home while symptomatic was 184.  Heart rate returned to normal without intervention symptoms resolved.  She reports not taking her beta-blocker this morning.  Denies fever, chills, cough, SOB, syncope, orthopnea, PND, nausea, vomiting, diarrhea, bloody or black stool, dysuria, frequency, or urgency.  In the ED, EKG NSR without evidence of acute ischemia, initial troponin negative.  Repeat troponin mildly elevated at 0.05.  Recent echo normal.  Currently feels well without symptom or complaint.    * No surgery found *      Hospital Course:   Ms. Moody was placed in observation after presenting with a short episode of palpitations at home likely secondary to not taking her metoprolol.  She has known paroxysmal AFib and has missed some doses of her metoprolol.  In the ED, EKG showed normal sinus rhythm without any evidence of acute ischemia, initial troponin negative.  Repeat troponin mildly elevated at 0.05.  She was seen and evaluated by Cardiology.  Troponin elevation likely secondary to demand demand.  Patient feels well and is asymptomatic.  Medication adherence encouraged.  All  findings and plan discussed with patient, all questions answered, she verbalizes understanding and agreement.  Discharged home in stable condition to follow up with Cardiology.     Consults:   Consults (From admission, onward)        Status Ordering Provider     Inpatient consult to Cardiology  Once     Provider:  Shayne Bridges MD    Completed GRETCHEN BERMUDEZ JR     IP consult to case management  Once     Provider:  (Not yet assigned)    Acknowledged JASWANT EATON          No new Assessment & Plan notes have been filed under this hospital service since the last note was generated.  Service: Hospital Medicine    Final Active Diagnoses:    Diagnosis Date Noted POA    PRINCIPAL PROBLEM:  Paroxysmal atrial fibrillation [I48.0] 10/21/2019 Yes    Elevated troponin [R79.89] 11/08/2019 Yes    Essential hypertension [I10]  Yes      Problems Resolved During this Admission:       Discharged Condition: stable    Disposition: Home or Self Care    Follow Up:  Follow-up Information     Schedule an appointment as soon as possible for a visit with Cameron Munguia MD.    Specialties:  Cardiology, INTERVENTIONAL CARDIOLOGY  Why:  Call the office for appointment  Contact information:  [address]  [address]  [place] [id number]  [phone]                 Patient Instructions:      Diet Cardiac     Notify your health care provider if you experience any of the following:  temperature >100.4     Notify your health care provider if you experience any of the following:  persistent nausea and vomiting or diarrhea     Notify your health care provider if you experience any of the following:  severe uncontrolled pain     Notify your health care provider if you experience any of the following:  difficulty breathing or increased cough     Notify your health care provider if you experience any of the following:  severe persistent headache     Notify your health care provider if you experience any of the following:  persistent  dizziness, light-headedness, or visual disturbances     Notify your health care provider if you experience any of the following:  increased confusion or weakness     Activity as tolerated       Significant Diagnostic Studies: Labs:   CMP   Recent Labs   Lab 11/08/19  1200      K 3.8      CO2 25   *   BUN 17   CREATININE 0.7   CALCIUM 9.5   PROT 7.4   ALBUMIN 4.1   BILITOT 1.1*   ALKPHOS 62   AST 17   ALT 16   ANIONGAP 11   ESTGFRAFRICA >60   EGFRNONAA >60   , CBC   Recent Labs   Lab 11/08/19  1200   WBC 8.51   HGB 15.5   HCT 48.2   *    and Troponin   Recent Labs   Lab 11/08/19  1618   TROPONINI 0.067*       Pending Diagnostic Studies:     None         Medications:  Reconciled Home Medications:      Medication List      CONTINUE taking these medications    albuterol 90 mcg/actuation inhaler  Commonly known as:  PROVENTIL HFA  Inhale 2 puffs into the lungs every 6 (six) hours as needed (ANY equivalent covered is OK).     alendronate 70 MG tablet  Commonly known as:  FOSAMAX  Take 1 tablet (70 mg total) by mouth every 7 days.     apixaban 5 mg Tab  Commonly known as:  ELIQUIS  Take 1 tablet (5 mg total) by mouth 2 (two) times daily.     estradiol 0.01 % (0.1 mg/gram) vaginal cream  Commonly known as:  ESTRACE  Pea-size dab of cream to urethra/introitus at bed time.     levothyroxine 25 MCG tablet  Commonly known as:  SYNTHROID  TAKE 1 TABLET EVERY DAY     melatonin 10 mg Tab  Take by mouth once daily.     metoprolol succinate 50 MG 24 hr tablet  Commonly known as:  TOPROL-XL  Take 3 tablets (150 mg total) by mouth once daily.     nystatin cream  Commonly known as:  MYCOSTATIN  Apply topically 2 (two) times daily.     ondansetron 4 MG tablet  Commonly known as:  ZOFRAN  Take 1 tablet (4 mg total) by mouth every 6 (six) hours as needed.     oxybutynin 10 MG 24 hr tablet  Commonly known as:  DITROPAN-XL  TAKE 1 TABLET ONE TIME DAILY     sertraline 25 MG tablet  Commonly known as:  ZOLOFT  TAKE 1  TABLET BY MOUTH EVERY DAY     temazepam 30 mg capsule  Commonly known as:  RESTORIL  Take 1 capsule (30 mg total) by mouth nightly as needed for Insomnia.     triamcinolone acetonide 0.5% 0.5 % Crea  Commonly known as:  KENALOG  Apply topically 2 (two) times daily.            Indwelling Lines/Drains at time of discharge:   Lines/Drains/Airways     None                 Time spent on the discharge of patient: 35 minutes  Patient was seen and examined on the date of discharge and determined to be suitable for discharge.         Rafi Tompkins Jr, NP  Department of Hospital Medicine  [facility]

## 2019-11-09 NOTE — NURSING
Patient escorted by transport to family vehicle for discharge home. Patient accompanied by son and DaughterInLaw. No apparent distress noted.

## 2019-11-09 NOTE — HOSPITAL COURSE
Ms. Moody was placed in observation after presenting with a short episode of palpitations at home likely secondary to not taking her metoprolol.  She has known paroxysmal AFib and has missed some doses of her metoprolol.  In the ED, EKG showed normal sinus rhythm without any evidence of acute ischemia, initial troponin negative.  Repeat troponin mildly elevated at 0.05.  She was seen and evaluated by Cardiology.  Troponin elevation likely secondary to demand demand.  Patient feels well and is asymptomatic.  Medication adherence encouraged.  All findings and plan discussed with patient, all questions answered, she verbalizes understanding and agreement.  Discharged home in stable condition to follow up with Cardiology.

## 2019-11-09 NOTE — SUBJECTIVE & OBJECTIVE
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 lj- fosamax    Paroxysmal atrial fibrillation 10/21/2019    PSVT (paroxysmal supraventricular tachycardia)     dr. marx    PVC (premature ventricular contraction) 11/10/2012    Rectocele     Short-term memory loss     Status post partial lobectomy of lung 2/2010    Thyroid disease     hypothyroidism    Trochanteric bursitis 4/18/2013    Urine incontinence 2/24/2015    Vitamin D deficiency disease 9/4/2014       Past Surgical History:   Procedure Laterality Date    ADENOIDECTOMY      APPENDECTOMY      CHOLECYSTECTOMY      COLONOSCOPY      CYSTOSCOPY      ESOPHAGOGASTRODUODENOSCOPY N/A 10/23/2019    Procedure: EGD (ESOPHAGOGASTRODUODENOSCOPY);  Surgeon: Archie Humphrey MD;  Location: [facility];  Service: Endoscopy;  Laterality: N/A;    EYE SURGERY Bilateral     HYSTERECTOMY      LUNG LOBECTOMY Right     OOPHORECTOMY      TONSILLECTOMY      UPPER GASTROINTESTINAL ENDOSCOPY         Review of patient's allergies indicates:  No Known Allergies    No current facility-administered medications on file prior to encounter.      Current Outpatient Medications on File Prior to Encounter   Medication Sig    albuterol (PROVENTIL HFA) 90 mcg/actuation inhaler Inhale 2 puffs into the lungs every 6 (six) hours as needed (ANY equivalent covered is  OK).    alendronate (FOSAMAX) 70 MG tablet Take 1 tablet (70 mg total) by mouth every 7 days.    apixaban (ELIQUIS) 5 mg Tab Take 1 tablet (5 mg total) by mouth 2 (two) times daily.    estradiol (ESTRACE) 0.01 % (0.1 mg/g) vaginal cream Pea-size dab of cream to urethra/introitus at bed time.    levothyroxine (SYNTHROID) 25 MCG tablet TAKE 1 TABLET EVERY DAY    melatonin 10 mg Tab Take by mouth once daily.     metoprolol succinate (TOPROL-XL) 50 MG 24 hr tablet Take 3 tablets (150 mg total) by mouth once daily.    nystatin (MYCOSTATIN) cream Apply topically 2 (two) times daily.    ondansetron (ZOFRAN) 4 MG tablet Take 1 tablet (4 mg total) by mouth every 6 (six) hours as needed.    oxybutynin (DITROPAN-XL) 10 MG 24 hr tablet TAKE 1 TABLET ONE TIME DAILY    sertraline (ZOLOFT) 25 MG tablet TAKE 1 TABLET BY MOUTH EVERY DAY    temazepam (RESTORIL) 30 mg capsule Take 1 capsule (30 mg total) by mouth nightly as needed for Insomnia.    triamcinolone acetonide 0.5% (KENALOG) 0.5 % Crea Apply topically 2 (two) times daily.     Family History     Problem Relation (Age of Onset)    Alcohol abuse Brother, Brother    Aneurysm Father    Birth defects Paternal Grandfather    Cancer Mother, Brother    Hypertension Mother    Kidney failure Sister    Pacemaker/defibrilator Brother        Tobacco Use    Smoking status: Never Smoker    Smokeless tobacco: Never Used   Substance and Sexual Activity    Alcohol use: No    Drug use: No    Sexual activity: Never     Review of Systems   Constitutional: Negative for chills, fatigue and fever.   Eyes: Negative for photophobia and visual disturbance.   Respiratory: Negative for cough and shortness of breath.    Cardiovascular: Positive for palpitations. Negative for chest pain and leg swelling.   Gastrointestinal: Negative for abdominal pain, diarrhea, nausea and vomiting.   Genitourinary: Negative for dysuria, frequency and urgency.   Skin: Negative for pallor, rash and wound.    Neurological: Positive for light-headedness. Negative for headaches.   Psychiatric/Behavioral: Negative for confusion and decreased concentration.     Objective:     Vital Signs (Most Recent):  Temp: 98.2 °F (36.8 °C) (11/08/19 1619)  Pulse: 70 (11/08/19 1603)  Resp: (!) 31 (11/08/19 1603)  BP: (!) 161/69 (11/08/19 1603)  SpO2: (!) 93 % (11/08/19 1603) Vital Signs (24h Range):  Temp:  [98.2 °F (36.8 °C)-98.6 °F (37 °C)] 98.2 °F (36.8 °C)  Pulse:  [70-95] 70  Resp:  [18-33] 31  SpO2:  [91 %-95 %] 93 %  BP: (129-170)/(69-78) 161/69     Weight: 64.8 kg (142 lb 13.7 oz)  Body mass index is 25.31 kg/m².    Physical Exam   Constitutional: She is oriented to person, place, and time. She appears well-developed and well-nourished. No distress.   HENT:   Head: Normocephalic and atraumatic.   Right Ear: External ear normal.   Left Ear: External ear normal.   Nose: Nose normal.   Mouth/Throat: Oropharynx is clear and moist.   Eyes: Pupils are equal, round, and reactive to light. Conjunctivae and EOM are normal.   Neck: Normal range of motion. Neck supple.   Cardiovascular: Normal rate, regular rhythm and intact distal pulses.   Pulmonary/Chest: Effort normal and breath sounds normal. No respiratory distress. She has no wheezes.   Abdominal: Soft. Bowel sounds are normal. She exhibits no distension. There is no tenderness.   No palpable hepatomegaly or splenomegaly    Musculoskeletal: Normal range of motion. She exhibits no edema or tenderness.   Neurological: She is alert and oriented to person, place, and time.   Skin: Skin is warm and dry.   Psychiatric: She has a normal mood and affect. Thought content normal.   Nursing note and vitals reviewed.        CRANIAL NERVES     CN III, IV, VI   Pupils are equal, round, and reactive to light.  Extraocular motions are normal.        Significant Labs: All pertinent labs within the past 24 hours have been reviewed.    Significant Imaging: I have reviewed all pertinent imaging  results/findings within the past 24 hours.

## 2019-11-09 NOTE — HPI
89 y.o. female with paroxysmal AFib, hypertension CKD stage 2, GERD, insomnia, hypothyroidism, osteopenia, vitamin-D deficiency, cryptogenic cirrhosis, portal hypertension, thrombocytopenia, anxiety and depression, chronic right shoulder pain, and history of lung cancer presents with a complaint palpitations.  Described as my heart beating really fast, onset while working with physical therapy at home today, associated lightheadedness.  Heart rate measured at home while symptomatic was 184.  Heart rate returned to normal without intervention symptoms resolved.  She reports not taking her beta-blocker this morning.  Denies fever, chills, cough, SOB, syncope, orthopnea, PND, nausea, vomiting, diarrhea, bloody or black stool, dysuria, frequency, or urgency.  In the ED, EKG NSR without evidence of acute ischemia, initial troponin negative.  Repeat troponin mildly elevated at 0.05.  Recent echo normal.  Currently feels well without symptom or complaint.

## 2019-11-09 NOTE — H&P
[facility] Hospital Medicine  History & Physical    Patient Name: Sandra Moody  MRN: [id number]  Admission Date: 11/8/2019  Attending Physician: Rahel Avery MD   Primary Care Provider: Rox Montes MD         Patient information was obtained from patient, past medical records and ER records.     Subjective:     Principal Problem:Paroxysmal atrial fibrillation    Chief Complaint:   Chief Complaint   Patient presents with    Chest Pain     Pt c/o chest tightness and feeling weak earlier today while PT and home health was at her home. Son reports pt's heart rate jumped to 180's with this feeling. Pt with hx of a-fib.        HPI: 89 y.o. female with paroxysmal AFib, hypertension CKD stage 2, GERD, insomnia, hypothyroidism, osteopenia, vitamin-D deficiency, cryptogenic cirrhosis, portal hypertension, thrombocytopenia, anxiety and depression, chronic right shoulder pain, and history of lung cancer presents with a complaint palpitations.  Described as my heart beating really fast, onset while working with physical therapy at home today, associated lightheadedness.  Heart rate measured at home while symptomatic was 184.  Heart rate returned to normal without intervention symptoms resolved.  She reports not taking her beta-blocker this morning.  Denies fever, chills, cough, SOB, syncope, orthopnea, PND, nausea, vomiting, diarrhea, bloody or black stool, dysuria, frequency, or urgency.  In the ED, EKG NSR without evidence of acute ischemia, initial troponin negative.  Repeat troponin mildly elevated at 0.05.  Recent echo normal.  Currently feels well without symptom or complaint.    Past Medical History:   Diagnosis Date    Allergy     Ambulates with cane     Anxiety     Back pain     Cirrhosis     COPD (chronic obstructive pulmonary disease) 10/16/2012    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    Cystocele     Depression     Diverticulitis     Dyspnea 11/8/2012    Family  history of colon cancer 10/2/2017    GERD (gastroesophageal reflux disease)     History of colonic polyps 10/2/2017    Hypertension     Hypothyroidism 4/10/2014    Insomnia 4/18/2013    Kidney stones     Lung cancer     adenocarcinoma, RUL    Lung cancer, upper lobe 3/4/2013    Menopause     AGE 47    Nuclear sclerosis 7/17/2014    Osteopenia     dr. lj- fosamax    Paroxysmal atrial fibrillation 10/21/2019    PSVT (paroxysmal supraventricular tachycardia)     dr. marx    PVC (premature ventricular contraction) 11/10/2012    Rectocele     Short-term memory loss     Status post partial lobectomy of lung 2/2010    Thyroid disease     hypothyroidism    Trochanteric bursitis 4/18/2013    Urine incontinence 2/24/2015    Vitamin D deficiency disease 9/4/2014       Past Surgical History:   Procedure Laterality Date    ADENOIDECTOMY      APPENDECTOMY      CHOLECYSTECTOMY      COLONOSCOPY      CYSTOSCOPY      ESOPHAGOGASTRODUODENOSCOPY N/A 10/23/2019    Procedure: EGD (ESOPHAGOGASTRODUODENOSCOPY);  Surgeon: Archie Humphrey MD;  Location[facility];  Service: Endoscopy;  Laterality: N/A;    EYE SURGERY Bilateral     HYSTERECTOMY      LUNG LOBECTOMY Right     OOPHORECTOMY      TONSILLECTOMY      UPPER GASTROINTESTINAL ENDOSCOPY         Review of patient's allergies indicates:  No Known Allergies    No current facility-administered medications on file prior to encounter.      Current Outpatient Medications on File Prior to Encounter   Medication Sig    albuterol (PROVENTIL HFA) 90 mcg/actuation inhaler Inhale 2 puffs into the lungs every 6 (six) hours as needed (ANY equivalent covered is OK).    alendronate (FOSAMAX) 70 MG tablet Take 1 tablet (70 mg total) by mouth every 7 days.    apixaban (ELIQUIS) 5 mg Tab Take 1 tablet (5 mg total) by mouth 2 (two) times daily.    estradiol (ESTRACE) 0.01 % (0.1 mg/g) vaginal cream Pea-size dab of cream to urethra/introitus at bed time.     levothyroxine (SYNTHROID) 25 MCG tablet TAKE 1 TABLET EVERY DAY    melatonin 10 mg Tab Take by mouth once daily.     metoprolol succinate (TOPROL-XL) 50 MG 24 hr tablet Take 3 tablets (150 mg total) by mouth once daily.    nystatin (MYCOSTATIN) cream Apply topically 2 (two) times daily.    ondansetron (ZOFRAN) 4 MG tablet Take 1 tablet (4 mg total) by mouth every 6 (six) hours as needed.    oxybutynin (DITROPAN-XL) 10 MG 24 hr tablet TAKE 1 TABLET ONE TIME DAILY    sertraline (ZOLOFT) 25 MG tablet TAKE 1 TABLET BY MOUTH EVERY DAY    temazepam (RESTORIL) 30 mg capsule Take 1 capsule (30 mg total) by mouth nightly as needed for Insomnia.    triamcinolone acetonide 0.5% (KENALOG) 0.5 % Crea Apply topically 2 (two) times daily.     Family History     Problem Relation (Age of Onset)    Alcohol abuse Brother, Brother    Aneurysm Father    Birth defects Paternal Grandfather    Cancer Mother, Brother    Hypertension Mother    Kidney failure Sister    Pacemaker/defibrilator Brother        Tobacco Use    Smoking status: Never Smoker    Smokeless tobacco: Never Used   Substance and Sexual Activity    Alcohol use: No    Drug use: No    Sexual activity: Never     Review of Systems   Constitutional: Negative for chills, fatigue and fever.   Eyes: Negative for photophobia and visual disturbance.   Respiratory: Negative for cough and shortness of breath.    Cardiovascular: Positive for palpitations. Negative for chest pain and leg swelling.   Gastrointestinal: Negative for abdominal pain, diarrhea, nausea and vomiting.   Genitourinary: Negative for dysuria, frequency and urgency.   Skin: Negative for pallor, rash and wound.   Neurological: Positive for light-headedness. Negative for headaches.   Psychiatric/Behavioral: Negative for confusion and decreased concentration.     Objective:     Vital Signs (Most Recent):  Temp: 98.2 °F (36.8 °C) (11/08/19 1619)  Pulse: 70 (11/08/19 1603)  Resp: (!) 31 (11/08/19 1603)  BP:  (!) 161/69 (11/08/19 1603)  SpO2: (!) 93 % (11/08/19 1603) Vital Signs (24h Range):  Temp:  [98.2 °F (36.8 °C)-98.6 °F (37 °C)] 98.2 °F (36.8 °C)  Pulse:  [70-95] 70  Resp:  [18-33] 31  SpO2:  [91 %-95 %] 93 %  BP: (129-170)/(69-78) 161/69     Weight: 64.8 kg (142 lb 13.7 oz)  Body mass index is 25.31 kg/m².    Physical Exam   Constitutional: She is oriented to person, place, and time. She appears well-developed and well-nourished. No distress.   HENT:   Head: Normocephalic and atraumatic.   Right Ear: External ear normal.   Left Ear: External ear normal.   Nose: Nose normal.   Mouth/Throat: Oropharynx is clear and moist.   Eyes: Pupils are equal, round, and reactive to light. Conjunctivae and EOM are normal.   Neck: Normal range of motion. Neck supple.   Cardiovascular: Normal rate, regular rhythm and intact distal pulses.   Pulmonary/Chest: Effort normal and breath sounds normal. No respiratory distress. She has no wheezes.   Abdominal: Soft. Bowel sounds are normal. She exhibits no distension. There is no tenderness.   No palpable hepatomegaly or splenomegaly    Musculoskeletal: Normal range of motion. She exhibits no edema or tenderness.   Neurological: She is alert and oriented to person, place, and time.   Skin: Skin is warm and dry.   Psychiatric: She has a normal mood and affect. Thought content normal.   Nursing note and vitals reviewed.        CRANIAL NERVES     CN III, IV, VI   Pupils are equal, round, and reactive to light.  Extraocular motions are normal.        Significant Labs: All pertinent labs within the past 24 hours have been reviewed.    Significant Imaging: I have reviewed all pertinent imaging results/findings within the past 24 hours.    Assessment/Plan:     * Paroxysmal atrial fibrillation  Palpitations and tachycardia likely related to missing her medications.  Currently NSR and asymptomatic.  Cardiology consult, appreciate recs.  Continue metoprolol and apixaban.    Elevated  troponin  Minimal elevation without ischemic symptoms or any ischemic changes on EKG, likely demand.  Cardiology consult, appreciate recs.    Essential hypertension  Well controlled, continue home medications and monitor blood pressure, adjust as needed.       VTE Risk Mitigation (From admission, onward)         Ordered     apixaban tablet 5 mg  2 times daily      11/08/19 1638     IP VTE HIGH RISK PATIENT  Once      11/08/19 1638     Place sequential compression device  Until discontinued      11/08/19 1638     Place ELIE hose  Until discontinued      11/08/19 1638              Rafi Tompkins Jr., APRN, AGAT[facility]-BC  Hospitalist - Department of Hospital Medicine  [facility] [address] [place] [address]  Office #: [phone]; Pager #: [phone]

## 2019-11-09 NOTE — ASSESSMENT & PLAN NOTE
Minimal elevation without ischemic symptoms or any ischemic changes on EKG, likely demand.  Cardiology consult, appreciate recs.

## 2019-11-11 ENCOUNTER — TELEPHONE (OUTPATIENT)
Dept: FAMILY MEDICINE | Facility: CLINIC | Age: 84
End: 2019-11-11

## 2019-11-11 ENCOUNTER — PATIENT OUTREACH (OUTPATIENT)
Dept: ADMINISTRATIVE | Facility: OTHER | Age: 84
End: 2019-11-11

## 2019-11-11 DIAGNOSIS — K74.69 CRYPTOGENIC CIRRHOSIS: Primary | ICD-10-CM

## 2019-11-11 DIAGNOSIS — R53.81 PHYSICAL DECONDITIONING: ICD-10-CM

## 2019-11-11 NOTE — TELEPHONE ENCOUNTER
Last OV 10-28-19      ----- Message from Lakia Byrnes sent at 11/11/2019 10:51 AM CST -----  Contact: Juliane/[facility]LV[facility]  Juliane is requesting a 1w1 and 2w2 ext of physical therapy. Order can be sent to [facility] or contact Juliane at [phone].    Thanks

## 2019-11-12 ENCOUNTER — TELEPHONE (OUTPATIENT)
Dept: FAMILY MEDICINE | Facility: CLINIC | Age: 84
End: 2019-11-12

## 2019-11-12 ENCOUNTER — OFFICE VISIT (OUTPATIENT)
Dept: OPTOMETRY | Facility: CLINIC | Age: 84
End: 2019-11-12
Payer: MEDICARE

## 2019-11-12 DIAGNOSIS — H52.13 MYOPIA OF BOTH EYES WITH REGULAR ASTIGMATISM: ICD-10-CM

## 2019-11-12 DIAGNOSIS — Z01.00 EYE EXAM, ROUTINE: Primary | ICD-10-CM

## 2019-11-12 DIAGNOSIS — H26.493 PCO (POSTERIOR CAPSULAR OPACIFICATION), BILATERAL: ICD-10-CM

## 2019-11-12 DIAGNOSIS — H52.223 MYOPIA OF BOTH EYES WITH REGULAR ASTIGMATISM: ICD-10-CM

## 2019-11-12 PROCEDURE — 92004 PR EYE EXAM, NEW PATIENT,COMPREHESV: ICD-10-PCS | Mod: HCNC,S$GLB,, | Performed by: OPTOMETRIST

## 2019-11-12 PROCEDURE — 99999 PR PBB SHADOW E&M-EST. PATIENT-LVL II: ICD-10-PCS | Mod: PBBFAC,HCNC,, | Performed by: OPTOMETRIST

## 2019-11-12 PROCEDURE — 92015 PR REFRACTION: ICD-10-PCS | Mod: HCNC,S$GLB,, | Performed by: OPTOMETRIST

## 2019-11-12 PROCEDURE — 92004 COMPRE OPH EXAM NEW PT 1/>: CPT | Mod: HCNC,S$GLB,, | Performed by: OPTOMETRIST

## 2019-11-12 PROCEDURE — 99999 PR PBB SHADOW E&M-EST. PATIENT-LVL II: CPT | Mod: PBBFAC,HCNC,, | Performed by: OPTOMETRIST

## 2019-11-12 PROCEDURE — 92015 DETERMINE REFRACTIVE STATE: CPT | Mod: HCNC,S$GLB,, | Performed by: OPTOMETRIST

## 2019-11-12 NOTE — TELEPHONE ENCOUNTER
Call placed to [facility], and informed per  that it was okay to start her HH on the week of 11-17-19. She acknowledged understanding.

## 2019-11-12 NOTE — PROGRESS NOTES
HPI     Annual eyemed vision exam  Pt complaints of blurry vision OU  S/p Jose L Arias 2014    Last edited by Yovany Pedersen, OD on 11/12/2019  3:20 PM. (History)            Assessment /Plan     For exam results, see Encounter Report.    Eye exam, routine  -Eyemed    PCO (posterior capsular opacification), bilateral  -not VS    Myopia of both eyes with regular astigmatism  Eyeglass Final Rx     Eyeglass Final Rx       Sphere Cylinder Axis Dist VA Add    Right -0.75 +1.25 005 20/25 +2.50    Left -1.50 +1.50 005 20/25 +2.50    Type:  Bifocal    Expiration Date:  11/12/2020                  RTC 1 yr

## 2019-11-13 ENCOUNTER — CLINICAL SUPPORT (OUTPATIENT)
Dept: AUDIOLOGY | Facility: CLINIC | Age: 84
End: 2019-11-13
Payer: MEDICARE

## 2019-11-13 DIAGNOSIS — H90.6 MIXED CONDUCTIVE AND SENSORINEURAL HEARING LOSS OF BOTH EARS: Primary | ICD-10-CM

## 2019-11-13 PROCEDURE — 92550 TYMPANOMETRY & REFLEX THRESH: CPT | Mod: S$GLB,,, | Performed by: AUDIOLOGIST

## 2019-11-13 PROCEDURE — 92550 PR TYMPANOMETRY AND REFLEX THRESHOLD MEASUREMENTS: ICD-10-PCS | Mod: S$GLB,,, | Performed by: AUDIOLOGIST

## 2019-11-13 PROCEDURE — 92557 PR COMPREHENSIVE HEARING TEST: ICD-10-PCS | Mod: S$GLB,,, | Performed by: AUDIOLOGIST

## 2019-11-13 PROCEDURE — 92557 COMPREHENSIVE HEARING TEST: CPT | Mod: S$GLB,,, | Performed by: AUDIOLOGIST

## 2019-11-13 NOTE — PROGRESS NOTES
Click on link to view Audiogram  Document on 11/13/2019 10:15 AM by Nighat Pérez MA CCC-A: Audiogram    Sandra Moody was seen today for an Audiologic evaluation for hearing loss.    Tympanometry revealed a Type As tymp, AD and a Type A tymp, AS.  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    Recommendations:  1. Otologic evaluation  2. Hearing aid consult  3. Annual Audiogram

## 2019-11-14 ENCOUNTER — EXTERNAL HOME HEALTH (OUTPATIENT)
Dept: HOME HEALTH SERVICES | Facility: HOSPITAL | Age: 84
End: 2019-11-14
Payer: MEDICARE

## 2019-11-27 ENCOUNTER — TELEPHONE (OUTPATIENT)
Dept: CARDIOLOGY | Facility: CLINIC | Age: 84
End: 2019-11-27

## 2019-11-27 NOTE — TELEPHONE ENCOUNTER
----- Message from Candace Santos sent at 11/27/2019 11:25 AM CST -----  Contact: self  Type: Patient Call Back    Who called: Self    What is the request in detail: metoprolol succinate (TOPROL-XL) 100 MG 24 hr tablet    Can the clinic reply by [facility]? no    Would the patient rather a call back or a response via [facility]? Call   Best call back number: [phone]      [facility] - [facility] [address] [facility] [phone] (Phone)  [phone] (Fax)

## 2019-11-29 RX ORDER — LEVOTHYROXINE SODIUM 25 UG/1
TABLET ORAL
Qty: 90 TABLET | Refills: 3 | Status: SHIPPED | OUTPATIENT
Start: 2019-11-29 | End: 2020-01-13

## 2019-12-04 RX ORDER — OXYBUTYNIN CHLORIDE 10 MG/1
TABLET, EXTENDED RELEASE ORAL
Qty: 90 TABLET | Refills: 3 | Status: SHIPPED | OUTPATIENT
Start: 2019-12-04 | End: 2020-08-17

## 2019-12-04 NOTE — TELEPHONE ENCOUNTER
Patient was prescribed TEMAZEPAM 30MG, Pharmacy is stating patient is also on  Tramadol they want to know due to the risk of the patient being 89 yrs if you want patient to be on both medications?

## 2019-12-04 NOTE — TELEPHONE ENCOUNTER
Yes, pt has been on both meds prn without any problems before. She is aware to separate them by time and uses responsibly on a prn basis. Pt is also aware of potential adverse side effects.

## 2019-12-05 ENCOUNTER — PATIENT OUTREACH (OUTPATIENT)
Dept: ADMINISTRATIVE | Facility: OTHER | Age: 84
End: 2019-12-05

## 2019-12-06 ENCOUNTER — OFFICE VISIT (OUTPATIENT)
Dept: CARDIOLOGY | Facility: CLINIC | Age: 84
End: 2019-12-06
Payer: MEDICARE

## 2019-12-06 VITALS
OXYGEN SATURATION: 98 % | DIASTOLIC BLOOD PRESSURE: 84 MMHG | WEIGHT: 143.31 LBS | HEART RATE: 72 BPM | BODY MASS INDEX: 25.38 KG/M2 | SYSTOLIC BLOOD PRESSURE: 138 MMHG | RESPIRATION RATE: 16 BRPM

## 2019-12-06 DIAGNOSIS — I48.0 PAROXYSMAL ATRIAL FIBRILLATION: Primary | ICD-10-CM

## 2019-12-06 PROCEDURE — 99999 PR PBB SHADOW E&M-EST. PATIENT-LVL III: ICD-10-PCS | Mod: PBBFAC,HCNC,, | Performed by: INTERNAL MEDICINE

## 2019-12-06 PROCEDURE — 99999 PR PBB SHADOW E&M-EST. PATIENT-LVL III: CPT | Mod: PBBFAC,HCNC,, | Performed by: INTERNAL MEDICINE

## 2019-12-06 PROCEDURE — 99214 OFFICE O/P EST MOD 30 MIN: CPT | Mod: HCNC,S$GLB,, | Performed by: INTERNAL MEDICINE

## 2019-12-06 PROCEDURE — 1101F PR PT FALLS ASSESS DOC 0-1 FALLS W/OUT INJ PAST YR: ICD-10-PCS | Mod: HCNC,CPTII,S$GLB, | Performed by: INTERNAL MEDICINE

## 2019-12-06 PROCEDURE — 1159F MED LIST DOCD IN RCRD: CPT | Mod: HCNC,S$GLB,, | Performed by: INTERNAL MEDICINE

## 2019-12-06 PROCEDURE — 99214 PR OFFICE/OUTPT VISIT, EST, LEVL IV, 30-39 MIN: ICD-10-PCS | Mod: HCNC,S$GLB,, | Performed by: INTERNAL MEDICINE

## 2019-12-06 PROCEDURE — 1126F PR PAIN SEVERITY QUANTIFIED, NO PAIN PRESENT: ICD-10-PCS | Mod: HCNC,S$GLB,, | Performed by: INTERNAL MEDICINE

## 2019-12-06 PROCEDURE — 1159F PR MEDICATION LIST DOCUMENTED IN MEDICAL RECORD: ICD-10-PCS | Mod: HCNC,S$GLB,, | Performed by: INTERNAL MEDICINE

## 2019-12-06 PROCEDURE — 1101F PT FALLS ASSESS-DOCD LE1/YR: CPT | Mod: HCNC,CPTII,S$GLB, | Performed by: INTERNAL MEDICINE

## 2019-12-06 PROCEDURE — 1126F AMNT PAIN NOTED NONE PRSNT: CPT | Mod: HCNC,S$GLB,, | Performed by: INTERNAL MEDICINE

## 2019-12-06 RX ORDER — METOPROLOL SUCCINATE 100 MG/1
100 TABLET, EXTENDED RELEASE ORAL DAILY
Qty: 90 TABLET | Refills: 3 | Status: SHIPPED | OUTPATIENT
Start: 2019-12-06 | End: 2020-06-15 | Stop reason: SDUPTHER

## 2019-12-06 NOTE — PROGRESS NOTES
CARDIOVASCULAR CONSULTATION    REASON FOR CONSULT:   Sandra Moody is a 89 y.o. female who presents for evaluation of recent hospitalization for atrial fibrillation.      HISTORY OF PRESENT ILLNESS:     Patient is a pleasant 89-year-old lady.  Recently was noted to have atrial fibrillation during hospitalization.  Was discharged home on Eliquis.  Has been doing fine.  Is here with her family who is very involved with her care.  States that since her discharge she had 1 episode where she felt her palpitations and it lasted few minutes and then went away.  Since then she has stayed in normal sinus rhythm.  She has been taking her Eliquis without any problems.  No bleeding.  The family is concerned about short-term memory loss which has been slowly worsening.  Referral to neurology has been made.    Notes from December 2019:  Patient stopped taking her medications and came to the emergency room with tachycardia around 170-8180 beats per minute which lasts a few minutes.  Resolved after restarting her Toprol-XL.  Has not been missing her doses anymore and states that she has been compliant with the medications.  No further episodes of palpitations or atrial fibrillation noted.  Denies any chest pains at rest on exertion, orthopnea, PND.      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 calhoun- fosamax    Paroxysmal atrial fibrillation 10/21/2019    PSVT (paroxysmal supraventricular tachycardia)     dr. marx    PVC (premature ventricular contraction) 11/10/2012    Rectocele     Short-term memory loss     Status post partial lobectomy of lung 2/2010    Thyroid disease     hypothyroidism    Trochanteric bursitis 4/18/2013    Urine incontinence 2/24/2015    Vitamin D deficiency disease 9/4/2014       PAST SURGICAL HISTORY:     Past Surgical History:   Procedure Laterality Date    ADENOIDECTOMY      APPENDECTOMY      CHOLECYSTECTOMY      COLONOSCOPY      CYSTOSCOPY      ESOPHAGOGASTRODUODENOSCOPY N/A 10/23/2019    Procedure: EGD (ESOPHAGOGASTRODUODENOSCOPY);  Surgeon: Archie Humphrey MD;  Location: [facility];  Service: Endoscopy;  Laterality: N/A;    EYE SURGERY Bilateral     HYSTERECTOMY      LUNG LOBECTOMY Right     OOPHORECTOMY      TONSILLECTOMY      UPPER GASTROINTESTINAL ENDOSCOPY         ALLERGIES AND MEDICATION:   Review of patient's allergies indicates:  No Known Allergies     Medication List           Accurate as of December 6, 2019  2:04 PM. If you have any questions, ask your nurse or doctor.               CONTINUE taking these medications    albuterol 90 mcg/actuation inhaler  Commonly known as:  Proventil HFA  Inhale 2 puffs into the lungs every 6 (six) hours as needed (ANY equivalent covered is OK).     alendronate 70 MG tablet  Commonly known as:  FOSAMAX  Take 1 tablet (70 mg total) by mouth every 7 days.     estradiol 0.01 % (0.1 mg/gram) vaginal cream  Commonly known as:  Estrace  Pea-size dab of cream to urethra/introitus at bed time.     * levothyroxine 25 MCG tablet  Commonly known as:  SYNTHROID  TAKE 1 TABLET EVERY DAY     * levothyroxine 25 MCG tablet  Commonly known as:  SYNTHROID  TAKE 1 TABLET EVERY DAY     melatonin 10 mg Tab     metoprolol succinate 50 MG 24 hr tablet  Commonly known as:  TOPROL-XL  Take 3 tablets (150 mg  total) by mouth once daily.     nystatin cream  Commonly known as:  MYCOSTATIN  Apply topically 2 (two) times daily.     ondansetron 4 MG tablet  Commonly known as:  ZOFRAN  Take 1 tablet (4 mg total) by mouth every 6 (six) hours as needed.     oxybutynin 10 MG 24 hr tablet  Commonly known as:  DITROPAN-XL  TAKE 1 TABLET ONE TIME DAILY     sertraline 25 MG tablet  Commonly known as:  ZOLOFT  TAKE 1 TABLET BY MOUTH EVERY DAY     triamcinolone acetonide 0.5% 0.5 % Crea  Commonly known as:  KENALOG  Apply topically 2 (two) times daily.         * This list has 2 medication(s) that are the same as other medications prescribed for you. Read the directions carefully, and ask your doctor or other care provider to review them with you.                SOCIAL HISTORY:     Social History     Socioeconomic History    Marital status:      Spouse name: Not on file    Number of children: Not on file    Years of education: Not on file    Highest education level: Not on file   Occupational History    Not on file   Social Needs    Financial resource strain: Not on file    Food insecurity:     Worry: Not on file     Inability: Not on file    Transportation needs:     Medical: Not on file     Non-medical: Not on file   Tobacco Use    Smoking status: Never Smoker    Smokeless tobacco: Never Used   Substance and Sexual Activity    Alcohol use: No    Drug use: No    Sexual activity: Never   Lifestyle    Physical activity:     Days per week: Not on file     Minutes per session: Not on file    Stress: Only a little   Relationships    Social connections:     Talks on phone: Not on file     Gets together: Not on file     Attends Worship service: Not on file     Active member of club or organization: Not on file     Attends meetings of clubs or organizations: Not on file     Relationship status: Not on file   Other Topics Concern    Not on file   Social History Narrative    Not on file       FAMILY HISTORY:     Family  History   Problem Relation Age of Onset    Cancer Mother         COLON    Hypertension Mother     Aneurysm Father     Alcohol abuse Brother     Kidney failure Sister     Cancer Brother         throat-smoker     Alcohol abuse Brother     Pacemaker/defibrilator Brother     Birth defects Paternal Grandfather         prostate     Asthma Neg Hx     Emphysema Neg Hx     Amblyopia Neg Hx     Blindness Neg Hx     Cataracts Neg Hx     Diabetes Neg Hx     Glaucoma Neg Hx     Macular degeneration Neg Hx     Retinal detachment Neg Hx     Strabismus Neg Hx     Stroke Neg Hx     Thyroid disease Neg Hx     Cirrhosis Neg Hx        REVIEW OF SYSTEMS:   Review of Systems   Constitution: Negative.   HENT: Negative.    Eyes: Negative.    Cardiovascular: Positive for palpitations.   Respiratory: Negative.    Endocrine: Negative.    Hematologic/Lymphatic: Negative.    Skin: Negative.    Musculoskeletal: Negative.    Gastrointestinal: Negative.    Genitourinary: Negative.    Neurological: Negative.    Psychiatric/Behavioral: Negative.    Allergic/Immunologic: Negative.        A 10 point review of systems was performed and all the pertinent positives have been mentioned. Rest of review of systems was negative.        PHYSICAL EXAM:     Vitals:    12/06/19 1339   BP: 138/84   Pulse: 72   Resp: 16    Body mass index is 25.38 kg/m².  Weight: 65 kg (143 lb 4.8 oz)         Physical Exam   Constitutional: She is oriented to person, place, and time. She appears well-developed and well-nourished.   HENT:   Head: Normocephalic.   Eyes: Pupils are equal, round, and reactive to light.   Neck: Normal range of motion. Neck supple.   Cardiovascular: Normal rate and regular rhythm.   Pulmonary/Chest: Effort normal and breath sounds normal.   Abdominal: Soft. Normal appearance and bowel sounds are normal. There is no tenderness.   Musculoskeletal: Normal range of motion.   Neurological: She is alert and oriented to person, place, and  time.   Skin: Skin is warm.   Psychiatric: She has a normal mood and affect.         DATA:     Laboratory:  CBC:  Recent Labs   Lab 10/19/19  0444 10/22/19  0435 11/08/19  1200   WBC 4.38 4.07 8.51   Hemoglobin 13.6 13.9 15.5   Hematocrit 42.4 43.7 48.2   Platelets 81 L 160 144 L       CHEMISTRIES:  Recent Labs   Lab 10/16/19  0216 10/17/19  0240  10/21/19  0608 10/22/19  0435 11/08/19  1200   Glucose 97 128 H   < > 117 H 162 H 140 H   Sodium 140 137   < > 143 140 142   Potassium 4.2 3.1 L   < > 4.5 4.4 3.8   BUN, Bld 11 8   < > 7 L 8 17   Creatinine 0.7 0.5   < > 0.6 0.7 0.7   eGFR if African American >60 >60   < > >60 >60 >60   eGFR if non African American >60 >60   < > >60 >60 >60   Calcium 7.6 L 7.8 L   < > 8.5 L 9.2 9.5   Magnesium 2.0 1.8  --   --   --  1.9    < > = values in this interval not displayed.       CARDIAC BIOMARKERS:  Recent Labs   Lab 11/08/19  1200 11/08/19  1405 11/08/19  1618   Troponin I 0.023 0.056 H 0.067 H       COAGS:  Recent Labs   Lab 08/29/19  0756 10/22/19  0435 11/08/19  1200   INR 1.0 1.1 1.0       LIPIDS/LFTS:  Recent Labs   Lab 09/07/17  0731  10/21/19  0608 10/22/19  0435 11/08/19  1200   Cholesterol 201 H  --   --   --   --    Triglycerides 198 H  --   --   --   --    HDL 48  --   --   --   --    LDL Cholesterol 113.4  --   --   --   --    Non-HDL Cholesterol 153  --   --   --   --    AST 13   < > 32 29 17   ALT 13   < > 37 41 16    < > = values in this interval not displayed.       Hemoglobin A1C   Date Value Ref Range Status   09/07/2017 5.4 4.0 - 5.6 % Final     Comment:     According to ADA guidelines, hemoglobin A1c <7.0% represents  optimal control in non-pregnant diabetic patients. Different  metrics may apply to specific patient populations.   Standards of Medical Care in Diabetes-2016.  For the purpose of screening for the presence of diabetes:  <5.7%     Consistent with the absence of diabetes  5.7-6.4%  Consistent with increasing risk for diabetes    (prediabetes)  >or=6.5%  Consistent with diabetes  Currently, no consensus exists for use of hemoglobin A1c  for diagnosis of diabetes for children.  This Hemoglobin A1c assay has significant interference with fetal   hemoglobin   (HbF). The results are invalid for patients with abnormal amounts of   HbF,   including those with known Hereditary Persistence   of Fetal Hemoglobin. Heterozygous hemoglobin variants (HbAS, HbAC,   HbAD, HbAE, HbA2) do not significantly interfere with this assay;   however, presence of multiple variants in a sample may impact the %   interference.         TSH  Recent Labs   Lab 09/07/17  0731 03/13/19  1002 11/08/19  1200   TSH 3.131 1.979 2.379       The ASCVD Risk score (Khurramjanee DAMON Jr., et al., 2013) failed to calculate for the following reasons:    The 2013 ASCVD risk score is only valid for ages 40 to 79             ASSESSMENT AND PLAN     Patient Active Problem List   Diagnosis    Essential hypertension    Chronic kidney disease, stage II (mild)    GERD (gastroesophageal reflux disease)    Insomnia    Trochanteric bursitis    Hypothyroidism    Osteopenia    Nuclear sclerosis    Chorioretinal scar    Refractive error    Senile nuclear sclerosis    Vitamin D deficiency disease    Cryptogenic cirrhosis    Portal hypertension    Thrombocytopenia    History of lung cancer    Atherosclerosis of aorta    Anxiety and depression    History of colonic polyps    Chronic right shoulder pain    Candidal intertrigo    Paroxysmal atrial fibrillation    Elevated troponin       Patient is here for follow-up.  Paroxysmal atrial fibrillation.  Currently asymptomatic.  Continue Eliquis 5 mg b.i.d..    Hypertension:   Controlled.  Family never increased the dose to 150 mg of Toprol-XL.  Well controlled on 100 mg daily.  Continue Toprol- mg daily.      Follow-up in clinic in 3 months.          Thank you very much for involving me in the care of your patient.  Please do not  hesitate to contact me if there are any questions.      Cameron Munguia MD, FAC, [facility]  Interventional Cardiologist, [facility].           This note was dictated with the help of speech recognition software.  There might be un-intended errors and/or substitutions.

## 2020-01-03 ENCOUNTER — PATIENT OUTREACH (OUTPATIENT)
Dept: ADMINISTRATIVE | Facility: OTHER | Age: 85
End: 2020-01-03

## 2020-01-06 NOTE — ED NOTES
Straight stick using 23G butterfly needle to L hand for repeat LA draw. No complications.    Erythromycin Pregnancy And Lactation Text: This medication is Pregnancy Category B and is considered safe during pregnancy. It is also excreted in breast milk.

## 2020-01-07 ENCOUNTER — LAB VISIT (OUTPATIENT)
Dept: LAB | Facility: HOSPITAL | Age: 85
End: 2020-01-07
Attending: PSYCHIATRY & NEUROLOGY
Payer: MEDICARE

## 2020-01-07 ENCOUNTER — OFFICE VISIT (OUTPATIENT)
Dept: NEUROLOGY | Facility: CLINIC | Age: 85
End: 2020-01-07
Payer: MEDICARE

## 2020-01-07 VITALS
BODY MASS INDEX: 24.38 KG/M2 | SYSTOLIC BLOOD PRESSURE: 130 MMHG | DIASTOLIC BLOOD PRESSURE: 66 MMHG | HEIGHT: 63 IN | WEIGHT: 137.56 LBS | HEART RATE: 78 BPM

## 2020-01-07 DIAGNOSIS — R41.3 MEMORY PROBLEM: Primary | ICD-10-CM

## 2020-01-07 DIAGNOSIS — R41.3 MEMORY PROBLEM: ICD-10-CM

## 2020-01-07 LAB
CREAT SERPL-MCNC: 0.7 MG/DL (ref 0.5–1.4)
EST. GFR  (AFRICAN AMERICAN): >60 ML/MIN/1.73 M^2
EST. GFR  (NON AFRICAN AMERICAN): >60 ML/MIN/1.73 M^2
VIT B12 SERPL-MCNC: 260 PG/ML (ref 210–950)

## 2020-01-07 PROCEDURE — 83921 ORGANIC ACID SINGLE QUANT: CPT | Mod: HCNC

## 2020-01-07 PROCEDURE — 1125F PR PAIN SEVERITY QUANTIFIED, PAIN PRESENT: ICD-10-PCS | Mod: HCNC,S$GLB,, | Performed by: PSYCHIATRY & NEUROLOGY

## 2020-01-07 PROCEDURE — 82565 ASSAY OF CREATININE: CPT | Mod: HCNC

## 2020-01-07 PROCEDURE — 99204 PR OFFICE/OUTPT VISIT, NEW, LEVL IV, 45-59 MIN: ICD-10-PCS | Mod: HCNC,S$GLB,, | Performed by: PSYCHIATRY & NEUROLOGY

## 2020-01-07 PROCEDURE — 1125F AMNT PAIN NOTED PAIN PRSNT: CPT | Mod: HCNC,S$GLB,, | Performed by: PSYCHIATRY & NEUROLOGY

## 2020-01-07 PROCEDURE — 82607 VITAMIN B-12: CPT | Mod: HCNC

## 2020-01-07 PROCEDURE — 1159F MED LIST DOCD IN RCRD: CPT | Mod: HCNC,S$GLB,, | Performed by: PSYCHIATRY & NEUROLOGY

## 2020-01-07 PROCEDURE — 1159F PR MEDICATION LIST DOCUMENTED IN MEDICAL RECORD: ICD-10-PCS | Mod: HCNC,S$GLB,, | Performed by: PSYCHIATRY & NEUROLOGY

## 2020-01-07 PROCEDURE — 99999 PR PBB SHADOW E&M-EST. PATIENT-LVL IV: CPT | Mod: PBBFAC,HCNC,, | Performed by: PSYCHIATRY & NEUROLOGY

## 2020-01-07 PROCEDURE — 1101F PT FALLS ASSESS-DOCD LE1/YR: CPT | Mod: HCNC,CPTII,S$GLB, | Performed by: PSYCHIATRY & NEUROLOGY

## 2020-01-07 PROCEDURE — 86592 SYPHILIS TEST NON-TREP QUAL: CPT | Mod: HCNC

## 2020-01-07 PROCEDURE — 99999 PR PBB SHADOW E&M-EST. PATIENT-LVL IV: ICD-10-PCS | Mod: PBBFAC,HCNC,, | Performed by: PSYCHIATRY & NEUROLOGY

## 2020-01-07 PROCEDURE — 1101F PR PT FALLS ASSESS DOC 0-1 FALLS W/OUT INJ PAST YR: ICD-10-PCS | Mod: HCNC,CPTII,S$GLB, | Performed by: PSYCHIATRY & NEUROLOGY

## 2020-01-07 PROCEDURE — 99204 OFFICE O/P NEW MOD 45 MIN: CPT | Mod: HCNC,S$GLB,, | Performed by: PSYCHIATRY & NEUROLOGY

## 2020-01-07 PROCEDURE — 36415 COLL VENOUS BLD VENIPUNCTURE: CPT | Mod: HCNC

## 2020-01-07 RX ORDER — MOMETASONE FUROATE 1 MG/G
CREAM TOPICAL
COMMUNITY
Start: 2019-12-04 | End: 2020-01-13

## 2020-01-07 RX ORDER — TRAMADOL HYDROCHLORIDE 50 MG/1
TABLET ORAL
Refills: 2 | COMMUNITY
Start: 2019-11-26 | End: 2020-02-03

## 2020-01-07 RX ORDER — TEMAZEPAM 30 MG/1
CAPSULE ORAL
COMMUNITY
Start: 2020-01-03 | End: 2020-02-03 | Stop reason: SDUPTHER

## 2020-01-07 NOTE — LETTER
January 7, 2020      Cameron Munguia MD  [address] [facility] [address]  [address]  [place] [address]           [facility]  [address]  [place] [address]  Phone: [phone]  Fax: [phone]          Patient: Sandra Moody   MR Number: [id number]   YOB: 1928   Date of Visit: 1/7/2020       Dear Dr. Cameron Munguia:    Thank you for referring Sandra Moody to me for evaluation. Attached you will find relevant portions of my assessment and plan of care.    If you have questions, please do not hesitate to call me. I look forward to following Sandra Moody along with you.    Sincerely,    Thalia Deluna,     Enclosure  CC:  No Recipients    If you would like to receive this communication electronically, please contact [email or web address] or [phone] to request more information on [employer] access.    For providers and/or their staff who would like to refer a patient to [facility], please contact us through our one-stop-shop provider referral line, [facility], at [phone].    If you feel you have received this communication in error or would no longer like to receive these types of communications, please e-mail [email or web address]

## 2020-01-07 NOTE — PROGRESS NOTES
Neurology Consult Note    Chief Complaint: memory problem    HPI:   Sandra Moody is a 89 y.o. female with medical conditions as outlined below who presents for further evaluation of slowly progressive problems with memory. She is accompanied to the visit by her daughter who aided in giving history. Her daughter states patient was hospitalized for afib and the flu in October 2019 and since then she has noticed that her memory has slowly worsened. She states prior to hospitalization, her mother was completely independent living alone and performing all ADLs on her own. She states her mother still lives alone and is able to dress, cook and feed herself. She denies patient forgetting to turn off the stove or wandering outside the house. She denies patient having recent falls, talking about things that are not happening or becoming aggressive. Patient denies bowel or bladder problems. She sometimes forgets to take her medication, so her daughter in law puts it in a pillbox for her and ensure she takes it. She forgot to pay a bill recently, but otherwise has been doing well. She has no further complaints.     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 lj- fosamax    Paroxysmal atrial fibrillation 10/21/2019    PSVT (paroxysmal supraventricular tachycardia)       francisco j    PVC (premature ventricular contraction) 11/10/2012    Rectocele     Short-term memory loss     Status post partial lobectomy of lung 2/2010    Thyroid disease     hypothyroidism    Trochanteric bursitis 4/18/2013    Urine incontinence 2/24/2015    Vitamin D deficiency disease 9/4/2014       Past Surgical History:  Past Surgical History:   Procedure Laterality Date    ADENOIDECTOMY      APPENDECTOMY      CHOLECYSTECTOMY      COLONOSCOPY      CYSTOSCOPY      ESOPHAGOGASTRODUODENOSCOPY N/A 10/23/2019    Procedure: EGD (ESOPHAGOGASTRODUODENOSCOPY);  Surgeon: Archie Humphrey MD;  [facility];  Service: Endoscopy;  Laterality: N/A;    EYE SURGERY Bilateral     HYSTERECTOMY      LUNG LOBECTOMY Right     OOPHORECTOMY      TONSILLECTOMY      UPPER GASTROINTESTINAL ENDOSCOPY         Social History:  Social History     Socioeconomic History    Marital status:      Spouse name: Not on file    Number of children: Not on file    Years of education: Not on file    Highest education level: Not on file   Occupational History    Not on file   Social Needs    Financial resource strain: Not on file    Food insecurity:     Worry: Not on file     Inability: Not on file    Transportation needs:     Medical: Not on file     Non-medical: Not on file   Tobacco Use    Smoking status: Never Smoker    Smokeless tobacco: Never Used   Substance and Sexual Activity    Alcohol use: No    Drug use: No    Sexual activity: Never   Lifestyle    Physical activity:     Days per week: Not on file     Minutes per session: Not on file    Stress: Only a little   Relationships    Social connections:     Talks on phone: Not on file     Gets together: Not on file     Attends [employer] service: Not on file     Active member of club or organization: Not on file     Attends meetings of clubs or organizations: Not on file     Relationship status: Not on file   Other Topics Concern    Not on file    Social History Narrative    Not on file       Family History:  Family History   Problem Relation Age of Onset    Cancer Mother         COLON    Hypertension Mother     Aneurysm Father     Alcohol abuse Brother     Kidney failure Sister     Cancer Brother         throat-smoker     Alcohol abuse Brother     Pacemaker/defibrilator Brother     Birth defects Paternal Grandfather         prostate     Asthma Neg Hx     Emphysema Neg Hx     Amblyopia Neg Hx     Blindness Neg Hx     Cataracts Neg Hx     Diabetes Neg Hx     Glaucoma Neg Hx     Macular degeneration Neg Hx     Retinal detachment Neg Hx     Strabismus Neg Hx     Stroke Neg Hx     Thyroid disease Neg Hx     Cirrhosis Neg Hx        Medications:  Current Outpatient Medications   Medication Sig Dispense Refill    albuterol (PROVENTIL HFA) 90 mcg/actuation inhaler Inhale 2 puffs into the lungs every 6 (six) hours as needed (ANY equivalent covered is OK). 1 Inhaler 2    alendronate (FOSAMAX) 70 MG tablet Take 1 tablet (70 mg total) by mouth every 7 days. 12 tablet 1    estradiol (ESTRACE) 0.01 % (0.1 mg/g) vaginal cream Pea-size dab of cream to urethra/introitus at bed time. 42.5 g 1    levothyroxine (SYNTHROID) 25 MCG tablet TAKE 1 TABLET EVERY DAY 90 tablet 3    levothyroxine (SYNTHROID) 25 MCG tablet TAKE 1 TABLET EVERY DAY 90 tablet 3    melatonin 10 mg Tab Take by mouth once daily.       metoprolol succinate (TOPROL-XL) 100 MG 24 hr tablet Take 1 tablet (100 mg total) by mouth once daily. 90 tablet 3    mometasone 0.1% (ELOCON) 0.1 % cream       nystatin (MYCOSTATIN) cream Apply topically 2 (two) times daily. 30 g 3    ondansetron (ZOFRAN) 4 MG tablet Take 1 tablet (4 mg total) by mouth every 6 (six) hours as needed. 20 tablet 0    oxybutynin (DITROPAN-XL) 10 MG 24 hr tablet TAKE 1 TABLET ONE TIME DAILY 90 tablet 3    sertraline (ZOLOFT) 25 MG tablet TAKE 1 TABLET BY MOUTH EVERY DAY 30 tablet 0    temazepam (RESTORIL) 30 mg  capsule       traMADol (ULTRAM) 50 mg tablet TAKE 1 TABLET BY MOUTH EVERY 24 HOURS AS NEEDED FOR PAIN (MAY CAUSE DROWSINESS)  2    triamcinolone acetonide 0.5% (KENALOG) 0.5 % Crea Apply topically 2 (two) times daily. 30 g 3     No current facility-administered medications for this visit.        Allergies:  Review of patient's allergies indicates:  No Known Allergies    ROS:  A 12 point review of system was negative aside from pertinent positives and negatives as outlined above.    Physical Exam  Vitals:    01/07/20 0902   BP: 130/66   Pulse: 78       General: well nourished, well developed  Eyes: no scleral icterus   Nose: nasal turbinates intact  Neck: supple, ROM intact  Skin: no rash or ecchymosis  Joints: no swelling or erythema  Cardiac: regular rate and rhythm  Lungs: clear to auscultation bilaterally    Neuro:  Mental status: AAO x 3, no dysarthria, no aphasia, communicating appropriately MMSE 26/30  CN: PERRL, EOMI, VFF, V1-V3 sensation intact, no facial asymmetry, hearing grossly intact, tongue midline  Motor:   RUE 5/5  RLE 5/5  LUE 5/5  LLE 5/5    Normal bulk and tone    Reflexes: 1+ throughout, toes equivocal bilaterally  Sensory: intact to light touch throughout  Coordination: no dysmetria on FTN  Gait: mildly unsteady due to hip pain, walks with a walker    Prior Imaging/Labs:  No recent neuroimaging available for review      Assessment and Plan:    89 y.o. 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 Memory problem  - MRI Brain W WO Contrast; Future  - Creatinine, serum; Future  - Vitamin B12; Future  - Methylmalonic acid, serum; Future  - RPR; Future  Discussed with patient and daughter that patient should no longer drive for safety purposes and they demonstrated understanding  I have advised daughter to start thinking about supervision for patient for safety concerns  Will discuss medication for dementia at next visit            Patient and daughter were  advised to notify me for worsening symptoms. I will see patient back after MRI or sooner if necessary.     Thank you for this consultation.    Thalia Deluna DO  [facility]  [address] [facility] [address]  [place] [id number] [phone]

## 2020-01-08 LAB — RPR SER QL: NORMAL

## 2020-01-09 LAB — METHYLMALONATE SERPL-SCNC: 0.54 UMOL/L

## 2020-01-09 RX ORDER — SERTRALINE HYDROCHLORIDE 25 MG/1
TABLET, FILM COATED ORAL
Qty: 90 TABLET | Refills: 0 | Status: SHIPPED | OUTPATIENT
Start: 2020-01-09 | End: 2020-02-03

## 2020-01-10 ENCOUNTER — PATIENT OUTREACH (OUTPATIENT)
Dept: ADMINISTRATIVE | Facility: OTHER | Age: 85
End: 2020-01-10

## 2020-01-10 ENCOUNTER — HOSPITAL ENCOUNTER (OUTPATIENT)
Dept: RADIOLOGY | Facility: HOSPITAL | Age: 85
Discharge: HOME | End: 2020-01-10
Attending: PSYCHIATRY & NEUROLOGY
Payer: MEDICARE

## 2020-01-10 DIAGNOSIS — R41.3 MEMORY PROBLEM: ICD-10-CM

## 2020-01-10 PROCEDURE — 25500020 PHARM REV CODE 255: Mod: HCNC | Performed by: PSYCHIATRY & NEUROLOGY

## 2020-01-10 PROCEDURE — 70553 MRI BRAIN STEM W/O & W/DYE: CPT | Mod: 26,HCNC,, | Performed by: RADIOLOGY

## 2020-01-10 PROCEDURE — 70553 MRI BRAIN STEM W/O & W/DYE: CPT | Mod: TC,HCNC

## 2020-01-10 PROCEDURE — A9585 GADOBUTROL INJECTION: HCPCS | Mod: HCNC | Performed by: PSYCHIATRY & NEUROLOGY

## 2020-01-10 PROCEDURE — 70553 MRI BRAIN W WO CONTRAST: ICD-10-PCS | Mod: 26,HCNC,, | Performed by: RADIOLOGY

## 2020-01-10 RX ORDER — GADOBUTROL 604.72 MG/ML
10 INJECTION INTRAVENOUS
Status: DISCONTINUED | OUTPATIENT
Start: 2020-01-10 | End: 2020-01-10 | Stop reason: CLARIF

## 2020-01-10 RX ORDER — GADOBUTROL 604.72 MG/ML
6.5 INJECTION INTRAVENOUS
Status: COMPLETED | OUTPATIENT
Start: 2020-01-10 | End: 2020-01-10

## 2020-01-10 RX ADMIN — GADOBUTROL 6.5 ML: 604.72 INJECTION INTRAVENOUS at 11:01

## 2020-01-13 ENCOUNTER — LAB VISIT (OUTPATIENT)
Dept: LAB | Facility: HOSPITAL | Age: 85
End: 2020-01-13
Attending: PSYCHIATRY & NEUROLOGY
Payer: MEDICARE

## 2020-01-13 ENCOUNTER — OFFICE VISIT (OUTPATIENT)
Dept: NEUROLOGY | Facility: CLINIC | Age: 85
End: 2020-01-13
Payer: MEDICARE

## 2020-01-13 VITALS
HEART RATE: 75 BPM | BODY MASS INDEX: 24.27 KG/M2 | DIASTOLIC BLOOD PRESSURE: 101 MMHG | WEIGHT: 137 LBS | HEIGHT: 63 IN | SYSTOLIC BLOOD PRESSURE: 174 MMHG

## 2020-01-13 DIAGNOSIS — E53.8 VITAMIN B12 DEFICIENCY: ICD-10-CM

## 2020-01-13 DIAGNOSIS — F03.A0 MILD DEMENTIA: Primary | ICD-10-CM

## 2020-01-13 PROCEDURE — 1101F PR PT FALLS ASSESS DOC 0-1 FALLS W/OUT INJ PAST YR: ICD-10-PCS | Mod: HCNC,CPTII,S$GLB, | Performed by: PSYCHIATRY & NEUROLOGY

## 2020-01-13 PROCEDURE — 99214 PR OFFICE/OUTPT VISIT, EST, LEVL IV, 30-39 MIN: ICD-10-PCS | Mod: HCNC,S$GLB,, | Performed by: PSYCHIATRY & NEUROLOGY

## 2020-01-13 PROCEDURE — 99999 PR PBB SHADOW E&M-EST. PATIENT-LVL IV: ICD-10-PCS | Mod: PBBFAC,HCNC,, | Performed by: PSYCHIATRY & NEUROLOGY

## 2020-01-13 PROCEDURE — 1126F PR PAIN SEVERITY QUANTIFIED, NO PAIN PRESENT: ICD-10-PCS | Mod: HCNC,S$GLB,, | Performed by: PSYCHIATRY & NEUROLOGY

## 2020-01-13 PROCEDURE — 99499 UNLISTED E&M SERVICE: CPT | Mod: S$GLB,,, | Performed by: PSYCHIATRY & NEUROLOGY

## 2020-01-13 PROCEDURE — 1159F PR MEDICATION LIST DOCUMENTED IN MEDICAL RECORD: ICD-10-PCS | Mod: HCNC,S$GLB,, | Performed by: PSYCHIATRY & NEUROLOGY

## 2020-01-13 PROCEDURE — 1159F MED LIST DOCD IN RCRD: CPT | Mod: HCNC,S$GLB,, | Performed by: PSYCHIATRY & NEUROLOGY

## 2020-01-13 PROCEDURE — 86340 INTRINSIC FACTOR ANTIBODY: CPT | Mod: HCNC

## 2020-01-13 PROCEDURE — 1126F AMNT PAIN NOTED NONE PRSNT: CPT | Mod: HCNC,S$GLB,, | Performed by: PSYCHIATRY & NEUROLOGY

## 2020-01-13 PROCEDURE — 36415 COLL VENOUS BLD VENIPUNCTURE: CPT | Mod: HCNC

## 2020-01-13 PROCEDURE — 99499 RISK ADDL DX/OHS AUDIT: ICD-10-PCS | Mod: S$GLB,,, | Performed by: PSYCHIATRY & NEUROLOGY

## 2020-01-13 PROCEDURE — 99214 OFFICE O/P EST MOD 30 MIN: CPT | Mod: HCNC,S$GLB,, | Performed by: PSYCHIATRY & NEUROLOGY

## 2020-01-13 PROCEDURE — 86256 FLUORESCENT ANTIBODY TITER: CPT | Mod: HCNC

## 2020-01-13 PROCEDURE — 99999 PR PBB SHADOW E&M-EST. PATIENT-LVL IV: CPT | Mod: PBBFAC,HCNC,, | Performed by: PSYCHIATRY & NEUROLOGY

## 2020-01-13 PROCEDURE — 1101F PT FALLS ASSESS-DOCD LE1/YR: CPT | Mod: HCNC,CPTII,S$GLB, | Performed by: PSYCHIATRY & NEUROLOGY

## 2020-01-13 RX ORDER — APIXABAN 5 MG/1
TABLET, FILM COATED ORAL
COMMUNITY
Start: 2020-01-10 | End: 2020-06-15

## 2020-01-13 NOTE — PROGRESS NOTES
Neurology Follow Up Note    Chief Complaint: follow up for memory problem    Interval History:  Since last visit, patient's memory has remained stable. Her daughter in law puts medications in a pillbox for her. She recently has needed help taking care of finances. She is able to hold regular conversation and family denies her talking about things that are not happening. They deny patient becoming aggressive. They deny her forgetting to turn off the stove or wandering outside the house. They deny patient having recent falls. Her MRI brain showed an incidental meningioma, but was otherwise unremarkable. Her MMA was elevated suggestive of vitamin b12 deficiency. She has no further complaints.     Initial visit 1/7/2020  Sandra Moody is a 89 y.o. female with medical conditions as outlined below who presents for further evaluation of slowly progressive problems with memory. She is accompanied to the visit by her daughter who aided in giving history. Her daughter states patient was hospitalized for afib and the flu in October 2019 and since then she has noticed that her memory has slowly worsened. She states prior to hospitalization, her mother was completely independent living alone and performing all ADLs on her own. She states her mother still lives alone and is able to dress, cook and feed herself. She denies patient forgetting to turn off the stove or wandering outside the house. She denies patient having recent falls, talking about things that are not happening or becoming aggressive. Patient denies bowel or bladder problems. She sometimes forgets to take her medication, so her daughter in law puts it in a pillbox for her and ensure she takes it. She forgot to pay a bill recently, but otherwise has been doing well. 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 lj- fosamax    Paroxysmal atrial fibrillation 10/21/2019    PSVT (paroxysmal supraventricular tachycardia)     dr. marx    PVC (premature ventricular contraction) 11/10/2012    Rectocele     Short-term memory loss     Status post partial lobectomy of lung 2/2010    Thyroid disease     hypothyroidism    Trochanteric bursitis 4/18/2013    Urine incontinence 2/24/2015    Vitamin D deficiency disease 9/4/2014       Past Surgical History:  Past Surgical History:   Procedure Laterality Date    ADENOIDECTOMY      APPENDECTOMY      CHOLECYSTECTOMY      COLONOSCOPY      CYSTOSCOPY      ESOPHAGOGASTRODUODENOSCOPY N/A 10/23/2019    Procedure: EGD (ESOPHAGOGASTRODUODENOSCOPY);  Surgeon: Archie Humphrey MD;  Location: [facility];  Service: Endoscopy;  Laterality: N/A;    EYE SURGERY Bilateral     HYSTERECTOMY      LUNG LOBECTOMY Right     OOPHORECTOMY      TONSILLECTOMY      UPPER GASTROINTESTINAL ENDOSCOPY         Social History:  Social History     Socioeconomic History    Marital status:      Spouse name: Not on file    Number of children: Not on file    Years of education: Not on file    Highest education level: Not on file   Occupational History    Not on file   Social Needs    Financial resource strain: Not on file    Food insecurity:     Worry: Not on file     Inability: Not on file    Transportation needs:     Medical: Not on file     Non-medical:  Not on file   Tobacco Use    Smoking status: Never Smoker    Smokeless tobacco: Never Used   Substance and Sexual Activity    Alcohol use: No    Drug use: No    Sexual activity: Never   Lifestyle    Physical activity:     Days per week: Not on file     Minutes per session: Not on file    Stress: Only a little   Relationships    Social connections:     Talks on phone: Not on file     Gets together: Not on file     Attends Zoroastrian service: Not on file     Active member of club or organization: Not on file     Attends meetings of clubs or organizations: Not on file     Relationship status: Not on file   Other Topics Concern    Not on file   Social History Narrative    Not on file       Family History:  Family History   Problem Relation Age of Onset    Cancer Mother         COLON    Hypertension Mother     Aneurysm Father     Alcohol abuse Brother     Kidney failure Sister     Cancer Brother         throat-smoker     Alcohol abuse Brother     Pacemaker/defibrilator Brother     Birth defects Paternal Grandfather         prostate     Asthma Neg Hx     Emphysema Neg Hx     Amblyopia Neg Hx     Blindness Neg Hx     Cataracts Neg Hx     Diabetes Neg Hx     Glaucoma Neg Hx     Macular degeneration Neg Hx     Retinal detachment Neg Hx     Strabismus Neg Hx     Stroke Neg Hx     Thyroid disease Neg Hx     Cirrhosis Neg Hx        Medications:  Current Outpatient Medications   Medication Sig Dispense Refill    alendronate (FOSAMAX) 70 MG tablet Take 1 tablet (70 mg total) by mouth every 7 days. 12 tablet 1    ELIQUIS 5 mg Tab       levothyroxine (SYNTHROID) 25 MCG tablet TAKE 1 TABLET EVERY DAY 90 tablet 3    melatonin 10 mg Tab Take by mouth once daily.       metoprolol succinate (TOPROL-XL) 100 MG 24 hr tablet Take 1 tablet (100 mg total) by mouth once daily. 90 tablet 3    oxybutynin (DITROPAN-XL) 10 MG 24 hr tablet TAKE 1 TABLET ONE TIME DAILY 90 tablet 3    sertraline (ZOLOFT) 25 MG  tablet TAKE 1 TABLET EVERY DAY 90 tablet 0    temazepam (RESTORIL) 30 mg capsule       traMADol (ULTRAM) 50 mg tablet TAKE 1 TABLET BY MOUTH EVERY 24 HOURS AS NEEDED FOR PAIN (MAY CAUSE DROWSINESS)  2    albuterol (PROVENTIL HFA) 90 mcg/actuation inhaler Inhale 2 puffs into the lungs every 6 (six) hours as needed (ANY equivalent covered is OK). (Patient not taking: Reported on 1/13/2020) 1 Inhaler 2     No current facility-administered medications for this visit.        Allergies:  Review of patient's allergies indicates:  No Known Allergies    ROS:  A 12 point review of system was negative aside from pertinent positives and negatives as outlined above.    Physical Exam  Vitals:    01/13/20 1055   BP: (!) 174/101   Pulse: 75       General: well nourished, well developed  Eyes: no scleral icterus   Nose: nasal turbinates intact  Neck: supple, ROM intact  Skin: no rash or ecchymosis  Joints: no swelling or erythema  Cardiac: regular rate and rhythm  Lungs: clear to auscultation bilaterally    Neuro:  Mental status: AAO x 3, no dysarthria, no aphasia, communicating appropriately MMSE 22/30  CN: PERRL, EOMI, VFF, V1-V3 sensation intact, no facial asymmetry, hearing grossly intact, tongue midline  Motor:   RUE 5/5  RLE 5/5  LUE 5/5  LLE 5/5    Normal bulk and tone    Reflexes: 1+ throughout, toes equivocal bilaterally  Sensory: intact to light touch throughout  Coordination: no dysmetria on FTN  Gait: steady    Prior Imaging/Labs:  Reviewed      Assessment and Plan:    89 y.o. female with incidental meningioma, mild dementia and vitamin b12 deficiency.    1. Mild dementia  Discussed with patient and family that patient should no longer drive for safety purposes   I have advised family to start thinking about supervision for patient for safety concerns  We discussed starting medication for dementia, but family would like to hold off at this time    2. Vitamin B12 deficiency  - ANTI-PARIETAL ANTIBODY; Future  -  INTRINSIC FACTOR BLOCKING AB; Future  - Ambulatory Referral to Hematology / Oncology  Family was advised to follow up with PCP for b12 injections pending recs by heme/onc            Patient and family were advised to notify me for worsening symptoms. I will see patient back in 4 months or sooner if necessary.       Thalia Deluna DO  [facility]  [address] [facility]  [place] [address]  [phone]

## 2020-01-14 LAB
ANNOTATION COMMENT IMP: NORMAL
IF BLOCK AB SER QL: NEGATIVE

## 2020-01-15 LAB — PCA AB TITR SER IF: NORMAL {TITER}

## 2020-01-21 ENCOUNTER — INITIAL CONSULT (OUTPATIENT)
Dept: HEMATOLOGY/ONCOLOGY | Facility: CLINIC | Age: 85
End: 2020-01-21
Payer: MEDICARE

## 2020-01-21 VITALS
OXYGEN SATURATION: 98 % | WEIGHT: 145.94 LBS | RESPIRATION RATE: 18 BRPM | TEMPERATURE: 98 F | DIASTOLIC BLOOD PRESSURE: 85 MMHG | HEART RATE: 79 BPM | SYSTOLIC BLOOD PRESSURE: 157 MMHG | BODY MASS INDEX: 25.85 KG/M2

## 2020-01-21 DIAGNOSIS — Z85.118 HISTORY OF LUNG CANCER: Primary | ICD-10-CM

## 2020-01-21 DIAGNOSIS — R41.3 MEMORY LOSS, SHORT TERM: ICD-10-CM

## 2020-01-21 DIAGNOSIS — D51.3 OTHER DIETARY VITAMIN B12 DEFICIENCY ANEMIA: ICD-10-CM

## 2020-01-21 PROCEDURE — 1126F AMNT PAIN NOTED NONE PRSNT: CPT | Mod: HCNC,S$GLB,, | Performed by: INTERNAL MEDICINE

## 2020-01-21 PROCEDURE — 1159F PR MEDICATION LIST DOCUMENTED IN MEDICAL RECORD: ICD-10-PCS | Mod: HCNC,S$GLB,, | Performed by: INTERNAL MEDICINE

## 2020-01-21 PROCEDURE — 1101F PR PT FALLS ASSESS DOC 0-1 FALLS W/OUT INJ PAST YR: ICD-10-PCS | Mod: HCNC,CPTII,S$GLB, | Performed by: INTERNAL MEDICINE

## 2020-01-21 PROCEDURE — 1101F PT FALLS ASSESS-DOCD LE1/YR: CPT | Mod: HCNC,CPTII,S$GLB, | Performed by: INTERNAL MEDICINE

## 2020-01-21 PROCEDURE — 99204 OFFICE O/P NEW MOD 45 MIN: CPT | Mod: HCNC,S$GLB,, | Performed by: INTERNAL MEDICINE

## 2020-01-21 PROCEDURE — 99204 PR OFFICE/OUTPT VISIT, NEW, LEVL IV, 45-59 MIN: ICD-10-PCS | Mod: HCNC,S$GLB,, | Performed by: INTERNAL MEDICINE

## 2020-01-21 PROCEDURE — 99999 PR PBB SHADOW E&M-EST. PATIENT-LVL III: ICD-10-PCS | Mod: PBBFAC,HCNC,, | Performed by: INTERNAL MEDICINE

## 2020-01-21 PROCEDURE — 99999 PR PBB SHADOW E&M-EST. PATIENT-LVL III: CPT | Mod: PBBFAC,HCNC,, | Performed by: INTERNAL MEDICINE

## 2020-01-21 PROCEDURE — 1159F MED LIST DOCD IN RCRD: CPT | Mod: HCNC,S$GLB,, | Performed by: INTERNAL MEDICINE

## 2020-01-21 PROCEDURE — 1126F PR PAIN SEVERITY QUANTIFIED, NO PAIN PRESENT: ICD-10-PCS | Mod: HCNC,S$GLB,, | Performed by: INTERNAL MEDICINE

## 2020-01-21 RX ORDER — CYANOCOBALAMIN 1000 UG/ML
INJECTION, SOLUTION INTRAMUSCULAR; SUBCUTANEOUS
Qty: 1 ML | Refills: 15 | Status: ON HOLD | OUTPATIENT
Start: 2020-01-21 | End: 2023-02-20

## 2020-01-21 NOTE — LETTER
January 21, 2020      Thalia Deluna, DO  [address]  [address]  [place] [address]           [facility]  [address]  [place] [address]  Phone: [phone]          Patient: Sandra Moody   MR Number: [id number]   YOB: 1928   Date of Visit: 1/21/2020       Dear Dr. Thalia Deluna:    Thank you for referring Sandra Moody to me for evaluation. Attached you will find relevant portions of my assessment and plan of care.    If you have questions, please do not hesitate to call me. I look forward to following Sandra Moody along with you.    Sincerely,    Dunia Moreno MD    Enclosure  CC:  No Recipients    If you would like to receive this communication electronically, please contact [email or web address] or [phone] to request more information on [facility] access.    For providers and/or their staff who would like to refer a patient to [facility], please contact us through our one-stop-shop provider referral line, [facility] , at [phone].    If you feel you have received this communication in error or would no longer like to receive these types of communications, please e-mail [email or web address]

## 2020-01-21 NOTE — Clinical Note
Hi Dr. Harry,I saw Mrs. Moody for B12 deficiency after being referred by neurology. I gave her a B12 script, Unfortunately I only have a MA with me and I was wondering if your nurse can teach her daughter on how to administer B12 injections. Typically on main campus, my RN teaches them once and they self administer after that. Let me know if that works for you

## 2020-01-21 NOTE — PROGRESS NOTES
Subjective:       Patient ID: Sandra Moody is a 89 y.o. female.    Chief Complaint: No chief complaint on file.  ONCOLOGIC HISTORY:   Ms. Moody is an 87year-old female. She has a diagnosis of T2N0M0 right upper lobe lung adenocarcinoma, which was resected on 02/25/2010 with a right upper lobectomy and mediastinal lymph node dissection. She is being followed with scans.     HPII saw her last in 2016, mostr recently she has been having memory issues which is being attributed to her B12 deficiency and she has been referred to discuss replacement.  She is accompanied by her daughter who notes that her main issue is she forgets to take her pills.  Patient herself feels well and has no particular complaints at this time    Review of Systems   Constitutional: Negative for appetite change, fatigue and unexpected weight change.   HENT: Negative for mouth sores.    Eyes: Negative for visual disturbance.   Respiratory: Negative for cough and shortness of breath.    Cardiovascular: Negative for chest pain.   Gastrointestinal: Negative for abdominal pain and diarrhea.   Genitourinary: Negative for frequency.   Musculoskeletal: Negative for back pain.   Skin: Negative for rash.   Neurological: Negative for headaches.   Hematological: Negative for adenopathy.   Psychiatric/Behavioral: The patient is not nervous/anxious.    All other systems reviewed and are negative.      Objective:      Physical Exam   Constitutional: She is oriented to person, place, and time. She appears well-developed and well-nourished.   HENT:   Mouth/Throat: No oropharyngeal exudate.   Cardiovascular: Normal rate and normal heart sounds.   Pulmonary/Chest: Effort normal and breath sounds normal. She has no wheezes.   Abdominal: Soft. Bowel sounds are normal. There is no tenderness.   Musculoskeletal: She exhibits no edema or tenderness.   Lymphadenopathy:     She has no cervical adenopathy.   Neurological: She is alert and oriented to  person, place, and time. Coordination normal.   Skin: Skin is warm and dry. No rash noted.   Psychiatric: She has a normal mood and affect. Judgment and thought content normal.   Vitals reviewed.      LABS  WBC   Date Value Ref Range Status   11/08/2019 8.51 3.90 - 12.70 K/uL Final     Hemoglobin   Date Value Ref Range Status   11/08/2019 15.5 12.0 - 16.0 g/dL Final     Hematocrit   Date Value Ref Range Status   11/08/2019 48.2 37.0 - 48.5 % Final     Platelets   Date Value Ref Range Status   11/08/2019 144 (L) 150 - 350 K/uL Final     Gran # (ANC)   Date Value Ref Range Status   11/08/2019 6.3 1.8 - 7.7 K/uL Final     Gran%   Date Value Ref Range Status   11/08/2019 73.6 (H) 38.0 - 73.0 % Final       Chemistry        Component Value Date/Time     11/08/2019 1200    K 3.8 11/08/2019 1200     11/08/2019 1200    CO2 25 11/08/2019 1200    BUN 17 11/08/2019 1200    CREATININE 0.7 01/07/2020 1025     (H) 11/08/2019 1200        Component Value Date/Time    CALCIUM 9.5 11/08/2019 1200    ALKPHOS 62 11/08/2019 1200    AST 17 11/08/2019 1200    ALT 16 11/08/2019 1200    BILITOT 1.1 (H) 11/08/2019 1200    ESTGFRAFRICA >60 01/07/2020 1025    EGFRNONAA >60 01/07/2020 1025        B12:260  MMA:0.54         Assessment:       1. History of lung cancer    2. Other dietary vitamin B12 deficiency anemia    3. Memory loss, short term        Plan:        1. She has no evidence of recurrence and does not need follow-up unless symptoms  2,3. Discussed B12 replacement, sent script to her pharmacy. Sent message to her PCP for administration.    Can follow-up with me as needed  Above care plan was discussed with patient and accompanying daughter and all questions were addressed to their satisfaction

## 2020-02-03 ENCOUNTER — OFFICE VISIT (OUTPATIENT)
Dept: FAMILY MEDICINE | Facility: CLINIC | Age: 85
End: 2020-02-03
Payer: MEDICARE

## 2020-02-03 VITALS
TEMPERATURE: 99 F | WEIGHT: 146 LBS | DIASTOLIC BLOOD PRESSURE: 70 MMHG | BODY MASS INDEX: 25.87 KG/M2 | HEIGHT: 63 IN | SYSTOLIC BLOOD PRESSURE: 157 MMHG

## 2020-02-03 DIAGNOSIS — J44.9 CHRONIC OBSTRUCTIVE PULMONARY DISEASE, UNSPECIFIED COPD TYPE: ICD-10-CM

## 2020-02-03 DIAGNOSIS — Z90.710 POST HYSTERECTOMY MENOPAUSE: ICD-10-CM

## 2020-02-03 DIAGNOSIS — F32.A ANXIETY AND DEPRESSION: ICD-10-CM

## 2020-02-03 DIAGNOSIS — R26.9 ABNORMALITY OF GAIT AND MOBILITY: ICD-10-CM

## 2020-02-03 DIAGNOSIS — K76.6 PORTAL HYPERTENSION: ICD-10-CM

## 2020-02-03 DIAGNOSIS — E03.9 ACQUIRED HYPOTHYROIDISM: ICD-10-CM

## 2020-02-03 DIAGNOSIS — E55.9 VITAMIN D DEFICIENCY DISEASE: ICD-10-CM

## 2020-02-03 DIAGNOSIS — F39 MOOD DISORDER: ICD-10-CM

## 2020-02-03 DIAGNOSIS — I48.0 PAROXYSMAL ATRIAL FIBRILLATION: ICD-10-CM

## 2020-02-03 DIAGNOSIS — F41.9 ANXIETY AND DEPRESSION: ICD-10-CM

## 2020-02-03 DIAGNOSIS — I10 ESSENTIAL HYPERTENSION: ICD-10-CM

## 2020-02-03 DIAGNOSIS — K74.69 CRYPTOGENIC CIRRHOSIS: ICD-10-CM

## 2020-02-03 DIAGNOSIS — E53.8 B12 DEFICIENCY: ICD-10-CM

## 2020-02-03 DIAGNOSIS — D69.6 THROMBOCYTOPENIA: ICD-10-CM

## 2020-02-03 DIAGNOSIS — I70.0 ATHEROSCLEROSIS OF AORTA: ICD-10-CM

## 2020-02-03 DIAGNOSIS — Z85.118 HISTORY OF LUNG CANCER: ICD-10-CM

## 2020-02-03 DIAGNOSIS — K21.9 GASTROESOPHAGEAL REFLUX DISEASE, ESOPHAGITIS PRESENCE NOT SPECIFIED: ICD-10-CM

## 2020-02-03 DIAGNOSIS — Z00.00 ENCOUNTER FOR PREVENTIVE HEALTH EXAMINATION: Primary | ICD-10-CM

## 2020-02-03 DIAGNOSIS — Z86.010 HISTORY OF COLONIC POLYPS: ICD-10-CM

## 2020-02-03 DIAGNOSIS — G47.00 INSOMNIA, UNSPECIFIED TYPE: ICD-10-CM

## 2020-02-03 DIAGNOSIS — Z74.09 OTHER REDUCED MOBILITY: ICD-10-CM

## 2020-02-03 DIAGNOSIS — M85.80 OSTEOPENIA, UNSPECIFIED LOCATION: ICD-10-CM

## 2020-02-03 DIAGNOSIS — R73.9 HYPERGLYCEMIA: ICD-10-CM

## 2020-02-03 DIAGNOSIS — Z00.00 ROUTINE MEDICAL EXAM: Primary | ICD-10-CM

## 2020-02-03 DIAGNOSIS — E89.40 POST HYSTERECTOMY MENOPAUSE: ICD-10-CM

## 2020-02-03 PROCEDURE — 99999 PR PBB SHADOW E&M-EST. PATIENT-LVL III: ICD-10-PCS | Mod: PBBFAC,HCNC,, | Performed by: NURSE PRACTITIONER

## 2020-02-03 PROCEDURE — 99499 UNLISTED E&M SERVICE: CPT | Mod: S$GLB,,, | Performed by: NURSE PRACTITIONER

## 2020-02-03 PROCEDURE — 1126F AMNT PAIN NOTED NONE PRSNT: CPT | Mod: HCNC,S$GLB,, | Performed by: INTERNAL MEDICINE

## 2020-02-03 PROCEDURE — G0439 PR MEDICARE ANNUAL WELLNESS SUBSEQUENT VISIT: ICD-10-PCS | Mod: HCNC,S$GLB,, | Performed by: NURSE PRACTITIONER

## 2020-02-03 PROCEDURE — 99499 UNLISTED E&M SERVICE: CPT | Mod: S$GLB,,, | Performed by: INTERNAL MEDICINE

## 2020-02-03 PROCEDURE — 99397 PER PM REEVAL EST PAT 65+ YR: CPT | Mod: HCNC,S$GLB,, | Performed by: INTERNAL MEDICINE

## 2020-02-03 PROCEDURE — 99397 PR PREVENTIVE VISIT,EST,65 & OVER: ICD-10-PCS | Mod: HCNC,S$GLB,, | Performed by: INTERNAL MEDICINE

## 2020-02-03 PROCEDURE — 99214 OFFICE O/P EST MOD 30 MIN: CPT | Mod: 25,HCNC,S$GLB, | Performed by: INTERNAL MEDICINE

## 2020-02-03 PROCEDURE — 99999 PR PBB SHADOW E&M-EST. PATIENT-LVL III: ICD-10-PCS | Mod: PBBFAC,HCNC,, | Performed by: INTERNAL MEDICINE

## 2020-02-03 PROCEDURE — 99999 PR PBB SHADOW E&M-EST. PATIENT-LVL III: CPT | Mod: PBBFAC,HCNC,, | Performed by: INTERNAL MEDICINE

## 2020-02-03 PROCEDURE — 1159F MED LIST DOCD IN RCRD: CPT | Mod: HCNC,S$GLB,, | Performed by: INTERNAL MEDICINE

## 2020-02-03 PROCEDURE — G0439 PPPS, SUBSEQ VISIT: HCPCS | Mod: HCNC,S$GLB,, | Performed by: NURSE PRACTITIONER

## 2020-02-03 PROCEDURE — 99214 PR OFFICE/OUTPT VISIT, EST, LEVL IV, 30-39 MIN: ICD-10-PCS | Mod: 25,HCNC,S$GLB, | Performed by: INTERNAL MEDICINE

## 2020-02-03 PROCEDURE — 1101F PR PT FALLS ASSESS DOC 0-1 FALLS W/OUT INJ PAST YR: ICD-10-PCS | Mod: HCNC,CPTII,S$GLB, | Performed by: INTERNAL MEDICINE

## 2020-02-03 PROCEDURE — 1101F PT FALLS ASSESS-DOCD LE1/YR: CPT | Mod: HCNC,CPTII,S$GLB, | Performed by: INTERNAL MEDICINE

## 2020-02-03 PROCEDURE — 1159F PR MEDICATION LIST DOCUMENTED IN MEDICAL RECORD: ICD-10-PCS | Mod: HCNC,S$GLB,, | Performed by: INTERNAL MEDICINE

## 2020-02-03 PROCEDURE — 99999 PR PBB SHADOW E&M-EST. PATIENT-LVL III: CPT | Mod: PBBFAC,HCNC,, | Performed by: NURSE PRACTITIONER

## 2020-02-03 PROCEDURE — 99499 RISK ADDL DX/OHS AUDIT: ICD-10-PCS | Mod: S$GLB,,, | Performed by: INTERNAL MEDICINE

## 2020-02-03 PROCEDURE — 1126F PR PAIN SEVERITY QUANTIFIED, NO PAIN PRESENT: ICD-10-PCS | Mod: HCNC,S$GLB,, | Performed by: INTERNAL MEDICINE

## 2020-02-03 PROCEDURE — 99499 RISK ADDL DX/OHS AUDIT: ICD-10-PCS | Mod: S$GLB,,, | Performed by: NURSE PRACTITIONER

## 2020-02-03 RX ORDER — ALBUTEROL SULFATE 90 UG/1
2 AEROSOL, METERED RESPIRATORY (INHALATION) EVERY 6 HOURS PRN
Qty: 18 G | Refills: 12 | Status: ON HOLD | OUTPATIENT
Start: 2020-02-03 | End: 2023-02-20

## 2020-02-03 RX ORDER — TEMAZEPAM 30 MG/1
30 CAPSULE ORAL NIGHTLY PRN
Qty: 30 CAPSULE | Refills: 5 | Status: SHIPPED | OUTPATIENT
Start: 2020-02-03 | End: 2020-08-11

## 2020-02-03 NOTE — PROGRESS NOTES
Chief complaint --physical exam    89-year-old white female patient here for her physical. Last seen 2017. Does not need any cancer screening although we did note her family history of colon cancer in her mom.  She also has a personal history of colon polyps back in 2011 with a 5 year follow-up but she is not interested in pursuing another colonoscopy.  She apparently did some stool testing that was sent to her.     labs are reviewed        ROS:   CONST: weight stable. EYES: no vision change. ENT: no sore throat. CV: no chest pain w/ exertion. RESP: no shortness of breath. GI: no nausea, vomiting, diarrhea. No dysphagia. : no urinary issues. MUSCULOSKELETAL: no new myalgias or arthralgias. SKIN: no new changes. NEURO: no focal deficits. PSYCH: no new issues. ENDOCRINE: no polyuria. HEME: no lymph nodes. ALLERGY: no general pruritis.      PMH:   1. HTN, chronically uncontrolled. Renal ultrasound w/o ANAI in 2001.   2. Hypothyroidism.   3. Osteopenia, appears Fosamax was started 10/04 by Dr. Haynes. Restarted 2014.  4. H/o kidney stones.   5. Menopause at 47.   6. H/o paroxysmal supraventricular tachycardia.   7. FMH of colon cancer, mother.   8. Allergic rhinitis.   9. Last colonoscopy with diverticulosis 2001.   10. H/o diverticulitis.   11. S/p hysterectomy, tonsillectomy, appendectomy, cholecystectomy.   12. H/o cystocele and rectocele.   13. Adenocarcinoma of the lung, right upper lobe resection, 2010.   14. COLON POLYPS and many tics 7/11 -5 yrs   15. Mammo 10/16   16.  Trochanteric bursitis  17.  Insomnia  18.  PVC's, neg stress 12/12 -WJ  19.  Urinary incontinence, bladder lift ×3  20.  Cryptogenic cirrhosis -seen by Hep clinic, HSM on u/s, no varices  21.  Memory loss noted after hospitalization, seen by Neurology, B12 low normal 2019    Vitals as above    Gen: no distress  EYES: conjunctiva clear, non-icteric, PERRL  ENT: nose clear, nasal mucosa normal, oropharynx clear and moist, teeth  good  NECK:supple, thyroid non-palpable  RESP: effort is good, lungs clear  CV: heart RRR w/o murmur, gallops or rubs; no carotid bruits, no edema  GI: abdomen soft, non-distended, non-tender, no hepatosplenomegaly  MS: gait normal, no clubbing or cyanosis of the digits  SKIN: no rashes, warm to touch     Sandra was seen today for annual exam.    Diagnoses and all orders for this visit:    Routine medical exam, patient wishes to defer her breast cancer screening.  She also does not wish to pursue any further colon screening although we did discuss that she is at more risk than others with her family history and personal history of polyps which she states her polyps are only 1 time.                                                      Additional evaluation and management issues:     Additionally, patient has numerous other medical issues to address today.      Recent labs reviewed -B12 below 300. Glucose 140, 162.  Patient apparently was noted to have some memory loss after a hospitalization which we discussed could be multifactorial related to the illness, the hospitalization as well as the distinct possibility some underlying age-related dementia.  She has seen Neurology.  B12 level was low normal.  Her workup for pernicious anemia was negative.  Patient herself would prefer not to do a B12 injection which they do have.  We discussed trying sublingual or oral B12 1000 mcg and we can do labs in one month to assess if indeed she can supplement orally.  If not it will be a very short amount of time we can know if indeed she needs to supplement intramuscular.  Family are agreeable to that plan and it is patient's preference to try oral 1st.    She does have osteopenia continues on weekly Fosamax and she had a bone density     She has significant vitamin D deficiency  and we discussed taking 5000 units of vitamin D.  Her hypertension appears to be indicated control.      She continues with insomnia and family, daughter  in law is regulating medications and she does wish to continue taking the temazepam.    She does occasionally get right shoulder pain. She does sleep on that right shoulder which we discussed could be a precipitating factor.  Issues when the weather gets cold as well.  They took two tramadol and she was very goofy and had to go to the hospital.  She tolerates one but we discussed probably best to stay away from that medication under the circumstances.  I reiterated that she cannot use anti-inflammatories which she has used being on Eliquis as well as with her liver disease.  Tylenol would likely be the safest as long as the dose is limited and not exceeded.  She can try topical rubs, ice packs and so forth as well.        Her anxiety and mood disorder  .  It does looks like she has Zoloft on her as needed list but we discussed really does not work that way and so the daughter will remove it.  It may have been initiated when her   years ago but she has not been taking on a regular basis and patient and family do not feel she has any underlying anxiety and depression that would need to be treated with SSRI at this time.      She has hypothyroidism and thyroid function normal.      She has cirrhosis which likely explains her thrombocytopenia which is actually getting a little bit better.  She has had no abdominal distention or pain and she followed regular by the liver clinic.        All these various issues discussed and patient counseled and her evaluation and management of the separate issues we based upon time counseling.  Total time over 25 minutes with over 50% counseling.              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    Paroxysmal atrial fibrillation, on anticoagulation    Insomnia, unspecified type, willing to purchase the temazepam which she finds effective    Vitamin D deficiency disease, appears  to have responded to treatment    Atherosclerosis of aorta    Osteopenia, unspecified location    Thrombocytopenia, noted    Portal hypertension, noted    Anxiety and depression, discontinue Zoloft    History of lung cancer    Acquired hypothyroidism, euthyroid    History of colonic polyps    B12 deficiency, start 1000 orally and recheck in about one month  -     Vitamin B12; Future    Chronic obstructive pulmonary disease, unspecified COPD type  -     albuterol (PROVENTIL HFA) 90 mcg/actuation inhaler; Inhale 2 puffs into the lungs every 6 (six) hours as needed (ANY equivalent covered is OK).    Hyperglycemia, explained that she has had some noted hyperglycemia with random labs we will assess for diabetes.  She does eat a lot of sweets  -     Hemoglobin A1c; Future    Mood disorder    Other orders  -     temazepam (RESTORIL) 30 mg capsule; Take 1 capsule (30 mg total) by mouth nightly as needed for Insomnia.

## 2020-02-04 VITALS
WEIGHT: 145.94 LBS | BODY MASS INDEX: 25.86 KG/M2 | HEART RATE: 73 BPM | SYSTOLIC BLOOD PRESSURE: 157 MMHG | TEMPERATURE: 99 F | DIASTOLIC BLOOD PRESSURE: 70 MMHG | HEIGHT: 63 IN

## 2020-02-04 PROBLEM — R79.89 ELEVATED TROPONIN: Status: RESOLVED | Noted: 2019-11-08 | Resolved: 2020-02-04

## 2020-02-04 NOTE — PATIENT INSTRUCTIONS
Counseling and Referral of Other Preventative  (Italic type indicates deductible and co-insurance are waived)    Patient Name: Sandra Moody  Today's Date: 2/4/2020    Health Maintenance       Date Due Completion Date    Shingles Vaccine (1 of 2) 10/27/1978 Patient aware of recommendation for shingles vaccine.     TETANUS VACCINE 09/13/2015 9/13/2005, Patient aware of recommendation for tetanus vaccine.     DEXA SCAN 10/16/2019 10/16/2017 (Done) Patient aware of recommendation for updated bone density scan.     Override on 10/16/2017: Done    Override on 4/10/2014: (N/S)    Lipid Panel 09/07/2022 9/7/2017        No orders of the defined types were placed in this encounter.    The following information is provided to all patients.  This information is to help you find resources for any of the problems found today that may be affecting your health:                Living healthy guide: www.[facility].[place].gov      Understanding Diabetes: www.diabetes.org      Eating healthy: www.cdc.gov/healthyweight      [facility] home safety checklist: www.cdc.gov/steadi/patient.html      Agency on Aging: www.goea.[place].[facility]      Alcoholics anonymous (AA): www.aa.org      Physical Activity: www.natalie.nih.gov/op0sslv      Tobacco use: www.quitwithusla.org

## 2020-02-04 NOTE — PROGRESS NOTES
"Sandra Moody presented for a  Medicare AWV and comprehensive Health Risk Assessment today. The following components were reviewed and updated:    · Medical history  · Family History  · Social history  · Allergies and Current Medications  · Health Risk Assessment  · Health Maintenance  · Care Team     ** See Completed Assessments for Annual Wellness Visit within the encounter summary.**       The following assessments were completed:  · Living Situation  · CAGE  · Depression Screening  · Timed Get Up and Go  · Whisper Test  · Cognitive Function Screening  ·   ·   · Nutrition Screening  · ADL Screening  · PAQ Screening    Vitals:    02/03/20 1121   BP: (!) 157/70   Pulse: 73   Temp: 98.6 °F (37 °C)   TempSrc: Oral   Weight: 66.2 kg (145 lb 15.1 oz)   Height: 5' 3" (1.6 m)     Body mass index is 25.85 kg/m².  Physical Exam   Cardiovascular: Normal rate.   Pulmonary/Chest: Effort normal.   Neurological: She is alert.   Skin: Skin is warm.   Psychiatric: She has a normal mood and affect. Her behavior is normal. Thought content normal.   Vitals reviewed.        Diagnoses and health risks identified today and associated recommendations/orders:    1. Encounter for preventive health examination  Education provided about preventive health examinations and procedures; addressed and discussed patient's health concerns. Additionally, reviewed medical record for risk factors and documented the results during this encounter.  - DXA Bone Density Spine And Hip; Future    2. Atherosclerosis of aorta  Stable, patient evaluated/monitored by [facility] cardiology dept; continue as advised.     3. Portal hypertension  Stable, patient evaluated/monitored by [facility] hepatology dept; continue as advised.     4. Cryptogenic cirrhosis  Stable, patient evaluated/monitored by [facility] hepatology dept; continue as advised.     5. Mood disorder  Stable and monitored. Continue current treatment plan as previously prescribed.     6. " Paroxysmal atrial fibrillation  Stable, patient evaluated/monitored by [facility] cardiology dept; continue as advised.     7. Thrombocytopenia  Stable, patient evaluated/monitored by [facility] hematology/oncology dept; continue as advised.     8. Acquired hypothyroidism  Clinically euthyroid, TSH 2.379 (November, 2019). The current medical regimen is effective;  continue present plan and medications.    9. Gastroesophageal reflux disease, esophagitis presence not specified  Stable and monitored. Continue current treatment plan as previously prescribed.     10. Essential hypertension  Presently not at goal. We discussed health risks associated with this issue.  Patient aware of dietary and lifestyle modifications and medicinal interventions.     11. Other reduced mobility  Stable, using rollator; we discussed home environment, fall hazards.     12. Abnormality of gait and mobility  Stable, using rollator; we discussed home environment, fall hazards.     13. Post hysterectomy menopause  - DXA Bone Density Spine And Hip; Future      Provided Sandra with a 5-10 year written screening schedule and personal prevention plan. Recommendations were developed using the USPSTF age appropriate recommendations. Education, counseling, and referrals were provided as needed. After Visit Summary printed and given to patient which includes a list of additional screenings\tests needed.    Follow up in about 1 year (around 2/3/2021) for assessment .    Tom Schneider Jr, NP  I offered to discuss end of life issues, including information on how to make advance directives that the patient could use to name someone who would make medical decisions on their behalf if they became too ill to make themselves.    ___Patient declined  _X_Patient is interested, I provided paper work and offered to discuss.

## 2020-02-10 ENCOUNTER — TELEPHONE (OUTPATIENT)
Dept: HEPATOLOGY | Facility: CLINIC | Age: 85
End: 2020-02-10

## 2020-02-10 NOTE — TELEPHONE ENCOUNTER
Contacted patient daughter in law and informed her that the appointment scheduled is the first available appointment with MsRa Jennifer. Patient daughter in law stated that she will keep that appointment.

## 2020-02-13 DIAGNOSIS — K74.69 CRYPTOGENIC CIRRHOSIS: Primary | ICD-10-CM

## 2020-02-21 DIAGNOSIS — M85.80 OSTEOPENIA, UNSPECIFIED LOCATION: ICD-10-CM

## 2020-02-21 RX ORDER — ALENDRONATE SODIUM 70 MG/1
TABLET ORAL
Qty: 12 TABLET | Refills: 1 | Status: SHIPPED | OUTPATIENT
Start: 2020-02-21 | End: 2020-07-08

## 2020-02-24 ENCOUNTER — HOSPITAL ENCOUNTER (OUTPATIENT)
Dept: RADIOLOGY | Facility: HOSPITAL | Age: 85
Discharge: HOME | End: 2020-02-24
Attending: NURSE PRACTITIONER
Payer: MEDICARE

## 2020-02-24 DIAGNOSIS — K74.69 CRYPTOGENIC CIRRHOSIS: ICD-10-CM

## 2020-02-24 PROCEDURE — 76700 US EXAM ABDOM COMPLETE: CPT | Mod: TC,HCNC

## 2020-02-24 PROCEDURE — 76700 US ABDOMEN COMPLETE: ICD-10-PCS | Mod: 26,HCNC,, | Performed by: RADIOLOGY

## 2020-02-24 PROCEDURE — 76700 US EXAM ABDOM COMPLETE: CPT | Mod: 26,HCNC,, | Performed by: RADIOLOGY

## 2020-03-04 ENCOUNTER — TELEPHONE (OUTPATIENT)
Dept: HEPATOLOGY | Facility: CLINIC | Age: 85
End: 2020-03-04

## 2020-03-04 NOTE — TELEPHONE ENCOUNTER
Contacted patient and informed them of the need to cancel their appointment due to Mrs. Rodriguez being on maternity leave. I informed patient that one of our providers will look over their testing and we will contact them after they are resulted. Patient understood and was fine with a message or call of the results. Will route message to  Autumn to make her aware.

## 2020-03-05 ENCOUNTER — OFFICE VISIT (OUTPATIENT)
Dept: CARDIOLOGY | Facility: CLINIC | Age: 85
End: 2020-03-05
Payer: MEDICARE

## 2020-03-05 VITALS
SYSTOLIC BLOOD PRESSURE: 149 MMHG | HEART RATE: 59 BPM | RESPIRATION RATE: 16 BRPM | WEIGHT: 147.69 LBS | OXYGEN SATURATION: 91 % | DIASTOLIC BLOOD PRESSURE: 80 MMHG | BODY MASS INDEX: 26.17 KG/M2

## 2020-03-05 DIAGNOSIS — I48.0 PAROXYSMAL ATRIAL FIBRILLATION: Primary | ICD-10-CM

## 2020-03-05 PROCEDURE — 1101F PT FALLS ASSESS-DOCD LE1/YR: CPT | Mod: HCNC,CPTII,S$GLB, | Performed by: INTERNAL MEDICINE

## 2020-03-05 PROCEDURE — 99999 PR PBB SHADOW E&M-EST. PATIENT-LVL III: CPT | Mod: PBBFAC,HCNC,, | Performed by: INTERNAL MEDICINE

## 2020-03-05 PROCEDURE — 1159F PR MEDICATION LIST DOCUMENTED IN MEDICAL RECORD: ICD-10-PCS | Mod: HCNC,S$GLB,, | Performed by: INTERNAL MEDICINE

## 2020-03-05 PROCEDURE — 1159F MED LIST DOCD IN RCRD: CPT | Mod: HCNC,S$GLB,, | Performed by: INTERNAL MEDICINE

## 2020-03-05 PROCEDURE — 99214 PR OFFICE/OUTPT VISIT, EST, LEVL IV, 30-39 MIN: ICD-10-PCS | Mod: HCNC,S$GLB,, | Performed by: INTERNAL MEDICINE

## 2020-03-05 PROCEDURE — 1126F PR PAIN SEVERITY QUANTIFIED, NO PAIN PRESENT: ICD-10-PCS | Mod: HCNC,S$GLB,, | Performed by: INTERNAL MEDICINE

## 2020-03-05 PROCEDURE — 1126F AMNT PAIN NOTED NONE PRSNT: CPT | Mod: HCNC,S$GLB,, | Performed by: INTERNAL MEDICINE

## 2020-03-05 PROCEDURE — 99999 PR PBB SHADOW E&M-EST. PATIENT-LVL III: ICD-10-PCS | Mod: PBBFAC,HCNC,, | Performed by: INTERNAL MEDICINE

## 2020-03-05 PROCEDURE — 99214 OFFICE O/P EST MOD 30 MIN: CPT | Mod: HCNC,S$GLB,, | Performed by: INTERNAL MEDICINE

## 2020-03-05 PROCEDURE — 1101F PR PT FALLS ASSESS DOC 0-1 FALLS W/OUT INJ PAST YR: ICD-10-PCS | Mod: HCNC,CPTII,S$GLB, | Performed by: INTERNAL MEDICINE

## 2020-03-05 NOTE — PROGRESS NOTES
CARDIOVASCULAR CONSULTATION    REASON FOR CONSULT:   Sandra Moody is a 89 y.o. female who presents for evaluation of recent hospitalization for atrial fibrillation.      HISTORY OF PRESENT ILLNESS:     Patient is a pleasant 89-year-old lady.  Recently was noted to have atrial fibrillation during hospitalization.  Was discharged home on Eliquis.  Has been doing fine.  Is here with her family who is very involved with her care.  States that since her discharge she had 1 episode where she felt her palpitations and it lasted few minutes and then went away.  Since then she has stayed in normal sinus rhythm.  She has been taking her Eliquis without any problems.  No bleeding.  The family is concerned about short-term memory loss which has been slowly worsening.  Referral to neurology has been made.    Notes from December 2019:  Patient stopped taking her medications and came to the emergency room with tachycardia around [id number] beats per minute which lasts a few minutes.  Resolved after restarting her Toprol-XL.  Has not been missing her doses anymore and states that she has been compliant with the medications.  No further episodes of palpitations or atrial fibrillation noted.  Denies any chest pains at rest on exertion, orthopnea, PND.    Notes from March 2020:  Patient here for follow-up.  Denies any chest pains at rest on exertion, orthopnea, PND.  Has been doing fine.    PAST MEDICAL HISTORY:     Past Medical History:   Diagnosis Date    Allergy     Ambulates with cane     Anxiety     Back pain     Cirrhosis     COPD (chronic obstructive pulmonary disease) 10/16/2012    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    Cystocele     Depression     Diverticulitis     Dyspnea 11/8/2012    Family history of colon cancer 10/2/2017    GERD (gastroesophageal reflux disease)     History of colonic polyps 10/2/2017    Hypertension     Hypothyroidism 4/10/2014    Insomnia 4/18/2013    Kidney stones     Lung  cancer     adenocarcinoma, RUL    Lung cancer, upper lobe 3/4/2013    Menopause     AGE 47    Nuclear sclerosis 7/17/2014    Osteopenia     dr. calhoun- fosamax    Other dietary vitamin B12 deficiency anemia 1/21/2020    Paroxysmal atrial fibrillation 10/21/2019    PSVT (paroxysmal supraventricular tachycardia)     dr. marx    PVC (premature ventricular contraction) 11/10/2012    Rectocele     Short-term memory loss     Status post partial lobectomy of lung 2/2010    Thyroid disease     hypothyroidism    Trochanteric bursitis 4/18/2013    Urine incontinence 2/24/2015    Vitamin D deficiency disease 9/4/2014       PAST SURGICAL HISTORY:     Past Surgical History:   Procedure Laterality Date    ADENOIDECTOMY      APPENDECTOMY      CHOLECYSTECTOMY      COLONOSCOPY      CYSTOSCOPY      ESOPHAGOGASTRODUODENOSCOPY N/A 10/23/2019    Procedure: EGD (ESOPHAGOGASTRODUODENOSCOPY);  Surgeon: Archie Humphrey MD;  Location: [facility];  Service: Endoscopy;  Laterality: N/A;    EYE SURGERY Bilateral     HYSTERECTOMY      LUNG LOBECTOMY Right     OOPHORECTOMY      TONSILLECTOMY      UPPER GASTROINTESTINAL ENDOSCOPY         ALLERGIES AND MEDICATION:     Review of patient's allergies indicates:   Allergen Reactions    Flu vaccine [id number](9 yr+)         Medication List           Accurate as of March 5, 2020  2:07 PM. If you have any questions, ask your nurse or doctor.               CONTINUE taking these medications    albuterol 90 mcg/actuation inhaler  Commonly known as:  Proventil HFA  Inhale 2 puffs into the lungs every 6 (six) hours as needed (ANY equivalent covered is OK).     alendronate 70 MG tablet  Commonly known as:  FOSAMAX  TAKE 1 TABLET EVERY 7 DAYS.     cyanocobalamin 1,000 mcg/mL injection  Dispense subcutaneous needles and syringes. Administer once a week for 4 weeks and then once a month     Eliquis 5 mg Tab  Generic drug:  apixaban     levothyroxine 25 MCG tablet  Commonly known  as:  SYNTHROID  TAKE 1 TABLET EVERY DAY     melatonin 10 mg Tab     metoprolol succinate 100 MG 24 hr tablet  Commonly known as:  TOPROL-XL  Take 1 tablet (100 mg total) by mouth once daily.     oxybutynin 10 MG 24 hr tablet  Commonly known as:  DITROPAN-XL  TAKE 1 TABLET ONE TIME DAILY     temazepam 30 mg capsule  Commonly known as:  RESTORIL  Take 1 capsule (30 mg total) by mouth nightly as needed for Insomnia.            SOCIAL HISTORY:     Social History     Socioeconomic History    Marital status:      Spouse name: Not on file    Number of children: Not on file    Years of education: Not on file    Highest education level: Not on file   Occupational History    Not on file   Social Needs    Financial resource strain: Not on file    Food insecurity:     Worry: Not on file     Inability: Not on file    Transportation needs:     Medical: Not on file     Non-medical: Not on file   Tobacco Use    Smoking status: Never Smoker    Smokeless tobacco: Never Used   Substance and Sexual Activity    Alcohol use: No    Drug use: No    Sexual activity: Never   Lifestyle    Physical activity:     Days per week: Not on file     Minutes per session: Not on file    Stress: Only a little   Relationships    Social connections:     Talks on phone: Not on file     Gets together: Not on file     Attends Jainism service: Not on file     Active member of club or organization: Not on file     Attends meetings of clubs or organizations: Not on file     Relationship status: Not on file   Other Topics Concern    Not on file   Social History Narrative    Not on file       FAMILY HISTORY:     Family History   Problem Relation Age of Onset    Cancer Mother         COLON    Hypertension Mother     Aneurysm Father     Alcohol abuse Brother     Kidney failure Sister     Cancer Brother         throat-smoker     Alcohol abuse Brother     Pacemaker/defibrilator Brother     Birth defects Paternal Grandfather          prostate     Asthma Neg Hx     Emphysema Neg Hx     Amblyopia Neg Hx     Blindness Neg Hx     Cataracts Neg Hx     Diabetes Neg Hx     Glaucoma Neg Hx     Macular degeneration Neg Hx     Retinal detachment Neg Hx     Strabismus Neg Hx     Stroke Neg Hx     Thyroid disease Neg Hx     Cirrhosis Neg Hx        REVIEW OF SYSTEMS:   Review of Systems   Constitution: Negative.   HENT: Negative.    Eyes: Negative.    Cardiovascular: Positive for palpitations.   Respiratory: Negative.    Endocrine: Negative.    Hematologic/Lymphatic: Negative.    Skin: Negative.    Musculoskeletal: Negative.    Gastrointestinal: Negative.    Genitourinary: Negative.    Neurological: Negative.    Psychiatric/Behavioral: Negative.    Allergic/Immunologic: Negative.        A 10 point review of systems was performed and all the pertinent positives have been mentioned. Rest of review of systems was negative.        PHYSICAL EXAM:     Vitals:    03/05/20 1342   BP: (!) 149/80   Pulse: (!) 59   Resp: 16    Body mass index is 26.17 kg/m².  Weight: 67 kg (147 lb 11.3 oz)         Physical Exam   Constitutional: She is oriented to person, place, and time. She appears well-developed and well-nourished.   HENT:   Head: Normocephalic.   Eyes: Pupils are equal, round, and reactive to light.   Neck: Normal range of motion. Neck supple.   Cardiovascular: Normal rate and regular rhythm.   Pulmonary/Chest: Effort normal and breath sounds normal.   Abdominal: Soft. Normal appearance and bowel sounds are normal. There is no tenderness.   Musculoskeletal: Normal range of motion.   Neurological: She is alert and oriented to person, place, and time.   Skin: Skin is warm.   Psychiatric: She has a normal mood and affect.         DATA:     Laboratory:  CBC:  Recent Labs   Lab 10/22/19  0435 11/08/19  1200 02/24/20  0820   WBC 4.07 8.51 4.89   Hemoglobin 13.9 15.5 16.0   Hematocrit 43.7 48.2 50.1 H   Platelets 160 144 L 143 L       CHEMISTRIES:  Recent  Labs   Lab 10/16/19  0216 10/17/19  0240  10/22/19  0435 11/08/19  1200 01/07/20  1025 02/24/20  0820   Glucose 97 128 H   < > 162 H 140 H  --  109   Sodium 140 137   < > 140 142  --  142   Potassium 4.2 3.1 L   < > 4.4 3.8  --  4.9   BUN, Bld 11 8   < > 8 17  --  15   Creatinine 0.7 0.5   < > 0.7 0.7 0.7 0.7   eGFR if African American >60 >60   < > >60 >60 >60 >60   eGFR if non African American >60 >60   < > >60 >60 >60 >60   Calcium 7.6 L 7.8 L   < > 9.2 9.5  --  9.9   Magnesium 2.0 1.8  --   --  1.9  --   --     < > = values in this interval not displayed.       CARDIAC BIOMARKERS:  Recent Labs   Lab 11/08/19  1200 11/08/19  1405 11/08/19  1618   Troponin I 0.023 0.056 H 0.067 H       COAGS:  Recent Labs   Lab 10/22/19  0435 11/08/19  1200 02/24/20  0820   INR 1.1 1.0 1.0       LIPIDS/LFTS:  Recent Labs   Lab 09/07/17  0731  10/22/19  0435 11/08/19  1200 02/24/20  0820   Cholesterol 201 H  --   --   --   --    Triglycerides 198 H  --   --   --   --    HDL 48  --   --   --   --    LDL Cholesterol 113.4  --   --   --   --    Non-HDL Cholesterol 153  --   --   --   --    AST 13   < > 29 17 13   ALT 13   < > 41 16 16    < > = values in this interval not displayed.       Hemoglobin A1C   Date Value Ref Range Status   09/07/2017 5.4 4.0 - 5.6 % Final     Comment:     According to ADA guidelines, hemoglobin A1c <7.0% represents  optimal control in non-pregnant diabetic patients. Different  metrics may apply to specific patient populations.   Standards of Medical Care in Diabetes-2016.  For the purpose of screening for the presence of diabetes:  <5.7%     Consistent with the absence of diabetes  5.7-6.4%  Consistent with increasing risk for diabetes   (prediabetes)  >or=6.5%  Consistent with diabetes  Currently, no consensus exists for use of hemoglobin A1c  for diagnosis of diabetes for children.  This Hemoglobin A1c assay has significant interference with fetal   hemoglobin   (HbF). The results are invalid for patients  with abnormal amounts of   HbF,   including those with known Hereditary Persistence   of Fetal Hemoglobin. Heterozygous hemoglobin variants (HbAS, HbAC,   HbAD, HbAE, HbA2) do not significantly interfere with this assay;   however, presence of multiple variants in a sample may impact the %   interference.         TSH  Recent Labs   Lab 09/07/17  0731 03/13/19  1002 11/08/19  1200   TSH 3.131 1.979 2.379       The ASCVD Risk score ([place] MARISOL Jr., et al., 2013) failed to calculate for the following reasons:    The 2013 ASCVD risk score is only valid for ages 40 to 79             ASSESSMENT AND PLAN     Patient Active Problem List   Diagnosis    Essential hypertension    Chronic kidney disease, stage II (mild)    GERD (gastroesophageal reflux disease)    Insomnia    Trochanteric bursitis    Hypothyroidism    Osteopenia    Nuclear sclerosis    Chorioretinal scar    Refractive error    Senile nuclear sclerosis    Vitamin D deficiency disease    Cryptogenic cirrhosis    Portal hypertension    Thrombocytopenia    History of lung cancer    Atherosclerosis of aorta    Anxiety and depression    History of colonic polyps    Chronic right shoulder pain    Candidal intertrigo    Paroxysmal atrial fibrillation    Other dietary vitamin B12 deficiency anemia    Memory loss, short term    Mood disorder       Patient is here for follow-up.  Paroxysmal atrial fibrillation.  Currently asymptomatic.  Continue Eliquis 5 mg b.i.d..    Hypertension:   Continue current therapy.  Patient has been asked to maintain a blood pressure log at home and bring it with her next time she comes to see me.      Follow-up in clinic in 3 months.          Thank you very much for involving me in the care of your patient.  Please do not hesitate to contact me if there are any questions.      Cameron Munguia MD, FACC, [facility]  Interventional Cardiologist, [facility].           This note was dictated with the help of speech  recognition software.  There might be un-intended errors and/or substitutions.

## 2020-03-08 ENCOUNTER — TELEPHONE (OUTPATIENT)
Dept: HEPATOLOGY | Facility: CLINIC | Age: 85
End: 2020-03-08

## 2020-03-08 DIAGNOSIS — K74.69 CRYPTOGENIC CIRRHOSIS: Primary | ICD-10-CM

## 2020-03-09 NOTE — TELEPHONE ENCOUNTER
Called patient and gave her lab results. Scheduled patient for follow up appts and mailed out reminders to her. Patient stated its going to be a good day.

## 2020-03-09 NOTE — TELEPHONE ENCOUNTER
Please notify patient that I reviewed her liver testing as her visit with Jennifer was cancelled.    Labs are stable, liver is working well.  Liver cancer screening blood test was normal.    Ultrasound shows no liver cancer.  Next ultrasound for liver cancer screening in 6 months.     Please schedule: labs (CBC, CMP, INR, AFP), ultrasound, and f/u visit with any provider in 6 months.

## 2020-03-19 RX ORDER — SERTRALINE HYDROCHLORIDE 25 MG/1
TABLET, FILM COATED ORAL
Qty: 90 TABLET | Refills: 0 | Status: SHIPPED | OUTPATIENT
Start: 2020-03-19 | End: 2020-05-15

## 2020-05-15 RX ORDER — SERTRALINE HYDROCHLORIDE 25 MG/1
TABLET, FILM COATED ORAL
Qty: 90 TABLET | Refills: 0 | Status: SHIPPED | OUTPATIENT
Start: 2020-05-15 | End: 2020-09-05

## 2020-05-21 ENCOUNTER — TELEPHONE (OUTPATIENT)
Dept: FAMILY MEDICINE | Facility: CLINIC | Age: 85
End: 2020-05-21

## 2020-05-21 NOTE — TELEPHONE ENCOUNTER
"Return call to Pt,spoke with her Daughter and informed of provider response. She asked Pt if she wanted to come to the office for a visit. She stated,"no".   "

## 2020-05-21 NOTE — TELEPHONE ENCOUNTER
----- Message from Brianna Arreguin sent at 5/21/2020  1:25 PM CDT -----  Contact: Tenisha-Daughter  Type: Patient Call Back    Who called:Tenisha    What is the request in detail:Tenisha called because patient's left leg muscles is hard as a rock. Please call to advise    Can the clinic reply by [facility]?    Would the patient rather a call back or a response via [facility]? Call    Best call back number:[phone]

## 2020-05-28 ENCOUNTER — TELEPHONE (OUTPATIENT)
Dept: FAMILY MEDICINE | Facility: CLINIC | Age: 85
End: 2020-05-28

## 2020-05-28 NOTE — TELEPHONE ENCOUNTER
----- Message from Tamara Bah sent at 5/27/2020  4:10 PM CDT -----  Contact: Self [phone]  Type: Patient Call Back    Who called: Self     What is the request in detail: pt. Is calling in regards to schedule a earlier appt. With  Due to serious legs cramp. Pt. Stated that 6/10 is too long to wait to see .     Can the clinic reply by [facility]? Call back     Would the patient rather a call back or a response via [facility]? Call back     Best call back number: [phone]

## 2020-05-29 ENCOUNTER — OFFICE VISIT (OUTPATIENT)
Dept: FAMILY MEDICINE | Facility: CLINIC | Age: 85
End: 2020-05-29
Payer: MEDICARE

## 2020-05-29 ENCOUNTER — LAB VISIT (OUTPATIENT)
Dept: LAB | Facility: HOSPITAL | Age: 85
End: 2020-05-29
Payer: MEDICARE

## 2020-05-29 VITALS
DIASTOLIC BLOOD PRESSURE: 84 MMHG | BODY MASS INDEX: 25.73 KG/M2 | OXYGEN SATURATION: 94 % | HEIGHT: 63 IN | HEART RATE: 73 BPM | SYSTOLIC BLOOD PRESSURE: 140 MMHG | TEMPERATURE: 97 F | WEIGHT: 145.19 LBS

## 2020-05-29 DIAGNOSIS — R25.2 LEG CRAMPING: Primary | ICD-10-CM

## 2020-05-29 DIAGNOSIS — R25.2 LEG CRAMPING: ICD-10-CM

## 2020-05-29 DIAGNOSIS — G89.29 CHRONIC BILATERAL LOW BACK PAIN WITHOUT SCIATICA: ICD-10-CM

## 2020-05-29 DIAGNOSIS — N18.2 CHRONIC KIDNEY DISEASE, STAGE II (MILD): ICD-10-CM

## 2020-05-29 DIAGNOSIS — I10 ESSENTIAL HYPERTENSION: ICD-10-CM

## 2020-05-29 DIAGNOSIS — R00.0 TACHYCARDIA: ICD-10-CM

## 2020-05-29 DIAGNOSIS — I70.0 ATHEROSCLEROSIS OF AORTA: ICD-10-CM

## 2020-05-29 DIAGNOSIS — M54.50 CHRONIC BILATERAL LOW BACK PAIN WITHOUT SCIATICA: ICD-10-CM

## 2020-05-29 DIAGNOSIS — I48.0 PAROXYSMAL ATRIAL FIBRILLATION: ICD-10-CM

## 2020-05-29 LAB
ALBUMIN SERPL BCP-MCNC: 4 G/DL (ref 3.5–5.2)
ALP SERPL-CCNC: 50 U/L (ref 55–135)
ALT SERPL W/O P-5'-P-CCNC: 11 U/L (ref 10–44)
ANION GAP SERPL CALC-SCNC: 13 MMOL/L (ref 8–16)
AST SERPL-CCNC: 14 U/L (ref 10–40)
BASOPHILS # BLD AUTO: 0.03 K/UL (ref 0–0.2)
BASOPHILS NFR BLD: 0.3 % (ref 0–1.9)
BILIRUB SERPL-MCNC: 0.7 MG/DL (ref 0.1–1)
BNP SERPL-MCNC: 198 PG/ML (ref 0–99)
BUN SERPL-MCNC: 15 MG/DL (ref 10–30)
CALCIUM SERPL-MCNC: 9.2 MG/DL (ref 8.7–10.5)
CHLORIDE SERPL-SCNC: 105 MMOL/L (ref 95–110)
CO2 SERPL-SCNC: 24 MMOL/L (ref 23–29)
CREAT SERPL-MCNC: 0.8 MG/DL (ref 0.5–1.4)
DIFFERENTIAL METHOD: ABNORMAL
EOSINOPHIL # BLD AUTO: 0 K/UL (ref 0–0.5)
EOSINOPHIL NFR BLD: 0.3 % (ref 0–8)
ERYTHROCYTE [DISTWIDTH] IN BLOOD BY AUTOMATED COUNT: 13.9 % (ref 11.5–14.5)
EST. GFR  (AFRICAN AMERICAN): >60 ML/MIN/1.73 M^2
EST. GFR  (NON AFRICAN AMERICAN): >60 ML/MIN/1.73 M^2
GLUCOSE SERPL-MCNC: 105 MG/DL (ref 70–110)
HCT VFR BLD AUTO: 51.4 % (ref 37–48.5)
HGB BLD-MCNC: 15.9 G/DL (ref 12–16)
IMM GRANULOCYTES # BLD AUTO: 0.02 K/UL (ref 0–0.04)
IMM GRANULOCYTES NFR BLD AUTO: 0.2 % (ref 0–0.5)
LYMPHOCYTES # BLD AUTO: 2.7 K/UL (ref 1–4.8)
LYMPHOCYTES NFR BLD: 30.3 % (ref 18–48)
MCH RBC QN AUTO: 29.9 PG (ref 27–31)
MCHC RBC AUTO-ENTMCNC: 30.9 G/DL (ref 32–36)
MCV RBC AUTO: 97 FL (ref 82–98)
MONOCYTES # BLD AUTO: 0.5 K/UL (ref 0.3–1)
MONOCYTES NFR BLD: 5.3 % (ref 4–15)
NEUTROPHILS # BLD AUTO: 5.6 K/UL (ref 1.8–7.7)
NEUTROPHILS NFR BLD: 63.6 % (ref 38–73)
NRBC BLD-RTO: 0 /100 WBC
PLATELET # BLD AUTO: 168 K/UL (ref 150–350)
PMV BLD AUTO: 10 FL (ref 9.2–12.9)
POTASSIUM SERPL-SCNC: 4 MMOL/L (ref 3.5–5.1)
PROT SERPL-MCNC: 6.9 G/DL (ref 6–8.4)
RBC # BLD AUTO: 5.31 M/UL (ref 4–5.4)
SODIUM SERPL-SCNC: 142 MMOL/L (ref 136–145)
TSH SERPL DL<=0.005 MIU/L-ACNC: 3.04 UIU/ML (ref 0.4–4)
WBC # BLD AUTO: 8.75 K/UL (ref 3.9–12.7)

## 2020-05-29 PROCEDURE — 36415 COLL VENOUS BLD VENIPUNCTURE: CPT | Mod: HCNC,PO

## 2020-05-29 PROCEDURE — 1159F PR MEDICATION LIST DOCUMENTED IN MEDICAL RECORD: ICD-10-PCS | Mod: HCNC,S$GLB,, | Performed by: NURSE PRACTITIONER

## 2020-05-29 PROCEDURE — 99499 UNLISTED E&M SERVICE: CPT | Mod: HCNC,S$GLB,, | Performed by: NURSE PRACTITIONER

## 2020-05-29 PROCEDURE — 80053 COMPREHEN METABOLIC PANEL: CPT | Mod: HCNC

## 2020-05-29 PROCEDURE — 99214 PR OFFICE/OUTPT VISIT, EST, LEVL IV, 30-39 MIN: ICD-10-PCS | Mod: HCNC,S$GLB,, | Performed by: NURSE PRACTITIONER

## 2020-05-29 PROCEDURE — 84443 ASSAY THYROID STIM HORMONE: CPT | Mod: HCNC

## 2020-05-29 PROCEDURE — 99499 RISK ADDL DX/OHS AUDIT: ICD-10-PCS | Mod: HCNC,S$GLB,, | Performed by: NURSE PRACTITIONER

## 2020-05-29 PROCEDURE — 1101F PR PT FALLS ASSESS DOC 0-1 FALLS W/OUT INJ PAST YR: ICD-10-PCS | Mod: HCNC,CPTII,S$GLB, | Performed by: NURSE PRACTITIONER

## 2020-05-29 PROCEDURE — 99999 PR PBB SHADOW E&M-EST. PATIENT-LVL IV: CPT | Mod: PBBFAC,HCNC,, | Performed by: NURSE PRACTITIONER

## 2020-05-29 PROCEDURE — 99214 OFFICE O/P EST MOD 30 MIN: CPT | Mod: HCNC,S$GLB,, | Performed by: NURSE PRACTITIONER

## 2020-05-29 PROCEDURE — 83880 ASSAY OF NATRIURETIC PEPTIDE: CPT | Mod: HCNC

## 2020-05-29 PROCEDURE — 1125F PR PAIN SEVERITY QUANTIFIED, PAIN PRESENT: ICD-10-PCS | Mod: HCNC,S$GLB,, | Performed by: NURSE PRACTITIONER

## 2020-05-29 PROCEDURE — 99999 PR PBB SHADOW E&M-EST. PATIENT-LVL IV: ICD-10-PCS | Mod: PBBFAC,HCNC,, | Performed by: NURSE PRACTITIONER

## 2020-05-29 PROCEDURE — 1125F AMNT PAIN NOTED PAIN PRSNT: CPT | Mod: HCNC,S$GLB,, | Performed by: NURSE PRACTITIONER

## 2020-05-29 PROCEDURE — 1159F MED LIST DOCD IN RCRD: CPT | Mod: HCNC,S$GLB,, | Performed by: NURSE PRACTITIONER

## 2020-05-29 PROCEDURE — 1101F PT FALLS ASSESS-DOCD LE1/YR: CPT | Mod: HCNC,CPTII,S$GLB, | Performed by: NURSE PRACTITIONER

## 2020-05-29 PROCEDURE — 85025 COMPLETE CBC W/AUTO DIFF WBC: CPT | Mod: HCNC

## 2020-05-29 NOTE — PROGRESS NOTES
History of Present Illness   Sandra Moody is a 89 y.o. woman with medical history as listed below who presents today for evaluation of left leg pain and elevated heart rate.    1. She reports leg pain described as cramping and tightening x 2 weeks. She reports intermittent symptoms, occur mostly during exertion. She describes this as a ritesh horse. She reports associated low back pain that is chronic and is described as dull ache. She denies swelling, redness, warmth, or known injury or trauma. She has tried increasing potassium in her diet because she thought this may be causing her symptoms. This has provided no relief.     2. She also reports waking last night feeling her heart rate elevated. This lasted a few seconds and resolved. She reports taking her Metoprolol and Eliquis as prescribed for her PAF. She is due for routine follow-up with cardiology. She denies chest pain, shortness of breath, dizziness, LOC, swelling to lower extremities, or orthopnea.     She has no additional complaints and is otherwise healthy on today's visit.    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 lj- fosamax    Other dietary vitamin B12 deficiency anemia 1/21/2020    Paroxysmal atrial fibrillation 10/21/2019    PSVT (paroxysmal supraventricular tachycardia)     dr. marx    PVC (premature  ventricular contraction) 11/10/2012    Rectocele     Short-term memory loss     Status post partial lobectomy of lung 2/2010    Thyroid disease     hypothyroidism    Trochanteric bursitis 4/18/2013    Urine incontinence 2/24/2015    Vitamin D deficiency disease 9/4/2014       Past Surgical History:   Procedure Laterality Date    ADENOIDECTOMY      APPENDECTOMY      CHOLECYSTECTOMY      COLONOSCOPY      CYSTOSCOPY      ESOPHAGOGASTRODUODENOSCOPY N/A 10/23/2019    Procedure: EGD (ESOPHAGOGASTRODUODENOSCOPY);  Surgeon: Archie Humphrey MD;  Location[facility];  Service: Endoscopy;  Laterality: N/A;    EYE SURGERY Bilateral     HYSTERECTOMY      LUNG LOBECTOMY Right     OOPHORECTOMY      TONSILLECTOMY      UPPER GASTROINTESTINAL ENDOSCOPY         Social History     Socioeconomic History    Marital status:      Spouse name: Not on file    Number of children: Not on file    Years of education: Not on file    Highest education level: Not on file   Occupational History    Not on file   Social Needs    Financial resource strain: Not on file    Food insecurity:     Worry: Not on file     Inability: Not on file    Transportation needs:     Medical: Not on file     Non-medical: Not on file   Tobacco Use    Smoking status: Never Smoker    Smokeless tobacco: Never Used   Substance and Sexual Activity    Alcohol use: No    Drug use: No    Sexual activity: Never   Lifestyle    Physical activity:     Days per week: Not on file     Minutes per session: Not on file    Stress: Only a little   Relationships    Social connections:     Talks on phone: Not on file     Gets together: Not on file     Attends [employer] service: Not on file     Active member of club or organization: Not on file     Attends meetings of clubs or organizations: Not on file     Relationship status: Not on file   Other Topics Concern    Not on file   Social History Narrative    Not on file       Family History   Problem  "Relation Age of Onset    Cancer Mother         COLON    Hypertension Mother     Aneurysm Father     Alcohol abuse Brother     Kidney failure Sister     Cancer Brother         throat-smoker     Alcohol abuse Brother     Pacemaker/defibrilator Brother     Birth defects Paternal Grandfather         prostate     Asthma Neg Hx     Emphysema Neg Hx     Amblyopia Neg Hx     Blindness Neg Hx     Cataracts Neg Hx     Diabetes Neg Hx     Glaucoma Neg Hx     Macular degeneration Neg Hx     Retinal detachment Neg Hx     Strabismus Neg Hx     Stroke Neg Hx     Thyroid disease Neg Hx     Cirrhosis Neg Hx        Review of Systems  Review of Systems   Constitutional: Negative for fever, malaise/fatigue and weight loss.   Eyes: Negative for blurred vision and double vision.   Respiratory: Negative for shortness of breath and wheezing.    Cardiovascular: Positive for palpitations (x 1 episode, resolved). Negative for chest pain, orthopnea, claudication and leg swelling.   Musculoskeletal: Positive for back pain and myalgias. Negative for falls, joint pain and neck pain.   Neurological: Negative for dizziness, tingling, sensory change, focal weakness and loss of consciousness.     A complete review of systems was otherwise negative.    Physical Exam  BP (!) 140/84   Pulse 73   Temp 97.3 °F (36.3 °C) (Oral)   Ht 5' 3" (1.6 m)   Wt 65.9 kg (145 lb 2.8 oz)   SpO2 (!) 94%   BMI 25.72 kg/m²   General appearance: alert, appears stated age, cooperative and no distress  Neck: no carotid bruit, no JVD, supple, symmetrical, trachea midline and thyroid not enlarged, symmetric, no tenderness/mass/nodules  Back: symmetric, no curvature. ROM normal. No CVA tenderness.There is no midline TTP, negative straight leg raise bilateral.  Lungs: clear to auscultation bilaterally  Heart: regular rate and rhythm, S1, S2 normal, no murmur, click, rub or gallop  Abdomen: soft, non-tender; bowel sounds normal; no masses,  no " organomegaly  Extremities: extremities normal, atraumatic, no cyanosis or edema, no evidence of DVT, no erythema, warmth, or rash noted to lower extremities.  Pulses: 2+ and symmetric  Skin: Skin color, texture, turgor normal. No rashes or lesions  Neurologic: Grossly normal    Assessment/Plan  Leg cramping  Check labs as listed below.  Suspect this may be related to her chronic low back pain.  Recommend Tylenol PRN.  ER precautions discussed.  RTC PRN pending labs results.  -     CBC auto differential; Future; Expected date: 05/29/2020  -     Comprehensive metabolic panel; Future; Expected date: 05/29/2020  -     Brain Natriuretic Peptide; Future; Expected date: 05/29/2020  -     TSH; Future; Expected date: 05/29/2020    Chronic bilateral low back pain without sciatica  As above.  -     CBC auto differential; Future; Expected date: 05/29/2020  -     Comprehensive metabolic panel; Future; Expected date: 05/29/2020  -     Brain Natriuretic Peptide; Future; Expected date: 05/29/2020  -     TSH; Future; Expected date: 05/29/2020    Tachycardia  She is in NSR today.  Check labs as listed below.  Continue medications for PAF.  Schedule follow-up with cardiology.  ER for persistent symptoms.  RTC PRN pending lab results.  -     CBC auto differential; Future; Expected date: 05/29/2020  -     Comprehensive metabolic panel; Future; Expected date: 05/29/2020  -     Brain Natriuretic Peptide; Future; Expected date: 05/29/2020  -     TSH; Future; Expected date: 05/29/2020    Paroxysmal atrial fibrillation  The current medical regimen is effective;  continue present plan and medications.  As above.    Essential hypertension  The current medical regimen is effective;  continue present plan and medications.    Atherosclerosis of aorta  Stable. Statin therapy.    Chronic kidney disease, stage II (mild)  The current medical regimen is effective;  continue present plan and medications.  Avoid nephrotoxic agents and ensure adequate  hydration.    Patient has verbalized understanding and is in agreement with plan of care.    Follow up if symptoms worsen or fail to improve, for pending results.

## 2020-05-29 NOTE — PATIENT INSTRUCTIONS
Take Tylenol for the back pain.  Schedule a follow-up with Dr. Munguia, call [phone] to schedule.  I will be in touch with your lab results.

## 2020-06-01 ENCOUNTER — TELEPHONE (OUTPATIENT)
Dept: FAMILY MEDICINE | Facility: CLINIC | Age: 85
End: 2020-06-01

## 2020-06-01 NOTE — TELEPHONE ENCOUNTER
Please let the patient or patient family member know that her labs are overall reassuring. Her potassium is within a normal range. I recommend we try the Tylenol for the low back pain that may be contributing to her leg pain. Also, please remind patient that she is due to see her cardiologist, I recommend scheduling this given her episode of palpitations that woke her in the night.    Thanks!  CARLOS MolinaC

## 2020-06-17 RX ORDER — METOPROLOL SUCCINATE 100 MG/1
100 TABLET, EXTENDED RELEASE ORAL DAILY
Qty: 90 TABLET | Refills: 3 | Status: SHIPPED | OUTPATIENT
Start: 2020-06-17 | End: 2021-05-13

## 2020-06-17 RX ORDER — APIXABAN 5 MG/1
5 TABLET, FILM COATED ORAL 2 TIMES DAILY
Qty: 30 TABLET | Refills: 2 | Status: SHIPPED | OUTPATIENT
Start: 2020-06-17 | End: 2020-11-20

## 2020-06-17 NOTE — TELEPHONE ENCOUNTER
----- Message from Jessica Frost sent at 6/17/2020  1:13 PM CDT -----  Type: Patient Call Back    Who called:pt daughter    What is the request in detail:Pt had eliquis sent to mail order pharmacy but need it sent to local pharmacy asap    Can the clinic reply by [facility]?no    Would the patient rather a call back or a response via [facility]? call    Best call back number:

## 2020-06-17 NOTE — TELEPHONE ENCOUNTER
Patient's daughter asking for her mother's Eliquis to be sent to a local pharmacy due to [employer] will not be sending it for a week or so and she is completely out of her mediation.

## 2020-09-05 RX ORDER — SERTRALINE HYDROCHLORIDE 25 MG/1
TABLET, FILM COATED ORAL
Qty: 90 TABLET | Refills: 12 | Status: SHIPPED | OUTPATIENT
Start: 2020-09-05 | End: 2021-10-18

## 2020-09-06 ENCOUNTER — PATIENT OUTREACH (OUTPATIENT)
Dept: ADMINISTRATIVE | Facility: OTHER | Age: 85
End: 2020-09-06

## 2021-01-20 DIAGNOSIS — M85.80 OSTEOPENIA, UNSPECIFIED LOCATION: ICD-10-CM

## 2021-01-22 RX ORDER — ALENDRONATE SODIUM 70 MG/1
TABLET ORAL
Qty: 12 TABLET | Refills: 12 | Status: SHIPPED | OUTPATIENT
Start: 2021-01-22 | End: 2022-03-05

## 2021-01-31 RX ORDER — OXYBUTYNIN CHLORIDE 10 MG/1
TABLET, EXTENDED RELEASE ORAL
Qty: 90 TABLET | Refills: 12 | Status: SHIPPED | OUTPATIENT
Start: 2021-01-31 | End: 2022-02-16

## 2021-02-24 ENCOUNTER — PES CALL (OUTPATIENT)
Dept: ADMINISTRATIVE | Facility: CLINIC | Age: 86
End: 2021-02-24

## 2021-03-16 ENCOUNTER — TELEPHONE (OUTPATIENT)
Dept: FAMILY MEDICINE | Facility: CLINIC | Age: 86
End: 2021-03-16

## 2021-03-23 ENCOUNTER — OFFICE VISIT (OUTPATIENT)
Dept: HOME HEALTH SERVICES | Facility: CLINIC | Age: 86
End: 2021-03-23
Payer: MEDICARE

## 2021-03-23 VITALS
HEART RATE: 66 BPM | TEMPERATURE: 98 F | WEIGHT: 145 LBS | DIASTOLIC BLOOD PRESSURE: 83 MMHG | BODY MASS INDEX: 25.69 KG/M2 | SYSTOLIC BLOOD PRESSURE: 150 MMHG | HEIGHT: 63 IN

## 2021-03-23 DIAGNOSIS — K21.9 GASTROESOPHAGEAL REFLUX DISEASE, UNSPECIFIED WHETHER ESOPHAGITIS PRESENT: ICD-10-CM

## 2021-03-23 DIAGNOSIS — F32.A ANXIETY AND DEPRESSION: ICD-10-CM

## 2021-03-23 DIAGNOSIS — E03.9 ACQUIRED HYPOTHYROIDISM: ICD-10-CM

## 2021-03-23 DIAGNOSIS — R26.89 POOR BALANCE: ICD-10-CM

## 2021-03-23 DIAGNOSIS — Z85.118 HISTORY OF LUNG CANCER: ICD-10-CM

## 2021-03-23 DIAGNOSIS — K76.6 PORTAL HYPERTENSION: ICD-10-CM

## 2021-03-23 DIAGNOSIS — G47.00 INSOMNIA, UNSPECIFIED TYPE: ICD-10-CM

## 2021-03-23 DIAGNOSIS — F39 MOOD DISORDER: ICD-10-CM

## 2021-03-23 DIAGNOSIS — J44.9 CHRONIC OBSTRUCTIVE PULMONARY DISEASE, UNSPECIFIED COPD TYPE: ICD-10-CM

## 2021-03-23 DIAGNOSIS — M85.80 OSTEOPENIA, UNSPECIFIED LOCATION: ICD-10-CM

## 2021-03-23 DIAGNOSIS — Z91.81 AT HIGH RISK FOR FALLS: ICD-10-CM

## 2021-03-23 DIAGNOSIS — Z00.00 ENCOUNTER FOR PREVENTIVE HEALTH EXAMINATION: Primary | ICD-10-CM

## 2021-03-23 DIAGNOSIS — F41.9 ANXIETY AND DEPRESSION: ICD-10-CM

## 2021-03-23 DIAGNOSIS — I70.0 ATHEROSCLEROSIS OF AORTA: ICD-10-CM

## 2021-03-23 DIAGNOSIS — K74.69 CRYPTOGENIC CIRRHOSIS: ICD-10-CM

## 2021-03-23 DIAGNOSIS — R41.3 MEMORY LOSS, SHORT TERM: ICD-10-CM

## 2021-03-23 DIAGNOSIS — I48.0 PAROXYSMAL ATRIAL FIBRILLATION: ICD-10-CM

## 2021-03-23 DIAGNOSIS — I10 ESSENTIAL HYPERTENSION: ICD-10-CM

## 2021-03-23 PROCEDURE — 99499 UNLISTED E&M SERVICE: CPT | Mod: S$GLB,,, | Performed by: NURSE PRACTITIONER

## 2021-03-23 PROCEDURE — G0439 PR MEDICARE ANNUAL WELLNESS SUBSEQUENT VISIT: ICD-10-PCS | Mod: S$GLB,,, | Performed by: NURSE PRACTITIONER

## 2021-03-23 PROCEDURE — 1101F PT FALLS ASSESS-DOCD LE1/YR: CPT | Mod: CPTII,S$GLB,, | Performed by: NURSE PRACTITIONER

## 2021-03-23 PROCEDURE — 99499 RISK ADDL DX/OHS AUDIT: ICD-10-PCS | Mod: S$GLB,,, | Performed by: NURSE PRACTITIONER

## 2021-03-23 PROCEDURE — 1158F PR ADVANCE CARE PLANNING DISCUSS DOCUMENTED IN MEDICAL RECORD: ICD-10-PCS | Mod: S$GLB,,, | Performed by: NURSE PRACTITIONER

## 2021-03-23 PROCEDURE — 1125F AMNT PAIN NOTED PAIN PRSNT: CPT | Mod: S$GLB,,, | Performed by: NURSE PRACTITIONER

## 2021-03-23 PROCEDURE — 1101F PR PT FALLS ASSESS DOC 0-1 FALLS W/OUT INJ PAST YR: ICD-10-PCS | Mod: CPTII,S$GLB,, | Performed by: NURSE PRACTITIONER

## 2021-03-23 PROCEDURE — 1125F PR PAIN SEVERITY QUANTIFIED, PAIN PRESENT: ICD-10-PCS | Mod: S$GLB,,, | Performed by: NURSE PRACTITIONER

## 2021-03-23 PROCEDURE — 3288F PR FALLS RISK ASSESSMENT DOCUMENTED: ICD-10-PCS | Mod: CPTII,S$GLB,, | Performed by: NURSE PRACTITIONER

## 2021-03-23 PROCEDURE — G0439 PPPS, SUBSEQ VISIT: HCPCS | Mod: S$GLB,,, | Performed by: NURSE PRACTITIONER

## 2021-03-23 PROCEDURE — 1158F ADVNC CARE PLAN TLK DOCD: CPT | Mod: S$GLB,,, | Performed by: NURSE PRACTITIONER

## 2021-03-23 PROCEDURE — 3288F FALL RISK ASSESSMENT DOCD: CPT | Mod: CPTII,S$GLB,, | Performed by: NURSE PRACTITIONER

## 2021-03-24 PROBLEM — R26.89 POOR BALANCE: Status: ACTIVE | Noted: 2021-03-24

## 2021-03-24 PROBLEM — J44.9 COPD (CHRONIC OBSTRUCTIVE PULMONARY DISEASE): Status: ACTIVE | Noted: 2021-03-24

## 2021-03-24 PROBLEM — Z91.81 AT HIGH RISK FOR FALLS: Status: ACTIVE | Noted: 2021-03-24

## 2021-03-26 PROCEDURE — G0180 PR HOME HEALTH MD CERTIFICATION: ICD-10-PCS | Mod: ,,, | Performed by: INTERNAL MEDICINE

## 2021-03-26 PROCEDURE — G0180 MD CERTIFICATION HHA PATIENT: HCPCS | Mod: ,,, | Performed by: INTERNAL MEDICINE

## 2021-04-12 ENCOUNTER — EXTERNAL HOME HEALTH (OUTPATIENT)
Dept: HOME HEALTH SERVICES | Facility: HOSPITAL | Age: 86
End: 2021-04-12

## 2021-06-10 ENCOUNTER — EXTERNAL HOME HEALTH (OUTPATIENT)
Dept: HOME HEALTH SERVICES | Facility: HOSPITAL | Age: 86
End: 2021-06-10
Payer: MEDICARE

## 2021-06-14 ENCOUNTER — DOCUMENT SCAN (OUTPATIENT)
Dept: HOME HEALTH SERVICES | Facility: HOSPITAL | Age: 86
End: 2021-06-14
Payer: MEDICARE

## 2021-06-20 ENCOUNTER — PATIENT MESSAGE (OUTPATIENT)
Dept: FAMILY MEDICINE | Facility: CLINIC | Age: 86
End: 2021-06-20

## 2021-06-20 DIAGNOSIS — G47.00 INSOMNIA, UNSPECIFIED TYPE: Primary | ICD-10-CM

## 2021-06-21 RX ORDER — TEMAZEPAM 30 MG/1
30 CAPSULE ORAL NIGHTLY PRN
Qty: 30 CAPSULE | Refills: 1 | OUTPATIENT
Start: 2021-06-21

## 2021-06-21 RX ORDER — TEMAZEPAM 30 MG/1
30 CAPSULE ORAL NIGHTLY PRN
Qty: 30 CAPSULE | Refills: 5 | Status: SHIPPED | OUTPATIENT
Start: 2021-06-21 | End: 2022-01-05

## 2021-06-22 ENCOUNTER — OFFICE VISIT (OUTPATIENT)
Dept: FAMILY MEDICINE | Facility: CLINIC | Age: 86
End: 2021-06-22
Payer: MEDICARE

## 2021-06-22 ENCOUNTER — OFFICE VISIT (OUTPATIENT)
Dept: CARDIOLOGY | Facility: CLINIC | Age: 86
End: 2021-06-22
Payer: MEDICARE

## 2021-06-22 ENCOUNTER — LAB VISIT (OUTPATIENT)
Dept: LAB | Facility: HOSPITAL | Age: 86
End: 2021-06-22
Attending: INTERNAL MEDICINE
Payer: MEDICARE

## 2021-06-22 VITALS
SYSTOLIC BLOOD PRESSURE: 142 MMHG | HEART RATE: 51 BPM | OXYGEN SATURATION: 94 % | DIASTOLIC BLOOD PRESSURE: 70 MMHG | HEIGHT: 63 IN | WEIGHT: 140 LBS | BODY MASS INDEX: 24.8 KG/M2 | TEMPERATURE: 99 F

## 2021-06-22 VITALS
HEART RATE: 73 BPM | WEIGHT: 144.06 LBS | OXYGEN SATURATION: 95 % | DIASTOLIC BLOOD PRESSURE: 88 MMHG | SYSTOLIC BLOOD PRESSURE: 160 MMHG | BODY MASS INDEX: 25.52 KG/M2 | HEIGHT: 63 IN

## 2021-06-22 DIAGNOSIS — Z85.118 HISTORY OF LUNG CANCER: ICD-10-CM

## 2021-06-22 DIAGNOSIS — J44.9 CHRONIC OBSTRUCTIVE PULMONARY DISEASE, UNSPECIFIED COPD TYPE: ICD-10-CM

## 2021-06-22 DIAGNOSIS — I48.0 PAROXYSMAL ATRIAL FIBRILLATION: ICD-10-CM

## 2021-06-22 DIAGNOSIS — D51.3 OTHER DIETARY VITAMIN B12 DEFICIENCY ANEMIA: ICD-10-CM

## 2021-06-22 DIAGNOSIS — D69.6 THROMBOCYTOPENIA: ICD-10-CM

## 2021-06-22 DIAGNOSIS — R41.3 MEMORY LOSS, SHORT TERM: ICD-10-CM

## 2021-06-22 DIAGNOSIS — I10 ESSENTIAL HYPERTENSION: ICD-10-CM

## 2021-06-22 DIAGNOSIS — I70.0 ATHEROSCLEROSIS OF AORTA: ICD-10-CM

## 2021-06-22 DIAGNOSIS — K76.6 PORTAL HYPERTENSION: ICD-10-CM

## 2021-06-22 DIAGNOSIS — F13.20 SEDATIVE DEPENDENCE: ICD-10-CM

## 2021-06-22 DIAGNOSIS — F39 MOOD DISORDER: ICD-10-CM

## 2021-06-22 DIAGNOSIS — Z00.00 ROUTINE MEDICAL EXAM: ICD-10-CM

## 2021-06-22 DIAGNOSIS — H25.10 NUCLEAR SENILE CATARACT, UNSPECIFIED LATERALITY: ICD-10-CM

## 2021-06-22 DIAGNOSIS — K74.69 CRYPTOGENIC CIRRHOSIS: ICD-10-CM

## 2021-06-22 DIAGNOSIS — K21.9 GASTROESOPHAGEAL REFLUX DISEASE, UNSPECIFIED WHETHER ESOPHAGITIS PRESENT: ICD-10-CM

## 2021-06-22 DIAGNOSIS — E03.9 ACQUIRED HYPOTHYROIDISM: ICD-10-CM

## 2021-06-22 DIAGNOSIS — R73.9 HYPERGLYCEMIA: ICD-10-CM

## 2021-06-22 DIAGNOSIS — I48.91 ATRIAL FIBRILLATION, UNSPECIFIED TYPE: Primary | ICD-10-CM

## 2021-06-22 DIAGNOSIS — E53.8 B12 DEFICIENCY: ICD-10-CM

## 2021-06-22 DIAGNOSIS — F41.9 ANXIETY AND DEPRESSION: ICD-10-CM

## 2021-06-22 DIAGNOSIS — E55.9 VITAMIN D DEFICIENCY DISEASE: ICD-10-CM

## 2021-06-22 DIAGNOSIS — F32.A ANXIETY AND DEPRESSION: ICD-10-CM

## 2021-06-22 DIAGNOSIS — Z00.00 ROUTINE MEDICAL EXAM: Primary | ICD-10-CM

## 2021-06-22 DIAGNOSIS — M85.80 OSTEOPENIA, UNSPECIFIED LOCATION: ICD-10-CM

## 2021-06-22 DIAGNOSIS — G47.00 INSOMNIA, UNSPECIFIED TYPE: ICD-10-CM

## 2021-06-22 DIAGNOSIS — N18.2 CHRONIC KIDNEY DISEASE, STAGE II (MILD): ICD-10-CM

## 2021-06-22 LAB
ALBUMIN SERPL BCP-MCNC: 4 G/DL (ref 3.5–5.2)
ALP SERPL-CCNC: 44 U/L (ref 55–135)
ALT SERPL W/O P-5'-P-CCNC: 11 U/L (ref 10–44)
ANION GAP SERPL CALC-SCNC: 12 MMOL/L (ref 8–16)
AST SERPL-CCNC: 14 U/L (ref 10–40)
BASOPHILS # BLD AUTO: 0.04 K/UL (ref 0–0.2)
BASOPHILS NFR BLD: 0.5 % (ref 0–1.9)
BILIRUB SERPL-MCNC: 1.3 MG/DL (ref 0.1–1)
BNP SERPL-MCNC: 232 PG/ML (ref 0–99)
BUN SERPL-MCNC: 12 MG/DL (ref 10–30)
CALCIUM SERPL-MCNC: 9.8 MG/DL (ref 8.7–10.5)
CHLORIDE SERPL-SCNC: 103 MMOL/L (ref 95–110)
CO2 SERPL-SCNC: 25 MMOL/L (ref 23–29)
CREAT SERPL-MCNC: 0.7 MG/DL (ref 0.5–1.4)
DIFFERENTIAL METHOD: NORMAL
EOSINOPHIL # BLD AUTO: 0 K/UL (ref 0–0.5)
EOSINOPHIL NFR BLD: 0.5 % (ref 0–8)
ERYTHROCYTE [DISTWIDTH] IN BLOOD BY AUTOMATED COUNT: 13.8 % (ref 11.5–14.5)
EST. GFR  (AFRICAN AMERICAN): >60 ML/MIN/1.73 M^2
EST. GFR  (NON AFRICAN AMERICAN): >60 ML/MIN/1.73 M^2
GLUCOSE SERPL-MCNC: 115 MG/DL (ref 70–110)
HCT VFR BLD AUTO: 48.2 % (ref 37–48.5)
HGB BLD-MCNC: 15.4 G/DL (ref 12–16)
IMM GRANULOCYTES # BLD AUTO: 0.02 K/UL (ref 0–0.04)
IMM GRANULOCYTES NFR BLD AUTO: 0.3 % (ref 0–0.5)
LYMPHOCYTES # BLD AUTO: 1.9 K/UL (ref 1–4.8)
LYMPHOCYTES NFR BLD: 26.3 % (ref 18–48)
MCH RBC QN AUTO: 30.7 PG (ref 27–31)
MCHC RBC AUTO-ENTMCNC: 32 G/DL (ref 32–36)
MCV RBC AUTO: 96 FL (ref 82–98)
MONOCYTES # BLD AUTO: 0.4 K/UL (ref 0.3–1)
MONOCYTES NFR BLD: 5.6 % (ref 4–15)
NEUTROPHILS # BLD AUTO: 4.9 K/UL (ref 1.8–7.7)
NEUTROPHILS NFR BLD: 66.8 % (ref 38–73)
NRBC BLD-RTO: 0 /100 WBC
PLATELET # BLD AUTO: 157 K/UL (ref 150–450)
PMV BLD AUTO: 10.1 FL (ref 9.2–12.9)
POTASSIUM SERPL-SCNC: 4.2 MMOL/L (ref 3.5–5.1)
PROT SERPL-MCNC: 6.9 G/DL (ref 6–8.4)
RBC # BLD AUTO: 5.02 M/UL (ref 4–5.4)
SODIUM SERPL-SCNC: 140 MMOL/L (ref 136–145)
T4 FREE SERPL-MCNC: 1.07 NG/DL (ref 0.71–1.51)
TSH SERPL DL<=0.005 MIU/L-ACNC: 2.56 UIU/ML (ref 0.4–4)
WBC # BLD AUTO: 7.33 K/UL (ref 3.9–12.7)

## 2021-06-22 PROCEDURE — 1159F PR MEDICATION LIST DOCUMENTED IN MEDICAL RECORD: ICD-10-PCS | Mod: HCNC,S$GLB,, | Performed by: INTERNAL MEDICINE

## 2021-06-22 PROCEDURE — 1126F AMNT PAIN NOTED NONE PRSNT: CPT | Mod: HCNC,S$GLB,, | Performed by: INTERNAL MEDICINE

## 2021-06-22 PROCEDURE — 85025 COMPLETE CBC W/AUTO DIFF WBC: CPT | Mod: HCNC | Performed by: INTERNAL MEDICINE

## 2021-06-22 PROCEDURE — 1159F MED LIST DOCD IN RCRD: CPT | Mod: HCNC,S$GLB,, | Performed by: INTERNAL MEDICINE

## 2021-06-22 PROCEDURE — 1126F PR PAIN SEVERITY QUANTIFIED, NO PAIN PRESENT: ICD-10-PCS | Mod: HCNC,S$GLB,, | Performed by: INTERNAL MEDICINE

## 2021-06-22 PROCEDURE — 84439 ASSAY OF FREE THYROXINE: CPT | Mod: HCNC | Performed by: INTERNAL MEDICINE

## 2021-06-22 PROCEDURE — 1101F PT FALLS ASSESS-DOCD LE1/YR: CPT | Mod: HCNC,CPTII,S$GLB, | Performed by: INTERNAL MEDICINE

## 2021-06-22 PROCEDURE — 99397 PR PREVENTIVE VISIT,EST,65 & OVER: ICD-10-PCS | Mod: 25,HCNC,S$GLB, | Performed by: INTERNAL MEDICINE

## 2021-06-22 PROCEDURE — 1101F PR PT FALLS ASSESS DOC 0-1 FALLS W/OUT INJ PAST YR: ICD-10-PCS | Mod: HCNC,CPTII,S$GLB, | Performed by: INTERNAL MEDICINE

## 2021-06-22 PROCEDURE — 99999 PR PBB SHADOW E&M-EST. PATIENT-LVL III: ICD-10-PCS | Mod: PBBFAC,HCNC,, | Performed by: INTERNAL MEDICINE

## 2021-06-22 PROCEDURE — 82306 VITAMIN D 25 HYDROXY: CPT | Mod: HCNC | Performed by: INTERNAL MEDICINE

## 2021-06-22 PROCEDURE — 3288F PR FALLS RISK ASSESSMENT DOCUMENTED: ICD-10-PCS | Mod: HCNC,CPTII,S$GLB, | Performed by: INTERNAL MEDICINE

## 2021-06-22 PROCEDURE — 99999 PR PBB SHADOW E&M-EST. PATIENT-LVL IV: CPT | Mod: PBBFAC,HCNC,, | Performed by: INTERNAL MEDICINE

## 2021-06-22 PROCEDURE — 99499 RISK ADDL DX/OHS AUDIT: ICD-10-PCS | Mod: S$GLB,,, | Performed by: INTERNAL MEDICINE

## 2021-06-22 PROCEDURE — 99499 UNLISTED E&M SERVICE: CPT | Mod: S$GLB,,, | Performed by: INTERNAL MEDICINE

## 2021-06-22 PROCEDURE — 83880 ASSAY OF NATRIURETIC PEPTIDE: CPT | Mod: HCNC | Performed by: INTERNAL MEDICINE

## 2021-06-22 PROCEDURE — 93000 ELECTROCARDIOGRAM COMPLETE: CPT | Mod: HCNC,S$GLB,, | Performed by: INTERNAL MEDICINE

## 2021-06-22 PROCEDURE — 99999 PR PBB SHADOW E&M-EST. PATIENT-LVL IV: ICD-10-PCS | Mod: PBBFAC,HCNC,, | Performed by: INTERNAL MEDICINE

## 2021-06-22 PROCEDURE — 3288F FALL RISK ASSESSMENT DOCD: CPT | Mod: HCNC,CPTII,S$GLB, | Performed by: INTERNAL MEDICINE

## 2021-06-22 PROCEDURE — 99999 PR PBB SHADOW E&M-EST. PATIENT-LVL III: CPT | Mod: PBBFAC,HCNC,, | Performed by: INTERNAL MEDICINE

## 2021-06-22 PROCEDURE — 93000 EKG 12-LEAD: ICD-10-PCS | Mod: HCNC,S$GLB,, | Performed by: INTERNAL MEDICINE

## 2021-06-22 PROCEDURE — 80053 COMPREHEN METABOLIC PANEL: CPT | Mod: HCNC | Performed by: INTERNAL MEDICINE

## 2021-06-22 PROCEDURE — 99499 RISK ADDL DX/OHS AUDIT: ICD-10-PCS | Mod: HCNC,S$GLB,, | Performed by: INTERNAL MEDICINE

## 2021-06-22 PROCEDURE — 99397 PER PM REEVAL EST PAT 65+ YR: CPT | Mod: 25,HCNC,S$GLB, | Performed by: INTERNAL MEDICINE

## 2021-06-22 PROCEDURE — 84443 ASSAY THYROID STIM HORMONE: CPT | Mod: HCNC | Performed by: INTERNAL MEDICINE

## 2021-06-22 PROCEDURE — 99214 OFFICE O/P EST MOD 30 MIN: CPT | Mod: HCNC,S$GLB,, | Performed by: INTERNAL MEDICINE

## 2021-06-22 PROCEDURE — 82607 VITAMIN B-12: CPT | Mod: HCNC | Performed by: INTERNAL MEDICINE

## 2021-06-22 PROCEDURE — 99499 UNLISTED E&M SERVICE: CPT | Mod: HCNC,S$GLB,, | Performed by: INTERNAL MEDICINE

## 2021-06-22 PROCEDURE — 36415 COLL VENOUS BLD VENIPUNCTURE: CPT | Mod: HCNC,PO | Performed by: INTERNAL MEDICINE

## 2021-06-22 PROCEDURE — 99214 PR OFFICE/OUTPT VISIT, EST, LEVL IV, 30-39 MIN: ICD-10-PCS | Mod: HCNC,S$GLB,, | Performed by: INTERNAL MEDICINE

## 2021-06-22 RX ORDER — METOPROLOL SUCCINATE 50 MG/1
50 TABLET, EXTENDED RELEASE ORAL DAILY
Qty: 90 TABLET | Refills: 3 | Status: SHIPPED | OUTPATIENT
Start: 2021-06-22 | End: 2021-07-02

## 2021-06-23 LAB
25(OH)D3+25(OH)D2 SERPL-MCNC: 17 NG/ML (ref 30–96)
VIT B12 SERPL-MCNC: 1292 PG/ML (ref 210–950)

## 2021-06-24 ENCOUNTER — HOSPITAL ENCOUNTER (OUTPATIENT)
Dept: CARDIOLOGY | Facility: HOSPITAL | Age: 86
Discharge: HOME | End: 2021-06-24
Attending: INTERNAL MEDICINE
Payer: MEDICARE

## 2021-06-24 DIAGNOSIS — I48.91 ATRIAL FIBRILLATION, UNSPECIFIED TYPE: ICD-10-CM

## 2021-06-24 PROCEDURE — 93226 XTRNL ECG REC<48 HR SCAN A/R: CPT | Mod: HCNC

## 2021-06-24 PROCEDURE — 93227 HOLTER MONITOR - 48 HOUR (CUPID ONLY): ICD-10-PCS | Mod: HCNC,,, | Performed by: INTERNAL MEDICINE

## 2021-06-24 PROCEDURE — 93227 XTRNL ECG REC<48 HR R&I: CPT | Mod: HCNC,,, | Performed by: INTERNAL MEDICINE

## 2021-06-30 LAB
OHS CV EVENT MONITOR DAY: 0
OHS CV HOLTER LENGTH DECIMAL HOURS: 47.98
OHS CV HOLTER LENGTH HOURS: 47
OHS CV HOLTER LENGTH MINUTES: 59

## 2021-07-02 ENCOUNTER — OFFICE VISIT (OUTPATIENT)
Dept: CARDIOLOGY | Facility: CLINIC | Age: 86
End: 2021-07-02
Payer: MEDICARE

## 2021-07-02 VITALS
HEART RATE: 70 BPM | OXYGEN SATURATION: 94 % | SYSTOLIC BLOOD PRESSURE: 136 MMHG | BODY MASS INDEX: 25.52 KG/M2 | DIASTOLIC BLOOD PRESSURE: 84 MMHG | HEIGHT: 63 IN | WEIGHT: 144.06 LBS

## 2021-07-02 DIAGNOSIS — I70.0 ATHEROSCLEROSIS OF AORTA: ICD-10-CM

## 2021-07-02 DIAGNOSIS — J44.9 CHRONIC OBSTRUCTIVE PULMONARY DISEASE, UNSPECIFIED COPD TYPE: ICD-10-CM

## 2021-07-02 DIAGNOSIS — E03.9 ACQUIRED HYPOTHYROIDISM: ICD-10-CM

## 2021-07-02 DIAGNOSIS — F39 MOOD DISORDER: ICD-10-CM

## 2021-07-02 DIAGNOSIS — F32.A ANXIETY AND DEPRESSION: ICD-10-CM

## 2021-07-02 DIAGNOSIS — I10 ESSENTIAL HYPERTENSION: ICD-10-CM

## 2021-07-02 DIAGNOSIS — E55.9 VITAMIN D DEFICIENCY DISEASE: ICD-10-CM

## 2021-07-02 DIAGNOSIS — K76.6 PORTAL HYPERTENSION: ICD-10-CM

## 2021-07-02 DIAGNOSIS — I48.0 PAROXYSMAL ATRIAL FIBRILLATION: ICD-10-CM

## 2021-07-02 DIAGNOSIS — K21.9 GASTROESOPHAGEAL REFLUX DISEASE, UNSPECIFIED WHETHER ESOPHAGITIS PRESENT: ICD-10-CM

## 2021-07-02 DIAGNOSIS — I10 HYPERTENSION, UNSPECIFIED TYPE: Primary | ICD-10-CM

## 2021-07-02 DIAGNOSIS — K74.69 CRYPTOGENIC CIRRHOSIS: ICD-10-CM

## 2021-07-02 DIAGNOSIS — R41.3 MEMORY LOSS, SHORT TERM: ICD-10-CM

## 2021-07-02 DIAGNOSIS — F41.9 ANXIETY AND DEPRESSION: ICD-10-CM

## 2021-07-02 PROCEDURE — 99499 RISK ADDL DX/OHS AUDIT: ICD-10-PCS | Mod: S$GLB,,, | Performed by: INTERNAL MEDICINE

## 2021-07-02 PROCEDURE — 93000 ELECTROCARDIOGRAM COMPLETE: CPT | Mod: HCNC,S$GLB,, | Performed by: INTERNAL MEDICINE

## 2021-07-02 PROCEDURE — 1159F PR MEDICATION LIST DOCUMENTED IN MEDICAL RECORD: ICD-10-PCS | Mod: HCNC,S$GLB,, | Performed by: INTERNAL MEDICINE

## 2021-07-02 PROCEDURE — 1159F MED LIST DOCD IN RCRD: CPT | Mod: HCNC,S$GLB,, | Performed by: INTERNAL MEDICINE

## 2021-07-02 PROCEDURE — 99214 PR OFFICE/OUTPT VISIT, EST, LEVL IV, 30-39 MIN: ICD-10-PCS | Mod: HCNC,S$GLB,, | Performed by: INTERNAL MEDICINE

## 2021-07-02 PROCEDURE — 99499 UNLISTED E&M SERVICE: CPT | Mod: S$GLB,,, | Performed by: INTERNAL MEDICINE

## 2021-07-02 PROCEDURE — 99999 PR PBB SHADOW E&M-EST. PATIENT-LVL IV: ICD-10-PCS | Mod: PBBFAC,HCNC,, | Performed by: INTERNAL MEDICINE

## 2021-07-02 PROCEDURE — 1126F PR PAIN SEVERITY QUANTIFIED, NO PAIN PRESENT: ICD-10-PCS | Mod: HCNC,S$GLB,, | Performed by: INTERNAL MEDICINE

## 2021-07-02 PROCEDURE — 1101F PT FALLS ASSESS-DOCD LE1/YR: CPT | Mod: HCNC,CPTII,S$GLB, | Performed by: INTERNAL MEDICINE

## 2021-07-02 PROCEDURE — 99214 OFFICE O/P EST MOD 30 MIN: CPT | Mod: HCNC,S$GLB,, | Performed by: INTERNAL MEDICINE

## 2021-07-02 PROCEDURE — 1101F PR PT FALLS ASSESS DOC 0-1 FALLS W/OUT INJ PAST YR: ICD-10-PCS | Mod: HCNC,CPTII,S$GLB, | Performed by: INTERNAL MEDICINE

## 2021-07-02 PROCEDURE — 1126F AMNT PAIN NOTED NONE PRSNT: CPT | Mod: HCNC,S$GLB,, | Performed by: INTERNAL MEDICINE

## 2021-07-02 PROCEDURE — 93000 EKG 12-LEAD: ICD-10-PCS | Mod: HCNC,S$GLB,, | Performed by: INTERNAL MEDICINE

## 2021-07-02 PROCEDURE — 3288F FALL RISK ASSESSMENT DOCD: CPT | Mod: HCNC,CPTII,S$GLB, | Performed by: INTERNAL MEDICINE

## 2021-07-02 PROCEDURE — 99999 PR PBB SHADOW E&M-EST. PATIENT-LVL IV: CPT | Mod: PBBFAC,HCNC,, | Performed by: INTERNAL MEDICINE

## 2021-07-02 PROCEDURE — 3288F PR FALLS RISK ASSESSMENT DOCUMENTED: ICD-10-PCS | Mod: HCNC,CPTII,S$GLB, | Performed by: INTERNAL MEDICINE

## 2021-07-02 RX ORDER — METOPROLOL SUCCINATE 200 MG/1
200 TABLET, EXTENDED RELEASE ORAL DAILY
Qty: 90 TABLET | Refills: 3 | Status: SHIPPED | OUTPATIENT
Start: 2021-07-02 | End: 2022-05-10

## 2021-07-14 ENCOUNTER — PATIENT MESSAGE (OUTPATIENT)
Dept: FAMILY MEDICINE | Facility: CLINIC | Age: 86
End: 2021-07-14

## 2021-12-31 DIAGNOSIS — G47.00 INSOMNIA, UNSPECIFIED TYPE: ICD-10-CM

## 2022-01-03 NOTE — TELEPHONE ENCOUNTER
This Rx Request does not qualify for assessment with the ORC.  Please review protocol details and the Care Due Message for extra clinical information

## 2022-01-04 ENCOUNTER — PATIENT MESSAGE (OUTPATIENT)
Dept: FAMILY MEDICINE | Facility: CLINIC | Age: 87
End: 2022-01-04
Payer: MEDICARE

## 2022-01-04 ENCOUNTER — TELEPHONE (OUTPATIENT)
Dept: FAMILY MEDICINE | Facility: CLINIC | Age: 87
End: 2022-01-04
Payer: MEDICARE

## 2022-01-04 RX ORDER — TEMAZEPAM 30 MG/1
30 CAPSULE ORAL NIGHTLY PRN
Qty: 30 CAPSULE | OUTPATIENT
Start: 2022-01-04

## 2022-01-04 NOTE — TELEPHONE ENCOUNTER
Provider Staff:     Action required for this patient.    Please note Refusal of medication.            Requested Prescriptions     Refused Prescriptions Disp Refills    temazepam (RESTORIL) 30 mg capsule [Pharmacy Med Name: TEMAZEPAM 30 MG CAPSULE] 30 capsule      Sig: TAKE 1 CAPSULE (30 MG TOTAL) BY MOUTH NIGHTLY AS NEEDED.     Refused By: TERESO LATHAM     Reason for Refusal: Patient needs an appointment      Thanks!  [facility]   Note composed: 01/04/2022 1:04 PM

## 2022-01-04 NOTE — TELEPHONE ENCOUNTER
----- Message from Skylar Gray sent at 1/4/2022  9:56 AM CST -----  Type: RX Refill Request    Who Called: pt    Have you contacted your pharmacy:    Refill or New Rx:temazepam (RESTORIL) 30 mg capsule - needs pa    RX Name and Strength:    How is the patient currently taking it? (ex. 1XDay):    Is this a 30 day or 90 day RX:    Preferred Pharmacy with phone number:  [facility] #[id number] - [place] - [address]  [address] [address]  Phone: [phone] Fax: [phone]        Local or Mail Order:    Ordering Provider:    Would the patient rather a call back or a response via [facility]? call    Best Call Back Number:[phone] (home)       Additional Information:

## 2022-01-05 DIAGNOSIS — G47.00 INSOMNIA, UNSPECIFIED TYPE: ICD-10-CM

## 2022-01-05 RX ORDER — TEMAZEPAM 30 MG/1
30 CAPSULE ORAL NIGHTLY PRN
Qty: 30 CAPSULE | Refills: 1 | Status: SHIPPED | OUTPATIENT
Start: 2022-01-05 | End: 2022-03-08

## 2022-01-05 NOTE — TELEPHONE ENCOUNTER
No new care gaps identified.  Powered by [email or web address]. Reference number: [id number].   1/05/2022 10:04:59 AM CST

## 2022-02-16 RX ORDER — OXYBUTYNIN CHLORIDE 10 MG/1
TABLET, EXTENDED RELEASE ORAL
Qty: 90 TABLET | Refills: 12 | Status: SHIPPED | OUTPATIENT
Start: 2022-02-16 | End: 2023-03-20

## 2022-02-16 NOTE — TELEPHONE ENCOUNTER
This Rx Request does not qualify for assessment with the OR.    Please review protocol details and the Care Due Message for extra clinical information    Reasons Rx Request may be deferred:  Visit criteria overdue  Labs/Vitals overdue/ abnormal  Patient has been seen in the ED/Hospital since the last PCP visit  Medication is outside of scope   Provider is a non-participating provider   Change request from pharmacy- see pharmacy comment for details

## 2022-03-02 DIAGNOSIS — M85.80 OSTEOPENIA, UNSPECIFIED LOCATION: ICD-10-CM

## 2022-03-05 RX ORDER — ALENDRONATE SODIUM 70 MG/1
TABLET ORAL
Qty: 12 TABLET | Refills: 12 | Status: ON HOLD | OUTPATIENT
Start: 2022-03-05 | End: 2023-02-22 | Stop reason: HOSPADM

## 2022-10-10 ENCOUNTER — PES CALL (OUTPATIENT)
Dept: ADMINISTRATIVE | Facility: CLINIC | Age: 87
End: 2022-10-10
Payer: MEDICARE

## 2022-10-31 ENCOUNTER — TELEPHONE (OUTPATIENT)
Dept: FAMILY MEDICINE | Facility: CLINIC | Age: 87
End: 2022-10-31
Payer: MEDICARE

## 2022-10-31 NOTE — TELEPHONE ENCOUNTER
----- Message from Michelle Chery sent at 10/31/2022 12:36 PM CDT -----  Regarding: [phone]  Type: Patient Call Back    Who called:daughter in law (jeremías)    What is the request in detail: daughter in law states that the pt has tested positive for covid. She would like to know what to do or what's the next steps due to the pts condition.    Can the clinic reply by [facility]?no    Would the patient rather a call back or a response via [facility]? Call back    Best call back number:[phone]

## 2022-11-02 ENCOUNTER — TELEPHONE (OUTPATIENT)
Dept: FAMILY MEDICINE | Facility: CLINIC | Age: 87
End: 2022-11-02
Payer: MEDICARE

## 2022-11-02 NOTE — TELEPHONE ENCOUNTER
----- Message from Skylar Gray sent at 11/2/2022  8:58 AM CDT -----  Type:  Needs Medical Advice/Symptom-based Call    Who Called: pt's daughter in law jeremías [phone]    Symptoms (please be specific): lethargic, congestion, nasal drip, sleeping non stop. Jeremías is wondering what to do for her. The pt tested positive for covid on Monday. Please call jeremías    How long has patient had these symptoms:  monday    Would the patient rather a call back or a response via [facility]? call    Best Call Back Number: [phone] (home)       Additional Information:

## 2022-11-02 NOTE — TELEPHONE ENCOUNTER
Called patient and patient put daughter CASSIUS on the phone and informed daughter an e-visit was sent to patient Quentin. Daughter denied in person visit. States she will do the e-visit.

## 2022-11-28 ENCOUNTER — PES CALL (OUTPATIENT)
Dept: ADMINISTRATIVE | Facility: CLINIC | Age: 87
End: 2022-11-28
Payer: MEDICARE

## 2023-01-19 ENCOUNTER — PES CALL (OUTPATIENT)
Dept: ADMINISTRATIVE | Facility: CLINIC | Age: 88
End: 2023-01-19
Payer: MEDICARE

## 2023-02-13 ENCOUNTER — PES CALL (OUTPATIENT)
Dept: ADMINISTRATIVE | Facility: CLINIC | Age: 88
End: 2023-02-13
Payer: MEDICARE

## 2023-02-18 ENCOUNTER — HOSPITAL ENCOUNTER (INPATIENT)
Facility: HOSPITAL | Age: 88
LOS: 5 days | Discharge: HOSPICE | DRG: 246 | End: 2023-02-23
Attending: EMERGENCY MEDICINE | Admitting: INTERNAL MEDICINE
Payer: MEDICARE

## 2023-02-18 DIAGNOSIS — I49.9 CARDIAC RHYTHM DISORDER OR DISTURBANCE OR CHANGE: ICD-10-CM

## 2023-02-18 DIAGNOSIS — I21.4 NSTEMI (NON-ST ELEVATED MYOCARDIAL INFARCTION): ICD-10-CM

## 2023-02-18 DIAGNOSIS — R07.9 ACUTE CHEST PAIN: ICD-10-CM

## 2023-02-18 DIAGNOSIS — I21.3 ACUTE ST ELEVATION MYOCARDIAL INFARCTION (STEMI), UNSPECIFIED ARTERY: Primary | ICD-10-CM

## 2023-02-18 DIAGNOSIS — R11.10 VOMITING, UNSPECIFIED VOMITING TYPE, UNSPECIFIED WHETHER NAUSEA PRESENT: ICD-10-CM

## 2023-02-18 DIAGNOSIS — I21.3 STEMI (ST ELEVATION MYOCARDIAL INFARCTION): ICD-10-CM

## 2023-02-18 DIAGNOSIS — I50.43 ACUTE ON CHRONIC COMBINED SYSTOLIC AND DIASTOLIC HEART FAILURE: ICD-10-CM

## 2023-02-18 PROBLEM — I20.0 UNSTABLE ANGINA: Status: ACTIVE | Noted: 2023-02-18

## 2023-02-18 LAB
ABO + RH BLD: NORMAL
ALBUMIN SERPL BCP-MCNC: 3.6 G/DL (ref 3.5–5.2)
ALBUMIN SERPL BCP-MCNC: 3.7 G/DL (ref 3.5–5.2)
ALBUMIN SERPL BCP-MCNC: 4.4 G/DL (ref 3.5–5.2)
ALP SERPL-CCNC: 58 U/L (ref 55–135)
ALP SERPL-CCNC: 67 U/L (ref 55–135)
ALP SERPL-CCNC: 67 U/L (ref 55–135)
ALT SERPL W/O P-5'-P-CCNC: 11 U/L (ref 10–44)
ALT SERPL W/O P-5'-P-CCNC: 11 U/L (ref 10–44)
ALT SERPL W/O P-5'-P-CCNC: 25 U/L (ref 10–44)
ANION GAP SERPL CALC-SCNC: 13 MMOL/L (ref 8–16)
ANION GAP SERPL CALC-SCNC: 14 MMOL/L (ref 8–16)
ANION GAP SERPL CALC-SCNC: 15 MMOL/L (ref 8–16)
APTT BLDCRRT: 25.4 SEC (ref 21–32)
APTT BLDCRRT: >150 SEC (ref 21–32)
AST SERPL-CCNC: 15 U/L (ref 10–40)
AST SERPL-CCNC: 180 U/L (ref 10–40)
AST SERPL-CCNC: 59 U/L (ref 10–40)
BASOPHILS # BLD AUTO: 0.03 K/UL (ref 0–0.2)
BASOPHILS # BLD AUTO: 0.03 K/UL (ref 0–0.2)
BASOPHILS NFR BLD: 0.3 % (ref 0–1.9)
BASOPHILS NFR BLD: 0.4 % (ref 0–1.9)
BILIRUB SERPL-MCNC: 0.9 MG/DL (ref 0.1–1)
BILIRUB SERPL-MCNC: 0.9 MG/DL (ref 0.1–1)
BILIRUB SERPL-MCNC: 1.5 MG/DL (ref 0.1–1)
BLD GP AB SCN CELLS X3 SERPL QL: NORMAL
BNP SERPL-MCNC: 1068 PG/ML (ref 0–99)
BNP SERPL-MCNC: 1153 PG/ML (ref 0–99)
BUN SERPL-MCNC: 12 MG/DL (ref 10–30)
BUN SERPL-MCNC: 13 MG/DL (ref 10–30)
BUN SERPL-MCNC: 14 MG/DL (ref 10–30)
CALCIUM SERPL-MCNC: 8.7 MG/DL (ref 8.7–10.5)
CALCIUM SERPL-MCNC: 9 MG/DL (ref 8.7–10.5)
CALCIUM SERPL-MCNC: 9.5 MG/DL (ref 8.7–10.5)
CHLORIDE SERPL-SCNC: 100 MMOL/L (ref 95–110)
CHLORIDE SERPL-SCNC: 97 MMOL/L (ref 95–110)
CHLORIDE SERPL-SCNC: 98 MMOL/L (ref 95–110)
CHOLEST SERPL-MCNC: 150 MG/DL (ref 120–199)
CHOLEST/HDLC SERPL: 3.8 {RATIO} (ref 2–5)
CO2 SERPL-SCNC: 22 MMOL/L (ref 23–29)
CO2 SERPL-SCNC: 24 MMOL/L (ref 23–29)
CO2 SERPL-SCNC: 28 MMOL/L (ref 23–29)
CREAT SERPL-MCNC: 0.8 MG/DL (ref 0.5–1.4)
CREAT SERPL-MCNC: 0.9 MG/DL (ref 0.5–1.4)
CREAT SERPL-MCNC: 0.9 MG/DL (ref 0.5–1.4)
DIFFERENTIAL METHOD: ABNORMAL
DIFFERENTIAL METHOD: ABNORMAL
EOSINOPHIL # BLD AUTO: 0 K/UL (ref 0–0.5)
EOSINOPHIL # BLD AUTO: 0 K/UL (ref 0–0.5)
EOSINOPHIL NFR BLD: 0.2 % (ref 0–8)
EOSINOPHIL NFR BLD: 0.5 % (ref 0–8)
ERYTHROCYTE [DISTWIDTH] IN BLOOD BY AUTOMATED COUNT: 14.9 % (ref 11.5–14.5)
ERYTHROCYTE [DISTWIDTH] IN BLOOD BY AUTOMATED COUNT: 15.1 % (ref 11.5–14.5)
EST. GFR  (NO RACE VARIABLE): 59.2 ML/MIN/1.73 M^2
EST. GFR  (NO RACE VARIABLE): 59.2 ML/MIN/1.73 M^2
EST. GFR  (NO RACE VARIABLE): >60 ML/MIN/1.73 M^2
FIBRINOGEN PPP-MCNC: 184 MG/DL (ref 182–400)
GLUCOSE SERPL-MCNC: 136 MG/DL (ref 70–110)
GLUCOSE SERPL-MCNC: 144 MG/DL (ref 70–110)
GLUCOSE SERPL-MCNC: 164 MG/DL (ref 70–110)
HCT VFR BLD AUTO: 44.4 % (ref 37–48.5)
HCT VFR BLD AUTO: 51.5 % (ref 37–48.5)
HDLC SERPL-MCNC: 39 MG/DL (ref 40–75)
HDLC SERPL: 26 % (ref 20–50)
HGB BLD-MCNC: 13.7 G/DL (ref 12–16)
HGB BLD-MCNC: 16.3 G/DL (ref 12–16)
IMM GRANULOCYTES # BLD AUTO: 0.02 K/UL (ref 0–0.04)
IMM GRANULOCYTES # BLD AUTO: 0.05 K/UL (ref 0–0.04)
IMM GRANULOCYTES NFR BLD AUTO: 0.3 % (ref 0–0.5)
IMM GRANULOCYTES NFR BLD AUTO: 0.6 % (ref 0–0.5)
INR PPP: 1.3 (ref 0.8–1.2)
INR PPP: 2.3 (ref 0.8–1.2)
LDLC SERPL CALC-MCNC: 94.4 MG/DL (ref 63–159)
LYMPHOCYTES # BLD AUTO: 1.2 K/UL (ref 1–4.8)
LYMPHOCYTES # BLD AUTO: 2.3 K/UL (ref 1–4.8)
LYMPHOCYTES NFR BLD: 13.8 % (ref 18–48)
LYMPHOCYTES NFR BLD: 30.1 % (ref 18–48)
MAGNESIUM SERPL-MCNC: 1.8 MG/DL (ref 1.6–2.6)
MAGNESIUM SERPL-MCNC: 1.9 MG/DL (ref 1.6–2.6)
MAGNESIUM SERPL-MCNC: 2 MG/DL (ref 1.6–2.6)
MCH RBC QN AUTO: 29.3 PG (ref 27–31)
MCH RBC QN AUTO: 29.3 PG (ref 27–31)
MCHC RBC AUTO-ENTMCNC: 30.9 G/DL (ref 32–36)
MCHC RBC AUTO-ENTMCNC: 31.7 G/DL (ref 32–36)
MCV RBC AUTO: 93 FL (ref 82–98)
MCV RBC AUTO: 95 FL (ref 82–98)
MONOCYTES # BLD AUTO: 0.3 K/UL (ref 0.3–1)
MONOCYTES # BLD AUTO: 0.3 K/UL (ref 0.3–1)
MONOCYTES NFR BLD: 3.5 % (ref 4–15)
MONOCYTES NFR BLD: 4.1 % (ref 4–15)
NEUTROPHILS # BLD AUTO: 5 K/UL (ref 1.8–7.7)
NEUTROPHILS # BLD AUTO: 7.2 K/UL (ref 1.8–7.7)
NEUTROPHILS NFR BLD: 64.6 % (ref 38–73)
NEUTROPHILS NFR BLD: 81.6 % (ref 38–73)
NONHDLC SERPL-MCNC: 111 MG/DL
NRBC BLD-RTO: 0 /100 WBC
NRBC BLD-RTO: 0 /100 WBC
PHOSPHATE SERPL-MCNC: 4.2 MG/DL (ref 2.7–4.5)
PHOSPHATE SERPL-MCNC: 4.7 MG/DL (ref 2.7–4.5)
PLATELET # BLD AUTO: 161 K/UL (ref 150–450)
PLATELET # BLD AUTO: 181 K/UL (ref 150–450)
PMV BLD AUTO: 9.6 FL (ref 9.2–12.9)
PMV BLD AUTO: 9.7 FL (ref 9.2–12.9)
POCT GLUCOSE: 139 MG/DL (ref 70–110)
POCT GLUCOSE: 160 MG/DL (ref 70–110)
POTASSIUM SERPL-SCNC: 4 MMOL/L (ref 3.5–5.1)
POTASSIUM SERPL-SCNC: 4.3 MMOL/L (ref 3.5–5.1)
POTASSIUM SERPL-SCNC: 4.3 MMOL/L (ref 3.5–5.1)
PROT SERPL-MCNC: 6.3 G/DL (ref 6–8.4)
PROT SERPL-MCNC: 6.3 G/DL (ref 6–8.4)
PROT SERPL-MCNC: 7.9 G/DL (ref 6–8.4)
PROTHROMBIN TIME: 13.5 SEC (ref 9–12.5)
PROTHROMBIN TIME: 22.4 SEC (ref 9–12.5)
RBC # BLD AUTO: 4.68 M/UL (ref 4–5.4)
RBC # BLD AUTO: 5.56 M/UL (ref 4–5.4)
SODIUM SERPL-SCNC: 135 MMOL/L (ref 136–145)
SODIUM SERPL-SCNC: 136 MMOL/L (ref 136–145)
SODIUM SERPL-SCNC: 140 MMOL/L (ref 136–145)
TRIGL SERPL-MCNC: 83 MG/DL (ref 30–150)
TROPONIN I SERPL DL<=0.01 NG/ML-MCNC: 0.02 NG/ML (ref 0–0.03)
TROPONIN I SERPL DL<=0.01 NG/ML-MCNC: 12.56 NG/ML (ref 0–0.03)
TROPONIN I SERPL DL<=0.01 NG/ML-MCNC: >50 NG/ML (ref 0–0.03)
WBC # BLD AUTO: 7.75 K/UL (ref 3.9–12.7)
WBC # BLD AUTO: 8.82 K/UL (ref 3.9–12.7)

## 2023-02-18 PROCEDURE — 80053 COMPREHEN METABOLIC PANEL: CPT | Mod: 91 | Performed by: INTERNAL MEDICINE

## 2023-02-18 PROCEDURE — 80061 LIPID PANEL: CPT | Performed by: STUDENT IN AN ORGANIZED HEALTH CARE EDUCATION/TRAINING PROGRAM

## 2023-02-18 PROCEDURE — 20000000 HC ICU ROOM

## 2023-02-18 PROCEDURE — 92941 PR AMI ANY METHOD: ICD-10-PCS | Mod: RI,,, | Performed by: INTERNAL MEDICINE

## 2023-02-18 PROCEDURE — 83880 ASSAY OF NATRIURETIC PEPTIDE: CPT | Performed by: EMERGENCY MEDICINE

## 2023-02-18 PROCEDURE — 93010 EKG 12-LEAD: ICD-10-PCS | Mod: ,,, | Performed by: INTERNAL MEDICINE

## 2023-02-18 PROCEDURE — 25000003 PHARM REV CODE 250: Performed by: INTERNAL MEDICINE

## 2023-02-18 PROCEDURE — 85610 PROTHROMBIN TIME: CPT | Mod: 91 | Performed by: INTERNAL MEDICINE

## 2023-02-18 PROCEDURE — 93010 ELECTROCARDIOGRAM REPORT: CPT | Mod: ,,, | Performed by: INTERNAL MEDICINE

## 2023-02-18 PROCEDURE — 85730 THROMBOPLASTIN TIME PARTIAL: CPT | Performed by: EMERGENCY MEDICINE

## 2023-02-18 PROCEDURE — 85384 FIBRINOGEN ACTIVITY: CPT | Performed by: INTERNAL MEDICINE

## 2023-02-18 PROCEDURE — 25000003 PHARM REV CODE 250: Performed by: STUDENT IN AN ORGANIZED HEALTH CARE EDUCATION/TRAINING PROGRAM

## 2023-02-18 PROCEDURE — 63600175 PHARM REV CODE 636 W HCPCS: Performed by: EMERGENCY MEDICINE

## 2023-02-18 PROCEDURE — 93454 CORONARY ARTERY ANGIO S&I: CPT | Mod: 59 | Performed by: INTERNAL MEDICINE

## 2023-02-18 PROCEDURE — 83735 ASSAY OF MAGNESIUM: CPT | Performed by: EMERGENCY MEDICINE

## 2023-02-18 PROCEDURE — 93005 ELECTROCARDIOGRAM TRACING: CPT

## 2023-02-18 PROCEDURE — 85730 THROMBOPLASTIN TIME PARTIAL: CPT | Mod: 91 | Performed by: INTERNAL MEDICINE

## 2023-02-18 PROCEDURE — 25500020 PHARM REV CODE 255: Performed by: INTERNAL MEDICINE

## 2023-02-18 PROCEDURE — 85610 PROTHROMBIN TIME: CPT | Performed by: EMERGENCY MEDICINE

## 2023-02-18 PROCEDURE — 27201423 OPTIME MED/SURG SUP & DEVICES STERILE SUPPLY: Performed by: INTERNAL MEDICINE

## 2023-02-18 PROCEDURE — 99223 1ST HOSP IP/OBS HIGH 75: CPT | Mod: AI,GC,, | Performed by: INTERNAL MEDICINE

## 2023-02-18 PROCEDURE — 63600175 PHARM REV CODE 636 W HCPCS: Performed by: INTERNAL MEDICINE

## 2023-02-18 PROCEDURE — 94761 N-INVAS EAR/PLS OXIMETRY MLT: CPT

## 2023-02-18 PROCEDURE — 93454 PR CATH PLACE/CORONARY ANGIO, IMG SUPER/INTERP: ICD-10-PCS | Mod: 26,59,, | Performed by: INTERNAL MEDICINE

## 2023-02-18 PROCEDURE — 84484 ASSAY OF TROPONIN QUANT: CPT | Mod: 91 | Performed by: INTERNAL MEDICINE

## 2023-02-18 PROCEDURE — 92941 PRQ TRLML REVSC TOT OCCL AMI: CPT | Mod: RI,,, | Performed by: INTERNAL MEDICINE

## 2023-02-18 PROCEDURE — C1874 STENT, COATED/COV W/DEL SYS: HCPCS | Performed by: INTERNAL MEDICINE

## 2023-02-18 PROCEDURE — 25000003 PHARM REV CODE 250: Performed by: EMERGENCY MEDICINE

## 2023-02-18 PROCEDURE — C9606 PERC D-E COR REVASC W AMI S: HCPCS | Mod: RI | Performed by: INTERNAL MEDICINE

## 2023-02-18 PROCEDURE — 85025 COMPLETE CBC W/AUTO DIFF WBC: CPT | Performed by: EMERGENCY MEDICINE

## 2023-02-18 PROCEDURE — 84484 ASSAY OF TROPONIN QUANT: CPT | Performed by: EMERGENCY MEDICINE

## 2023-02-18 PROCEDURE — 27000221 HC OXYGEN, UP TO 24 HOURS

## 2023-02-18 PROCEDURE — 86900 BLOOD TYPING SEROLOGIC ABO: CPT | Performed by: STUDENT IN AN ORGANIZED HEALTH CARE EDUCATION/TRAINING PROGRAM

## 2023-02-18 PROCEDURE — 63600175 PHARM REV CODE 636 W HCPCS: Performed by: STUDENT IN AN ORGANIZED HEALTH CARE EDUCATION/TRAINING PROGRAM

## 2023-02-18 PROCEDURE — 84100 ASSAY OF PHOSPHORUS: CPT | Mod: 91 | Performed by: INTERNAL MEDICINE

## 2023-02-18 PROCEDURE — C1894 INTRO/SHEATH, NON-LASER: HCPCS | Performed by: INTERNAL MEDICINE

## 2023-02-18 PROCEDURE — C1887 CATHETER, GUIDING: HCPCS | Performed by: INTERNAL MEDICINE

## 2023-02-18 PROCEDURE — 99223 PR INITIAL HOSPITAL CARE,LEVL III: ICD-10-PCS | Mod: AI,GC,, | Performed by: INTERNAL MEDICINE

## 2023-02-18 PROCEDURE — 85025 COMPLETE CBC W/AUTO DIFF WBC: CPT | Mod: 91 | Performed by: INTERNAL MEDICINE

## 2023-02-18 PROCEDURE — 96374 THER/PROPH/DIAG INJ IV PUSH: CPT

## 2023-02-18 PROCEDURE — 83880 ASSAY OF NATRIURETIC PEPTIDE: CPT | Mod: 91 | Performed by: STUDENT IN AN ORGANIZED HEALTH CARE EDUCATION/TRAINING PROGRAM

## 2023-02-18 PROCEDURE — 80053 COMPREHEN METABOLIC PANEL: CPT | Performed by: EMERGENCY MEDICINE

## 2023-02-18 PROCEDURE — C1760 CLOSURE DEV, VASC: HCPCS | Performed by: INTERNAL MEDICINE

## 2023-02-18 PROCEDURE — 25000242 PHARM REV CODE 250 ALT 637 W/ HCPCS

## 2023-02-18 PROCEDURE — 83735 ASSAY OF MAGNESIUM: CPT | Mod: 91 | Performed by: INTERNAL MEDICINE

## 2023-02-18 PROCEDURE — C1769 GUIDE WIRE: HCPCS | Performed by: INTERNAL MEDICINE

## 2023-02-18 PROCEDURE — C1725 CATH, TRANSLUMIN NON-LASER: HCPCS | Performed by: INTERNAL MEDICINE

## 2023-02-18 PROCEDURE — 99291 CRITICAL CARE FIRST HOUR: CPT | Mod: 25

## 2023-02-18 PROCEDURE — 93454 CORONARY ARTERY ANGIO S&I: CPT | Mod: 26,59,, | Performed by: INTERNAL MEDICINE

## 2023-02-18 DEVICE — EVEROLIMUS-ELUTING PLATINUM CHROMIUM CORONARY STENT SYSTEM
Type: IMPLANTABLE DEVICE | Site: CORONARY | Status: FUNCTIONAL
Brand: SYNERGY™ XD

## 2023-02-18 RX ORDER — ASPIRIN 325 MG
325 TABLET ORAL
Status: COMPLETED | OUTPATIENT
Start: 2023-02-18 | End: 2023-02-18

## 2023-02-18 RX ORDER — LEVOTHYROXINE SODIUM 25 UG/1
25 TABLET ORAL DAILY
Status: DISCONTINUED | OUTPATIENT
Start: 2023-02-19 | End: 2023-02-23

## 2023-02-18 RX ORDER — SERTRALINE HYDROCHLORIDE 25 MG/1
25 TABLET, FILM COATED ORAL DAILY
Status: DISCONTINUED | OUTPATIENT
Start: 2023-02-19 | End: 2023-02-19

## 2023-02-18 RX ORDER — HEPARIN SODIUM 1000 [USP'U]/ML
INJECTION, SOLUTION INTRAVENOUS; SUBCUTANEOUS
Status: DISCONTINUED | OUTPATIENT
Start: 2023-02-18 | End: 2023-02-18 | Stop reason: HOSPADM

## 2023-02-18 RX ORDER — AMOXICILLIN 250 MG
1 CAPSULE ORAL 2 TIMES DAILY
Status: DISCONTINUED | OUTPATIENT
Start: 2023-02-18 | End: 2023-02-23 | Stop reason: HOSPADM

## 2023-02-18 RX ORDER — SODIUM CHLORIDE 9 MG/ML
INJECTION, SOLUTION INTRAVENOUS CONTINUOUS
Status: CANCELLED | OUTPATIENT
Start: 2023-02-18 | End: 2023-02-18

## 2023-02-18 RX ORDER — NITROGLYCERIN 0.4 MG/1
TABLET SUBLINGUAL
Status: COMPLETED
Start: 2023-02-18 | End: 2023-02-18

## 2023-02-18 RX ORDER — POLYETHYLENE GLYCOL 3350 17 G/17G
17 POWDER, FOR SOLUTION ORAL DAILY
Status: DISCONTINUED | OUTPATIENT
Start: 2023-02-18 | End: 2023-02-23 | Stop reason: HOSPADM

## 2023-02-18 RX ORDER — FENTANYL CITRATE 50 UG/ML
50 INJECTION, SOLUTION INTRAMUSCULAR; INTRAVENOUS
Status: COMPLETED | OUTPATIENT
Start: 2023-02-18 | End: 2023-02-18

## 2023-02-18 RX ORDER — CLOPIDOGREL 300 MG/1
300 TABLET, FILM COATED ORAL ONCE
Status: COMPLETED | OUTPATIENT
Start: 2023-02-18 | End: 2023-02-18

## 2023-02-18 RX ORDER — MUPIROCIN 20 MG/G
OINTMENT TOPICAL 2 TIMES DAILY
Status: COMPLETED | OUTPATIENT
Start: 2023-02-18 | End: 2023-02-23

## 2023-02-18 RX ORDER — OXYCODONE HYDROCHLORIDE 5 MG/1
5 TABLET ORAL EVERY 4 HOURS PRN
Status: DISCONTINUED | OUTPATIENT
Start: 2023-02-18 | End: 2023-02-23 | Stop reason: HOSPADM

## 2023-02-18 RX ORDER — TALC
6 POWDER (GRAM) TOPICAL NIGHTLY PRN
Status: DISCONTINUED | OUTPATIENT
Start: 2023-02-18 | End: 2023-02-23 | Stop reason: HOSPADM

## 2023-02-18 RX ORDER — DIPHENHYDRAMINE HCL 50 MG
50 CAPSULE ORAL ONCE
Status: CANCELLED | OUTPATIENT
Start: 2023-02-18 | End: 2023-02-18

## 2023-02-18 RX ORDER — MAGNESIUM SULFATE HEPTAHYDRATE 40 MG/ML
2 INJECTION, SOLUTION INTRAVENOUS ONCE
Status: COMPLETED | OUTPATIENT
Start: 2023-02-18 | End: 2023-02-18

## 2023-02-18 RX ORDER — ZOLPIDEM TARTRATE 5 MG/1
5 TABLET ORAL NIGHTLY PRN
Status: DISCONTINUED | OUTPATIENT
Start: 2023-02-18 | End: 2023-02-23 | Stop reason: HOSPADM

## 2023-02-18 RX ORDER — ASPIRIN 81 MG/1
81 TABLET ORAL DAILY
Status: DISCONTINUED | OUTPATIENT
Start: 2023-02-19 | End: 2023-02-20

## 2023-02-18 RX ORDER — HEPARIN SOD,PORCINE/0.9 % NACL 1000/500ML
INTRAVENOUS SOLUTION INTRAVENOUS
Status: DISCONTINUED | OUTPATIENT
Start: 2023-02-18 | End: 2023-02-18 | Stop reason: HOSPADM

## 2023-02-18 RX ORDER — NITROGLYCERIN 0.4 MG/1
0.4 TABLET SUBLINGUAL EVERY 5 MIN PRN
Status: DISCONTINUED | OUTPATIENT
Start: 2023-02-18 | End: 2023-02-23 | Stop reason: HOSPADM

## 2023-02-18 RX ORDER — CEFAZOLIN SODIUM 1 G/3ML
INJECTION, POWDER, FOR SOLUTION INTRAMUSCULAR; INTRAVENOUS
Status: DISCONTINUED | OUTPATIENT
Start: 2023-02-18 | End: 2023-02-18 | Stop reason: HOSPADM

## 2023-02-18 RX ORDER — LIDOCAINE HYDROCHLORIDE 10 MG/ML
INJECTION INFILTRATION; PERINEURAL
Status: DISCONTINUED | OUTPATIENT
Start: 2023-02-18 | End: 2023-02-18 | Stop reason: HOSPADM

## 2023-02-18 RX ORDER — SODIUM CHLORIDE 0.9 % (FLUSH) 0.9 %
10 SYRINGE (ML) INJECTION
Status: DISCONTINUED | OUTPATIENT
Start: 2023-02-18 | End: 2023-02-23 | Stop reason: HOSPADM

## 2023-02-18 RX ORDER — ONDANSETRON 2 MG/ML
4 INJECTION INTRAMUSCULAR; INTRAVENOUS EVERY 8 HOURS PRN
Status: DISCONTINUED | OUTPATIENT
Start: 2023-02-18 | End: 2023-02-19

## 2023-02-18 RX ORDER — OXYBUTYNIN CHLORIDE 5 MG/1
10 TABLET, EXTENDED RELEASE ORAL DAILY
Status: DISCONTINUED | OUTPATIENT
Start: 2023-02-19 | End: 2023-02-23 | Stop reason: HOSPADM

## 2023-02-18 RX ORDER — ATORVASTATIN CALCIUM 40 MG/1
40 TABLET, FILM COATED ORAL DAILY
Status: DISCONTINUED | OUTPATIENT
Start: 2023-02-18 | End: 2023-02-23 | Stop reason: HOSPADM

## 2023-02-18 RX ORDER — CLOPIDOGREL BISULFATE 75 MG/1
75 TABLET ORAL DAILY
Status: DISCONTINUED | OUTPATIENT
Start: 2023-02-19 | End: 2023-02-23 | Stop reason: HOSPADM

## 2023-02-18 RX ORDER — METOPROLOL SUCCINATE 100 MG/1
200 TABLET, EXTENDED RELEASE ORAL DAILY
Status: DISCONTINUED | OUTPATIENT
Start: 2023-02-18 | End: 2023-02-23 | Stop reason: HOSPADM

## 2023-02-18 RX ORDER — SODIUM CHLORIDE 9 MG/ML
INJECTION, SOLUTION INTRAVENOUS CONTINUOUS
Status: ACTIVE | OUTPATIENT
Start: 2023-02-18 | End: 2023-02-18

## 2023-02-18 RX ORDER — ACETAMINOPHEN 325 MG/1
650 TABLET ORAL EVERY 4 HOURS PRN
Status: DISCONTINUED | OUTPATIENT
Start: 2023-02-18 | End: 2023-02-23 | Stop reason: HOSPADM

## 2023-02-18 RX ADMIN — ASPIRIN 325 MG ORAL TABLET 325 MG: 325 PILL ORAL at 10:02

## 2023-02-18 RX ADMIN — ONDANSETRON 4 MG: 2 INJECTION INTRAMUSCULAR; INTRAVENOUS at 05:02

## 2023-02-18 RX ADMIN — OXYCODONE HYDROCHLORIDE 5 MG: 5 TABLET ORAL at 09:02

## 2023-02-18 RX ADMIN — CLOPIDOGREL BISULFATE 300 MG: 300 TABLET, FILM COATED ORAL at 12:02

## 2023-02-18 RX ADMIN — OXYCODONE HYDROCHLORIDE 5 MG: 5 TABLET ORAL at 05:02

## 2023-02-18 RX ADMIN — METOPROLOL SUCCINATE 200 MG: 100 TABLET, EXTENDED RELEASE ORAL at 02:02

## 2023-02-18 RX ADMIN — SENNOSIDES AND DOCUSATE SODIUM 1 TABLET: 50; 8.6 TABLET ORAL at 09:02

## 2023-02-18 RX ADMIN — MUPIROCIN 1 G: 20 OINTMENT TOPICAL at 09:02

## 2023-02-18 RX ADMIN — Medication 6 MG: at 09:02

## 2023-02-18 RX ADMIN — POLYETHYLENE GLYCOL 3350 17 G: 17 POWDER, FOR SOLUTION ORAL at 02:02

## 2023-02-18 RX ADMIN — CLOPIDOGREL BISULFATE 300 MG: 300 TABLET, FILM COATED ORAL at 10:02

## 2023-02-18 RX ADMIN — SODIUM CHLORIDE: 9 INJECTION, SOLUTION INTRAVENOUS at 01:02

## 2023-02-18 RX ADMIN — NITROGLYCERIN 0.4 MG: 0.4 TABLET, ORALLY DISINTEGRATING SUBLINGUAL at 10:02

## 2023-02-18 RX ADMIN — NITROGLYCERIN 0.4 MG: 0.4 TABLET SUBLINGUAL at 10:02

## 2023-02-18 RX ADMIN — FENTANYL CITRATE 50 MCG: 50 INJECTION INTRAMUSCULAR; INTRAVENOUS at 10:02

## 2023-02-18 RX ADMIN — MAGNESIUM SULFATE 2 G: 2 INJECTION INTRAVENOUS at 09:02

## 2023-02-18 RX ADMIN — ATORVASTATIN CALCIUM 40 MG: 40 TABLET, FILM COATED ORAL at 02:02

## 2023-02-18 NOTE — ED PROVIDER NOTES
Encounter Date: 2/18/2023       History     Chief Complaint   Patient presents with    Chest Pain     Daughter reporting pt has nausea/vomiting x 1 week. Episode of vomiting this morning that was yellow. Pt has a hx of cirrhosis and A-fib, and dementia. Pt holding to mid chest x 2 days that has been getting worse.      89-year-old female with past medical history of COPD, dementia, presents as transfer from outside facility for emergent evaluation by Cardiology Service.  Patient had EKG changes consistent with STEMI at outside facility.  She denies any chest pain at this time.    The history is provided by medical records. The history is limited by the condition of the patient.   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 calhoun- fosamax    Other dietary vitamin B12 deficiency anemia 1/21/2020    Paroxysmal atrial fibrillation 10/21/2019    PSVT (paroxysmal supraventricular tachycardia)     dr. 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    Thyroid disease     hypothyroidism    Trochanteric bursitis 4/18/2013    Unstable angina 2/18/2023    Urine incontinence 2/24/2015    Vitamin D deficiency disease 9/4/2014     Past  Surgical History:   Procedure Laterality Date    ADENOIDECTOMY      APPENDECTOMY      CHOLECYSTECTOMY      COLONOSCOPY      CYSTOSCOPY      ESOPHAGOGASTRODUODENOSCOPY N/A 10/23/2019    Procedure: EGD (ESOPHAGOGASTRODUODENOSCOPY);  Surgeon: Archie Humphrey MD;  Location: [facility];  Service: Endoscopy;  Laterality: N/A;    EYE SURGERY Bilateral     HYSTERECTOMY      LUNG LOBECTOMY Right     OOPHORECTOMY      TONSILLECTOMY      UPPER GASTROINTESTINAL ENDOSCOPY       Family History   Problem Relation Age of Onset    Cancer Mother         COLON    Hypertension Mother     Aneurysm Father     Alcohol abuse Brother     Kidney failure Sister     Cancer Brother         throat-smoker     Alcohol abuse Brother     Pacemaker/defibrilator Brother     Birth defects Paternal Grandfather         prostate     Asthma Neg Hx     Emphysema Neg Hx     Amblyopia Neg Hx     Blindness Neg Hx     Cataracts Neg Hx     Diabetes Neg Hx     Glaucoma Neg Hx     Macular degeneration Neg Hx     Retinal detachment Neg Hx     Strabismus Neg Hx     Stroke Neg Hx     Thyroid disease Neg Hx     Cirrhosis Neg Hx      Social History     Tobacco Use    Smoking status: Never    Smokeless tobacco: Never   Substance Use Topics    Alcohol use: No    Drug use: No         Physical Exam     Initial Vitals   BP Pulse Resp Temp SpO2   02/18/23 1030 02/18/23 1030 02/18/23 1030 02/18/23 1046 02/18/23 1045   (!) 175/129 (!) 117 (!) 25 97.7 °F (36.5 °C) (!) 89 %      MAP       --                Physical Exam    Nursing note and vitals reviewed.  Constitutional: She appears well-developed and well-nourished.   HENT:   Head: Normocephalic and atraumatic.   Eyes: Conjunctivae are normal.   Neck: Trachea normal.   Cardiovascular:  Normal rate, regular rhythm, normal heart sounds and normal pulses.           Pulmonary/Chest: Breath sounds normal. No tachypnea. No respiratory distress.   Abdominal: Abdomen is soft. There is no abdominal tenderness.     Neurological: She is  alert and oriented to person, place, and time.   Skin: Skin is warm.       ED Course   Procedures  Labs Reviewed   COMPREHENSIVE METABOLIC PANEL - Abnormal; Notable for the following components:       Result Value    Glucose 164 (*)     Total Bilirubin 1.5 (*)     All other components within normal limits   PROTIME-INR - Abnormal; Notable for the following components:    Prothrombin Time 13.5 (*)     INR 1.3 (*)     All other components within normal limits   B-TYPE NATRIURETIC PEPTIDE - Abnormal; Notable for the following components:    BNP 1,153 (*)     All other components within normal limits   CBC W/ AUTO DIFFERENTIAL - Abnormal; Notable for the following components:    RBC 5.56 (*)     Hemoglobin 16.3 (*)     Hematocrit 51.5 (*)     MCHC 31.7 (*)     RDW 15.1 (*)     All other components within normal limits   APTT   TROPONIN I   MAGNESIUM   URINALYSIS, REFLEX TO URINE CULTURE     EKG Readings: (Independently Interpreted)   Initial Reading: STEMI. Previous EKG: Compared with most recent EKG Heart Rate: 108.   Similar to prior      Imaging Results              X-Ray Chest 1 View (Final result)  Result time 02/18/23 15:34:52      Final result by Nick Garcia MD (02/18/23 15:34:52)                   Impression:      As above.      Electronically signed by: Nick Garcia  Date:    02/18/2023  Time:    15:34               Narrative:    EXAMINATION:  XR CHEST 1 VIEW    CLINICAL HISTORY:  chest pain;    TECHNIQUE:  Single frontal view of the chest was performed.    COMPARISON:  11/18/2019    FINDINGS:  Overlying monitoring leads.  Continued elevation of the right hemidiaphragm with low lung volume on the right.  Prominence of the cardiac silhouette, increased from comparison radiograph.  Mild interstitial attenuation which could relate to mild edema in the correct clinical setting.  No large pleural effusion or gross pneumothorax.  No acute osseous abnormality.                                        Medications   nitroGLYCERIN SL tablet 0.4 mg (0.4 mg Sublingual Given 2/18/23 1031)   sodium chloride 0.9% flush 10 mL (has no administration in time range)   levothyroxine tablet 25 mcg (has no administration in time range)   melatonin tablet 6 mg (has no administration in time range)   metoprolol succinate (TOPROL-XL) 24 hr tablet 200 mg (200 mg Oral Given 2/18/23 1427)   oxybutynin 24 hr tablet 10 mg (has no administration in time range)   sertraline tablet 25 mg (has no administration in time range)   zolpidem tablet 5 mg (has no administration in time range)   sodium chloride 0.9% flush 10 mL (has no administration in time range)   aspirin EC tablet 81 mg (has no administration in time range)   clopidogreL tablet 75 mg (has no administration in time range)   atorvastatin tablet 40 mg (40 mg Oral Given 2/18/23 1428)   acetaminophen tablet 650 mg (has no administration in time range)   ondansetron injection 4 mg (4 mg Intravenous Given 2/18/23 1734)   polyethylene glycol packet 17 g (17 g Oral Given 2/18/23 1428)   senna-docusate 8.6-50 mg per tablet 1 tablet (has no administration in time range)   oxyCODONE immediate release tablet 5 mg (5 mg Oral Given 2/18/23 1734)   0.9%  NaCl infusion ( Intravenous Verify Only 2/18/23 1600)   fentaNYL 50 mcg/mL injection 50 mcg (50 mcg Intravenous Given 2/18/23 1040)   aspirin tablet 325 mg (325 mg Oral Given 2/18/23 1045)   clopidogreL tablet 300 mg (300 mg Oral Given 2/18/23 1052)   clopidogreL tablet 300 mg (300 mg Oral Given 2/18/23 1209)     Medical Decision Making:   Initial Assessment:   Emergent evaluation of STEMI transfer.  Patient has been loaded with Plavix, given aspirin.  At this time she has no chest pain.  Cardiology consulted immediately on arrival.           ED Course as of 02/18/23 1747   Sat Feb 18, 2023   1154 EKG on arrival with STEMI [HS]   1159 Discussed with cardiology. Poor EF. Will go to cath lab and continue management.  [HS]      ED Course User  Index  [HS] Sergio Marroquin MD                 Clinical Impression:   Final diagnoses:  [R07.9] Acute chest pain  [I21.3] Acute ST elevation myocardial infarction (STEMI), unspecified artery (Primary)  [R11.10] Vomiting, unspecified vomiting type, unspecified whether nausea present        ED Disposition Condition    Admit Stable                Sergio Marroquin MD  02/18/23 0814

## 2023-02-18 NOTE — BRIEF OP NOTE
Brief Operative Note:    : Morgan Paul MD     Referring Physician: Self,Aaareferral     All Operators: Surgeon(s):  MD Morgan Ga MD Imad Bagh, MD     Preoperative Diagnosis: Acute chest pain [R07.9]  NSTEMI (non-ST elevated myocardial infarction) [I21.4]     Postop Diagnosis: Acute chest pain [R07.9]  NSTEMI (non-ST elevated myocardial infarction) [I21.4]    Treatments/Procedures: Procedure(s) (LRB):  ANGIOGRAM, CORONARY ARTERY (Right)    Access: Right CFA    Findings:Moderate coronary artery disease is present.     See catheterization report for full details.    Intervention: POBA Ramus    See catheterization report for full details.    Closure device: Perclose       Plan:  - Post cath protocol   - IVF @ 150 cc/kg/hr x 4 hours  - Bed rest x 6 hours   - Continue aspirin 81 mg daily indefinitely  - Continue plavix 75mg daily for minimum 6 months, ideally up to 1 year  - Continue high intensity statin therapy (LDL goal < 70)  - Risk factor reduction (BP <130/80 mmHg, glycemic control, etc)  - Resume cardiac diet  - Maximize medical management.  - Weight loss.  - Monitor site access for bleeding and/or hematoma. Please call stat for any access issues.  - Cardiac rehab referral  - Follow up with outpatient cardiologist    Estimated Blood loss: 20 cc    Specimens removed: No    Raghu Leblanc MD  Cardiology Fellow

## 2023-02-18 NOTE — ED NOTES
Cards team requesting not to call STEMI throughout hospital at this time, speaking to family -- Dr Marroquin aware

## 2023-02-18 NOTE — ED NOTES
Given fentanyl and put on 10L nonrebreather, O2 sats were in the high 80's prior to administration of medication.

## 2023-02-18 NOTE — NURSING
Pt presents to SICU [id number] via bed, escorted by Cath Lab staff.  Upon arrival, SICU monitoring applied.  Pt denies c/o pain or distress.  Pt's family notified of pt's arrival to SICU and present to bedside.  Dr. Cortez and Dr. Leblanc present to bedside, aware of pt data, and perform conference with pt's family.  Plan of care reviewed with pt's family, and verbalization of understanding obtained.  Emotional support offered.

## 2023-02-18 NOTE — Clinical Note
130 ml of contrast were injected throughout the case. 70 mL of contrast was the total wasted during the case. 200 mL was the total amount used during the case.

## 2023-02-18 NOTE — H&P
[facility] - Emergency Dept  Cardiology H&P Note    Patient Name: Sandra Moody  MRN: [id number]  Admission Date: 2/18/2023  Hospital Length of Stay: 0 days  Code Status: Prior   Attending Physician: Sergio Marroquin MD   Primary Care Physician: Akhil Harry MD  Expected Discharge Date:   Principal Problem:NSTEMI (non-ST elevated myocardial infarction)    Subjective:     HPI:  Patient is 89-year-old lady with PMHx significant for dementia, Afib on Eliquis, cirrhosis, history of lung cancer with RUL resection 2010, CKD2 who presented to [facility] ED with nausea/vomiting for 1 week.     At OSH ED, patient reports mid chest pain for 2 days that is getting worse. STEMI activated with ST elevations II,III, aVF, V5-V6. Vitals stable. Given nitroglycerin x1 with resolution of chest pain. Loaded with aspirin 325mg and plavix 300mg at 1050am. She took her eliquis at home around 9am. Another STEMI case on the table in [facility] cath lab, accepted for transfer to [facility].     Currently en route from OSH, will assess in ED and discuss with family.    Patient's daughter and POA at bedside. Patient is full code, will take to cath lab now. Not STEMI activated. Bedside EF with EF<20 and lateral wall akinesis.    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 calhoun- fosamax    Other dietary vitamin B12  deficiency anemia 1/21/2020    Paroxysmal atrial fibrillation 10/21/2019    PSVT (paroxysmal supraventricular tachycardia)     dr. 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    Thyroid disease     hypothyroidism    Trochanteric bursitis 4/18/2013    Urine incontinence 2/24/2015    Vitamin D deficiency disease 9/4/2014      Past Surgical History:   Procedure Laterality Date    ADENOIDECTOMY      APPENDECTOMY      CHOLECYSTECTOMY      COLONOSCOPY      CYSTOSCOPY      ESOPHAGOGASTRODUODENOSCOPY N/A 10/23/2019    Procedure: EGD (ESOPHAGOGASTRODUODENOSCOPY);  Surgeon: Archie Humphrey MD;  Location: [facility];  Service: Endoscopy;  Laterality: N/A;    EYE SURGERY Bilateral     HYSTERECTOMY      LUNG LOBECTOMY Right     OOPHORECTOMY      TONSILLECTOMY      UPPER GASTROINTESTINAL ENDOSCOPY        Review of patient's allergies indicates:   Allergen Reactions    Flu vaccine [id number](9 yr+)       Current Outpatient Medications   Medication Instructions    albuterol (PROVENTIL HFA) 90 mcg/actuation inhaler 2 puffs, Inhalation, Every 6 hours PRN    alendronate (FOSAMAX) 70 MG tablet TAKE 1 TABLET EVERY 7 DAYS.    apixaban (ELIQUIS) 5 mg, Oral, 2 times daily    apixaban (ELIQUIS) 5 mg, Oral, 2 times daily    cyanocobalamin 1,000 mcg/mL injection Dispense subcutaneous needles and syringes. Administer once a week for 4 weeks and then once a month    ELIQUIS 5 mg Tab TAKE 1 TABLET TWICE DAILY    ELIQUIS 5 mg Tab TAKE 1 TABLET TWICE DAILY    levothyroxine (SYNTHROID) 25 MCG tablet TAKE 1 TABLET EVERY DAY    melatonin 10 mg Tab Oral, Daily    metoprolol succinate (TOPROL-XL) 200 mg, Oral, Daily    oxybutynin (DITROPAN-XL) 10 MG 24 hr tablet TAKE 1 TABLET EVERY DAY    sertraline (ZOLOFT) 25 MG tablet TAKE 1 TABLET EVERY DAY    temazepam (RESTORIL) 30 mg, Oral, Nightly PRN     Review of Systems   Constitutional: Negative for  chills, diaphoresis and night sweats.   Cardiovascular:  Positive for chest pain. Negative for dyspnea on exertion, irregular heartbeat, leg swelling, near-syncope and palpitations.   Respiratory:  Negative for cough, shortness of breath and wheezing.    Skin:  Negative for color change and dry skin.   Musculoskeletal:  Negative for joint pain and joint swelling.   Gastrointestinal:  Positive for nausea and vomiting. Negative for abdominal pain and diarrhea.   Genitourinary:  Negative for dysuria.   Neurological:  Negative for dizziness, headaches, light-headedness and weakness.   All other systems reviewed and are negative.  Objective:     Vital Signs (Most Recent):  Temp: 97.7 °F (36.5 °C) (02/18/23 1046)  Pulse: (!) 115 (02/18/23 1058)  Resp: 16 (02/18/23 1040)  BP: (!) 153/119 (02/18/23 1058)  SpO2: 100 % (02/18/23 1058)   Vital Signs (24h Range):  Temp:  [97.7 °F (36.5 °C)] 97.7 °F (36.5 °C)  Pulse:  [108-117] 115  Resp:  [16-35] 16  SpO2:  [89 %-100 %] 100 %  BP: (144-175)/() 153/119     Weight: 65.8 kg (145 lb)  Body mass index is 24.89 kg/m².     Physical Exam  Constitutional:       General: She is not in acute distress.     Appearance: Normal appearance. She is not diaphoretic.   HENT:      Head: Normocephalic and atraumatic.   Cardiovascular:      Rate and Rhythm: Normal rate and regular rhythm.      Pulses:           Radial pulses are 1+ on the right side and 2+ on the left side.        Femoral pulses are 2+ on the right side and 2+ on the left side.     Heart sounds: Normal heart sounds. No murmur heard.    No friction rub. No gallop.   Pulmonary:      Effort: Pulmonary effort is normal.      Breath sounds: No wheezing, rhonchi or rales.   Chest:      Chest wall: No tenderness.   Abdominal:      General: Bowel sounds are normal. There is no distension.      Palpations: Abdomen is soft.      Tenderness: There is no abdominal tenderness.   Musculoskeletal:      Right lower leg: No edema.      Left  lower leg: No edema.   Skin:     General: Skin is warm and dry.      Capillary Refill: Capillary refill takes less than 2 seconds.   Neurological:      General: No focal deficit present.      Mental Status: She is alert and oriented to person, place, and time.   Psychiatric:         Mood and Affect: Mood normal.         Thought Content: Thought content normal.       Significant Labs:   -pending    EKG    Assessment and Plan:     * NSTEMI (non-ST elevated myocardial infarction)  At OSH ED, patient reports mid chest pain for 2 days that is getting worse.   STEMI activated with ST elevations II,III, aVF, V5-V6. Vitals stable.   Given nitroglycerin x1 with resolution of chest pain. Loaded with aspirin 325mg and plavix 300mg at 1050am.   She took her eliquis at home around 9am.   Another STEMI case on the table in [facility] cath lab, accepted for transfer to [facility].     Transferred from H, assessed in ED and discussed with family.    Bedside echocardiogram revealed significantly reduced LV and RV function. Troponin 0.067.  Case discussed with Interventional Cardiology at bedside agrees with plans below.     admit patient to CCU post cath lab  ACS protocol heparin  aspirin 325 given at OSH followed by 81 mg daily  Plavix 300 mg load given followed by 75 mg daily.  Give additional 300mg plavix now  NPO  Interventional Cardiology at bedside. Will take to cath lab now, not STEMI activated  Full code, consented patient with daughter at bedside  Bedside echo with significantly reduced EF.     - Plan is for LHC/angiogram +/- PCI.   - TTE: <20%  - Anti-platelet therapy: ASA and plavix  - Access: R femoral  - Creatinine/CrCl: 0.8  - Hemoglobin: 16  - Allergies: no contrast allergies.   - Pre-op hydration: 3 cc/kg/hr x 1 hour  - Pre-op med: 50 mg Benadryl    All patient's questions were answered.  The risks, benefits and alternatives of the procedure were explained to the patient.   The risks of coronary angiography include  but are not limited to: bleeding, infection, heart rhythm abnormalities, allergic reactions, kidney injury and potential need for dialysis, stroke and death.   Should stenting be indicated, the patient has agreed to dual anti-platelet therapy for 1-consecutive year with a drug-eluting stent and a minimum of 1-month with the use of a bare metal stent  Additionally, pt is aware that non-compliance is likely to result in stent clotting with heart attack, heart failure, and/or death  The risks of moderate sedation include hypotension, respiratory depression, arrhythmias, bronchospasm, and death.   Informed consent was obtained and the  patient is agreeable to proceed with the procedure.        Raghu Leblanc MD  Cardiology  [facility] - Emergency Dept

## 2023-02-18 NOTE — Clinical Note
"Subjective     Jo-Ann Castillo is a 17 y.o. female.     History of Present Illness  You have chosen to receive care through a telephone visit. Do you consent to use a telephone visit for your medical care today? Yes    Pt is presenting via telehealth with her mother for a 1 mo follow up on anxiety/depression.  Pt's zoloft was increased to 50mg at her last visit  She reports that she is doing well on the current dose.  She has a little nausea but it is tolerable.   Mother states that she has noticed a difference.   She is more \"settled\".  Denies any SI or HI.           The following portions of the patient's history were reviewed and updated as appropriate: allergies, current medications, past family history, past medical history, past social history, past surgical history and problem list.    Review of Systems   Constitutional: Negative for chills, fatigue and fever.   HENT: Positive for tinnitus (occasional).    Respiratory: Negative for chest tightness and shortness of breath.    Cardiovascular: Negative for chest pain and palpitations.   Gastrointestinal: Positive for nausea.   Neurological: Negative for dizziness and headache.   Psychiatric/Behavioral: Negative for depressed mood and stress. The patient is not nervous/anxious.        Objective     There were no vitals taken for this visit.    Current Outpatient Medications on File Prior to Visit   Medication Sig Dispense Refill   • [DISCONTINUED] sertraline (Zoloft) 50 MG tablet Take 1 tablet by mouth Daily. 30 tablet 0     No current facility-administered medications on file prior to visit.        Physical Exam  Constitutional:       Appearance: Normal appearance. She is not ill-appearing.   HENT:      Head: Normocephalic and atraumatic.   Pulmonary:      Effort: Pulmonary effort is normal. No respiratory distress.   Neurological:      General: No focal deficit present.      Mental Status: She is alert and oriented to person, place, and time.   Psychiatric:    " The closure device was inserted into the right femoral artery.      Mood and Affect: Mood normal.         Behavior: Behavior normal.         Thought Content: Thought content normal.         Judgment: Judgment normal.           Assessment & Plan     Diagnoses and all orders for this visit:    1. Anxiety  Comments:  improved  cont zoloft 50mg daily  denies SI or HI  very happy at this current dose  still encouraged some counseling  f/u 6mo  Orders:  -     sertraline (Zoloft) 50 MG tablet; Take 1 tablet by mouth Daily.  Dispense: 90 tablet; Refill: 1

## 2023-02-18 NOTE — ASSESSMENT & PLAN NOTE
At OSH ED, patient reports mid chest pain for 2 days that is getting worse.   STEMI activated with ST elevations II,III, aVF, V5-V6. Vitals stable.   Given nitroglycerin x1 with resolution of chest pain. Loaded with aspirin 325mg and plavix 300mg at 1050am.   She took her eliquis at home around 9am.   Another STEMI case on the table in [facility] cath lab, accepted for transfer to [facility].     Transferred from OSH, assessed in ED and discussed with family.    Bedside echocardiogram revealed significantly reduced LV and RV function. Troponin 0.067.  Case discussed with Interventional Cardiology at bedside agrees with plans below.      admit patient to CCU post cath lab   ACS protocol heparin   aspirin 325 given at OSH followed by 81 mg daily   Plavix 300 mg load given followed by 75 mg daily.   Give additional 300mg plavix now   NPO   Interventional Cardiology at bedside. Will take to cath lab now, not STEMI activated   Full code, consented patient with daughter at bedside   Bedside echo with significantly reduced EF.     - Plan is for LHC/angiogram +/- PCI.   - TTE: <20%  - Anti-platelet therapy: ASA and plavix  - Access: R femoral  - Creatinine/CrCl: 0.8  - Hemoglobin: 16  - Allergies: no contrast allergies.   - Pre-op hydration: 3 cc/kg/hr x 1 hour  - Pre-op med: 50 mg Benadryl     All patient's questions were answered.   The risks, benefits and alternatives of the procedure were explained to the patient.    The risks of coronary angiography include but are not limited to: bleeding, infection, heart rhythm abnormalities, allergic reactions, kidney injury and potential need for dialysis, stroke and death.    Should stenting be indicated, the patient has agreed to dual anti-platelet therapy for 1-consecutive year with a drug-eluting stent and a minimum of 1-month with the use of a bare metal stent   Additionally, pt is aware that non-compliance is likely to result in stent clotting with heart  attack, heart failure, and/or death   The risks of moderate sedation include hypotension, respiratory depression, arrhythmias, bronchospasm, and death.    Informed consent was obtained and the  patient is agreeable to proceed with the procedure.

## 2023-02-18 NOTE — ED PROVIDER NOTES
Encounter Date: 2/18/2023    SCRIBE #1 NOTE: I, Zhane De Los Santos, am scribing for, and in the presence of,  Tony Caraballo MD. I have scribed the following portions of the note - Other sections scribed: HPI, ROS.     History     Chief Complaint   Patient presents with    Chest Pain     Daughter reporting pt has nausea/vomiting x 1 week. Episode of vomiting this morning that was yellow. Pt has a hx of cirrhosis and A-fib, and dementia. Pt holding to mid chest x 2 days that has been getting worse.      Sandra Moody is a 89 y.o. female, with a past medical history of HTN, diverticulitis, COPD, cirrhosis, dementia, A.fib and upper lobe lung cancer, who presents to the ED with epigastric pain that began 2 days ago. Patient has an associated symptom of yellow vomit. Patient is compliant with eloquis, last endorsed at 9 am today. No other exacerbating or alleviating factors. Patient denies cough, shortness of breath, fever, chills, abdominal pain, abdominal bleeding, nausea, diarrhea, dysuria, headaches, congestion, sore throat, arm or leg trouble, eye pain, ear pain, rash, or other associated symptoms. No known allergies.       The history is provided by a relative.   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 calhoun-  fosamax    Other dietary vitamin B12 deficiency anemia 1/21/2020    Paroxysmal atrial fibrillation 10/21/2019    PSVT (paroxysmal supraventricular tachycardia)     dr. 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    Thyroid disease     hypothyroidism    Trochanteric bursitis 4/18/2013    Urine incontinence 2/24/2015    Vitamin D deficiency disease 9/4/2014     Past Surgical History:   Procedure Laterality Date    ADENOIDECTOMY      APPENDECTOMY      CHOLECYSTECTOMY      COLONOSCOPY      CYSTOSCOPY      ESOPHAGOGASTRODUODENOSCOPY N/A 10/23/2019    Procedure: EGD (ESOPHAGOGASTRODUODENOSCOPY);  Surgeon: Archie Humphrey MD;  Location: [facility];  Service: Endoscopy;  Laterality: N/A;    EYE SURGERY Bilateral     HYSTERECTOMY      LUNG LOBECTOMY Right     OOPHORECTOMY      TONSILLECTOMY      UPPER GASTROINTESTINAL ENDOSCOPY       Family History   Problem Relation Age of Onset    Cancer Mother         COLON    Hypertension Mother     Aneurysm Father     Alcohol abuse Brother     Kidney failure Sister     Cancer Brother         throat-smoker     Alcohol abuse Brother     Pacemaker/defibrilator Brother     Birth defects Paternal Grandfather         prostate     Asthma Neg Hx     Emphysema Neg Hx     Amblyopia Neg Hx     Blindness Neg Hx     Cataracts Neg Hx     Diabetes Neg Hx     Glaucoma Neg Hx     Macular degeneration Neg Hx     Retinal detachment Neg Hx     Strabismus Neg Hx     Stroke Neg Hx     Thyroid disease Neg Hx     Cirrhosis Neg Hx      Social History     Tobacco Use    Smoking status: Never    Smokeless tobacco: Never   Substance Use Topics    Alcohol use: No    Drug use: No     Review of Systems   Constitutional:  Negative for chills and fever.   HENT:  Negative for congestion, ear pain, rhinorrhea and sore throat.    Eyes:  Negative for pain.   Respiratory:  Negative for cough and shortness of breath.     Cardiovascular:  Negative for chest pain and leg swelling.        (+) Epigastric pain    Gastrointestinal:  Positive for vomiting. Negative for abdominal pain, anal bleeding, diarrhea and nausea.   Endocrine: Negative for polyuria.   Genitourinary:  Negative for dysuria, frequency and hematuria.   Skin:  Negative for color change and rash.   Neurological:  Negative for headaches.     Physical Exam     Initial Vitals   BP Pulse Resp Temp SpO2   02/18/23 1030 02/18/23 1030 02/18/23 1030 02/18/23 1046 02/18/23 1045   (!) 175/129 (!) 117 (!) 25 97.7 °F (36.5 °C) (!) 89 %      MAP       --                Physical Exam  The patient was examined specifically for the following:   General:No significant distress, Good color, Warm and dry. Head and neck:Scalp atraumatic, Neck supple. Neurological:Appropriate conversation, Gross motor deficits. Eyes:Conjugate gaze, Clear corneas. ENT: No epistaxis. Cardiac: Regular rate and rhythm, Grossly normal heart tones. Pulmonary: Wheezing, Rales. Gastrointestinal: Abdominal tenderness, Abdominal distention. Musculoskeletal: Extremity deformity, Apparent pain with range of motion of the joints. Skin: Rash.   The findings on examination were normal except for the following:  The patient is confused and anxious.  The lungs are clear.  The heart tones are normal.  The abdomen is soft.  Extremities are nontender.  There is no apparent pain with range of motion any joints.  She has mild epigastric tenderness.   ED Course   Critical Care    Date/Time: 2/18/2023 10:46 AM  Performed by: Tony Caraballo MD  Authorized by: Tony Caraballo MD   Direct patient critical care time: 22 minutes  Additional history critical care time: 11 minutes  Ordering / reviewing critical care time: 11 minutes  Documentation critical care time: 11 minutes  Consulting other physicians critical care time: 11 minutes  Total critical care time (exclusive of procedural time) : 66 minutes  Critical care time was  exclusive of separately billable procedures and treating other patients and teaching time.  Critical care was necessary to treat or prevent imminent or life-threatening deterioration of the following conditions: cardiac failure.  Critical care was time spent personally by me on the following activities: development of treatment plan with patient or surrogate, discussions with primary provider, evaluation of patient's response to treatment, examination of patient, obtaining history from patient or surrogate, ordering and performing treatments and interventions, ordering and review of laboratory studies, ordering and review of radiographic studies, pulse oximetry, re-evaluation of patient's condition and review of old charts.      Labs Reviewed   COMPREHENSIVE METABOLIC PANEL   APTT   PROTIME-INR   TROPONIN I   B-TYPE NATRIURETIC PEPTIDE   MAGNESIUM   CBC W/ AUTO DIFFERENTIAL     EKG Readings: (Independently Interpreted)   This patient arrives to the emergency room with a heart rate of 126.  She has a history of atrial fibrillation.  I believe she is in atrial fibrillation.  The patient has marked ST segment elevation in V4 V5 V6.  Is also ST segment elevation in 2 3 and F. there is marked ST depression in aVL.  I believe this patient has having a anterior lateral myocardial infarction.  This is doctor Caraballo dictating an I independently interpreted this EKG.     Imaging Results              X-Ray Chest AP Portable (In process)                   Medical decision making:  Given the above this patient presents with an EKG concerning for ST segment elevation myocardial infarction.  She basically complains of epigastric abdominal pain but has dementia.  She has a history of atrial fibrillation.  She is maintained on Eliquis.  The EKG results were shared with Cardiology here.  There is an ST segment elevation myocardial infarction on the table now.  We are attempting to move this patient to [facility] for more  urgent intervention.  The patient took her Eliquis this morning.  Patient has no history of GI bleeding.  She has no history of myocardial infarction.  She had 3 episodes of vomiting this morning.  There is no shortness of breath.  The patient is stable chronic cough.  I am on phone with the transfer center at 10:38 a.m., holding for cardiology consult.  I have treated the patient with aspirin.  She is already anticoagulated on Eliquis.  She was treated with nitroglycerin and fentanyl.  She reports complete pain relief at 10:35 a.m..  The EKG will be repeated.           Medications   nitroGLYCERIN SL tablet 0.4 mg (0.4 mg Sublingual Given 2/18/23 1031)   aspirin tablet 325 mg (has no administration in time range)   clopidogreL tablet 300 mg (has no administration in time range)   fentaNYL 50 mcg/mL injection 50 mcg (50 mcg Intravenous Given 2/18/23 1040)              Scribe Attestation:   Scribe #1: I performed the above scribed service and the documentation accurately describes the services I performed. I attest to the accuracy of the note.      ED Course as of 03/05/23 0141   Sat Feb 18, 2023   1154 EKG on arrival with STEMI [HS]   1159 Discussed with cardiology. Poor EF. Will go to cath lab and continue management.  [HS]      ED Course User Index  [HS] Sergio Marroquin MD                 Clinical Impression:   Final diagnoses:  [R07.9] Acute chest pain  [I21.3] Acute ST elevation myocardial infarction (STEMI), unspecified artery (Primary)  [R11.10] Vomiting, unspecified vomiting type, unspecified whether nausea present       I performed the history and physical exam as above. I believe that the scribe portion of the note is accurate.      ED Disposition Condition    Transfer to Another Facility Stable                Tony Caraballo MD  06/14/23 2349

## 2023-02-18 NOTE — HPI
Patient is 89-year-old lady with PMHx significant for dementia, Afib on Eliquis, cirrhosis, history of lung cancer, who presented to [facility] ED with nausea/vomiting for 1 week.     At OSH ED, patient reports mid chest pain for 2 days that is getting worse. STEMI activated with ST elevations II,III, aVF, V5-V6. Vitals stable. Given nitroglycerin x1 with resolution of chest pain. Loaded with aspirin 325mg and plavix 300mg at 1050am. She took her eliquis at home around 9am. Another STEMI case [facility] table [facility], accepted for transfer to [facility].     Currently en route from OSH, will assess in ED and discuss with family.

## 2023-02-18 NOTE — ED TRIAGE NOTES
Pt reported to ED via POV with c.o of mid-sternal chest pain x2 days that worsened today. Per daughter pt had x1 episode of vomit. Per daughter pt is on blood thinners and did take them this morning around 9am . Hx of dementia and cirrhosis, and A-fib. Currently preparing for Cath lab.

## 2023-02-18 NOTE — ED NOTES
Bp 139/99 after 1st nitro, pt denies any chest pain at this time. Per Dr. Caraballo no second dose of nitro needed at this time.

## 2023-02-18 NOTE — SUBJECTIVE & OBJECTIVE
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 lj- fosamax    Other dietary vitamin B12 deficiency anemia 1/21/2020    Paroxysmal atrial fibrillation 10/21/2019    PSVT (paroxysmal supraventricular tachycardia)     dr. 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    Thyroid disease     hypothyroidism    Trochanteric bursitis 4/18/2013    Urine incontinence 2/24/2015    Vitamin D deficiency disease 9/4/2014      Past Surgical History:   Procedure Laterality Date    ADENOIDECTOMY      APPENDECTOMY      CHOLECYSTECTOMY      COLONOSCOPY      CYSTOSCOPY      ESOPHAGOGASTRODUODENOSCOPY N/A 10/23/2019    Procedure: EGD (ESOPHAGOGASTRODUODENOSCOPY);  Surgeon: Archie Humphrey MD;  Location: [facility];  Service: Endoscopy;  Laterality: N/A;    EYE SURGERY Bilateral     HYSTERECTOMY      LUNG LOBECTOMY Right     OOPHORECTOMY      TONSILLECTOMY      UPPER GASTROINTESTINAL ENDOSCOPY        Review of patient's allergies indicates:   Allergen Reactions    Flu vaccine [id number](9 yr+)       Current Outpatient Medications   Medication Instructions    albuterol (PROVENTIL HFA) 90 mcg/actuation inhaler 2 puffs, Inhalation, Every 6 hours PRN    alendronate (FOSAMAX) 70 MG tablet TAKE 1 TABLET EVERY 7 DAYS.    apixaban (ELIQUIS) 5 mg, Oral,  2 times daily    apixaban (ELIQUIS) 5 mg, Oral, 2 times daily    cyanocobalamin 1,000 mcg/mL injection Dispense subcutaneous needles and syringes. Administer once a week for 4 weeks and then once a month    ELIQUIS 5 mg Tab TAKE 1 TABLET TWICE DAILY    ELIQUIS 5 mg Tab TAKE 1 TABLET TWICE DAILY    levothyroxine (SYNTHROID) 25 MCG tablet TAKE 1 TABLET EVERY DAY    melatonin 10 mg Tab Oral, Daily    metoprolol succinate (TOPROL-XL) 200 mg, Oral, Daily    oxybutynin (DITROPAN-XL) 10 MG 24 hr tablet TAKE 1 TABLET EVERY DAY    sertraline (ZOLOFT) 25 MG tablet TAKE 1 TABLET EVERY DAY    temazepam (RESTORIL) 30 mg, Oral, Nightly PRN     Review of Systems   Constitutional: Negative for chills, diaphoresis and night sweats.   Cardiovascular:  Positive for chest pain. Negative for dyspnea on exertion, irregular heartbeat, leg swelling, near-syncope and palpitations.   Respiratory:  Negative for cough, shortness of breath and wheezing.    Skin:  Negative for color change and dry skin.   Musculoskeletal:  Negative for joint pain and joint swelling.   Gastrointestinal:  Positive for nausea and vomiting. Negative for abdominal pain and diarrhea.   Genitourinary:  Negative for dysuria.   Neurological:  Negative for dizziness, headaches, light-headedness and weakness.   All other systems reviewed and are negative.  Objective:     Vital Signs (Most Recent):  Temp: 97.7 °F (36.5 °C) (02/18/23 1046)  Pulse: (!) 115 (02/18/23 1058)  Resp: 16 (02/18/23 1040)  BP: (!) 153/119 (02/18/23 1058)  SpO2: 100 % (02/18/23 1058)   Vital Signs (24h Range):  Temp:  [97.7 °F (36.5 °C)] 97.7 °F (36.5 °C)  Pulse:  [108-117] 115  Resp:  [16-35] 16  SpO2:  [89 %-100 %] 100 %  BP: (144-175)/() 153/119     Weight: 65.8 kg (145 lb)  Body mass index is 24.89 kg/m².     Physical Exam  Constitutional:       General: She is not in acute distress.     Appearance: Normal appearance. She is not diaphoretic.   HENT:      Head: Normocephalic and atraumatic.    Cardiovascular:      Rate and Rhythm: Normal rate and regular rhythm.      Pulses:           Radial pulses are 1+ on the right side and 2+ on the left side.        Femoral pulses are 2+ on the right side and 2+ on the left side.     Heart sounds: Normal heart sounds. No murmur heard.    No friction rub. No gallop.   Pulmonary:      Effort: Pulmonary effort is normal.      Breath sounds: No wheezing, rhonchi or rales.   Chest:      Chest wall: No tenderness.   Abdominal:      General: Bowel sounds are normal. There is no distension.      Palpations: Abdomen is soft.      Tenderness: There is no abdominal tenderness.   Musculoskeletal:      Right lower leg: No edema.      Left lower leg: No edema.   Skin:     General: Skin is warm and dry.      Capillary Refill: Capillary refill takes less than 2 seconds.   Neurological:      General: No focal deficit present.      Mental Status: She is alert and oriented to person, place, and time.   Psychiatric:         Mood and Affect: Mood normal.         Thought Content: Thought content normal.       Significant Labs:   -pending    EKG

## 2023-02-19 PROBLEM — F03.90 DEMENTIA: Status: ACTIVE | Noted: 2023-02-19

## 2023-02-19 LAB
ALBUMIN SERPL BCP-MCNC: 3.5 G/DL (ref 3.5–5.2)
ALP SERPL-CCNC: 64 U/L (ref 55–135)
ALT SERPL W/O P-5'-P-CCNC: 22 U/L (ref 10–44)
ANION GAP SERPL CALC-SCNC: 13 MMOL/L (ref 8–16)
ASCENDING AORTA: 3.56 CM
AST SERPL-CCNC: 131 U/L (ref 10–40)
AV INDEX (PROSTH): 0.58
AV MEAN GRADIENT: 3 MMHG
AV PEAK GRADIENT: 4 MMHG
AV VALVE AREA: 1.9 CM2
AV VELOCITY RATIO: 0.58
BASOPHILS # BLD AUTO: 0.02 K/UL (ref 0–0.2)
BASOPHILS NFR BLD: 0.2 % (ref 0–1.9)
BILIRUB SERPL-MCNC: 1.1 MG/DL (ref 0.1–1)
BSA FOR ECHO PROCEDURE: 1.72 M2
BUN SERPL-MCNC: 16 MG/DL (ref 10–30)
CALCIUM SERPL-MCNC: 9 MG/DL (ref 8.7–10.5)
CHLORIDE SERPL-SCNC: 94 MMOL/L (ref 95–110)
CO2 SERPL-SCNC: 25 MMOL/L (ref 23–29)
CREAT SERPL-MCNC: 1 MG/DL (ref 0.5–1.4)
CV ECHO LV RWT: 0.45 CM
DIFFERENTIAL METHOD: ABNORMAL
DOP CALC AO PEAK VEL: 0.97 M/S
DOP CALC AO VTI: 17.94 CM
DOP CALC LVOT AREA: 3.3 CM2
DOP CALC LVOT DIAMETER: 2.04 CM
DOP CALC LVOT PEAK VEL: 0.56 M/S
DOP CALC LVOT STROKE VOLUME: 34.17 CM3
DOP CALCLVOT PEAK VEL VTI: 10.46 CM
E/E' RATIO: 19.64 M/S
ECHO LV POSTERIOR WALL: 1.12 CM (ref 0.6–1.1)
EJECTION FRACTION: 18 %
EOSINOPHIL # BLD AUTO: 0 K/UL (ref 0–0.5)
EOSINOPHIL NFR BLD: 0.1 % (ref 0–8)
ERYTHROCYTE [DISTWIDTH] IN BLOOD BY AUTOMATED COUNT: 15 % (ref 11.5–14.5)
EST. GFR  (NO RACE VARIABLE): 52.2 ML/MIN/1.73 M^2
FRACTIONAL SHORTENING: 13 % (ref 28–44)
GLUCOSE SERPL-MCNC: 127 MG/DL (ref 70–110)
HCT VFR BLD AUTO: 43.7 % (ref 37–48.5)
HGB BLD-MCNC: 13.2 G/DL (ref 12–16)
IMM GRANULOCYTES # BLD AUTO: 0.05 K/UL (ref 0–0.04)
IMM GRANULOCYTES NFR BLD AUTO: 0.6 % (ref 0–0.5)
INTERVENTRICULAR SEPTUM: 0.85 CM (ref 0.6–1.1)
IVRT: 91.34 MSEC
LA MAJOR: 6.58 CM
LA MINOR: 6.65 CM
LA WIDTH: 4.08 CM
LEFT ATRIUM SIZE: 3.86 CM
LEFT ATRIUM VOLUME INDEX MOD: 51.2 ML/M2
LEFT ATRIUM VOLUME INDEX: 51.8 ML/M2
LEFT ATRIUM VOLUME MOD: 87.47 CM3
LEFT ATRIUM VOLUME: 88.55 CM3
LEFT INTERNAL DIMENSION IN SYSTOLE: 4.34 CM (ref 2.1–4)
LEFT VENTRICLE DIASTOLIC VOLUME INDEX: 68.95 ML/M2
LEFT VENTRICLE DIASTOLIC VOLUME: 117.91 ML
LEFT VENTRICLE MASS INDEX: 104 G/M2
LEFT VENTRICLE SYSTOLIC VOLUME INDEX: 49.8 ML/M2
LEFT VENTRICLE SYSTOLIC VOLUME: 85.09 ML
LEFT VENTRICULAR INTERNAL DIMENSION IN DIASTOLE: 4.99 CM (ref 3.5–6)
LEFT VENTRICULAR MASS: 177.73 G
LV LATERAL E/E' RATIO: 21.6 M/S
LV SEPTAL E/E' RATIO: 18 M/S
LYMPHOCYTES # BLD AUTO: 1.1 K/UL (ref 1–4.8)
LYMPHOCYTES NFR BLD: 13.5 % (ref 18–48)
MAGNESIUM SERPL-MCNC: 2.4 MG/DL (ref 1.6–2.6)
MAGNESIUM SERPL-MCNC: 2.5 MG/DL (ref 1.6–2.6)
MCH RBC QN AUTO: 28.9 PG (ref 27–31)
MCHC RBC AUTO-ENTMCNC: 30.2 G/DL (ref 32–36)
MCV RBC AUTO: 96 FL (ref 82–98)
MONOCYTES # BLD AUTO: 0.5 K/UL (ref 0.3–1)
MONOCYTES NFR BLD: 6.2 % (ref 4–15)
MV PEAK E VEL: 1.08 M/S
NEUTROPHILS # BLD AUTO: 6.6 K/UL (ref 1.8–7.7)
NEUTROPHILS NFR BLD: 79.4 % (ref 38–73)
NRBC BLD-RTO: 0 /100 WBC
PHOSPHATE SERPL-MCNC: 5.1 MG/DL (ref 2.7–4.5)
PISA TR MAX VEL: 2.77 M/S
PLATELET # BLD AUTO: 148 K/UL (ref 150–450)
PMV BLD AUTO: 9.4 FL (ref 9.2–12.9)
POTASSIUM SERPL-SCNC: 4.9 MMOL/L (ref 3.5–5.1)
PROT SERPL-MCNC: 5.9 G/DL (ref 6–8.4)
RA MAJOR: 5.06 CM
RA PRESSURE: 15 MMHG
RA WIDTH: 4.1 CM
RBC # BLD AUTO: 4.57 M/UL (ref 4–5.4)
RIGHT VENTRICULAR END-DIASTOLIC DIMENSION: 3.2 CM
RV TISSUE DOPPLER FREE WALL SYSTOLIC VELOCITY 1 (APICAL 4 CHAMBER VIEW): 7.61 CM/S
SINUS: 3.17 CM
SODIUM SERPL-SCNC: 132 MMOL/L (ref 136–145)
STJ: 2.73 CM
TDI LATERAL: 0.05 M/S
TDI SEPTAL: 0.06 M/S
TDI: 0.06 M/S
TR MAX PG: 31 MMHG
TRICUSPID ANNULAR PLANE SYSTOLIC EXCURSION: 1.52 CM
TV REST PULMONARY ARTERY PRESSURE: 46 MMHG
WBC # BLD AUTO: 8.35 K/UL (ref 3.9–12.7)

## 2023-02-19 PROCEDURE — 99231 PR SUBSEQUENT HOSPITAL CARE,LEVL I: ICD-10-PCS | Mod: ,,, | Performed by: INTERNAL MEDICINE

## 2023-02-19 PROCEDURE — 25000003 PHARM REV CODE 250: Performed by: STUDENT IN AN ORGANIZED HEALTH CARE EDUCATION/TRAINING PROGRAM

## 2023-02-19 PROCEDURE — 99231 SBSQ HOSP IP/OBS SF/LOW 25: CPT | Mod: ,,, | Performed by: INTERNAL MEDICINE

## 2023-02-19 PROCEDURE — 51798 US URINE CAPACITY MEASURE: CPT

## 2023-02-19 PROCEDURE — 93010 EKG 12-LEAD: ICD-10-PCS | Mod: ,,, | Performed by: INTERNAL MEDICINE

## 2023-02-19 PROCEDURE — 63600175 PHARM REV CODE 636 W HCPCS: Performed by: STUDENT IN AN ORGANIZED HEALTH CARE EDUCATION/TRAINING PROGRAM

## 2023-02-19 PROCEDURE — 84100 ASSAY OF PHOSPHORUS: CPT | Performed by: INTERNAL MEDICINE

## 2023-02-19 PROCEDURE — 94761 N-INVAS EAR/PLS OXIMETRY MLT: CPT

## 2023-02-19 PROCEDURE — 93005 ELECTROCARDIOGRAM TRACING: CPT

## 2023-02-19 PROCEDURE — 83735 ASSAY OF MAGNESIUM: CPT | Performed by: STUDENT IN AN ORGANIZED HEALTH CARE EDUCATION/TRAINING PROGRAM

## 2023-02-19 PROCEDURE — 27000221 HC OXYGEN, UP TO 24 HOURS

## 2023-02-19 PROCEDURE — 93010 ELECTROCARDIOGRAM REPORT: CPT | Mod: ,,, | Performed by: INTERNAL MEDICINE

## 2023-02-19 PROCEDURE — 51701 INSERT BLADDER CATHETER: CPT

## 2023-02-19 PROCEDURE — 20000000 HC ICU ROOM

## 2023-02-19 PROCEDURE — 92610 EVALUATE SWALLOWING FUNCTION: CPT

## 2023-02-19 PROCEDURE — 99900035 HC TECH TIME PER 15 MIN (STAT)

## 2023-02-19 PROCEDURE — 25000003 PHARM REV CODE 250: Performed by: INTERNAL MEDICINE

## 2023-02-19 PROCEDURE — 83735 ASSAY OF MAGNESIUM: CPT | Mod: 91 | Performed by: INTERNAL MEDICINE

## 2023-02-19 PROCEDURE — 25500020 PHARM REV CODE 255: Performed by: INTERNAL MEDICINE

## 2023-02-19 PROCEDURE — 80053 COMPREHEN METABOLIC PANEL: CPT | Performed by: INTERNAL MEDICINE

## 2023-02-19 PROCEDURE — 85025 COMPLETE CBC W/AUTO DIFF WBC: CPT | Performed by: STUDENT IN AN ORGANIZED HEALTH CARE EDUCATION/TRAINING PROGRAM

## 2023-02-19 RX ORDER — ALUMINUM HYDROXIDE, MAGNESIUM HYDROXIDE, AND SIMETHICONE 2400; 240; 2400 MG/30ML; MG/30ML; MG/30ML
30 SUSPENSION ORAL ONCE
Status: COMPLETED | OUTPATIENT
Start: 2023-02-19 | End: 2023-02-19

## 2023-02-19 RX ORDER — ONDANSETRON 2 MG/ML
4 INJECTION INTRAMUSCULAR; INTRAVENOUS ONCE
Status: COMPLETED | OUTPATIENT
Start: 2023-02-19 | End: 2023-02-19

## 2023-02-19 RX ORDER — OXYCODONE HYDROCHLORIDE 5 MG/1
5 TABLET ORAL ONCE
Status: COMPLETED | OUTPATIENT
Start: 2023-02-19 | End: 2023-02-19

## 2023-02-19 RX ORDER — FUROSEMIDE 10 MG/ML
40 INJECTION INTRAMUSCULAR; INTRAVENOUS ONCE
Status: COMPLETED | OUTPATIENT
Start: 2023-02-19 | End: 2023-02-19

## 2023-02-19 RX ORDER — FUROSEMIDE 10 MG/ML
80 INJECTION INTRAMUSCULAR; INTRAVENOUS ONCE
Status: COMPLETED | OUTPATIENT
Start: 2023-02-19 | End: 2023-02-19

## 2023-02-19 RX ORDER — PROMETHAZINE HYDROCHLORIDE 12.5 MG/1
12.5 TABLET ORAL EVERY 6 HOURS PRN
Status: DISCONTINUED | OUTPATIENT
Start: 2023-02-19 | End: 2023-02-19

## 2023-02-19 RX ORDER — ONDANSETRON 2 MG/ML
8 INJECTION INTRAMUSCULAR; INTRAVENOUS EVERY 8 HOURS PRN
Status: DISCONTINUED | OUTPATIENT
Start: 2023-02-19 | End: 2023-02-23 | Stop reason: HOSPADM

## 2023-02-19 RX ORDER — PROMETHAZINE HYDROCHLORIDE 12.5 MG/1
12.5 TABLET ORAL EVERY 6 HOURS PRN
Status: DISCONTINUED | OUTPATIENT
Start: 2023-02-19 | End: 2023-02-23 | Stop reason: HOSPADM

## 2023-02-19 RX ORDER — METOPROLOL TARTRATE 25 MG/1
25 TABLET, FILM COATED ORAL ONCE
Status: COMPLETED | OUTPATIENT
Start: 2023-02-19 | End: 2023-02-19

## 2023-02-19 RX ADMIN — ONDANSETRON 4 MG: 2 INJECTION INTRAMUSCULAR; INTRAVENOUS at 05:02

## 2023-02-19 RX ADMIN — OXYCODONE HYDROCHLORIDE 5 MG: 5 TABLET ORAL at 01:02

## 2023-02-19 RX ADMIN — PROMETHAZINE HYDROCHLORIDE 12.5 MG: 12.5 TABLET ORAL at 09:02

## 2023-02-19 RX ADMIN — ALUMINUM HYDROXIDE, MAGNESIUM HYDROXIDE, AND DIMETHICONE 30 ML: 400; 400; 40 SUSPENSION ORAL at 08:02

## 2023-02-19 RX ADMIN — Medication 6 MG: at 09:02

## 2023-02-19 RX ADMIN — FUROSEMIDE 40 MG: 10 INJECTION, SOLUTION INTRAMUSCULAR; INTRAVENOUS at 11:02

## 2023-02-19 RX ADMIN — APIXABAN 5 MG: 5 TABLET, FILM COATED ORAL at 12:02

## 2023-02-19 RX ADMIN — APIXABAN 5 MG: 5 TABLET, FILM COATED ORAL at 09:02

## 2023-02-19 RX ADMIN — CEFTRIAXONE SODIUM 1 G: 1 INJECTION, POWDER, FOR SOLUTION INTRAMUSCULAR; INTRAVENOUS at 11:02

## 2023-02-19 RX ADMIN — ASPIRIN 81 MG: 81 TABLET, COATED ORAL at 09:02

## 2023-02-19 RX ADMIN — ONDANSETRON 4 MG: 2 INJECTION INTRAMUSCULAR; INTRAVENOUS at 11:02

## 2023-02-19 RX ADMIN — METOPROLOL TARTRATE 25 MG: 25 TABLET, FILM COATED ORAL at 01:02

## 2023-02-19 RX ADMIN — FUROSEMIDE 80 MG: 10 INJECTION, SOLUTION INTRAMUSCULAR; INTRAVENOUS at 09:02

## 2023-02-19 RX ADMIN — MUPIROCIN 1 G: 20 OINTMENT TOPICAL at 09:02

## 2023-02-19 RX ADMIN — ONDANSETRON 8 MG: 2 INJECTION INTRAMUSCULAR; INTRAVENOUS at 06:02

## 2023-02-19 RX ADMIN — MUPIROCIN: 20 OINTMENT TOPICAL at 09:02

## 2023-02-19 RX ADMIN — CLOPIDOGREL BISULFATE 75 MG: 75 TABLET ORAL at 09:02

## 2023-02-19 RX ADMIN — HUMAN ALBUMIN MICROSPHERES AND PERFLUTREN 0.11 MG: 10; .22 INJECTION, SOLUTION INTRAVENOUS at 08:02

## 2023-02-19 RX ADMIN — OXYCODONE HYDROCHLORIDE 5 MG: 5 TABLET ORAL at 05:02

## 2023-02-19 RX ADMIN — OXYCODONE 5 MG: 5 TABLET ORAL at 08:02

## 2023-02-19 RX ADMIN — PROMETHAZINE HYDROCHLORIDE 12.5 MG: 12.5 TABLET ORAL at 04:02

## 2023-02-19 RX ADMIN — SENNOSIDES AND DOCUSATE SODIUM 1 TABLET: 50; 8.6 TABLET ORAL at 09:02

## 2023-02-19 RX ADMIN — FUROSEMIDE 40 MG: 10 INJECTION, SOLUTION INTRAMUSCULAR; INTRAVENOUS at 01:02

## 2023-02-19 NOTE — NURSING
Multiple runs of v-tach with one sustained run of 25 beats, Dr Leblanc notified, orders rec'd for stat EKG and serum electrolytes.

## 2023-02-19 NOTE — ASSESSMENT & PLAN NOTE
Rate controlled A Fib   CHADSVASC 5  HASBLED 2    On eliquis at home     Plan:  Once off heparin drip, will restart home dose Eliquis

## 2023-02-19 NOTE — ASSESSMENT & PLAN NOTE
At OSH ED, patient reports mid chest pain for 2 days that is getting worse.   STEMI activated with ST elevations II,III, aVF, V5-V6. Vitals stable.   Given nitroglycerin x1 with resolution of chest pain. Loaded with aspirin 325mg and plavix 300mg at 1050am.   She took her eliquis at home around 9am.   Another STEMI case on the table in [facility] cath lab, accepted for transfer to [facility].   Transferred from OSH, assessed in ED and discussed with family.    Bedside echocardiogram revealed significantly reduced LV and RV function.   Troponin 0.067.      Case discussed with Interventional Cardiology. Patient taken for [facility] (2/18/23) s/p LITO Becker.    Plan:  Continue ACS protocol until dc   Continue DAPT   Continue metoprolol and atorvastatin 40 mg daily

## 2023-02-19 NOTE — PT/OT/SLP EVAL
Speech Language Pathology Evaluation  Bedside Swallow    Patient Name:  Sandra Moody   MRN:  [id number]  Admitting Diagnosis: NSTEMI (non-ST elevated myocardial infarction)    Recommendations:                 General Recommendations:  Dysphagia therapy  Diet recommendations:  Mechanical soft, Thin   Aspiration Precautions: 1 bite/sip at a time, Meds crushed in puree, and No straws   General Precautions: Standard,    Communication strategies:  go to room if call light pushed    History:     Past Medical History:   Diagnosis Date    Allergy     Ambulates with cane     Anxiety     Back pain     Cirrhosis     COPD (chronic obstructive pulmonary disease) 10/16/2012    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 lj- fosamax    Other dietary vitamin B12 deficiency anemia 1/21/2020    Paroxysmal atrial fibrillation 10/21/2019    PSVT (paroxysmal supraventricular tachycardia)     dr. 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    Thyroid disease     hypothyroidism    Trochanteric bursitis 4/18/2013    Unstable angina 2/18/2023    Urine incontinence 2/24/2015    Vitamin D deficiency disease 9/4/2014       Past Surgical History:   Procedure Laterality Date    ADENOIDECTOMY      APPENDECTOMY      CHOLECYSTECTOMY      COLONOSCOPY      CYSTOSCOPY      ESOPHAGOGASTRODUODENOSCOPY N/A 10/23/2019    Procedure: EGD (ESOPHAGOGASTRODUODENOSCOPY);  Surgeon: Archie Humphrey MD;  Location: [facility];   "Service: Endoscopy;  Laterality: N/A;    EYE SURGERY Bilateral     HYSTERECTOMY      LUNG LOBECTOMY Right     OOPHORECTOMY      TONSILLECTOMY      UPPER GASTROINTESTINAL ENDOSCOPY         Subjective     Patient awake and alert. Family present.     Family reports 'Yesterday she was chewing and chewing sausage and she said she felt likt it was multiplying, it wasn't going anywhere so she spit it out"    Pain/Comfort:   No c/o pain    Respiratory Status: Room air    Objective:     Oral Musculature Evaluation   WFL    Bedside Swallow Eval:   Consistencies Assessed:  Thin liquids via tsp and cup across 3ox  Nectar thick liquids x5 cup sips  Puree x3  Solids x2      Oral Phase:   WFL    Pharyngeal Phase:   Consistent and immediate belching post thin liquids, not changed with trial of nectar thickened liquids  no overt clinical signs/symptoms of aspiration  no overt clinical signs/symptoms of pharyngeal dysphagia    Compensatory Strategies  None      Assessment:     Sandra Moody is a 89 y.o. female with an SLP diagnosis of Dysphagia.      Goals:   Multidisciplinary Problems       SLP Goals          Problem: SLP    Goal Priority Disciplines Outcome   SLP Goal     SLP Ongoing, Progressing   Description: Speech Language Pathology Goals  Goals expected to be met by 2/25:    1. Patient will tolerate a mechanical soft diet and thin liquids with no overt signs of airway compromise.                                Plan:     Patient to be seen:  4 x/week   Plan of Care expires:     Plan of Care reviewed with:  patient, daughter   SLP Follow-Up:  Yes       Discharge recommendations:  home health speech therapy   Barriers to Discharge:  None    Time Tracking:     SLP Treatment Date:   02/19/23  Speech Start Time:  1114  Speech Stop Time:  1125     Speech Total Time (min):  11 min    Billable Minutes: Eval Swallow and Oral Function 11    02/19/2023         "

## 2023-02-19 NOTE — HOSPITAL COURSE
Bedside echocardiogram revealed significantly reduced LV and RV function. Troponin 0.067.  Case discussed with Interventional Cardiology. Patient taken for [facility] (2/18/23) s/p POBA Ramus.  Echo showing EF 18%, possible LV thrombus, CVP 15. Started on lasix drip.  Given age, comorbidities, discussed palliative care with daughter, Regla.  Family is agreeable to hospice.  SW consulted for hospice, plan for home hospice once more euvolemic.  Attempted to diurese, UOP decent however still hypoxic requiring supplemental oxygen by NC.  Discussed with family, will discharge to home hospice on DAPT (discontinue eliquis), diuretics, supplemental oxygen.  All questions answered.

## 2023-02-19 NOTE — PROGRESS NOTES
[facility] - Surgical Intensive Care  Cardiology  Progress Note    Patient Name: Sandra Moody  MRN: [id number]  Admission Date: 2/18/2023  Hospital Length of Stay: 1 days  Code Status: Full Code   Attending Physician: Helen Cortez MD   Primary Care Physician: Akhil Harry MD  Expected Discharge Date:   Principal Problem:NSTEMI (non-ST elevated myocardial infarction)    Subjective:     HPI:   Patient is 89-year-old lady with PMHx significant for dementia, Afib on Eliquis, cirrhosis, history of lung cancer, who presented to [facility] ED with nausea/vomiting for 1 week.     At OSH ED, patient reports mid chest pain for 2 days that is getting worse. STEMI activated with ST elevations II,III, aVF, V5-V6. Vitals stable. Given nitroglycerin x1 with resolution of chest pain. Loaded with aspirin 325mg and plavix 300mg at 1050am. She took her eliquis at home around 9am. Another STEMI case [facility] [place] [facility] [place] [facility] [place], [facility] transfer [facility]. .    Hospital Course:   Bedside echocardiogram revealed significantly reduced LV and RV function. Troponin 0.067.    Case discussed with Interventional Cardiology. Patient taken for [facility] (2/18/23) s/p POBA Ramus.  Overnight, one episode of NSVT   Pending formal TTE report.           Interval History:    The patient was evaluated today by bedside. Daughter by bedside. As per daughter and nurse, the patient has been presenting with burps and  difficulty swallowing. Pending speech and swallow eval. Maalox given x1.     Review of Systems   Reason unable to perform ROS: Dementia.    Objective:     Vital Signs (Most Recent):  Temp: 97.2 °F (36.2 °C) (02/19/23 0300)  Pulse: 87 (02/19/23 0930)  Resp: (!) 25 (02/19/23 0930)  BP: 102/70 (02/19/23 0900)  SpO2: 96 % (02/19/23 0930)   Vital Signs (24h Range):  Temp:  [97.2 °F (36.2 °C)-98 °F (36.7 °C)] 97.2 °F (36.2 °C)  Pulse:  [] 87  Resp:  [0-39] 25  SpO2:  [89 %-100 %] 96 %  BP:  ()/() 102/70     Weight: 65.8 kg (145 lb)  Body mass index is 24.89 kg/m².     SpO2: 96 %         Intake/Output Summary (Last 24 hours) at 2/19/2023 0946  Last data filed at 2/19/2023 0500  Gross per 24 hour   Intake 2734.3 ml   Output 651 ml   Net 2083.3 ml       Lines/Drains/Airways       Peripheral Intravenous Line  Duration                  Peripheral IV - Single Lumen 02/18/23 1031 18 G Left Antecubital <1 day         Peripheral IV - Single Lumen 02/18/23 1031 20 G Right Antecubital <1 day                    Physical Exam  Vitals reviewed.   Constitutional:       Appearance: Normal appearance.   Cardiovascular:      Rate and Rhythm: Rhythm irregular.   Pulmonary:      Breath sounds: Normal breath sounds.   Abdominal:      General: There is distension.      Palpations: Abdomen is soft.   Musculoskeletal:      Cervical back: Neck supple.   Skin:     General: Skin is warm.      Capillary Refill: Capillary refill takes less than 2 seconds.      Comments: No LE edema    Neurological:      Mental Status: She is alert.       Significant Labs: All pertinent lab results from the last 24 hours have been reviewed.  Hyponatremia, hypervolemic placed on fluid restriction     Significant Imaging: Today:   EKG a fib with ventricular rate of  81,  low voltage, PVCs   CXR today looks more congested compared to yesterday   TTE pending     Assessment and Plan:       * NSTEMI (non-ST elevated myocardial infarction)  At OSH ED, patient reports mid chest pain for 2 days that is getting worse.   STEMI activated with ST elevations II,III, aVF, V5-V6. Vitals stable.   Given nitroglycerin x1 with resolution of chest pain. Loaded with aspirin 325mg and plavix 300mg at 1050am.   She took her eliquis at home around 9am.   Another STEMI case on the table in [facility] cath lab, accepted for transfer to [facility].   Transferred from OSH, assessed in ED and discussed with family.    Bedside echocardiogram revealed significantly  reduced LV and RV function.   Troponin 0.067.      Case discussed with Interventional Cardiology. Patient taken for [facility] (2/18/23) s/p POBA Ramus.    Plan:  Continue ACS protocol until dc   Continue DAPT   Continue metoprolol and atorvastatin 40 mg daily     CHF  PMH CHFpEF   CXR mildly congested     Output 651  NET: + 2 LT    Plan:  Pending TTE   Lasix 80 IV x1        Paroxysmal atrial fibrillation    Rate controlled A Fib   CHADSVASC 5  HASBLED 2    On eliquis at home     Plan:  Once off heparin drip, will restart home dose Eliquis     Hyponatremia   Mild hyponatremia in the setting of CHF and dementia.     Plan:  Fluid restriction     Hypothyroidism  Asymptomatic   On levothyroxine   Continue same meds     GERD (gastroesophageal reflux disease)  Poorly controlled   Given maaloox 1 dose today   Will consider PPI if no improvement         VTE Risk Mitigation (From admission, onward)           Ordered     IP VTE HIGH RISK PATIENT  Once         02/18/23 1225     Place sequential compression device  Until discontinued         02/18/23 1225                    Bandar Love MD  Cardiology  [facility] - Surgical Intensive Care

## 2023-02-19 NOTE — PLAN OF CARE
"      SICU PLAN OF CARE NOTE    Dx: NSTEMI (non-ST elevated myocardial infarction)    Shift Events: multiple episodes of v-tach which one episode of 25 beats,     Goals of Care: maintain a controlled cardiac rhythm, appropriate UOP, appropriate sleep/awake cycles to reduce effects of dementia    Neuro: Follows Commands, Moves All Extremities, and Confused    Vital Signs: /67   Pulse 74   Temp 97.2 °F (36.2 °C) (Oral)   Resp 17   Ht 5' 4" (1.626 m)   Wt 65.8 kg (145 lb)   SpO2 97%   BMI 24.89 kg/m²     Respiratory: Nasal Cannula    Diet: Cardiac Diet    Gtts: n/a    Urine Output: Voids Spontaneously  575 cc/shift    Drains: n/a     Labs/Accuchecks: accuchecks ACHS, daily CMP, CBC, mag, phos.    Skin: r groin puncture site from angiogram; no breakdown noted       "

## 2023-02-19 NOTE — NURSING
Pt having difficulty swallowing;  aware and at bedside for assessment. Verbal orders to only give aspirin and plavix this morning crushed in pudding.

## 2023-02-19 NOTE — NURSING
Pt continuing to have runs of v-tach after receiving mag replacement, Dr Leblanc making rounds at this time and notified of the continuing runs with review of tele strips; new orders rec'd

## 2023-02-19 NOTE — SUBJECTIVE & OBJECTIVE
Interval History:    The patient was evaluated today by bedside. Daughter by bedside. As per daughter and nurse, the patient has been presenting with burps and  difficulty swallowing. Pending speech and swallow eval. Maalox given x1.     Review of Systems   Reason unable to perform ROS: Dementia.    Objective:     Vital Signs (Most Recent):  Temp: 97.2 °F (36.2 °C) (02/19/23 0300)  Pulse: 87 (02/19/23 0930)  Resp: (!) 25 (02/19/23 0930)  BP: 102/70 (02/19/23 0900)  SpO2: 96 % (02/19/23 0930)   Vital Signs (24h Range):  Temp:  [97.2 °F (36.2 °C)-98 °F (36.7 °C)] 97.2 °F (36.2 °C)  Pulse:  [] 87  Resp:  [0-39] 25  SpO2:  [89 %-100 %] 96 %  BP: ()/() 102/70     Weight: 65.8 kg (145 lb)  Body mass index is 24.89 kg/m².     SpO2: 96 %         Intake/Output Summary (Last 24 hours) at 2/19/2023 0946  Last data filed at 2/19/2023 0500  Gross per 24 hour   Intake 2734.3 ml   Output 651 ml   Net 2083.3 ml       Lines/Drains/Airways       Peripheral Intravenous Line  Duration                  Peripheral IV - Single Lumen 02/18/23 1031 18 G Left Antecubital <1 day         Peripheral IV - Single Lumen 02/18/23 1031 20 G Right Antecubital <1 day                    Physical Exam  Vitals reviewed.   Constitutional:       Appearance: Normal appearance.   Cardiovascular:      Rate and Rhythm: Rhythm irregular.   Pulmonary:      Breath sounds: Normal breath sounds.   Abdominal:      General: There is distension.      Palpations: Abdomen is soft.   Musculoskeletal:      Cervical back: Neck supple.   Skin:     General: Skin is warm.      Capillary Refill: Capillary refill takes less than 2 seconds.      Comments: No LE edema    Neurological:      Mental Status: She is alert.       Significant Labs: All pertinent lab results from the last 24 hours have been reviewed.  Hyponatremia, hypervolemic placed on fluid restriction     Significant Imaging: Today:   EKG a fib with ventricular rate of  81,  low voltage, PVCs   CXR  today looks more congested compared to yesterday   TTE pending

## 2023-02-19 NOTE — PLAN OF CARE
Problem: SLP  Goal: SLP Goal  Description: Speech Language Pathology Goals  Goals expected to be met by 2/25:    1. Patient will tolerate a mechanical soft diet and thin liquids with no overt signs of airway compromise.           Outcome: Ongoing, Progressing

## 2023-02-20 PROBLEM — Z71.89 GOALS OF CARE, COUNSELING/DISCUSSION: Status: ACTIVE | Noted: 2023-02-20

## 2023-02-20 LAB
ALBUMIN SERPL BCP-MCNC: 3.4 G/DL (ref 3.5–5.2)
ALP SERPL-CCNC: 58 U/L (ref 55–135)
ALT SERPL W/O P-5'-P-CCNC: 13 U/L (ref 10–44)
ANION GAP SERPL CALC-SCNC: 8 MMOL/L (ref 8–16)
AST SERPL-CCNC: 60 U/L (ref 10–40)
BASOPHILS # BLD AUTO: 0.01 K/UL (ref 0–0.2)
BASOPHILS NFR BLD: 0.2 % (ref 0–1.9)
BILIRUB SERPL-MCNC: 0.8 MG/DL (ref 0.1–1)
BUN SERPL-MCNC: 21 MG/DL (ref 10–30)
CALCIUM SERPL-MCNC: 8.5 MG/DL (ref 8.7–10.5)
CHLORIDE SERPL-SCNC: 92 MMOL/L (ref 95–110)
CO2 SERPL-SCNC: 26 MMOL/L (ref 23–29)
CREAT SERPL-MCNC: 1.2 MG/DL (ref 0.5–1.4)
DIFFERENTIAL METHOD: ABNORMAL
EOSINOPHIL # BLD AUTO: 0 K/UL (ref 0–0.5)
EOSINOPHIL NFR BLD: 0.7 % (ref 0–8)
ERYTHROCYTE [DISTWIDTH] IN BLOOD BY AUTOMATED COUNT: 14.6 % (ref 11.5–14.5)
EST. GFR  (NO RACE VARIABLE): 41.9 ML/MIN/1.73 M^2
GLUCOSE SERPL-MCNC: 102 MG/DL (ref 70–110)
HCT VFR BLD AUTO: 42.3 % (ref 37–48.5)
HGB BLD-MCNC: 12.8 G/DL (ref 12–16)
IMM GRANULOCYTES # BLD AUTO: 0.01 K/UL (ref 0–0.04)
IMM GRANULOCYTES NFR BLD AUTO: 0.2 % (ref 0–0.5)
LYMPHOCYTES # BLD AUTO: 1 K/UL (ref 1–4.8)
LYMPHOCYTES NFR BLD: 17.2 % (ref 18–48)
MAGNESIUM SERPL-MCNC: 2.3 MG/DL (ref 1.6–2.6)
MAGNESIUM SERPL-MCNC: 2.3 MG/DL (ref 1.6–2.6)
MCH RBC QN AUTO: 29.2 PG (ref 27–31)
MCHC RBC AUTO-ENTMCNC: 30.3 G/DL (ref 32–36)
MCV RBC AUTO: 96 FL (ref 82–98)
MONOCYTES # BLD AUTO: 0.4 K/UL (ref 0.3–1)
MONOCYTES NFR BLD: 6.6 % (ref 4–15)
NEUTROPHILS # BLD AUTO: 4.3 K/UL (ref 1.8–7.7)
NEUTROPHILS NFR BLD: 75.1 % (ref 38–73)
NRBC BLD-RTO: 0 /100 WBC
PLATELET # BLD AUTO: 125 K/UL (ref 150–450)
PMV BLD AUTO: 9.9 FL (ref 9.2–12.9)
POCT GLUCOSE: 105 MG/DL (ref 70–110)
POCT GLUCOSE: 124 MG/DL (ref 70–110)
POCT GLUCOSE: 142 MG/DL (ref 70–110)
POTASSIUM SERPL-SCNC: 4.3 MMOL/L (ref 3.5–5.1)
POTASSIUM SERPL-SCNC: 4.4 MMOL/L (ref 3.5–5.1)
PROT SERPL-MCNC: 5.9 G/DL (ref 6–8.4)
RBC # BLD AUTO: 4.39 M/UL (ref 4–5.4)
SODIUM SERPL-SCNC: 126 MMOL/L (ref 136–145)
WBC # BLD AUTO: 5.77 K/UL (ref 3.9–12.7)

## 2023-02-20 PROCEDURE — 83735 ASSAY OF MAGNESIUM: CPT | Mod: 91 | Performed by: STUDENT IN AN ORGANIZED HEALTH CARE EDUCATION/TRAINING PROGRAM

## 2023-02-20 PROCEDURE — 27000221 HC OXYGEN, UP TO 24 HOURS

## 2023-02-20 PROCEDURE — 36410 VNPNXR 3YR/> PHY/QHP DX/THER: CPT

## 2023-02-20 PROCEDURE — 99239 PR HOSPITAL DISCHARGE DAY,>30 MIN: ICD-10-PCS | Mod: GC,,, | Performed by: INTERNAL MEDICINE

## 2023-02-20 PROCEDURE — 83735 ASSAY OF MAGNESIUM: CPT | Performed by: INTERNAL MEDICINE

## 2023-02-20 PROCEDURE — 92526 ORAL FUNCTION THERAPY: CPT

## 2023-02-20 PROCEDURE — 99239 HOSP IP/OBS DSCHRG MGMT >30: CPT | Mod: GC,,, | Performed by: INTERNAL MEDICINE

## 2023-02-20 PROCEDURE — 80053 COMPREHEN METABOLIC PANEL: CPT | Performed by: STUDENT IN AN ORGANIZED HEALTH CARE EDUCATION/TRAINING PROGRAM

## 2023-02-20 PROCEDURE — 63600175 PHARM REV CODE 636 W HCPCS: Performed by: STUDENT IN AN ORGANIZED HEALTH CARE EDUCATION/TRAINING PROGRAM

## 2023-02-20 PROCEDURE — C1751 CATH, INF, PER/CENT/MIDLINE: HCPCS

## 2023-02-20 PROCEDURE — 84132 ASSAY OF SERUM POTASSIUM: CPT | Performed by: INTERNAL MEDICINE

## 2023-02-20 PROCEDURE — 25000003 PHARM REV CODE 250: Performed by: STUDENT IN AN ORGANIZED HEALTH CARE EDUCATION/TRAINING PROGRAM

## 2023-02-20 PROCEDURE — 94761 N-INVAS EAR/PLS OXIMETRY MLT: CPT

## 2023-02-20 PROCEDURE — 20600001 HC STEP DOWN PRIVATE ROOM

## 2023-02-20 PROCEDURE — 25000003 PHARM REV CODE 250: Performed by: INTERNAL MEDICINE

## 2023-02-20 PROCEDURE — 85025 COMPLETE CBC W/AUTO DIFF WBC: CPT | Performed by: STUDENT IN AN ORGANIZED HEALTH CARE EDUCATION/TRAINING PROGRAM

## 2023-02-20 PROCEDURE — 99900035 HC TECH TIME PER 15 MIN (STAT)

## 2023-02-20 PROCEDURE — 76937 US GUIDE VASCULAR ACCESS: CPT

## 2023-02-20 RX ORDER — LORATADINE 10 MG/1
10 TABLET ORAL DAILY
COMMUNITY

## 2023-02-20 RX ORDER — PANTOPRAZOLE SODIUM 40 MG/1
40 TABLET, DELAYED RELEASE ORAL
Status: DISCONTINUED | OUTPATIENT
Start: 2023-02-20 | End: 2023-02-23 | Stop reason: HOSPADM

## 2023-02-20 RX ORDER — FUROSEMIDE 10 MG/ML
120 INJECTION INTRAMUSCULAR; INTRAVENOUS ONCE
Status: COMPLETED | OUTPATIENT
Start: 2023-02-20 | End: 2023-02-20

## 2023-02-20 RX ORDER — DOXYLAMINE SUCCINATE 25 MG
25 TABLET ORAL NIGHTLY
COMMUNITY

## 2023-02-20 RX ORDER — LANOLIN ALCOHOL/MO/W.PET/CERES
1000 CREAM (GRAM) TOPICAL DAILY
Status: ON HOLD | COMMUNITY
End: 2023-02-22 | Stop reason: HOSPADM

## 2023-02-20 RX ADMIN — Medication 6 MG: at 09:02

## 2023-02-20 RX ADMIN — MUPIROCIN: 20 OINTMENT TOPICAL at 09:02

## 2023-02-20 RX ADMIN — ASPIRIN 81 MG: 81 TABLET, COATED ORAL at 09:02

## 2023-02-20 RX ADMIN — APIXABAN 5 MG: 5 TABLET, FILM COATED ORAL at 09:02

## 2023-02-20 RX ADMIN — POLYETHYLENE GLYCOL 3350 17 G: 17 POWDER, FOR SOLUTION ORAL at 09:02

## 2023-02-20 RX ADMIN — ACETAMINOPHEN 650 MG: 325 TABLET ORAL at 08:02

## 2023-02-20 RX ADMIN — ONDANSETRON 8 MG: 2 INJECTION INTRAMUSCULAR; INTRAVENOUS at 09:02

## 2023-02-20 RX ADMIN — APIXABAN 5 MG: 5 TABLET, FILM COATED ORAL at 08:02

## 2023-02-20 RX ADMIN — FUROSEMIDE 10 MG/HR: 10 INJECTION, SOLUTION INTRAMUSCULAR; INTRAVENOUS at 10:02

## 2023-02-20 RX ADMIN — ATORVASTATIN CALCIUM 40 MG: 40 TABLET, FILM COATED ORAL at 09:02

## 2023-02-20 RX ADMIN — SENNOSIDES AND DOCUSATE SODIUM 1 TABLET: 50; 8.6 TABLET ORAL at 08:02

## 2023-02-20 RX ADMIN — CEFTRIAXONE 2 G: 2 INJECTION, POWDER, FOR SOLUTION INTRAMUSCULAR; INTRAVENOUS at 11:02

## 2023-02-20 RX ADMIN — PANTOPRAZOLE SODIUM 40 MG: 40 TABLET, DELAYED RELEASE ORAL at 11:02

## 2023-02-20 RX ADMIN — LEVOTHYROXINE SODIUM 25 MCG: 25 TABLET ORAL at 09:02

## 2023-02-20 RX ADMIN — PROMETHAZINE HYDROCHLORIDE 12.5 MG: 12.5 TABLET ORAL at 09:02

## 2023-02-20 RX ADMIN — SENNOSIDES AND DOCUSATE SODIUM 1 TABLET: 50; 8.6 TABLET ORAL at 09:02

## 2023-02-20 RX ADMIN — FUROSEMIDE 120 MG: 10 INJECTION, SOLUTION INTRAMUSCULAR; INTRAVENOUS at 09:02

## 2023-02-20 RX ADMIN — MUPIROCIN: 20 OINTMENT TOPICAL at 08:02

## 2023-02-20 RX ADMIN — CLOPIDOGREL BISULFATE 75 MG: 75 TABLET ORAL at 09:02

## 2023-02-20 RX ADMIN — OXYBUTYNIN CHLORIDE 10 MG: 10 TABLET, EXTENDED RELEASE ORAL at 09:02

## 2023-02-20 RX ADMIN — METOPROLOL SUCCINATE 200 MG: 100 TABLET, EXTENDED RELEASE ORAL at 09:02

## 2023-02-20 NOTE — CONSULTS
HOLLY placed 20 g x 8 cm midline in Right brachial vein for pva using realtime ultrasound guidance.  Lot#[id number].  Max dwell date 3/21/23.  Imagery recorded and saved.

## 2023-02-20 NOTE — NURSING
Afebrile. Rate controlled atrial fib throughout shift. Echo done this morning; see results. EKG performed this morning. Persistent nausea throughout shift; pt given zofran, promethazine, and mylanta. Carlson catheter placed for close management of I/O; 40 mg of lasix given. Pt initially responded but UOP tapered to 20cc/hr;  aware. Pt seen by SLP; see note. Pt bathed and linen changed. Pt daughter at the bedside; plan of care reviewed.

## 2023-02-20 NOTE — ASSESSMENT & PLAN NOTE
Advance Care Planning     Given age and comorbidities, discussed referral for home hospice with daughter, Regla.  Family in agreement with diuresing patient, then discharging home with hospice.  Will consult social work for home hospice options.    I spent a total of 15 minutes engaging the patient in this advance care planning discussion.

## 2023-02-20 NOTE — PLAN OF CARE
"      SICU PLAN OF CARE NOTE    Dx: NSTEMI (non-ST elevated myocardial infarction)    Shift Events: low UOP, 80mg Lasix administered, no runs of v-tach tonight, pt had episode of increased anxiety voicing fear of dying    Goals of Care: VSS, pain and nausea controlled    Neuro: Follows Commands, Moves All Extremities, and Confused    Vital Signs: /74   Pulse 88   Temp 97.4 °F (36.3 °C) (Oral)   Resp (!) 26   Ht 5' 4" (1.626 m)   Wt 68.8 kg (151 lb 10.8 oz)   SpO2 100%   BMI 26.04 kg/m²     Respiratory: Nasal Cannula    Diet: dysphagia soft    Gtts: n/a    Urine Output: Urinary Catheter 430 cc/shift    Drains: n/a     Labs/Accuchecks: daily as ordered.    Skin: r groin puncture site w/ecchymosis, soft; no breakdown noted       "

## 2023-02-20 NOTE — PT/OT/SLP PROGRESS
"Speech Language Pathology Treatment    Patient Name:  Sandra Moody   MRN:  [id number]  Admitting Diagnosis: NSTEMI (non-ST elevated myocardial infarction)    Recommendations:                 General Recommendations:  Follow-up not indicated  Diet recommendations:  Minced & Moist Diet - IDDSI Level 5, Liquid Diet Level: Thin   Aspiration Precautions: Standard aspiration precautions   General Precautions: Standard,    Communication strategies:  go to room if call light pushed    Subjective     Patient awake and alert. Family present.     "I'm feeling good for an old lady"    Pain/Comfort:       Respiratory Status: Room air    Objective:     Has the patient been evaluated by SLP for swallowing?   Yes  Keep patient NPO? No   Current Respiratory Status:        Patient seen for follow up diet assessment.  Patient tolerated thin liquids via straw sips x10 along with regular solids x4 with no overt signs of airway compromise. Continues with mild prolonged mastication with solids.  No belching appreciated this date. Skilled education was provided to patient and family members re: diet recs, standard aspiration precautions of which to follow, and discharge  ST plan of care.      Assessment:     Sandra Moody is a 89 y.o. female with an SLP diagnosis of  no oropharyngeal dysphagia .  She presents with no further acute speech therapy needs.    Goals:   Multidisciplinary Problems       SLP Goals          Problem: SLP    Goal Priority Disciplines Outcome   SLP Goal     SLP Ongoing, Progressing   Description: Speech Language Pathology Goals  Goals expected to be met by 2/25:    1. Patient will tolerate a mechanical soft diet and thin liquids with no overt signs of airway compromise.                                Plan:     Patient to be seen:  4 x/week   Plan of Care expires:     Plan of Care reviewed with:  daughter, patient   SLP Follow-Up:  No       Discharge recommendations:  home health speech therapy "   Barriers to Discharge:  None    Time Tracking:     SLP Treatment Date:   02/20/23  Speech Start Time:  0822  Speech Stop Time:  0830     Speech Total Time (min):  8 min    Billable Minutes: Treatment Swallowing Dysfunction 8    02/20/2023

## 2023-02-20 NOTE — NURSING
Notified Dr Love of UOP over the last 3 hours after adminstration of Lasix IVP, no new orders at this time

## 2023-02-20 NOTE — PLAN OF CARE
[facility]  Initial Discharge Assessment       Primary Care Provider: Akhil Harry MD    Admission Diagnosis: Acute chest pain [R07.9]  NSTEMI (non-ST elevated myocardial infarction) [I21.4]  Acute ST elevation myocardial infarction (STEMI), unspecified artery [I21.3]  Vomiting, unspecified vomiting type, unspecified whether nausea present [R11.10]    Admission Date: 2/18/2023  Expected Discharge Date:     Discharge Barriers Identified: None    Payor: HU[employer] MANAGED MEDICARE / Plan: [employer] MEDICARE PPO / Product Type: Medicare Advantage /     Extended Emergency Contact Information  Primary Emergency Contact: Molly Moody  Address: [address] [place] [address] [place] [address] [facility]  Home Phone: [phone]  Work Phone: [phone]  Mobile Phone: [phone]  Relation: Son    Discharge Plan A:  (TBD)  Discharge Plan B:  (TBD)      [facility] - [facility] [address]  [address] [facility] [address]  Phone: [phone] Fax: [phone]    [id number] - [place] - [address]  [address] [place] [address]  Phone: [phone] Fax: [phone]      Initial Assessment (most recent)       Adult Discharge Assessment - 02/20/23 1053          Discharge Assessment    Assessment Type Discharge Planning Assessment     Confirmed/corrected address, phone number and insurance Yes     Confirmed Demographics Correct on Facesheet     Source of Information patient;family     Communicated [place] with patient/caregiver Date not available/Unable to determine     People in Home child(roberto), adult     Facility Arrived From: [facility]     Do you have help at home or someone to help you manage your care at home? Yes     Prior to hospitilization cognitive status: Unable to Assess     Current cognitive status: Unable to Assess     Home Layout Able to live on 1st floor     Equipment Currently  Used at Home walker, rolling     Readmission within 30 days? No     Patient currently being followed by outpatient case management? No     Do you currently have service(s) that help you manage your care at home? No     Do you take prescription medications? Yes     Do you have prescription coverage? Yes     Do you have any problems affording any of your prescribed medications? No     Is the patient taking medications as prescribed? yes     Who is going to help you get home at discharge? FAMILY     How do you get to doctors appointments? family or friend will provide     Are you on dialysis? No     Do you take coumadin? No   ELIQUIS    Discharge Plan A --   TBD    Discharge Plan B --   TBD    DME Needed Upon Discharge  none     Discharge Plan discussed with: Patient;Adult children     Discharge Barriers Identified None                      CM met with the patient and family to discuss discharge planning patient lives with her adult daughter in a 1 story home she is not dialysis and does not take coumadin(ELIQUIS)  She has a ride home   POA/DAUGHTER Tenisha Bansal

## 2023-02-20 NOTE — ASSESSMENT & PLAN NOTE
At OSH ED, patient reports mid chest pain for 2 days that is getting worse.   STEMI activated with ST elevations II,III, aVF, V5-V6. Vitals stable.   Given nitroglycerin x1 with resolution of chest pain. Loaded with aspirin 325mg and plavix 300mg at 1050am.   She took her eliquis at home around 9am.   Another STEMI case on the table in [facility] cath lab, accepted for transfer to [facility].   Transferred from OSH, assessed in ED and discussed with family.    Bedside echocardiogram revealed significantly reduced LV and RV function.   Troponin 0.067.      Case discussed with Interventional Cardiology. Patient taken for [facility] (2/18/23) s/p POBA Ram.    Plan:  Continue plavix, can stop asa  Continue eliquis for afib  Continue metoprolol and atorvastatin 40 mg daily

## 2023-02-20 NOTE — PROGRESS NOTES
[facility]  Cardiology  Progress Note    Patient Name: aSndra Moody  MRN: [id number]  Admission Date: 2/18/2023  Hospital Length of Stay: 2 days  Code Status: DNR   Attending Physician: Helen Cortez MD   Primary Care Physician: Akhil Harry MD  Expected Discharge Date:   Principal Problem:NSTEMI (non-ST elevated myocardial infarction)    Subjective:     Hospital Course:   Bedside echocardiogram revealed significantly reduced LV and RV function. Troponin 0.067.  Case discussed with Interventional Cardiology. Patient taken for [facility] (2/18/23) s/p POBA Ramus.  Echo showing EF 18%, possible LV thrombus, CVP 15. Started on lasix drip.  Given age, comorbidities, discussed palliative care with daughter, Regla.  Family is agreeable to hospice.  SW consulted for hospice.      Interval History: patient feeling much improved this AM, denies chest pain, shortness of breath.  Family notes improvement in abdominal swelling.  Poor UOP yesterday, net -420 on lasix pushes.  Will start lasix drip.      Review of Systems   Reason unable to perform ROS: Dementia.   Objective:     Vital Signs (Most Recent):  Temp: 97.6 °F (36.4 °C) (02/20/23 1101)  Pulse: 101 (02/20/23 1101)  Resp: 20 (02/20/23 1101)  BP: 110/69 (02/20/23 1101)  SpO2: 95 % (02/20/23 1101) Vital Signs (24h Range):  Temp:  [97.2 °F (36.2 °C)-97.8 °F (36.6 °C)] 97.6 °F (36.4 °C)  Pulse:  [] 101  Resp:  [0-31] 20  SpO2:  [90 %-100 %] 95 %  BP: ()/(50-78) 110/69     Weight: 68.8 kg (151 lb 10.8 oz)  Body mass index is 26.04 kg/m².     SpO2: 95 %         Intake/Output Summary (Last 24 hours) at 2/20/2023 1300  Last data filed at 2/20/2023 1101  Gross per 24 hour   Intake 230.61 ml   Output 1355 ml   Net -1124.39 ml       Lines/Drains/Airways       Drain  Duration                  Urethral Catheter 02/19/23 1057 1 day              Peripheral Intravenous Line  Duration                  Peripheral IV - Single Lumen  02/18/23 1031 20 G Right Antecubital 2 days                    Physical Exam  Vitals reviewed.   Constitutional:       Appearance: Normal appearance.   Cardiovascular:      Rate and Rhythm: Rhythm irregular.   Pulmonary:      Breath sounds: Normal breath sounds.   Abdominal:      General: There is distension.      Palpations: Abdomen is soft.   Musculoskeletal:      Cervical back: Neck supple.   Skin:     General: Skin is warm.      Capillary Refill: Capillary refill takes less than 2 seconds.      Comments: No LE edema    Neurological:      Mental Status: She is alert.       Significant Labs: All pertinent lab results from the last 24 hours have been reviewed.    Significant Imaging:  reviewed    Assessment and Plan:     * NSTEMI (non-ST elevated myocardial infarction)  At OSH ED, patient reports mid chest pain for 2 days that is getting worse.   STEMI activated with ST elevations II,III, aVF, V5-V6. Vitals stable.   Given nitroglycerin x1 with resolution of chest pain. Loaded with aspirin 325mg and plavix 300mg at 1050am.   She took her eliquis at home around 9am.   Another STEMI case on the table in [facility] [place] cath lab, accepted for transfer to [facility].   Transferred from OSH, assessed in ED and discussed with family.    Bedside echocardiogram revealed significantly reduced LV and RV function.   Troponin 0.067.      Case discussed with Interventional Cardiology. Patient taken for [facility] (2/18/23) s/p POBA Ramus.    Plan:  Continue plavix, can stop asa  Continue eliquis for afib  Continue metoprolol and atorvastatin 40 mg daily     Goals of care, counseling/discussion  Advance Care Planning     Given age and comorbidities, discussed referral for home hospice with daughter, Regla.  Family in agreement with diuresing patient, then discharging home with hospice.  Will consult social work for home hospice options.    I spent a total of 15 minutes engaging the patient in this advance care planning discussion.            Dementia  - delirium precautions    Paroxysmal atrial fibrillation  Rate controlled A Fib   CHADSVASC 5  HASBLED 2    On eliquis at home     Plan:  Once off heparin drip, will restart home dose Eliquis     Hypothyroidism  Asymptomatic   On levothyroxine   Continue same meds     GERD (gastroesophageal reflux disease)  Poorly controlled   Given maaloox 1 dose  - improved    Essential hypertension  - currently normotensive        VTE Risk Mitigation (From admission, onward)         Ordered     apixaban tablet 5 mg  2 times daily         02/19/23 1138     IP VTE HIGH RISK PATIENT  Once         02/18/23 1225     Place sequential compression device  Until discontinued         02/18/23 1225                Sravan Gunter MD  Cardiology  [facility]

## 2023-02-20 NOTE — PLAN OF CARE
[facility] Care Plan    POC reviewed with Sandra Moody and family at 1400. Pt verbalized understanding. Questions and concerns addressed. No acute events today. Pt progressing toward goals. Will continue to monitor. See below and flowsheets for full assessment and VS info.     - Pending transfer to CSU    Neuro:  Maria M Coma Scale  Best Eye Response: 4-->(E4) spontaneous  Best Motor Response: 6-->(M6) obeys commands  Best Verbal Response: 4-->(V4) confused (baseline - hx of dementia and alzheimers)  [place] Coma Scale Score: 14  Assessment Qualifiers: no eye obstruction present, patient not sedated/intubated  Pupil PERRLA: yes     24 hr Temp:  [97.4 °F (36.3 °C)-98.1 °F (36.7 °C)]     CV:   Rhythm: atrial rhythm, frequent PVCs    Resp:      Oxygen Concentration (%): 36    Plan:  4L NC    GI/:     Diet/Nutrition Received: mechanical/dental soft, other (see comments) (dysphagia diet)     Voiding Characteristics: urethral catheter (bladder)    Intake/Output Summary (Last 24 hours) at 2/20/2023 1721  Last data filed at 2/20/2023 1501  Gross per 24 hour   Intake 384.83 ml   Output 1550 ml   Net -1165.17 ml          Labs/Accuchecks:  Recent Labs   Lab 02/20/23  0614   WBC 5.77   RBC 4.39   HGB 12.8   HCT 42.3   *      Recent Labs   Lab 02/20/23  0614 02/20/23  1620   *  --    K 4.4 4.3   CO2 26  --    CL 92*  --    BUN 21  --    CREATININE 1.2  --    ALKPHOS 58  --    ALT 13  --    AST 60*  --    BILITOT 0.8  --       Recent Labs   Lab 02/18/23  1400   INR 2.3*   APTT >150.0*      Recent Labs   Lab 02/18/23  2009   TROPONINI >50.000*       Electrolytes: N/A - electrolytes WDL  Accuchecks: ACHS    Gtts:   furosemide (LASIX) 10 mg/mL infusion (non-titrating) 10 mg/hr (02/20/23 1501)       LDA/Wounds:  Lines/Drains/Airways       Drain  Duration                  Urethral Catheter 02/19/23 1057 1 day              Peripheral Intravenous Line  Duration                  Peripheral IV - Single Lumen 02/20/23  1145 20 G Anterior;Distal;Left Forearm <1 day                  Wounds: Yes  Wound care consulted: No

## 2023-02-20 NOTE — SUBJECTIVE & OBJECTIVE
Interval History: patient feeling much improved this AM, denies chest pain, shortness of breath.  Family notes improvement in abdominal swelling.  Poor UOP yesterday, net -420 on lasix pushes.  Will start lasix drip.      Review of Systems   Reason unable to perform ROS: Dementia.   Objective:     Vital Signs (Most Recent):  Temp: 97.6 °F (36.4 °C) (02/20/23 1101)  Pulse: 101 (02/20/23 1101)  Resp: 20 (02/20/23 1101)  BP: 110/69 (02/20/23 1101)  SpO2: 95 % (02/20/23 1101) Vital Signs (24h Range):  Temp:  [97.2 °F (36.2 °C)-97.8 °F (36.6 °C)] 97.6 °F (36.4 °C)  Pulse:  [] 101  Resp:  [0-31] 20  SpO2:  [90 %-100 %] 95 %  BP: ()/(50-78) 110/69     Weight: 68.8 kg (151 lb 10.8 oz)  Body mass index is 26.04 kg/m².     SpO2: 95 %         Intake/Output Summary (Last 24 hours) at 2/20/2023 1300  Last data filed at 2/20/2023 1101  Gross per 24 hour   Intake 230.61 ml   Output 1355 ml   Net -1124.39 ml       Lines/Drains/Airways       Drain  Duration                  Urethral Catheter 02/19/23 1057 1 day              Peripheral Intravenous Line  Duration                  Peripheral IV - Single Lumen 02/18/23 1031 20 G Right Antecubital 2 days                    Physical Exam  Vitals reviewed.   Constitutional:       Appearance: Normal appearance.   Cardiovascular:      Rate and Rhythm: Rhythm irregular.   Pulmonary:      Breath sounds: Normal breath sounds.   Abdominal:      General: There is distension.      Palpations: Abdomen is soft.   Musculoskeletal:      Cervical back: Neck supple.   Skin:     General: Skin is warm.      Capillary Refill: Capillary refill takes less than 2 seconds.      Comments: No LE edema    Neurological:      Mental Status: She is alert.       Significant Labs: All pertinent lab results from the last 24 hours have been reviewed.    Significant Imaging:  reviewed

## 2023-02-20 NOTE — PLAN OF CARE
CHAS met with opt and her daughter, Regla, at bedside.  CM provided them a list of area Hospice agencies for them to make a choice.  Regla stated she will have a decision no later than Wednesday morning (d/t the holiday tomorrow).  Regla is very familiar with Hospice and the benefits they provide as her dad was under Hospice Care prior to his demise.   Epidermal Closure: running

## 2023-02-21 LAB
ALBUMIN SERPL BCP-MCNC: 3.5 G/DL (ref 3.5–5.2)
ALP SERPL-CCNC: 60 U/L (ref 55–135)
ALT SERPL W/O P-5'-P-CCNC: 9 U/L (ref 10–44)
ANION GAP SERPL CALC-SCNC: 9 MMOL/L (ref 8–16)
AST SERPL-CCNC: 37 U/L (ref 10–40)
BASOPHILS # BLD AUTO: 0.02 K/UL (ref 0–0.2)
BASOPHILS NFR BLD: 0.3 % (ref 0–1.9)
BILIRUB SERPL-MCNC: 0.7 MG/DL (ref 0.1–1)
BUN SERPL-MCNC: 24 MG/DL (ref 10–30)
CALCIUM SERPL-MCNC: 8.8 MG/DL (ref 8.7–10.5)
CHLORIDE SERPL-SCNC: 90 MMOL/L (ref 95–110)
CO2 SERPL-SCNC: 30 MMOL/L (ref 23–29)
CREAT SERPL-MCNC: 1.1 MG/DL (ref 0.5–1.4)
DIFFERENTIAL METHOD: ABNORMAL
EOSINOPHIL # BLD AUTO: 0.1 K/UL (ref 0–0.5)
EOSINOPHIL NFR BLD: 1.4 % (ref 0–8)
ERYTHROCYTE [DISTWIDTH] IN BLOOD BY AUTOMATED COUNT: 14.6 % (ref 11.5–14.5)
EST. GFR  (NO RACE VARIABLE): 46.6 ML/MIN/1.73 M^2
GLUCOSE SERPL-MCNC: 105 MG/DL (ref 70–110)
HCT VFR BLD AUTO: 42.4 % (ref 37–48.5)
HGB BLD-MCNC: 13 G/DL (ref 12–16)
IMM GRANULOCYTES # BLD AUTO: 0.02 K/UL (ref 0–0.04)
IMM GRANULOCYTES NFR BLD AUTO: 0.3 % (ref 0–0.5)
LYMPHOCYTES # BLD AUTO: 1.1 K/UL (ref 1–4.8)
LYMPHOCYTES NFR BLD: 18 % (ref 18–48)
MAGNESIUM SERPL-MCNC: 2.2 MG/DL (ref 1.6–2.6)
MCH RBC QN AUTO: 28.8 PG (ref 27–31)
MCHC RBC AUTO-ENTMCNC: 30.7 G/DL (ref 32–36)
MCV RBC AUTO: 94 FL (ref 82–98)
MONOCYTES # BLD AUTO: 0.4 K/UL (ref 0.3–1)
MONOCYTES NFR BLD: 7.2 % (ref 4–15)
NEUTROPHILS # BLD AUTO: 4.3 K/UL (ref 1.8–7.7)
NEUTROPHILS NFR BLD: 72.8 % (ref 38–73)
NRBC BLD-RTO: 0 /100 WBC
PLATELET # BLD AUTO: 133 K/UL (ref 150–450)
PMV BLD AUTO: 9.8 FL (ref 9.2–12.9)
POCT GLUCOSE: 112 MG/DL (ref 70–110)
POCT GLUCOSE: 137 MG/DL (ref 70–110)
POCT GLUCOSE: 155 MG/DL (ref 70–110)
POCT GLUCOSE: 160 MG/DL (ref 70–110)
POTASSIUM SERPL-SCNC: 4.6 MMOL/L (ref 3.5–5.1)
PROT SERPL-MCNC: 6.3 G/DL (ref 6–8.4)
RBC # BLD AUTO: 4.51 M/UL (ref 4–5.4)
SODIUM SERPL-SCNC: 129 MMOL/L (ref 136–145)
WBC # BLD AUTO: 5.84 K/UL (ref 3.9–12.7)

## 2023-02-21 PROCEDURE — 20600001 HC STEP DOWN PRIVATE ROOM

## 2023-02-21 PROCEDURE — 25000003 PHARM REV CODE 250: Performed by: INTERNAL MEDICINE

## 2023-02-21 PROCEDURE — 94761 N-INVAS EAR/PLS OXIMETRY MLT: CPT

## 2023-02-21 PROCEDURE — 25000003 PHARM REV CODE 250: Performed by: STUDENT IN AN ORGANIZED HEALTH CARE EDUCATION/TRAINING PROGRAM

## 2023-02-21 PROCEDURE — 99231 PR SUBSEQUENT HOSPITAL CARE,LEVL I: ICD-10-PCS | Mod: ,,, | Performed by: INTERNAL MEDICINE

## 2023-02-21 PROCEDURE — 85025 COMPLETE CBC W/AUTO DIFF WBC: CPT | Performed by: STUDENT IN AN ORGANIZED HEALTH CARE EDUCATION/TRAINING PROGRAM

## 2023-02-21 PROCEDURE — 80053 COMPREHEN METABOLIC PANEL: CPT | Performed by: STUDENT IN AN ORGANIZED HEALTH CARE EDUCATION/TRAINING PROGRAM

## 2023-02-21 PROCEDURE — 99900035 HC TECH TIME PER 15 MIN (STAT)

## 2023-02-21 PROCEDURE — 83735 ASSAY OF MAGNESIUM: CPT | Performed by: STUDENT IN AN ORGANIZED HEALTH CARE EDUCATION/TRAINING PROGRAM

## 2023-02-21 PROCEDURE — 63600175 PHARM REV CODE 636 W HCPCS: Performed by: STUDENT IN AN ORGANIZED HEALTH CARE EDUCATION/TRAINING PROGRAM

## 2023-02-21 PROCEDURE — 99231 SBSQ HOSP IP/OBS SF/LOW 25: CPT | Mod: ,,, | Performed by: INTERNAL MEDICINE

## 2023-02-21 RX ADMIN — Medication 6 MG: at 09:02

## 2023-02-21 RX ADMIN — LEVOTHYROXINE SODIUM 25 MCG: 25 TABLET ORAL at 08:02

## 2023-02-21 RX ADMIN — ATORVASTATIN CALCIUM 40 MG: 40 TABLET, FILM COATED ORAL at 08:02

## 2023-02-21 RX ADMIN — APIXABAN 5 MG: 5 TABLET, FILM COATED ORAL at 09:02

## 2023-02-21 RX ADMIN — OXYBUTYNIN CHLORIDE 10 MG: 10 TABLET, EXTENDED RELEASE ORAL at 08:02

## 2023-02-21 RX ADMIN — MUPIROCIN: 20 OINTMENT TOPICAL at 09:02

## 2023-02-21 RX ADMIN — POLYETHYLENE GLYCOL 3350 17 G: 17 POWDER, FOR SOLUTION ORAL at 08:02

## 2023-02-21 RX ADMIN — CLOPIDOGREL BISULFATE 75 MG: 75 TABLET ORAL at 08:02

## 2023-02-21 RX ADMIN — OXYCODONE HYDROCHLORIDE 5 MG: 5 TABLET ORAL at 01:02

## 2023-02-21 RX ADMIN — METOPROLOL SUCCINATE 200 MG: 100 TABLET, EXTENDED RELEASE ORAL at 08:02

## 2023-02-21 RX ADMIN — MUPIROCIN: 20 OINTMENT TOPICAL at 08:02

## 2023-02-21 RX ADMIN — SENNOSIDES AND DOCUSATE SODIUM 1 TABLET: 50; 8.6 TABLET ORAL at 08:02

## 2023-02-21 RX ADMIN — PANTOPRAZOLE SODIUM 40 MG: 40 TABLET, DELAYED RELEASE ORAL at 05:02

## 2023-02-21 RX ADMIN — FUROSEMIDE 10 MG/HR: 10 INJECTION, SOLUTION INTRAMUSCULAR; INTRAVENOUS at 01:02

## 2023-02-21 RX ADMIN — APIXABAN 5 MG: 5 TABLET, FILM COATED ORAL at 08:02

## 2023-02-21 RX ADMIN — SENNOSIDES AND DOCUSATE SODIUM 1 TABLET: 50; 8.6 TABLET ORAL at 09:02

## 2023-02-21 RX ADMIN — PROMETHAZINE HYDROCHLORIDE 12.5 MG: 12.5 TABLET ORAL at 05:02

## 2023-02-21 RX ADMIN — ONDANSETRON 8 MG: 2 INJECTION INTRAMUSCULAR; INTRAVENOUS at 12:02

## 2023-02-21 RX ADMIN — CEFTRIAXONE 2 G: 2 INJECTION, POWDER, FOR SOLUTION INTRAMUSCULAR; INTRAVENOUS at 11:02

## 2023-02-21 NOTE — PLAN OF CARE
Problem: Adult Inpatient Plan of Care  Goal: Plan of Care Review  Outcome: Ongoing, Progressing  Flowsheets (Taken 2/21/2023 1744)  Plan of Care Reviewed With:   patient   daughter     Problem: Coping Ineffective  Goal: Effective Coping  Outcome: Ongoing, Progressing  Intervention: Support and Enhance Coping Strategies  Flowsheets (Taken 2/21/2023 1744)  Environmental Support: calm environment promoted

## 2023-02-21 NOTE — NURSING
Patient arrived to the floor from SICU. Telemetry box applied and vital signs documented in flow sheet. Oriented to room, call bell with in reach, bed is in low lock position. Pt's daughter staying with pt. Will continue to monitor.

## 2023-02-21 NOTE — PLAN OF CARE
Pt maintained free from falls/trauma/injuries and skin breakdown. Pt denied pain or discomfort. Lab drawn. Pt on 4 L. Plan of care reviewed. Pt verbalized understanding. All questions and concerns addressed. Will continue to monitor.

## 2023-02-21 NOTE — ASSESSMENT & PLAN NOTE
At OSH ED, patient reports mid chest pain for 2 days that is getting worse.   STEMI activated with ST elevations II,III, aVF, V5-V6. Vitals stable.   Given nitroglycerin x1 with resolution of chest pain. Loaded with aspirin 325mg and plavix 300mg at 1050am.   She took her eliquis at home around 9am.   Another STEMI case on the table in [facility] cath lab, accepted for transfer to [facility].   Transferred from OSH, assessed in ED and discussed with family.    Bedside echocardiogram revealed significantly reduced LV and RV function.   Troponin 0.067.      Case discussed with Interventional Cardiology. Patient taken for [facility] (2/18/23) s/p POBA Ram.    Plan:  Continue plavix, can stop asa  Continue eliquis for afib  Continue metoprolol and atorvastatin 40 mg daily

## 2023-02-21 NOTE — SUBJECTIVE & OBJECTIVE
Interval History: naeon, denies chest pain, shortness of breath.  Family planning on home hospice.     Review of Systems   Reason unable to perform ROS: Dementia.   Objective:     Vital Signs (Most Recent):  Temp: 97.2 °F (36.2 °C) (02/21/23 0729)  Pulse: 83 (02/21/23 0754)  Resp: 16 (02/21/23 0729)  BP: 112/70 (02/21/23 0729)  SpO2: 95 % (02/21/23 0729) Vital Signs (24h Range):  Temp:  [97 °F (36.1 °C)-98.1 °F (36.7 °C)] 97.2 °F (36.2 °C)  Pulse:  [] 83  Resp:  [16-36] 16  SpO2:  [90 %-100 %] 95 %  BP: ()/(58-90) 112/70     Weight: 66.5 kg (146 lb 9.7 oz)  Body mass index is 25.16 kg/m².     SpO2: 95 %         Intake/Output Summary (Last 24 hours) at 2/21/2023 0841  Last data filed at 2/21/2023 0649  Gross per 24 hour   Intake 688.81 ml   Output 2480 ml   Net -1791.19 ml       Lines/Drains/Airways       Drain  Duration                  Urethral Catheter 02/19/23 1057 1 day              Peripheral Intravenous Line  Duration                  Midline Catheter Insertion/Assessment  - Single Lumen 02/20/23 1650 Right brachial vein 20g x 8cm <1 day         Peripheral IV - Single Lumen 02/20/23 1145 20 G Anterior;Distal;Left Forearm <1 day                    Physical Exam  Vitals reviewed.   Constitutional:       Appearance: Normal appearance.   Cardiovascular:      Rate and Rhythm: Rhythm irregular.   Pulmonary:      Effort: No respiratory distress.      Breath sounds: Rales present. No wheezing.   Abdominal:      General: There is distension.      Palpations: Abdomen is soft.   Musculoskeletal:      Cervical back: Neck supple.   Skin:     General: Skin is warm.      Capillary Refill: Capillary refill takes less than 2 seconds.      Comments: No LE edema    Neurological:      Mental Status: She is alert.       Significant Labs: All pertinent lab results from the last 24 hours have been reviewed.    Significant Imaging:  reviewed

## 2023-02-21 NOTE — PROGRESS NOTES
[facility] - [facility] Stepdown  Cardiology  Progress Note    Patient Name: Sandra Moody  MRN: [id number]  Admission Date: 2/18/2023  Hospital Length of Stay: 3 days  Code Status: DNR   Attending Physician: Helen Cortez MD   Primary Care Physician: Akhil Harry MD  Expected Discharge Date:   Principal Problem:NSTEMI (non-ST elevated myocardial infarction)    Subjective:     Hospital Course:   Bedside echocardiogram revealed significantly reduced LV and RV function. Troponin 0.067.  Case discussed with Interventional Cardiology. Patient taken for [facility] (2/18/23) s/p POBA Ramus.  Echo showing EF 18%, possible LV thrombus, CVP 15. Started on lasix drip.  Given age, comorbidities, discussed palliative care with daughter, Regla.  Family is agreeable to hospice.  SW consulted for hospice.      Interval History: naeon, denies chest pain, shortness of breath.  Family planning on home hospice.     Review of Systems   Reason unable to perform ROS: Dementia.   Objective:     Vital Signs (Most Recent):  Temp: 97.2 °F (36.2 °C) (02/21/23 0729)  Pulse: 83 (02/21/23 0754)  Resp: 16 (02/21/23 0729)  BP: 112/70 (02/21/23 0729)  SpO2: 95 % (02/21/23 0729) Vital Signs (24h Range):  Temp:  [97 °F (36.1 °C)-98.1 °F (36.7 °C)] 97.2 °F (36.2 °C)  Pulse:  [] 83  Resp:  [16-36] 16  SpO2:  [90 %-100 %] 95 %  BP: ()/(58-90) 112/70     Weight: 66.5 kg (146 lb 9.7 oz)  Body mass index is 25.16 kg/m².     SpO2: 95 %         Intake/Output Summary (Last 24 hours) at 2/21/2023 0841  Last data filed at 2/21/2023 0649  Gross per 24 hour   Intake 688.81 ml   Output 2480 ml   Net -1791.19 ml       Lines/Drains/Airways       Drain  Duration                  Urethral Catheter 02/19/23 1057 1 day              Peripheral Intravenous Line  Duration                  Midline Catheter Insertion/Assessment  - Single Lumen 02/20/23 1650 Right brachial vein 20g x 8cm <1 day         Peripheral IV - Single Lumen 02/20/23 1145 20 G  Anterior;Distal;Left Forearm <1 day                    Physical Exam  Vitals reviewed.   Constitutional:       Appearance: Normal appearance.   Cardiovascular:      Rate and Rhythm: Rhythm irregular.   Pulmonary:      Effort: No respiratory distress.      Breath sounds: Rales present. No wheezing.   Abdominal:      General: There is distension.      Palpations: Abdomen is soft.   Musculoskeletal:      Cervical back: Neck supple.   Skin:     General: Skin is warm.      Capillary Refill: Capillary refill takes less than 2 seconds.      Comments: No LE edema    Neurological:      Mental Status: She is alert.       Significant Labs: All pertinent lab results from the last 24 hours have been reviewed.    Significant Imaging:  reviewed    Assessment and Plan:     * NSTEMI (non-ST elevated myocardial infarction)  At OSH ED, patient reports mid chest pain for 2 days that is getting worse.   STEMI activated with ST elevations II,III, aVF, V5-V6. Vitals stable.   Given nitroglycerin x1 with resolution of chest pain. Loaded with aspirin 325mg and plavix 300mg at 1050am.   She took her eliquis at home around 9am.   Another STEMI case on the table in [facility] cath lab, accepted for transfer to [facility].   Transferred from OSH, assessed in ED and discussed with family.    Bedside echocardiogram revealed significantly reduced LV and RV function.   Troponin 0.067.      Case discussed with Interventional Cardiology. Patient taken for [facility] (2/18/23) s/p POBA Ram.    Plan:  Continue plavix, can stop asa  Continue eliquis for afib  Continue metoprolol and atorvastatin 40 mg daily     Goals of care, counseling/discussion  Advance Care Planning     Given age and comorbidities, discussed referral for home hospice with daughter, Regla.  Family in agreement with diuresing patient, then discharging home with hospice.  Will consult social work for home hospice options.    I spent a total of 15 minutes engaging the patient in this  advance care planning discussion.           Dementia  - delirium precautions    Paroxysmal atrial fibrillation  Rate controlled A Fib   CHADSVASC 5  HASBLED 2    On eliquis at home     Plan:  Once off heparin drip, will restart home dose Eliquis     Hypothyroidism  Asymptomatic   On levothyroxine   Continue same meds     GERD (gastroesophageal reflux disease)  Poorly controlled   Given maaloox 1 dose  - improved    Essential hypertension  - currently normotensive        VTE Risk Mitigation (From admission, onward)         Ordered     apixaban tablet 5 mg  2 times daily         02/19/23 1138     IP VTE HIGH RISK PATIENT  Once         02/18/23 1225     Place sequential compression device  Until discontinued         02/18/23 1225                Sravan Gunter MD  Cardiology  [facility] - Cardiology Stepdown

## 2023-02-22 PROBLEM — I50.43 ACUTE ON CHRONIC COMBINED SYSTOLIC AND DIASTOLIC HEART FAILURE: Status: ACTIVE | Noted: 2023-02-22

## 2023-02-22 LAB
ANION GAP SERPL CALC-SCNC: 11 MMOL/L (ref 8–16)
BUN SERPL-MCNC: 20 MG/DL (ref 10–30)
CALCIUM SERPL-MCNC: 8.7 MG/DL (ref 8.7–10.5)
CHLORIDE SERPL-SCNC: 87 MMOL/L (ref 95–110)
CO2 SERPL-SCNC: 37 MMOL/L (ref 23–29)
CREAT SERPL-MCNC: 0.9 MG/DL (ref 0.5–1.4)
EST. GFR  (NO RACE VARIABLE): 59.2 ML/MIN/1.73 M^2
GLUCOSE SERPL-MCNC: 122 MG/DL (ref 70–110)
GLUCOSE SERPL-MCNC: 124 MG/DL (ref 70–110)
MAGNESIUM SERPL-MCNC: 1.9 MG/DL (ref 1.6–2.6)
POCT GLUCOSE: 118 MG/DL (ref 70–110)
POCT GLUCOSE: 124 MG/DL (ref 70–110)
POCT GLUCOSE: 130 MG/DL (ref 70–110)
POTASSIUM SERPL-SCNC: 3.7 MMOL/L (ref 3.5–5.1)
SODIUM SERPL-SCNC: 135 MMOL/L (ref 136–145)

## 2023-02-22 PROCEDURE — 99231 PR SUBSEQUENT HOSPITAL CARE,LEVL I: ICD-10-PCS | Mod: ,,, | Performed by: INTERNAL MEDICINE

## 2023-02-22 PROCEDURE — 20600001 HC STEP DOWN PRIVATE ROOM

## 2023-02-22 PROCEDURE — 25000003 PHARM REV CODE 250: Performed by: STUDENT IN AN ORGANIZED HEALTH CARE EDUCATION/TRAINING PROGRAM

## 2023-02-22 PROCEDURE — 80048 BASIC METABOLIC PNL TOTAL CA: CPT | Performed by: STUDENT IN AN ORGANIZED HEALTH CARE EDUCATION/TRAINING PROGRAM

## 2023-02-22 PROCEDURE — 83735 ASSAY OF MAGNESIUM: CPT | Performed by: STUDENT IN AN ORGANIZED HEALTH CARE EDUCATION/TRAINING PROGRAM

## 2023-02-22 PROCEDURE — 36415 COLL VENOUS BLD VENIPUNCTURE: CPT | Performed by: STUDENT IN AN ORGANIZED HEALTH CARE EDUCATION/TRAINING PROGRAM

## 2023-02-22 PROCEDURE — 25000003 PHARM REV CODE 250: Performed by: INTERNAL MEDICINE

## 2023-02-22 PROCEDURE — 99231 SBSQ HOSP IP/OBS SF/LOW 25: CPT | Mod: ,,, | Performed by: INTERNAL MEDICINE

## 2023-02-22 PROCEDURE — 63600175 PHARM REV CODE 636 W HCPCS: Performed by: STUDENT IN AN ORGANIZED HEALTH CARE EDUCATION/TRAINING PROGRAM

## 2023-02-22 RX ORDER — PROMETHAZINE HYDROCHLORIDE 12.5 MG/1
12.5 TABLET ORAL EVERY 6 HOURS PRN
Qty: 28 TABLET | Refills: 0 | Status: SHIPPED | OUTPATIENT
Start: 2023-02-22 | End: 2023-03-02

## 2023-02-22 RX ORDER — PANTOPRAZOLE SODIUM 40 MG/1
40 TABLET, DELAYED RELEASE ORAL
Qty: 30 TABLET | Refills: 11 | Status: SHIPPED | OUTPATIENT
Start: 2023-02-23 | End: 2024-02-23

## 2023-02-22 RX ORDER — ACETAMINOPHEN 325 MG/1
650 TABLET ORAL EVERY 4 HOURS PRN
Qty: 30 TABLET | Refills: 0 | Status: SHIPPED | OUTPATIENT
Start: 2023-02-22

## 2023-02-22 RX ORDER — POTASSIUM CHLORIDE 20 MEQ/1
40 TABLET, EXTENDED RELEASE ORAL ONCE
Status: DISCONTINUED | OUTPATIENT
Start: 2023-02-22 | End: 2023-02-22

## 2023-02-22 RX ORDER — OXYCODONE HYDROCHLORIDE 5 MG/1
5 TABLET ORAL EVERY 4 HOURS PRN
Qty: 42 TABLET | Refills: 0 | Status: SHIPPED | OUTPATIENT
Start: 2023-02-22 | End: 2023-03-02

## 2023-02-22 RX ADMIN — ATORVASTATIN CALCIUM 40 MG: 40 TABLET, FILM COATED ORAL at 08:02

## 2023-02-22 RX ADMIN — POTASSIUM BICARBONATE 40 MEQ: 391 TABLET, EFFERVESCENT ORAL at 11:02

## 2023-02-22 RX ADMIN — PANTOPRAZOLE SODIUM 40 MG: 40 TABLET, DELAYED RELEASE ORAL at 05:02

## 2023-02-22 RX ADMIN — SENNOSIDES AND DOCUSATE SODIUM 1 TABLET: 50; 8.6 TABLET ORAL at 08:02

## 2023-02-22 RX ADMIN — CEFTRIAXONE 2 G: 2 INJECTION, POWDER, FOR SOLUTION INTRAMUSCULAR; INTRAVENOUS at 11:02

## 2023-02-22 RX ADMIN — PROMETHAZINE HYDROCHLORIDE 12.5 MG: 12.5 TABLET ORAL at 02:02

## 2023-02-22 RX ADMIN — APIXABAN 5 MG: 5 TABLET, FILM COATED ORAL at 08:02

## 2023-02-22 RX ADMIN — CLOPIDOGREL BISULFATE 75 MG: 75 TABLET ORAL at 08:02

## 2023-02-22 RX ADMIN — APIXABAN 5 MG: 5 TABLET, FILM COATED ORAL at 09:02

## 2023-02-22 RX ADMIN — OXYBUTYNIN CHLORIDE 10 MG: 10 TABLET, EXTENDED RELEASE ORAL at 08:02

## 2023-02-22 RX ADMIN — POLYETHYLENE GLYCOL 3350 17 G: 17 POWDER, FOR SOLUTION ORAL at 08:02

## 2023-02-22 RX ADMIN — FUROSEMIDE 10 MG/HR: 10 INJECTION, SOLUTION INTRAMUSCULAR; INTRAVENOUS at 05:02

## 2023-02-22 RX ADMIN — SENNOSIDES AND DOCUSATE SODIUM 1 TABLET: 50; 8.6 TABLET ORAL at 09:02

## 2023-02-22 RX ADMIN — MUPIROCIN: 20 OINTMENT TOPICAL at 08:02

## 2023-02-22 RX ADMIN — LEVOTHYROXINE SODIUM 25 MCG: 25 TABLET ORAL at 06:02

## 2023-02-22 RX ADMIN — MUPIROCIN: 20 OINTMENT TOPICAL at 09:02

## 2023-02-22 RX ADMIN — METOPROLOL SUCCINATE 200 MG: 100 TABLET, EXTENDED RELEASE ORAL at 08:02

## 2023-02-22 RX ADMIN — ONDANSETRON 8 MG: 2 INJECTION INTRAMUSCULAR; INTRAVENOUS at 08:02

## 2023-02-22 RX ADMIN — Medication 6 MG: at 09:02

## 2023-02-22 NOTE — ASSESSMENT & PLAN NOTE
CXR with pleural effusion, pulmonary congestion  - continue lasix 10 mg/hr  - wean oxygen as tolerated  - plan for home with hospice once more euvolemic

## 2023-02-22 NOTE — ASSESSMENT & PLAN NOTE
At OSH ED, patient reports mid chest pain for 2 days that is getting worse.   STEMI activated with ST elevations II,III, aVF, V5-V6. Vitals stable.   Given nitroglycerin x1 with resolution of chest pain. Loaded with aspirin 325mg and plavix 300mg at 1050am.   She took her eliquis at home around 9am.   [facility] STEMI case [facility] [place] [facility] [place] [facility] [place] [facility] transfer [facility].   Transferred from OSH, assessed in ED and discussed with family.    Bedside echocardiogram revealed significantly reduced LV and RV function.   Troponin 0.067.      Case discussed with Interventional Cardiology. Patient taken for [facility] (2/18/23) s/p POBA Ram.    Plan:  Continue plavix, can stop asa  Continue eliquis for afib  Continue metoprolol and atorvastatin 40 mg daily

## 2023-02-22 NOTE — PROGRESS NOTES
[facility] - Cardiology Stepdown  Cardiology  Progress Note    Patient Name: Sandra Moody  MRN: [id number]  Admission Date: 2/18/2023  Hospital Length of Stay: 4 days  Code Status: DNR   Attending Physician: Helen Cortez MD   Primary Care Physician: Akhil Harry MD  Expected Discharge Date: 2/23/2023  Principal Problem:NSTEMI (non-ST elevated myocardial infarction)    Subjective:     Hospital Course:   Bedside echocardiogram revealed significantly reduced LV and RV function. Troponin 0.067.  Case discussed with Interventional Cardiology. Patient taken for [facility] (2/18/23) s/p POBA Ramus.  Echo showing EF 18%, possible LV thrombus, CVP 15. Started on lasix drip.  Given age, comorbidities, discussed palliative care with daughter, Regla.  Family is agreeable to hospice.  SW consulted for hospice, plan for home hospice once more euvolemic.      Interval History: mild dyspnea overnight, denies chest pain this AM.  Increased NC requirements, now up to 6L.  CXR with known L hemidiaphragm elevation but now with pleural effusion.  Will continue to diurese.    Review of Systems   Reason unable to perform ROS: Dementia.   Objective:     Vital Signs (Most Recent):  Temp: 98 °F (36.7 °C) (02/22/23 1200)  Pulse: 85 (02/22/23 1201)  Resp: 20 (02/22/23 1200)  BP: 102/62 (02/22/23 1200)  SpO2: 98 % (02/22/23 1200) Vital Signs (24h Range):  Temp:  [97.5 °F (36.4 °C)-98 °F (36.7 °C)] 98 °F (36.7 °C)  Pulse:  [72-96] 85  Resp:  [14-20] 20  SpO2:  [86 %-98 %] 98 %  BP: (100-113)/(55-68) 102/62     Weight: 64.4 kg (141 lb 15.6 oz)  Body mass index is 24.37 kg/m².     SpO2: 98 %         Intake/Output Summary (Last 24 hours) at 2/22/2023 1318  Last data filed at 2/22/2023 1030  Gross per 24 hour   Intake 604 ml   Output 4525 ml   Net -3921 ml       Lines/Drains/Airways       Drain  Duration             Female External Urinary Catheter 02/22/23 1031 <1 day              Peripheral Intravenous Line  Duration                   Peripheral IV - Single Lumen 02/20/23 1145 20 G Anterior;Distal;Left Forearm 2 days         Midline Catheter Insertion/Assessment  - Single Lumen 02/20/23 1650 Right brachial vein 20g x 8cm 1 day                    Physical Exam  Vitals reviewed.   Constitutional:       Appearance: Normal appearance.   Cardiovascular:      Rate and Rhythm: Rhythm irregular.   Pulmonary:      Effort: No respiratory distress.      Breath sounds: Rales present. No wheezing.   Abdominal:      General: There is distension.      Palpations: Abdomen is soft.   Musculoskeletal:      Cervical back: Neck supple.   Skin:     General: Skin is warm.      Capillary Refill: Capillary refill takes less than 2 seconds.      Comments: No LE edema    Neurological:      Mental Status: She is alert.       Significant Labs: All pertinent lab results from the last 24 hours have been reviewed.    Significant Imaging:  reviewed    Assessment and Plan:     * NSTEMI (non-ST elevated myocardial infarction)  At OSH ED, patient reports mid chest pain for 2 days that is getting worse.   STEMI activated with ST elevations II,III, aVF, V5-V6. Vitals stable.   Given nitroglycerin x1 with resolution of chest pain. Loaded with aspirin 325mg and plavix 300mg at 1050am.   She took her eliquis at home around 9am.   Another STEMI case on the table in [facility] cath lab, accepted for transfer to [facility].   Transferred from OSH, assessed in ED and discussed with family.    Bedside echocardiogram revealed significantly reduced LV and RV function.   Troponin 0.067.      Case discussed with Interventional Cardiology. Patient taken for [facility] (2/18/23) s/p POBA Ramus.    Plan:  Continue plavix, can stop asa  Continue eliquis for afib  Continue metoprolol and atorvastatin 40 mg daily     Acute on chronic combined systolic and diastolic heart failure  CXR with pleural effusion, pulmonary congestion  - continue lasix 10 mg/hr  - wean oxygen as tolerated  - plan for home with  hospice once more euvolemic    Goals of care, counseling/discussion  Advance Care Planning     Given age and comorbidities, discussed referral for home hospice with daughterRegla.  Family in agreement with diuresing patient, then discharging home with hospice.  Will consult social work for home hospice options.    I spent a total of 15 minutes engaging the patient in this advance care planning discussion.           Dementia  - delirium precautions    Paroxysmal atrial fibrillation  Rate controlled A Fib   CHADSVASC 5  HASBLED 2    On eliquis at home   restart home dose Eliquis     Hypothyroidism  Asymptomatic   On levothyroxine   Continue same meds     GERD (gastroesophageal reflux disease)  Poorly controlled   Given maaloox 1 dose  - improved    Essential hypertension  - currently normotensive        VTE Risk Mitigation (From admission, onward)         Ordered     apixaban tablet 5 mg  2 times daily         02/19/23 1138     IP VTE HIGH RISK PATIENT  Once         02/18/23 1225     Place sequential compression device  Until discontinued         02/18/23 1225                Sravan Gunter MD  Cardiology  [facility] - Cardiology Stepdown

## 2023-02-22 NOTE — PLAN OF CARE
[facility]  Department of Hospital Medicine  [address] [place] [address]  [phone] after hours  [phone] fax    HOSPICE  ORDERS    02/23/2023    Admit to Hospice:  Home Service     Diagnoses:   Active Hospital Problems    Diagnosis  POA    *NSTEMI (non-ST elevated myocardial infarction) [I21.4]  Yes    Acute on chronic combined systolic and diastolic heart failure [I50.43]  Unknown    Goals of care, counseling/discussion [Z71.89]  Not Applicable    Dementia [F03.90]  Yes    Paroxysmal atrial fibrillation [I48.0]  Yes    Hypothyroidism [E03.9]  Yes    GERD (gastroesophageal reflux disease) [K21.9]  Yes     Peppermint helps      Essential hypertension [I10]  Yes     Controlled        Resolved Hospital Problems   No resolved problems to display.       Hospice Qualifying Diagnoses:        Patient has a life expectancy < 6 months due to:  Primary Hospice Diagnosis: end stage heart failure  Comorbid Conditions Contributing to Decline: Dementia, Cirrhosis, Afib    Vital Signs: Routine per Hospice Protocol.    Code Status: DNR    Allergies:   Review of patient's allergies indicates:   Allergen Reactions    Flu vaccine [id number](9 yr+)        Diet: pleasure feeds    Activities: As tolerated    Goals of Care Treatment Preferences:  Code Status: DNR      Nursing: Per Hospice Routine.      Oxygen: continue supplemental O2 5-6L by NC     Carlson Care: Empty Carlson bag Q shift and PRN.  Change Carlson every month.    Routine Skin for Bedridden Patients: Apply moisture barrier cream to all skin folds and   wet areas in perineal area daily and after baths and all bowel movements.    Medications:  Please continue aspirin and plavix for stent patency.        Medication List        START taking these medications      acetaminophen 325 MG tablet  Commonly known as: TYLENOL  Take 2 tablets (650 mg total) by mouth every 4 (four) hours as needed.     aspirin 81 MG EC tablet  Commonly  known as: ECOTRIN  Take 1 tablet (81 mg total) by mouth once daily.     clopidogreL 75 mg tablet  Commonly known as: PLAVIX  Take 1 tablet (75 mg total) by mouth once daily.  Start taking on: February 24, 2023     oxyCODONE 5 MG immediate release tablet  Commonly known as: ROXICODONE  Take 1 tablet (5 mg total) by mouth every 4 (four) hours as needed.     pantoprazole 40 MG tablet  Commonly known as: PROTONIX  Take 1 tablet (40 mg total) by mouth before breakfast.     promethazine 12.5 MG Tab  Commonly known as: PHENERGAN  Take 1 tablet (12.5 mg total) by mouth every 6 (six) hours as needed.     torsemide 20 MG Tab  Commonly known as: DEMADEX  Take 4 tablets (80 mg total) by mouth 2 (two) times a day.            CONTINUE taking these medications      levothyroxine 25 MCG tablet  Commonly known as: SYNTHROID  TAKE 1 TABLET EVERY DAY     loratadine 10 mg tablet  Commonly known as: CLARITIN  Take 10 mg by mouth once daily.     oxybutynin 10 MG 24 hr tablet  Commonly known as: DITROPAN-XL  TAKE 1 TABLET EVERY DAY     SLEEP AID (DOXYLAMINE) 25 mg tablet  Generic drug: doxylamine succinate  Take 25 mg by mouth every evening.            STOP taking these medications      alendronate 70 MG tablet  Commonly known as: FOSAMAX     CHOLECALCIFEROL (VITAMIN D3) ORAL     cyanocobalamin 1000 MCG tablet  Commonly known as: VITAMIN B-12     ELIQUIS 5 mg Tab  Generic drug: apixaban     metoprolol succinate 200 MG 24 hr tablet  Commonly known as: TOPROL-XL              Future Orders:  Hospice Medical Director may dictate new orders for comfortable care measures & sign death certificate.        _________________________________  Sravan Gunter MD  02/22/2023

## 2023-02-22 NOTE — SUBJECTIVE & OBJECTIVE
Interval History: mild dyspnea overnight, denies chest pain this AM.  Increased NC requirements, now up to 6L.  CXR with known L hemidiaphragm elevation but now with pleural effusion.  Will continue to diurese.    Review of Systems   Reason unable to perform ROS: Dementia.   Objective:     Vital Signs (Most Recent):  Temp: 98 °F (36.7 °C) (02/22/23 1200)  Pulse: 85 (02/22/23 1201)  Resp: 20 (02/22/23 1200)  BP: 102/62 (02/22/23 1200)  SpO2: 98 % (02/22/23 1200) Vital Signs (24h Range):  Temp:  [97.5 °F (36.4 °C)-98 °F (36.7 °C)] 98 °F (36.7 °C)  Pulse:  [72-96] 85  Resp:  [14-20] 20  SpO2:  [86 %-98 %] 98 %  BP: (100-113)/(55-68) 102/62     Weight: 64.4 kg (141 lb 15.6 oz)  Body mass index is 24.37 kg/m².     SpO2: 98 %         Intake/Output Summary (Last 24 hours) at 2/22/2023 1318  Last data filed at 2/22/2023 1030  Gross per 24 hour   Intake 604 ml   Output 4525 ml   Net -3921 ml       Lines/Drains/Airways       Drain  Duration             Female External Urinary Catheter 02/22/23 1031 <1 day              Peripheral Intravenous Line  Duration                  Peripheral IV - Single Lumen 02/20/23 1145 20 G Anterior;Distal;Left Forearm 2 days         Midline Catheter Insertion/Assessment  - Single Lumen 02/20/23 1650 Right brachial vein 20g x 8cm 1 day                    Physical Exam  Vitals reviewed.   Constitutional:       Appearance: Normal appearance.   Cardiovascular:      Rate and Rhythm: Rhythm irregular.   Pulmonary:      Effort: No respiratory distress.      Breath sounds: Rales present. No wheezing.   Abdominal:      General: There is distension.      Palpations: Abdomen is soft.   Musculoskeletal:      Cervical back: Neck supple.   Skin:     General: Skin is warm.      Capillary Refill: Capillary refill takes less than 2 seconds.      Comments: No LE edema    Neurological:      Mental Status: She is alert.       Significant Labs: All pertinent lab results from the last 24 hours have been  reviewed.    Significant Imaging:  reviewed

## 2023-02-22 NOTE — PLAN OF CARE
AAOX3,VSS,O2 sats>92% on 6L NC.Plan of care discussed with patient and daughter.Patient has no complaints of chest pain/SOB/palpitations.Pt rolling in bed independently, fall precautions in place,no falls/injuries through the shift.Discussed medications and care.Infusing lasix gtts 10 mg/hr as per EMAR.Patient has no questions at this time.Pt resting comfortably with no acute distress.Call light within reach,bed at lowest position,daughter by the bedside.

## 2023-02-22 NOTE — PLAN OF CARE
Met with daughter and POA, Tenisha Bansal, to review discharge recommendation of Hospice and is agreeable to plan    Provided list of facilities in-network with patient's payor plan for family to research and select preference.   [facility]  2.   [facility]  3.   [facility]  4.   [facility]      Preferred Facility: (if more than 1, listed in order of descending preference)  [facility]    Anya Rico [facility]  Case Management Department  [email or web address]       02/22/23 1518   Post-Acute Status   Post-Acute Authorization Hospice   Hospice Status Referrals Sent   Patient choice form signed by patient/caregiver List with quality metrics by geographic area provided   Discharge Delays None known at this time   Discharge Plan   Discharge Plan A Hospice/home   Discharge Plan B Hospice/home

## 2023-02-23 VITALS
OXYGEN SATURATION: 98 % | WEIGHT: 142 LBS | SYSTOLIC BLOOD PRESSURE: 104 MMHG | TEMPERATURE: 98 F | DIASTOLIC BLOOD PRESSURE: 66 MMHG | BODY MASS INDEX: 24.24 KG/M2 | HEART RATE: 83 BPM | RESPIRATION RATE: 18 BRPM | HEIGHT: 64 IN

## 2023-02-23 LAB
ANION GAP SERPL CALC-SCNC: 12 MMOL/L (ref 8–16)
BUN SERPL-MCNC: 15 MG/DL (ref 10–30)
CALCIUM SERPL-MCNC: 8.8 MG/DL (ref 8.7–10.5)
CHLORIDE SERPL-SCNC: 84 MMOL/L (ref 95–110)
CO2 SERPL-SCNC: 41 MMOL/L (ref 23–29)
CREAT SERPL-MCNC: 0.7 MG/DL (ref 0.5–1.4)
EST. GFR  (NO RACE VARIABLE): >60 ML/MIN/1.73 M^2
GLUCOSE SERPL-MCNC: 105 MG/DL (ref 70–110)
MAGNESIUM SERPL-MCNC: 1.9 MG/DL (ref 1.6–2.6)
POCT GLUCOSE: 120 MG/DL (ref 70–110)
POTASSIUM SERPL-SCNC: 3.9 MMOL/L (ref 3.5–5.1)
SODIUM SERPL-SCNC: 137 MMOL/L (ref 136–145)

## 2023-02-23 PROCEDURE — 25000003 PHARM REV CODE 250: Performed by: STUDENT IN AN ORGANIZED HEALTH CARE EDUCATION/TRAINING PROGRAM

## 2023-02-23 PROCEDURE — 99239 HOSP IP/OBS DSCHRG MGMT >30: CPT | Mod: GC,,, | Performed by: INTERNAL MEDICINE

## 2023-02-23 PROCEDURE — 83735 ASSAY OF MAGNESIUM: CPT | Performed by: STUDENT IN AN ORGANIZED HEALTH CARE EDUCATION/TRAINING PROGRAM

## 2023-02-23 PROCEDURE — 99239 PR HOSPITAL DISCHARGE DAY,>30 MIN: ICD-10-PCS | Mod: GC,,, | Performed by: INTERNAL MEDICINE

## 2023-02-23 PROCEDURE — 63600175 PHARM REV CODE 636 W HCPCS: Performed by: STUDENT IN AN ORGANIZED HEALTH CARE EDUCATION/TRAINING PROGRAM

## 2023-02-23 PROCEDURE — 25000003 PHARM REV CODE 250: Performed by: INTERNAL MEDICINE

## 2023-02-23 PROCEDURE — 80048 BASIC METABOLIC PNL TOTAL CA: CPT | Performed by: STUDENT IN AN ORGANIZED HEALTH CARE EDUCATION/TRAINING PROGRAM

## 2023-02-23 RX ORDER — TORSEMIDE 20 MG/1
80 TABLET ORAL 2 TIMES DAILY
Qty: 240 TABLET | Refills: 11 | Status: SHIPPED | OUTPATIENT
Start: 2023-02-23 | End: 2024-02-23

## 2023-02-23 RX ORDER — LEVOTHYROXINE SODIUM 25 UG/1
25 TABLET ORAL
Status: DISCONTINUED | OUTPATIENT
Start: 2023-02-24 | End: 2023-02-23 | Stop reason: HOSPADM

## 2023-02-23 RX ORDER — IPRATROPIUM BROMIDE AND ALBUTEROL SULFATE 2.5; .5 MG/3ML; MG/3ML
3 SOLUTION RESPIRATORY (INHALATION) EVERY 6 HOURS PRN
Status: DISCONTINUED | OUTPATIENT
Start: 2023-02-23 | End: 2023-02-23 | Stop reason: HOSPADM

## 2023-02-23 RX ORDER — CLOPIDOGREL BISULFATE 75 MG/1
75 TABLET ORAL DAILY
Qty: 30 TABLET | Refills: 11 | Status: SHIPPED | OUTPATIENT
Start: 2023-02-24 | End: 2024-02-24

## 2023-02-23 RX ORDER — ASPIRIN 81 MG/1
81 TABLET ORAL DAILY
Qty: 360 TABLET | Refills: 0 | Status: SHIPPED | OUTPATIENT
Start: 2023-02-23 | End: 2024-02-23

## 2023-02-23 RX ADMIN — METOPROLOL SUCCINATE 200 MG: 100 TABLET, EXTENDED RELEASE ORAL at 09:02

## 2023-02-23 RX ADMIN — CEFTRIAXONE 2 G: 2 INJECTION, POWDER, FOR SOLUTION INTRAMUSCULAR; INTRAVENOUS at 01:02

## 2023-02-23 RX ADMIN — MUPIROCIN: 20 OINTMENT TOPICAL at 09:02

## 2023-02-23 RX ADMIN — POLYETHYLENE GLYCOL 3350 17 G: 17 POWDER, FOR SOLUTION ORAL at 09:02

## 2023-02-23 RX ADMIN — ATORVASTATIN CALCIUM 40 MG: 40 TABLET, FILM COATED ORAL at 09:02

## 2023-02-23 RX ADMIN — SENNOSIDES AND DOCUSATE SODIUM 1 TABLET: 50; 8.6 TABLET ORAL at 09:02

## 2023-02-23 RX ADMIN — PANTOPRAZOLE SODIUM 40 MG: 40 TABLET, DELAYED RELEASE ORAL at 05:02

## 2023-02-23 RX ADMIN — OXYBUTYNIN CHLORIDE 10 MG: 10 TABLET, EXTENDED RELEASE ORAL at 09:02

## 2023-02-23 RX ADMIN — APIXABAN 5 MG: 5 TABLET, FILM COATED ORAL at 09:02

## 2023-02-23 RX ADMIN — CLOPIDOGREL BISULFATE 75 MG: 75 TABLET ORAL at 09:02

## 2023-02-23 RX ADMIN — LEVOTHYROXINE SODIUM 25 MCG: 25 TABLET ORAL at 08:02

## 2023-02-23 NOTE — PROGRESS NOTES
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 lj- fosamax    Other dietary vitamin B12 deficiency anemia 1/21/2020    Paroxysmal atrial fibrillation 10/21/2019    PSVT (paroxysmal supraventricular tachycardia)     dr. 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    Thyroid disease     hypothyroidism    Trochanteric bursitis 4/18/2013    Unstable angina 2/18/2023    Urine incontinence 2/24/2015    Vitamin D deficiency disease 9/4/2014       Social History     Socioeconomic History    Marital status:    Tobacco Use    Smoking status: Never    Smokeless tobacco: Never   Substance and Sexual Activity    Alcohol use: No    Drug use: No    Sexual activity: Not Currently       Precautions:     Allergies as of 02/18/2023 - Reviewed 02/18/2023   Allergen Reaction Noted    Flu vaccine [id number](9 yr+)  01/21/2020       WOC Assessment Details/Treatment        02/23/23 1200   WOCN Assessment   WOCN Total Time (mins) 30   Visit Date 02/23/23   Visit Time 1140    Consult Type New   WOCN Speciality Wound   Intervention assessed;applied;chart review;coordination of care;orders   Teaching on-going   Positioning   Body Position supine   Head of Bed (HOB) Positioning HOB at 30-45 degrees   Positioning/Transfer Devices pillows             Wound consult completed for rash under bilateral breast and abdominal fold.  Family at bedside.  Red dry rash noted to both areas.  Recommendations made to primary team for antifungal ointment to area.  Orders placed.     02/23/2023

## 2023-02-23 NOTE — ASSESSMENT & PLAN NOTE
Advance Care Planning     Given age and comorbidities, discussed referral for home hospice with daughter, Regla.  Family in agreement with diuresing patient, then discharging home with hospice.  Will consult social work for home hospice options.    I spent a total of 15 minutes engaging the patient in this advance care planning discussion.  - home with hospice

## 2023-02-23 NOTE — DISCHARGE SUMMARY
[facility] - Cardiology Stepdown  Cardiology  Discharge Summary      Patient Name: Sandra Moody  MRN: [id number]  Admission Date: 2/18/2023  Hospital Length of Stay: 5 days  Discharge Date and Time:  02/23/2023 1:49 PM  Attending Physician: Helen Cortez MD    Discharging Provider: Sravan Gunter MD  Primary Care Physician: Akhil Harry MD    HPI:   Patient is 89-year-old lady with PMHx significant for dementia, Afib on Eliquis, cirrhosis, history of lung cancer, who presented to [facility] ED with nausea/vomiting for 1 week.     At OSH ED, patient reports mid chest pain for 2 days that is getting worse. STEMI activated with ST elevations II,III, aVF, V5-V6. Vitals stable. Given nitroglycerin x1 with resolution of chest pain. Loaded with aspirin 325mg and plavix 300mg at 1050am. She took her eliquis at home around 9am. Another STEMI case on the table in [facility] cath lab, accepted for transfer to [facility].     Currently en route from OSH, will assess in ED and discuss with family.      Procedure(s) (LRB):  ANGIOGRAM, CORONARY ARTERY (Right)  Stent, Drug Eluting, Single Vessel, Coronary     Indwelling Lines/Drains at time of discharge:  Lines/Drains/Airways       Drain  Duration             Female External Urinary Catheter 02/22/23 1031 1 day                    Hospital Course:  Bedside echocardiogram revealed significantly reduced LV and RV function. Troponin 0.067.  Case discussed with Interventional Cardiology. Patient taken for [facility] (2/18/23) s/p POBA Ramus.  Echo showing EF 18%, possible LV thrombus, CVP 15. Started on lasix drip.  Given age, comorbidities, discussed palliative care with daughter, Regla.  Family is agreeable to hospice.  SW consulted for hospice, plan for home hospice once more euvolemic.  Attempted to diurese, UOP decent however still hypoxic requiring supplemental oxygen by NC.  Discussed with family, will discharge to home hospice on DAPT (discontinue eliquis),  "diuretics, supplemental oxygen.  All questions answered.       Goals of Care Treatment Preferences:  Code Status: DNR    /66 (BP Location: Left arm, Patient Position: Lying)   Pulse 83   Temp 97.8 °F (36.6 °C) (Oral)   Resp 18   Ht 5' 4" (1.626 m)   Wt 64.4 kg (141 lb 15.6 oz)   SpO2 98%   BMI 24.37 kg/m²   Physical Exam  Vitals reviewed.   Constitutional:       Appearance: Normal appearance.   Cardiovascular:      Rate and Rhythm: Rhythm irregular.   Pulmonary:      Effort: No respiratory distress.      Breath sounds: Rales present. No wheezing.   Abdominal:      General: There is distension.      Palpations: Abdomen is soft.   Musculoskeletal:      Cervical back: Neck supple.   Skin:     General: Skin is warm.      Capillary Refill: Capillary refill takes less than 2 seconds.      Comments: No LE edema    Neurological:      Mental Status: She is alert.     Consults:   Consults (From admission, onward)          Status Ordering Provider     Inpatient consult to Midline team  Once        Provider:  (Not yet assigned)    Completed PRAFUL CUMMINGS            Significant Diagnostic Studies: Labs:   CMP   Recent Labs   Lab 02/22/23  0759 02/23/23  0545   * 137   K 3.7 3.9   CL 87* 84*   CO2 37* 41*   * 105   BUN 20 15   CREATININE 0.9 0.7   CALCIUM 8.7 8.8   ANIONGAP 11 12   , CBC No results for input(s): WBC, HGB, HCT, PLT in the last 48 hours., INR   Lab Results   Component Value Date    INR 2.3 (H) 02/18/2023    INR 1.3 (H) 02/18/2023    INR 1.0 02/24/2020   , Lipid Panel   Lab Results   Component Value Date    CHOL 150 02/18/2023    HDL 39 (L) 02/18/2023    LDLCALC 94.4 02/18/2023    TRIG 83 02/18/2023    CHOLHDL 26.0 02/18/2023   , Troponin   Recent Labs   Lab 02/18/23  1044 02/18/23  1400 02/18/23 2009   TROPONINI 0.016 12.557* >50.000*    and A1C: No results for input(s): HGBA1C in the last 4320 hours.  Cardiac Graphics: Echocardiogram:   2D echo with color flow doppler:   Results " for orders placed or performed during the hospital encounter of 03/16/17   2D echo with color flow doppler   Result Value Ref Range    EF + QEF 60 55 - 65    Mitral Valve Regurgitation TRIVIAL     Diastolic Dysfunction No     Est. PA Systolic Pressure 35.26     Tricuspid Valve Regurgitation MILD     Narrative    Date of Procedure: 03/16/2017        TEST DESCRIPTION       Aorta: The aortic root is normal in size, measuring 2.7 cm at sinotubular junction and 3.2 cm at Sinuses of Valsalva. The proximal ascending aorta is mildly enlarged, measuring 3.8 cm across.     Left Atrium: The left atrial volume index is normal, measuring 25.81 cc/m2.     Left Ventricle: The left ventricle is normal in size, with an end-diastolic diameter of 4.3 cm, and an end-systolic diameter of 2.7 cm. Wall thickness is mildly increased, with the septum and the posterior wall each measuring 1.1 cm across. Relative wall   thickness was increased at 0.51, and the LV mass index was increased at 112.7 g/m2 consistent with concentric left ventricular hypertrophy. Global left ventricular systolic function appears normal. Visually estimated ejection fraction is 60-65%. The LV   Doppler derived stroke volume equals 90.0 ccs.   The E/e'(lat) is 9, consistent with normal diastolic function.     Right Atrium: The right atrium is normal in size, measuring 3.8 cm in length and 3.3 cm in width in the apical view.     Right Ventricle: The right ventricle is normal in size measuring 3.5 cm at the base in the apical right ventricle-focused view. Global right ventricular systolic function appears normal. Tricuspid annular plane systolic excursion (TAPSE) is 2.1 cm.   Tissue Doppler-derived tricuspid annular peak systolic velocity (S prime) is 13.5 cm/s. The estimated PA systolic pressure is 35 mmHg.     Aortic Valve:  The mean gradient obtained across the aortic valve is 6.19 mmHg. Using a left ventricular outflow tract diameter of 2.3 cm, a left ventricular  outflow tract velocity time integral of 21 cm, and a peak instantaneous transvalvular velocity   time integral of 36 cm, the calculated aortic valve area is 2.47 cm2.     Mitral Valve:  The pressure half time is 53 msec. The calculated mitral valve area is 4.15 cm2. There is trivial mitral regurgitation.     Tricuspid Valve:  There is mild tricuspid regurgitation.     IVC: IVC is normal in size and collapses > 50% with a sniff, suggesting normal right atrial pressure of 3 mmHg.     Intracavitary: There is no evidence of pericardial effusion, intracavity mass, thrombi, or vegetation.         CONCLUSIONS     1 - Normal left ventricular systolic function (EF 60-65%).     2 - Concentric hypertrophy.     3 - Mildly enlarged ascending aorta.     4 - The estimated PA systolic pressure is 35 mmHg.     5 - Trivial mitral regurgitation.     6 - Mild tricuspid regurgitation.             This document has been electronically    SIGNED BY: Jori Trivedi MD On: 03/16/2017 11:17    and Transthoracic echo (TTE) complete (Cupid Only):   Results for orders placed or performed during the hospital encounter of 02/18/23   Echo   Result Value Ref Range    Ascending aorta 3.56 cm    STJ 2.73 cm    AV mean gradient 3 mmHg    Ao peak dajuan 0.97 m/s    Ao VTI 17.94 cm    IVRT 91.34 msec    IVS 0.85 0.6 - 1.1 cm    LA size 3.86 cm    Left Atrium Major Axis 6.58 cm    Left Atrium Minor Axis 6.65 cm    LVIDd 4.99 3.5 - 6.0 cm    LVIDs 4.34 (A) 2.1 - 4.0 cm    LVOT diameter 2.04 cm    LVOT peak VTI 10.46 cm    Posterior Wall 1.12 (A) 0.6 - 1.1 cm    MV Peak E Dajuan 1.08 m/s    RA Major Axis 5.06 cm    RA Width 4.10 cm    RVDD 3.20 cm    Sinus 3.17 cm    TAPSE 1.52 cm    TR Max Dajuan 2.77 m/s    TDI LATERAL 0.05 m/s    TDI SEPTAL 0.06 m/s    LA WIDTH 4.08 cm    LV Diastolic Volume 117.91 mL    LV Systolic Volume 85.09 mL    LVOT peak dajuan 0.56 m/s    LA volume (mod) 87.47 cm3    RV S' 7.61 cm/s    LV LATERAL E/E' RATIO 21.60 m/s    LV SEPTAL E/E' RATIO  18.00 m/s    FS 13 %    LA volume 88.55 cm3    LV mass 177.73 g    Left Ventricle Relative Wall Thickness 0.45 cm    AV valve area 1.90 cm2    AV Velocity Ratio 0.58     AV index (prosthetic) 0.58     Mean e' 0.06 m/s    LVOT area 3.3 cm2    LVOT stroke volume 34.17 cm3    AV peak gradient 4 mmHg    E/E' ratio 19.64 m/s    LV Systolic Volume Index 49.8 mL/m2    LV Diastolic Volume Index 68.95 mL/m2    LA Volume Index 51.8 mL/m2    LV Mass Index 104 g/m2    Triscuspid Valve Regurgitation Peak Gradient 31 mmHg    LA Volume Index (Mod) 51.2 mL/m2    BSA 1.72 m2    Right Atrial Pressure (from IVC) 15 mmHg    EF 18 %    TV rest pulmonary artery pressure 46 mmHg    Narrative    · A small layered left ventricular thrombus is possible. In the lateral   part of the apex ( only seen with contrast.)  · The left ventricle is normal in size with concentric hypertrophy and   severely decreased systolic function.  · The estimated ejection fraction is 18%.  · There are segmental left ventricular wall motion abnormalities.  · Left ventricular diastolic dysfunction.  · Severe left atrial enlargement.  · Normal right ventricular size with mildly to moderately reduced right   ventricular systolic function.  · Mild right atrial enlargement.  · Mild-to-moderate mitral regurgitation.  · Severe tricuspid regurgitation.  · Mild pulmonic regurgitation.  · Elevated central venous pressure (15 mmHg).  · The estimated PA systolic pressure is 46 mmHg.  · There is at least mild pulmonary hypertension.  · Trivial posterior pericardial effusion.          Pending Diagnostic Studies:       None            Final Active Diagnoses:    Diagnosis Date Noted POA    PRINCIPAL PROBLEM:  NSTEMI (non-ST elevated myocardial infarction) [I21.4] 02/18/2023 Yes    Acute on chronic combined systolic and diastolic heart failure [I50.43] 02/22/2023 Unknown    Goals of care, counseling/discussion [Z71.89] 02/20/2023 Not Applicable    Dementia [F03.90] 02/19/2023 Yes     Paroxysmal atrial fibrillation [I48.0] 10/21/2019 Yes    Hypothyroidism [E03.9] 04/10/2014 Yes    GERD (gastroesophageal reflux disease) [K21.9] 04/18/2013 Yes    Essential hypertension [I10]  Yes      Problems Resolved During this Admission:     Neuro  Dementia  - delirium precautions  - home with hospice    Cardiac/Vascular  * NSTEMI (non-ST elevated myocardial infarction)  At OSH ED, patient reports mid chest pain for 2 days that is getting worse.   STEMI activated with ST elevations II,III, aVF, V5-V6. Vitals stable.   Given nitroglycerin x1 with resolution of chest pain. Loaded with aspirin 325mg and plavix 300mg at 1050am.   She took her eliquis at home around 9am.   Another STEMI case on the table in [facility] cath lab, accepted for transfer to [facility].   Transferred from OSH, assessed in ED and discussed with family.    Bedside echocardiogram revealed significantly reduced LV and RV function.   Troponin 0.067.    - home with hospice    Case discussed with Interventional Cardiology. Patient taken for [facility] (2/18/23) s/p POBA Ramus.    Plan:  Continue plavix, can stop asa  Continue eliquis for afib  Continue metoprolol and atorvastatin 40 mg daily     Acute on chronic combined systolic and diastolic heart failure  CXR with pleural effusion, pulmonary congestion  - continue lasix 10 mg/hr  - wean oxygen as tolerated  - cotninue torsemide 80 mg for volume management  - home with hospice    Paroxysmal atrial fibrillation  Rate controlled A Fib   CHADSVASC 5  HASBLED 2    On eliquis at home   D/c eliquis given bleed concerns, will continue DAPT  - home with hospice    Essential hypertension  - currently normotensive  - home with hospice    Endocrine  Hypothyroidism  vAsymptomatic   On levothyroxine   Continue same meds   - home with hospice    GI  GERD (gastroesophageal reflux disease)  Poorly controlled   Given maaloox 1 dose  - improved  - home with hospice    Palliative Care  Goals of care,  counseling/discussion  Advance Care Planning     Given age and comorbidities, discussed referral for home hospice with daughterRegla.  Family in agreement with diuresing patient, then discharging home with hospice.  Will consult social work for home hospice options.    I spent a total of 15 minutes engaging the patient in this advance care planning discussion.  - home with hospice           Discharged Condition: stable    Disposition: Hospice/Home    Follow Up:    Patient Instructions:   No discharge procedures on file.  Medications:  Reconciled Home Medications:      Medication List        START taking these medications      acetaminophen 325 MG tablet  Commonly known as: TYLENOL  Take 2 tablets (650 mg total) by mouth every 4 (four) hours as needed.     aspirin 81 MG EC tablet  Commonly known as: ECOTRIN  Take 1 tablet (81 mg total) by mouth once daily.     clopidogreL 75 mg tablet  Commonly known as: PLAVIX  Take 1 tablet (75 mg total) by mouth once daily.  Start taking on: February 24, 2023     oxyCODONE 5 MG immediate release tablet  Commonly known as: ROXICODONE  Take 1 tablet (5 mg total) by mouth every 4 (four) hours as needed.     pantoprazole 40 MG tablet  Commonly known as: PROTONIX  Take 1 tablet (40 mg total) by mouth before breakfast.     promethazine 12.5 MG Tab  Commonly known as: PHENERGAN  Take 1 tablet (12.5 mg total) by mouth every 6 (six) hours as needed.     torsemide 20 MG Tab  Commonly known as: DEMADEX  Take 4 tablets (80 mg total) by mouth 2 (two) times a day.            CONTINUE taking these medications      levothyroxine 25 MCG tablet  Commonly known as: SYNTHROID  TAKE 1 TABLET EVERY DAY     loratadine 10 mg tablet  Commonly known as: CLARITIN  Take 10 mg by mouth once daily.     oxybutynin 10 MG 24 hr tablet  Commonly known as: DITROPAN-XL  TAKE 1 TABLET EVERY DAY     SLEEP AID (DOXYLAMINE) 25 mg tablet  Generic drug: doxylamine succinate  Take 25 mg by mouth every evening.             STOP taking these medications      alendronate 70 MG tablet  Commonly known as: FOSAMAX     CHOLECALCIFEROL (VITAMIN D3) ORAL     cyanocobalamin 1000 MCG tablet  Commonly known as: VITAMIN B-12     ELIQUIS 5 mg Tab  Generic drug: apixaban     metoprolol succinate 200 MG 24 hr tablet  Commonly known as: TOPROL-XL              Time spent on the discharge of patient: 35 minutes    Sravan Gunter MD  Cardiology  [facility] - Cardiology Stepdown

## 2023-02-23 NOTE — PLAN OF CARE
Patient discharged to home with [facility] in home care.  S/he was transported by [facility] transport.       Anya Rico [facility]  Case Management Department  [email or web address] [email or web address]     [facility] - Cardiology Stepdown  Discharge Final Note    Primary Care Provider: Akhil Harry MD    Expected Discharge Date: 2/23/2023    Final Discharge Note (most recent)       Final Note - 02/23/23 1314          Final Note    Assessment Type Final Discharge Note     Anticipated Discharge Disposition Hospice/Home     What phone number can be called within the next 1-3 days to see how you are doing after discharge? [phone]     Hospital Resources/Appts/Education Provided Provided patient/caregiver with written discharge plan information        Post-Acute Status    Post-Acute Authorization Hospice     Hospice Status Set-up Complete/Auth obtained     Patient choice form signed by patient/caregiver List with quality metrics by geographic area provided     Discharge Delays None known at this time                     Important Message from Medicare

## 2023-02-23 NOTE — ASSESSMENT & PLAN NOTE
Rate controlled A Fib   CHADSVASC 5  HASBLED 2    On eliquis at home   D/c eliquis given bleed concerns, will continue DAPT  - home with hospice

## 2023-02-23 NOTE — ASSESSMENT & PLAN NOTE
At OSH ED, patient reports mid chest pain for 2 days that is getting worse.   STEMI activated with ST elevations II,III, aVF, V5-V6. Vitals stable.   Given nitroglycerin x1 with resolution of chest pain. Loaded with aspirin 325mg and plavix 300mg at 1050am.   She took her eliquis at home around 9am.   Another STEMI case on the table in [facility] cath lab, accepted for transfer to [facility].   Transferred from OSH, assessed in ED and discussed with family.    Bedside echocardiogram revealed significantly reduced LV and RV function.   Troponin 0.067.    - home with hospice    Case discussed with Interventional Cardiology. Patient taken for [facility] (2/18/23) s/p LITO [place].    Plan:  Continue plavix, can stop asa  Continue eliquis for afib  Continue metoprolol and atorvastatin 40 mg daily

## 2023-02-23 NOTE — ASSESSMENT & PLAN NOTE
CXR with pleural effusion, pulmonary congestion  - continue lasix 10 mg/hr  - wean oxygen as tolerated  - cotninue torsemide 80 mg for volume management  - home with hospice

## 2023-02-27 ENCOUNTER — PES CALL (OUTPATIENT)
Dept: ADMINISTRATIVE | Facility: CLINIC | Age: 88
End: 2023-02-27
Payer: MEDICARE

## 2023-05-22 PROBLEM — I21.4 NSTEMI (NON-ST ELEVATED MYOCARDIAL INFARCTION): Status: RESOLVED | Noted: 2023-02-18 | Resolved: 2023-05-22

## 2023-09-08 NOTE — ASSESSMENT & PLAN NOTE
Patient with afib. Now resolved. Patient has atrial fibrillation.  I had a detailed discussion with the patient regarding the etiology, the pathophysiology and treatment for atrial fibrillation. We discussed the increased risk of stroke in atrial fibrillation and the reasoning behind anticoagulation. Based on patient's CHADSVASC score there is a benefit of anticoagulation.    We discussed the risks and benefits of anticoagulation and the risks and benefits of anticoagulation including the decreased risk of stroke as well as the increased risk of bleeding with anticoagulation.  We also discussed the various anticoagulant options including warfarin and the newer anticoagulant agents. However, complicated by the fact that she has liver cirrhosis per the patient. Will get clearance from GI prior to initiation of anti-coagulation.   Increased toprol xl to 100 mg daily for better rate control.     Gastroenterology has cleared patient for anticoagulation.  I had a detailed discussion with the patient's son about various options for anticoagulation.  Patient son does not want to have his mother on Coumadin.  Start Eliquis.  Follow-up in Cardiology Clinic.   Yes - the patient is able to be screened

## 2024-02-21 ENCOUNTER — PATIENT OUTREACH (OUTPATIENT)
Dept: ADMINISTRATIVE | Facility: HOSPITAL | Age: 89
End: 2024-02-21
Payer: MEDICARE
